# Patient Record
Sex: MALE | Race: WHITE | NOT HISPANIC OR LATINO | Employment: OTHER | URBAN - METROPOLITAN AREA
[De-identification: names, ages, dates, MRNs, and addresses within clinical notes are randomized per-mention and may not be internally consistent; named-entity substitution may affect disease eponyms.]

---

## 2017-08-31 ENCOUNTER — ALLSCRIPTS OFFICE VISIT (OUTPATIENT)
Dept: OTHER | Facility: OTHER | Age: 72
End: 2017-08-31

## 2017-08-31 DIAGNOSIS — R06.2 WHEEZING: ICD-10-CM

## 2017-09-14 ENCOUNTER — HOSPITAL ENCOUNTER (OUTPATIENT)
Dept: RADIOLOGY | Facility: HOSPITAL | Age: 72
Discharge: HOME | End: 2017-09-14
Attending: INTERNAL MEDICINE
Payer: MEDICARE

## 2017-09-14 DIAGNOSIS — R06.2 WHEEZING: ICD-10-CM

## 2017-09-14 PROCEDURE — 71020 HB CHEST X-RAY 2VW FRONTAL&LATL: CPT

## 2017-09-22 ENCOUNTER — ALLSCRIPTS OFFICE VISIT (OUTPATIENT)
Dept: OTHER | Facility: OTHER | Age: 72
End: 2017-09-22

## 2017-10-19 ENCOUNTER — GENERIC CONVERSION - ENCOUNTER (OUTPATIENT)
Dept: OTHER | Facility: OTHER | Age: 72
End: 2017-10-19

## 2017-11-02 ENCOUNTER — ALLSCRIPTS OFFICE VISIT (OUTPATIENT)
Dept: OTHER | Facility: OTHER | Age: 72
End: 2017-11-02

## 2017-11-16 ENCOUNTER — TRANSCRIBE ORDERS (OUTPATIENT)
Dept: ADMINISTRATIVE | Facility: HOSPITAL | Age: 72
End: 2017-11-16

## 2017-11-16 ENCOUNTER — GENERIC CONVERSION - ENCOUNTER (OUTPATIENT)
Dept: OTHER | Facility: OTHER | Age: 72
End: 2017-11-16

## 2017-11-16 ENCOUNTER — APPOINTMENT (OUTPATIENT)
Dept: LAB | Facility: CLINIC | Age: 72
End: 2017-11-16
Payer: MEDICARE

## 2017-11-16 DIAGNOSIS — G47.33 OBSTRUCTIVE SLEEP APNEA: ICD-10-CM

## 2017-11-16 DIAGNOSIS — R06.02 SHORTNESS OF BREATH: ICD-10-CM

## 2017-11-16 DIAGNOSIS — R06.02 SHORTNESS OF BREATH: Primary | ICD-10-CM

## 2017-11-16 PROCEDURE — 36415 COLL VENOUS BLD VENIPUNCTURE: CPT

## 2017-11-16 PROCEDURE — 86003 ALLG SPEC IGE CRUDE XTRC EA: CPT

## 2017-11-16 PROCEDURE — 82785 ASSAY OF IGE: CPT

## 2017-11-17 LAB
A ALTERNATA IGE QN: <0.1 KUA/I
A FUMIGATUS IGE QN: <0.1 KUA/I
ALLERGEN COMMENT: ABNORMAL
BERMUDA GRASS IGE QN: 0.42 KUA/I
BOXELDER IGE QN: <0.1 KUA/I
C HERBARUM IGE QN: <0.1 KUA/I
CAT DANDER IGE QN: <0.1 KUA/I
CMN PIGWEED IGE QN: <0.1 KUA/I
COMMON RAGWEED IGE QN: <0.1 KUA/I
COTTONWOOD IGE QN: <0.1 KUA/I
D FARINAE IGE QN: 0.19 KUA/I
D PTERONYSS IGE QN: 0.23 KUA/I
DOG DANDER IGE QN: <0.1 KUA/I
LONDON PLANE IGE QN: <0.1 KUA/I
MOUSE URINE PROT IGE QN: <0.1 KUA/I
MT JUNIPER IGE QN: <0.1 KUA/I
MUGWORT IGE QN: <0.1 KUA/I
P NOTATUM IGE QN: <0.1 KUA/I
ROACH IGE QN: <0.1 KUA/I
SHEEP SORREL IGE QN: <0.1 KUA/I
SILVER BIRCH IGE QN: 0.83 KUA/I
TIMOTHY IGE QN: 2.09 KUA/I
TOTAL IGE SMQN RAST: 10.8 KU/L (ref 0–113)
WALNUT IGE QN: <0.1 KUA/I
WHITE ASH IGE QN: 0.14 KUA/I
WHITE ELM IGE QN: <0.1 KUA/I
WHITE MULBERRY IGE QN: <0.1 KUA/I
WHITE OAK IGE QN: 0.4 KUA/I

## 2017-12-19 ENCOUNTER — TRANSCRIBE ORDERS (OUTPATIENT)
Dept: SLEEP CENTER | Facility: CLINIC | Age: 72
End: 2017-12-19

## 2017-12-19 DIAGNOSIS — G47.33 OSA (OBSTRUCTIVE SLEEP APNEA): Primary | ICD-10-CM

## 2017-12-22 ENCOUNTER — GENERIC CONVERSION - ENCOUNTER (OUTPATIENT)
Dept: OTHER | Facility: OTHER | Age: 72
End: 2017-12-22

## 2017-12-22 DIAGNOSIS — I10 ESSENTIAL (PRIMARY) HYPERTENSION: ICD-10-CM

## 2018-01-12 VITALS
DIASTOLIC BLOOD PRESSURE: 80 MMHG | HEIGHT: 63 IN | OXYGEN SATURATION: 99 % | WEIGHT: 224 LBS | BODY MASS INDEX: 39.69 KG/M2 | HEART RATE: 80 BPM | SYSTOLIC BLOOD PRESSURE: 130 MMHG

## 2018-01-13 VITALS
HEART RATE: 73 BPM | OXYGEN SATURATION: 93 % | SYSTOLIC BLOOD PRESSURE: 124 MMHG | BODY MASS INDEX: 41.86 KG/M2 | WEIGHT: 236.25 LBS | DIASTOLIC BLOOD PRESSURE: 76 MMHG | HEIGHT: 63 IN

## 2018-01-17 NOTE — CONSULTS
Chief Complaint  Patient here for pre-op clearance  He is having surgery on 11/08/2017 with Dr Liliana Mitchell for [facility] cell carcinoma on his face  History of Present Illness  Pre-Op Visit (Brief): The patient is being seen for a preoperative visit and 80-year-old male presents for preop medical evaluation for upcoming excisions of basal cell carcinoma from the left side of his face, scheduled for November 8, 2017 by Dr Monae Pappas  Surgical Risk Assessment:   Prior Anesthesia: He had prior anesthesia and no prior adverse reaction to general anesthesia  Pertinent Past Medical History: no pertinent past medical history  Exercise Capacity: able to walk four blocks without symptoms and able to walk two flights of stairs without symptoms  Lifestyle Factors: denies alcohol use, denies tobacco use and denies illegal drug use  Symptoms: no symptoms  HPI: Patient reports he has had several basal cell carcinomas removed in his past   Of note also this patient is being sent for pulmonary evaluation for suspected sleep apnea  At this time he denies chest pain, palpitations, shortness of breath, diaphoresis, dizziness, headache, or visual disturbance  Review of Systems    Constitutional: no fever, not feeling poorly, no chills and not feeling tired  Eyes: no eyesight problems  ENT: no earache, no sore throat and no nasal discharge  Cardiovascular: No complaints of slow heart rate, no fast heart rate, no chest pain, no palpitations, no leg claudication, no lower extremity  Respiratory: Patient does note that he has wheezing at night time if he lies on his back or his right side  , but No complaints of shortness of breath, no wheezing, no cough, no SOB on exertion, no orthopnea or PND  Gastrointestinal: No complaints of abdominal pain, no constipation, no nausea or vomiting, no diarrhea or bloody stools     Genitourinary: History of OAB, but No complaints of dysuria, no incontinence, no hesitancy, no nocturia, no genital lesion, no testicular pain  Musculoskeletal: No complaints of arthralgia, no myalgias, no joint swelling or stiffness, no limb pain or swelling  Integumentary: skin lesion, but as noted in HPI  Neurological: no headache, no dizziness and no fainting  Hematologic/Lymphatic: No complaints of swollen glands, no swollen glands in the neck, does not bleed easily, no easy bruising  Active Problems    1  Anxiety (300 00) (F41 9)   2  Back problem (724 9) (M53 9)   3  Basal cell carcinoma of skin (173 91) (C44 91)   4  Benign hypertension (401 1) (I10)   5  GERD (gastroesophageal reflux disease) (530 81) (K21 9)   6  Hyperlipidemia (272 4) (E78 5)   7  Morbid obesity (278 01) (E66 01)   8  OAB (overactive bladder) (596 51) (N32 81)   9  Obstructive sleep apnea (327 23) (G47 33)   10  Screening for colon cancer (V76 51) (Z12 11)   11  Screening for genitourinary condition (V81 6) (Z13 89)   12  Special screening examination for neoplasm of prostate (V76 44) (Z12 5)   13  Urinary urgency (788 63) (R39 15)    Past Medical History    · History of colon polyps (V12 72) (Z86 010)   · History of diverticulosis (V12 79) (Z87 19)   · History of wheezing (V12 69) (X77 868)   · History of Small bowel obstruction, partial (560 9) (K56 600)    The active problems and past medical history were reviewed and updated today  Surgical History    · History of Biopsy Skin   · History of Tonsillectomy With Adenoidectomy    The surgical history was reviewed and updated today  Family History    · Family history of cardiac disorder (V17 49) (Z82 49)   · Family history of hypertension (V17 49) (Z82 49)    · Family history of malignant neoplasm of breast (V16 3) (Z80 3)    · Denied: Family history of mental disorder   · Denied: Family history of substance abuse    The family history was reviewed and updated today         Social History    · Denies alcohol consumption (V49 89) (Z78 9)   · Full-time employment   · Sales   · Has no children   · Denied: History of High risk sexual behavior   ·    · Never smoked cigarettes (V49 89) (W97 1)   · No illicit drug use  The social history was reviewed and updated today  The social history was reviewed and is unchanged  Current Meds   1  ALPRAZolam 0 5 MG Oral Tablet; Take 1 tablet twice daily  Requested for: [id number];   Last Rx:26Oct2017 Ordered   2  Aspirin EC Lo-Dose 81 MG TBEC; Therapy: (Recorded:05May2016) to Recorded   3  B Complex Plus TABS; Therapy: (Recorded:05May2016) to Recorded   4  Chondroitin Complex CAPS; Therapy: (Recorded:05May2016) to Recorded   5  CoQ-10 100 MG Oral Capsule; Therapy: (Recorded:05May2016) to Recorded   6  Fenofibrate 54 MG Oral Tablet; take 1 tablet by mouth daily; Therapy: 75Osp6628 to (Evaluate:10Mar2018)  Requested for: 80Biy4709; Last   Rx:11Sep2017 Ordered   7  Glucosamine Complex Oral Tablet; Therapy: (Recorded:05May2016) to Recorded   8  Metoprolol Tartrate 50 MG Oral Tablet; Therapy: (Recorded:05May2016) to Recorded   9  Multi-Vitamin Oral Tablet; Therapy: (Recorded:05May2016) to Recorded   10  Omeprazole 40 MG CPCR; Therapy: (Recorded:05May2016) to Recorded   11  Probiotic Product TABS; Therapy: (Recorded:05May2016) to Recorded   12  Vitamin D3 1000 UNIT Oral Tablet; Therapy: (Recorded:05May2016) to Recorded    The medication list was reviewed and updated today  Allergies    1  No Known Drug Allergies    Vitals  Signs    Temperature: 97 9 F  Heart Rate: 78  Systolic: 580  Diastolic: 80  Height: 5 ft 2 5 in  Weight: 225 lb   BMI Calculated: 40 5  BSA Calculated: 2 02  O2 Saturation: 93    Physical Exam    Constitutional   General appearance: No acute distress, well appearing and well nourished  morbidly obese  Head and Face   Head and face: Normal     Palpation of the face and sinuses: No sinus tenderness  Eyes   Conjunctiva and lids: No erythema, swelling or discharge      Pupils and irises: Equal, round, reactive to light  Ears, Nose, Mouth, and Throat   External inspection of ears and nose: Normal     Otoscopic examination: Tympanic membranes translucent with normal light reflex  Canals patent without erythema  Hearing: Normal     Nasal mucosa, septum, and turbinates: Abnormal   Nasal septal deviation to the right with blockage of inhalation  Lips, teeth, and gums: Normal, good dentition  Oropharynx: Normal with no erythema, edema, exudate or lesions  Neck   Neck: Supple, symmetric, trachea midline, no masses  Thyroid: Normal, no thyromegaly  Pulmonary   Respiratory effort: No increased work of breathing or signs of respiratory distress  Auscultation of lungs: Clear to auscultation  No forced expiratory wheezing at this time, all fields are clear  Cardiovascular   Auscultation of heart: Normal rate and rhythm, normal S1 and S2, no murmurs  Carotid pulses: 2+ bilaterally  Abdominal aorta: Normal     Femoral pulses: 2+ bilaterally  Pedal pulses: 2+ bilaterally  Examination of extremities for edema and/or varicosities: Normal     Chest   Chest: Normal     Abdomen   Abdomen: Non-tender, no masses  The abdomen was obese  Liver and spleen: No hepatomegaly or splenomegaly  Lymphatic   Palpation of lymph nodes in neck: No lymphadenopathy  Palpation of lymph nodes in axillae: No lymphadenopathy  Palpation of lymph nodes in groin: No lymphadenopathy  Palpation of lymph nodes in other areas: No lymphadenopathy  Musculoskeletal   Gait and station: Normal     Inspection/palpation of digits and nails: Normal without clubbing or cyanosis  Inspection/palpation of joints, bones, and muscles: Normal     Range of motion: Normal     Stability: Normal     Muscle strength/tone: Normal     Skin   Skin and subcutaneous tissue: Normal without rashes or lesions  Examination for skin lesions: Abnormal   2 basal cell lesions present left side of face  Palpation of skin and subcutaneous tissue: Normal turgor  Neurologic   Cranial nerves: Cranial nerves 2-12 intact  Reflexes: 2+ and symmetric  Sensation: No sensory loss  Coordination: Normal finger to nose and heel to shin  Psychiatric   Judgment and insight: Normal     Orientation to person, place and time: Normal     Recent and remote memory: Intact  Mood and affect: Normal        Assessment    1  Preop exam for internal medicine (V72 83) (Z01 818)   2  Basal cell carcinoma of skin (173 91) (C44 91)    Plan  Need for influenza vaccination    · Stop: Fluzone High-Dose 0 5 ML Intramuscular Suspension Prefilled  Syringe  Need for pneumococcal vaccine    · Stop: Prevnar 13 Intramuscular Suspension    Discussion/Summary  Surgical Clearance: He is at a LOW TO MODERATE risk from a cardiovascular standpoint at this time without any additional cardiac testing  Reevaluation needed, if he should present with symptoms prior to surgery/procedure  Surgical clearance faxed to Dr Shiloh Tony  medical exam is acceptable with the exception of obesity and noted deviated nasal septum to the right  EKG reviewed and has been interpreted as normal  CBC and BMP are acceptable  Slight elevation in patient's WBC is not explainable by physical exam  We'll not keep him from having the procedure  Patient has already stopped his aspirin  He was also advised to stop a lot of his supplements at this time prior to surgery  He will take his metoprolol the morning of surgery with a sip of water  PATIENT IS MEDICALLY CLEARED FOR PROPOSED PROCEDURE  The patient was counseled regarding diagnostic results, instructions for management, risk factor reductions, impressions, risks and benefits of treatment options, importance of compliance with treatment  Possible side effects of new medications were reviewed with the patient/guardian today  The treatment plan was reviewed with the patient/guardian   The patient/guardian understands and agrees with the treatment plan     Self Referrals: No      End of Encounter Meds    1  ALPRAZolam 0 5 MG Oral Tablet; Take 1 tablet twice daily  Requested for: [id number];   Last Rx:26Oct2017 Ordered    2  Fenofibrate 54 MG Oral Tablet Ervin Milton); take 1 tablet by mouth daily; Therapy: 60Oss9291 to (Evaluate:10Mar2018)  Requested for: 67Pnh5422; Last   Rx:[id number] Ordered    3  Aspirin EC Lo-Dose 81 MG TBEC; Therapy: (Recorded:05May2016) to Recorded   4  B Complex Plus TABS; Therapy: (Recorded:05May2016) to Recorded   5  Chondroitin Complex CAPS; Therapy: (Recorded:05May2016) to Recorded   6  CoQ-10 100 MG Oral Capsule; Therapy: (Recorded:05May2016) to Recorded   7  Glucosamine Complex Oral Tablet; Therapy: (Recorded:05May2016) to Recorded   8  Metoprolol Tartrate 50 MG Oral Tablet; Therapy: (Recorded:05May2016) to Recorded   9  Multi-Vitamin Oral Tablet; Therapy: (Recorded:05May2016) to Recorded   10  Omeprazole 40 MG CPCR; Therapy: (Recorded:05May2016) to Recorded   11  Probiotic Product TABS; Therapy: (Recorded:05May2016) to Recorded   12  Vitamin D3 1000 UNIT Oral Tablet;     Therapy: (Recorded:05May2016) to Recorded    Signatures   Electronically signed by : [facility]; Nov 2 2017 11:08AM EST                       (Author)    Electronically signed by : Melani Morris MD; Nov 2 2017 11:22AM EST

## 2018-01-22 VITALS
BODY MASS INDEX: 39.87 KG/M2 | HEART RATE: 78 BPM | HEIGHT: 63 IN | TEMPERATURE: 97.9 F | SYSTOLIC BLOOD PRESSURE: 120 MMHG | DIASTOLIC BLOOD PRESSURE: 80 MMHG | OXYGEN SATURATION: 93 % | WEIGHT: 225 LBS

## 2018-01-22 VITALS
WEIGHT: 228.13 LBS | SYSTOLIC BLOOD PRESSURE: 114 MMHG | HEIGHT: 63 IN | DIASTOLIC BLOOD PRESSURE: 80 MMHG | HEART RATE: 77 BPM | OXYGEN SATURATION: 92 % | BODY MASS INDEX: 40.42 KG/M2

## 2018-01-24 VITALS
DIASTOLIC BLOOD PRESSURE: 80 MMHG | HEART RATE: 82 BPM | BODY MASS INDEX: 40.93 KG/M2 | WEIGHT: 231 LBS | SYSTOLIC BLOOD PRESSURE: 122 MMHG | HEIGHT: 63 IN | OXYGEN SATURATION: 92 % | TEMPERATURE: 97.3 F

## 2018-01-31 DIAGNOSIS — F41.9 ANXIETY: Primary | ICD-10-CM

## 2018-01-31 RX ORDER — ALPRAZOLAM 0.5 MG/1
TABLET ORAL
Qty: 60 TABLET | Refills: 1 | OUTPATIENT
Start: 2018-01-31 | End: 2018-03-06 | Stop reason: SDUPTHER

## 2018-02-01 ENCOUNTER — HOSPITAL ENCOUNTER (OUTPATIENT)
Dept: SLEEP CENTER | Facility: CLINIC | Age: 73
Discharge: HOME | End: 2018-02-01
Payer: MEDICARE

## 2018-02-01 DIAGNOSIS — G47.33 OSA (OBSTRUCTIVE SLEEP APNEA): ICD-10-CM

## 2018-02-01 PROCEDURE — 95810 POLYSOM 6/> YRS 4/> PARAM: CPT | Performed by: INTERNAL MEDICINE

## 2018-02-01 PROCEDURE — 95810 POLYSOM 6/> YRS 4/> PARAM: CPT

## 2018-02-02 NOTE — PROGRESS NOTES
Sleep Study Documentation    Pre-Sleep Study       Sleep testing procedure explained to patient:YES    Patient napped prior to study:NO    [address] [place] worker after 12PM   Caffeine use:NO    Alcohol:Dayshift workers after 5PM: Alcohol use:NO    Typical day for patient:NO       Study Documentation    Study type: Diagnostic   Snore: Moderate  Supplemental O2: no    O2 flow rate (L/min) range N/A  O2 flow rate (L/min) final N/A  Minimum SaO2 71%  Baseline SaO2 93%                  Study Terminated: N/A study was completed      Post-Sleep Study    Medication used at bedtime or during sleep study:YES other prescription medications    Patient reports time it took to fall asleep:20 to 30 minutes    Patient reports waking up during study:3 or more times  Patient reports returning to sleep without difficulty  Patient reports sleeping 6 to 8 hours with dreaming      Patient reports sleep during study:better than usual    Patient rated sleepiness: Not sleepy or tired    Post PAP treatment patient reports feeling N/A

## 2018-02-08 ENCOUNTER — TELEPHONE (OUTPATIENT)
Dept: SLEEP CENTER | Facility: CLINIC | Age: 73
End: 2018-02-08

## 2018-02-08 NOTE — TELEPHONE ENCOUNTER
Left voicemail reminder to make appointment for follow up with Dr Tracie Cline for sleep study results

## 2018-02-12 ENCOUNTER — TELEPHONE (OUTPATIENT)
Dept: PULMONOLOGY | Facility: CLINIC | Age: 73
End: 2018-02-12

## 2018-02-15 ENCOUNTER — OFFICE VISIT (OUTPATIENT)
Dept: PULMONOLOGY | Facility: CLINIC | Age: 73
End: 2018-02-15
Payer: MEDICARE

## 2018-02-15 VITALS
WEIGHT: 227 LBS | HEIGHT: 64 IN | OXYGEN SATURATION: 92 % | SYSTOLIC BLOOD PRESSURE: 138 MMHG | DIASTOLIC BLOOD PRESSURE: 80 MMHG | BODY MASS INDEX: 38.76 KG/M2 | HEART RATE: 88 BPM

## 2018-02-15 DIAGNOSIS — G47.33 OBSTRUCTIVE SLEEP APNEA: Primary | ICD-10-CM

## 2018-02-15 DIAGNOSIS — R06.02 SHORTNESS OF BREATH ON EXERTION: ICD-10-CM

## 2018-02-15 PROCEDURE — 99214 OFFICE O/P EST MOD 30 MIN: CPT | Performed by: PHYSICIAN ASSISTANT

## 2018-02-15 RX ORDER — METOPROLOL TARTRATE 50 MG/1
TABLET, FILM COATED ORAL
COMMUNITY
End: 2018-03-06 | Stop reason: SDUPTHER

## 2018-02-15 RX ORDER — PRAVASTATIN SODIUM 80 MG/1
TABLET ORAL
Refills: 3 | COMMUNITY
Start: 2018-01-03 | End: 2019-03-08 | Stop reason: SDUPTHER

## 2018-02-15 RX ORDER — SOLIFENACIN SUCCINATE 5 MG/1
1 TABLET, FILM COATED ORAL DAILY
COMMUNITY
Start: 2016-05-05 | End: 2018-03-06 | Stop reason: SDUPTHER

## 2018-02-15 RX ORDER — ASPIRIN 81 MG/1
TABLET ORAL
COMMUNITY

## 2018-02-15 RX ORDER — OMEPRAZOLE 40 MG/1
CAPSULE, DELAYED RELEASE ORAL
COMMUNITY
Start: 2017-05-03 | End: 2018-03-06 | Stop reason: SDUPTHER

## 2018-02-15 RX ORDER — FENOFIBRATE 54 MG/1
1 TABLET ORAL DAILY
COMMUNITY
Start: 2017-09-11 | End: 2018-03-06 | Stop reason: SDUPTHER

## 2018-02-15 NOTE — PROGRESS NOTES
Assessment:    1  Obstructive sleep apnea     2  Shortness of breath on exertion  CT chest without contrast    Pulmonary function test     PSG: significant for moderate sleep apnea with AHI of 17 4 increasing during REM sleep  Allergy panel - Elevated IgE for dust mites, bermuda grass, nolvia grass, birch, oak, white lito tree       Plan:     Discussed results of sleep study in detail with patient  Explained the pathophysiology of sleep apnea, risks of untreated sleep apnea including arrhythmia, MI, stroke  Spoke about different treatment options including CPAP, mandibular device  He agreed to try CPAP, spoke about the different masks, he can try different ones if he does not like a specific mask  He would like to try the nasal pillows  I told him that there may be some adjustment time in using the CPAP, can increase the time he uses it if he is unable to tolerate it the whole night to begin  Can also use it while resting while awake to get adjusted to using it  He will need to follow up with us within the 1st 90 days, should call us if any issues with the CPAP and not wait until his next visit  Spoke about allergy testing  Allergy to dust mites - told him to put allergy covers on his pillows and mattress, vacuum carpeting frequently and wash sheets frequently  I also reordered the CT chest and PFT that was ordered at prior visit for his SOB/wheezing  He will follow up with us in 3 months or sooner if necessary  25 minute visit was spent counseling patient on results of sleep study and blood work as well as treatment options and follow up  All of patient's questions answered  Subjective:     Patient ID: Jacquelyn Jarvis is a 81 y o  male  Chief Complaint:  Patient is a 82 yo male nonsmoker with history of KADEN not currently on CPAP  He had significant weight gain and was more symptomatic with excessive daytime sleepiness, witnessed apneic episodes, impaired concentration   He was also have cough and wheezing at night  He is here today for follow up  He had the sleep study and allergy testing done, did not have the CT scan or PFT done  He is here to discuss results  Review of Systems   Constitutional: Negative  Tired   HENT: Negative  Respiratory: Positive for cough and shortness of breath  Cardiovascular: Negative  Gastrointestinal: Negative  Genitourinary: Negative  Musculoskeletal: Negative  Skin: Negative  Allergic/Immunologic: Negative  Neurological: Negative  Psychiatric/Behavioral: Negative          Physical Exam

## 2018-02-22 ENCOUNTER — HOSPITAL ENCOUNTER (OUTPATIENT)
Dept: CT IMAGING | Facility: HOSPITAL | Age: 73
Discharge: HOME | End: 2018-02-22
Payer: MEDICARE

## 2018-02-22 ENCOUNTER — HOSPITAL ENCOUNTER (OUTPATIENT)
Dept: PULMONOLOGY | Facility: HOSPITAL | Age: 73
Discharge: HOME | End: 2018-02-22
Payer: MEDICARE

## 2018-02-22 DIAGNOSIS — R06.02 SHORTNESS OF BREATH ON EXERTION: ICD-10-CM

## 2018-02-22 PROCEDURE — 94060 EVALUATION OF WHEEZING: CPT | Performed by: INTERNAL MEDICINE

## 2018-02-22 PROCEDURE — 94729 DIFFUSING CAPACITY: CPT | Performed by: INTERNAL MEDICINE

## 2018-02-22 PROCEDURE — 94726 PLETHYSMOGRAPHY LUNG VOLUMES: CPT | Performed by: INTERNAL MEDICINE

## 2018-02-22 PROCEDURE — 94729 DIFFUSING CAPACITY: CPT

## 2018-02-22 PROCEDURE — 94060 EVALUATION OF WHEEZING: CPT

## 2018-02-22 PROCEDURE — 94760 N-INVAS EAR/PLS OXIMETRY 1: CPT

## 2018-02-22 PROCEDURE — 94727 GAS DIL/WSHOT DETER LNG VOL: CPT

## 2018-02-22 PROCEDURE — 71250 CT THORAX DX C-: CPT

## 2018-02-22 RX ORDER — ALBUTEROL SULFATE 2.5 MG/3ML
2.5 SOLUTION RESPIRATORY (INHALATION) ONCE
Status: COMPLETED | OUTPATIENT
Start: 2018-02-22 | End: 2018-02-22

## 2018-02-22 RX ADMIN — ALBUTEROL SULFATE 2.5 MG: 2.5 SOLUTION RESPIRATORY (INHALATION) at 13:31

## 2018-03-06 ENCOUNTER — TELEPHONE (OUTPATIENT)
Dept: INTERNAL MEDICINE CLINIC | Facility: CLINIC | Age: 73
End: 2018-03-06

## 2018-03-06 DIAGNOSIS — K21.9 GASTROESOPHAGEAL REFLUX DISEASE WITHOUT ESOPHAGITIS: ICD-10-CM

## 2018-03-06 DIAGNOSIS — F41.9 ANXIETY: Primary | ICD-10-CM

## 2018-03-06 DIAGNOSIS — I10 BENIGN HYPERTENSION: ICD-10-CM

## 2018-03-06 DIAGNOSIS — E78.2 MIXED HYPERLIPIDEMIA: ICD-10-CM

## 2018-03-06 DIAGNOSIS — N32.81 OAB (OVERACTIVE BLADDER): ICD-10-CM

## 2018-03-06 PROBLEM — J34.2 DEVIATED NASAL SEPTUM: Status: ACTIVE | Noted: 2017-11-02

## 2018-03-06 PROBLEM — E66.01 MORBID OBESITY (HCC): Status: ACTIVE | Noted: 2017-09-22

## 2018-03-06 PROBLEM — E78.5 HYPERLIPIDEMIA: Status: ACTIVE | Noted: 2017-08-31

## 2018-03-06 PROBLEM — C44.319 BASAL CELL CARCINOMA OF CHEEK: Status: ACTIVE | Noted: 2017-11-16

## 2018-03-06 RX ORDER — FENOFIBRATE 54 MG/1
54 TABLET ORAL DAILY
Qty: 90 TABLET | Refills: 1 | Status: SHIPPED | OUTPATIENT
Start: 2018-03-06 | End: 2018-07-27 | Stop reason: SDUPTHER

## 2018-03-06 RX ORDER — METOPROLOL TARTRATE 50 MG/1
50 TABLET, FILM COATED ORAL EVERY 12 HOURS SCHEDULED
Qty: 180 TABLET | Refills: 1 | Status: SHIPPED | OUTPATIENT
Start: 2018-03-06 | End: 2018-03-09 | Stop reason: SDUPTHER

## 2018-03-06 RX ORDER — SOLIFENACIN SUCCINATE 5 MG/1
5 TABLET, FILM COATED ORAL DAILY
Qty: 90 TABLET | Refills: 1 | Status: SHIPPED | OUTPATIENT
Start: 2018-03-06 | End: 2018-08-15 | Stop reason: SDUPTHER

## 2018-03-06 RX ORDER — OMEPRAZOLE 40 MG/1
40 CAPSULE, DELAYED RELEASE ORAL DAILY
Qty: 90 CAPSULE | Refills: 1 | Status: SHIPPED | OUTPATIENT
Start: 2018-03-06 | End: 2018-11-18 | Stop reason: SDUPTHER

## 2018-03-06 RX ORDER — ALPRAZOLAM 0.5 MG/1
0.5 TABLET ORAL 2 TIMES DAILY
Qty: 60 TABLET | Refills: 2 | OUTPATIENT
Start: 2018-03-06 | End: 2018-04-06 | Stop reason: SDUPTHER

## 2018-03-06 NOTE — TELEPHONE ENCOUNTER
Pt called asking for refills to be sent to [facility], he just signed up for it       Refills for:    Alprazolam 0 5mg  Fenofibrate 54mg  Vesicare 5mg   Lopressor 50mg  Omeprazole 40mg        He also provided his plan and ID in case its needed    Plan [id number]  ID [id number]

## 2018-03-08 NOTE — TELEPHONE ENCOUNTER
He would like it sent to his [address] on file  (2nd one)      He also had a question about one of his medications  He received one for metoprolol tartate 2x daily and he states he normally get the metorporpal succinate ER 1x daily  He wanted to know if this was an error or a specific change

## 2018-03-08 NOTE — TELEPHONE ENCOUNTER
Left message for patient to call me back so I can ask which pharmacy he would like the meds called into

## 2018-03-09 RX ORDER — METOPROLOL SUCCINATE 50 MG/1
50 TABLET, EXTENDED RELEASE ORAL DAILY
Qty: 90 TABLET | Refills: 1 | Status: SHIPPED | OUTPATIENT
Start: 2018-03-09 | End: 2018-06-14 | Stop reason: SDUPTHER

## 2018-03-09 RX ORDER — METOPROLOL SUCCINATE 50 MG/1
TABLET, EXTENDED RELEASE ORAL
Refills: 1 | COMMUNITY
Start: 2017-12-04 | End: 2018-03-09 | Stop reason: SDUPTHER

## 2018-03-09 NOTE — TELEPHONE ENCOUNTER
Pt and his wife wanted additional clarification  Xander's wife stated that she doesn't understand why on the succinate its 50mg but with the tartrate it is 2x 50mg = 100mg  I also informed her that the tartrate is what we had in Xander's chart which is why that was refilled but it is the same medication as the succinate just not Er  When asked which he preferred he stated he cannot make that decision that is up to you  Please advise

## 2018-03-09 NOTE — TELEPHONE ENCOUNTER
Called patient and gave him the advised information per Rh  Patient is in agreement with this  Will call back with any questions or concerns

## 2018-04-06 DIAGNOSIS — F41.9 ANXIETY: ICD-10-CM

## 2018-04-06 NOTE — TELEPHONE ENCOUNTER
Patient called and said that he needs a refill on His Alprazolam 60 day supply to the pharmacy in chart

## 2018-04-09 RX ORDER — ALPRAZOLAM 0.5 MG/1
0.5 TABLET ORAL 2 TIMES DAILY
Qty: 60 TABLET | Refills: 1 | OUTPATIENT
Start: 2018-04-09 | End: 2018-05-09 | Stop reason: SDUPTHER

## 2018-04-10 NOTE — TELEPHONE ENCOUNTER
Phoned into [facility] pharmacy because Ralph Kail needed it soon  Normally he does want the [facility] and he stated in the future he will call a week ahead of time

## 2018-04-12 ENCOUNTER — APPOINTMENT (OUTPATIENT)
Dept: LAB | Facility: CLINIC | Age: 73
End: 2018-04-12
Payer: MEDICARE

## 2018-04-12 ENCOUNTER — TRANSCRIBE ORDERS (OUTPATIENT)
Dept: LAB | Facility: CLINIC | Age: 73
End: 2018-04-12

## 2018-04-12 DIAGNOSIS — I10 ESSENTIAL (PRIMARY) HYPERTENSION: ICD-10-CM

## 2018-04-12 LAB
ALBUMIN SERPL BCP-MCNC: 4 G/DL (ref 3.5–5)
ALP SERPL-CCNC: 51 U/L (ref 46–116)
ALT SERPL W P-5'-P-CCNC: 38 U/L (ref 12–78)
ANION GAP SERPL CALCULATED.3IONS-SCNC: 7 MMOL/L (ref 4–13)
AST SERPL W P-5'-P-CCNC: 24 U/L (ref 5–45)
BASOPHILS # BLD AUTO: 0.04 THOUSANDS/ΜL (ref 0–0.1)
BASOPHILS NFR BLD AUTO: 1 % (ref 0–1)
BILIRUB SERPL-MCNC: 0.73 MG/DL (ref 0.2–1)
BUN SERPL-MCNC: 17 MG/DL (ref 5–25)
CALCIUM SERPL-MCNC: 8.9 MG/DL
CHLORIDE SERPL-SCNC: 105 MMOL/L (ref 100–108)
CHOLEST SERPL-MCNC: 145 MG/DL (ref 50–200)
CO2 SERPL-SCNC: 27 MMOL/L (ref 21–32)
CREAT SERPL-MCNC: 1.21 MG/DL (ref 0.6–1.3)
EOSINOPHIL # BLD AUTO: 0.15 THOUSAND/ΜL (ref 0–0.61)
EOSINOPHIL NFR BLD AUTO: 2 % (ref 0–6)
ERYTHROCYTE [DISTWIDTH] IN BLOOD BY AUTOMATED COUNT: 13.9 % (ref 11.6–15.1)
GFR SERPL CREATININE-BSD FRML MDRD: 60 ML/MIN/1.73SQ M
GLUCOSE P FAST SERPL-MCNC: 121 MG/DL (ref 65–99)
HCT VFR BLD AUTO: 48.3 % (ref 36.5–49.3)
HDLC SERPL-MCNC: 42 MG/DL (ref 40–60)
HGB BLD-MCNC: 16.9 G/DL (ref 12–17)
LDLC SERPL CALC-MCNC: 77 MG/DL (ref 0–100)
LYMPHOCYTES # BLD AUTO: 2 THOUSANDS/ΜL (ref 0.6–4.47)
LYMPHOCYTES NFR BLD AUTO: 25 % (ref 14–44)
MCH RBC QN AUTO: 30.9 PG (ref 26.8–34.3)
MCHC RBC AUTO-ENTMCNC: 35 G/DL (ref 31.4–37.4)
MCV RBC AUTO: 88 FL (ref 82–98)
MONOCYTES # BLD AUTO: 0.7 THOUSAND/ΜL (ref 0.17–1.22)
MONOCYTES NFR BLD AUTO: 9 % (ref 4–12)
NEUTROPHILS # BLD AUTO: 5.15 THOUSANDS/ΜL (ref 1.85–7.62)
NEUTS SEG NFR BLD AUTO: 63 % (ref 43–75)
NONHDLC SERPL-MCNC: 103 MG/DL
NRBC BLD AUTO-RTO: 0 /100 WBCS
PLATELET # BLD AUTO: 236 THOUSANDS/UL (ref 149–390)
PMV BLD AUTO: 10.6 FL (ref 8.9–12.7)
POTASSIUM SERPL-SCNC: 3.8 MMOL/L (ref 3.5–5.3)
PROT SERPL-MCNC: 7.6 G/DL (ref 6.4–8.2)
RBC # BLD AUTO: 5.47 MILLION/UL (ref 3.88–5.62)
SODIUM SERPL-SCNC: 139 MMOL/L (ref 136–145)
TRIGL SERPL-MCNC: 130 MG/DL
WBC # BLD AUTO: 8.06 THOUSAND/UL (ref 4.31–10.16)

## 2018-04-12 PROCEDURE — 85025 COMPLETE CBC W/AUTO DIFF WBC: CPT

## 2018-04-12 PROCEDURE — 80053 COMPREHEN METABOLIC PANEL: CPT

## 2018-04-12 PROCEDURE — 80061 LIPID PANEL: CPT

## 2018-04-27 ENCOUNTER — OFFICE VISIT (OUTPATIENT)
Dept: INTERNAL MEDICINE CLINIC | Facility: CLINIC | Age: 73
End: 2018-04-27
Payer: MEDICARE

## 2018-04-27 VITALS
SYSTOLIC BLOOD PRESSURE: 128 MMHG | OXYGEN SATURATION: 91 % | BODY MASS INDEX: 39.44 KG/M2 | DIASTOLIC BLOOD PRESSURE: 72 MMHG | HEART RATE: 84 BPM | WEIGHT: 231 LBS | HEIGHT: 64 IN

## 2018-04-27 DIAGNOSIS — E78.2 MIXED HYPERLIPIDEMIA: ICD-10-CM

## 2018-04-27 DIAGNOSIS — M54.50 ACUTE BILATERAL LOW BACK PAIN WITHOUT SCIATICA: ICD-10-CM

## 2018-04-27 DIAGNOSIS — I10 BENIGN HYPERTENSION: Primary | ICD-10-CM

## 2018-04-27 DIAGNOSIS — R73.01 IMPAIRED FASTING GLUCOSE: ICD-10-CM

## 2018-04-27 DIAGNOSIS — G47.33 OBSTRUCTIVE SLEEP APNEA: ICD-10-CM

## 2018-04-27 PROCEDURE — 99214 OFFICE O/P EST MOD 30 MIN: CPT | Performed by: INTERNAL MEDICINE

## 2018-04-27 RX ORDER — BIOTIN 1 MG
TABLET ORAL
COMMUNITY

## 2018-04-27 RX ORDER — MELOXICAM 15 MG/1
15 TABLET ORAL DAILY
Qty: 30 TABLET | Refills: 5 | Status: SHIPPED | OUTPATIENT
Start: 2018-04-27 | End: 2018-04-27 | Stop reason: SDUPTHER

## 2018-04-27 RX ORDER — MELOXICAM 15 MG/1
15 TABLET ORAL DAILY
Qty: 30 TABLET | Refills: 3 | Status: SHIPPED | OUTPATIENT
Start: 2018-04-27 | End: 2018-04-27 | Stop reason: SDUPTHER

## 2018-04-27 RX ORDER — MELOXICAM 15 MG/1
TABLET ORAL
Qty: 90 TABLET | Refills: 1 | Status: SHIPPED | OUTPATIENT
Start: 2018-04-27 | End: 2019-03-01 | Stop reason: ALTCHOICE

## 2018-04-27 NOTE — PROGRESS NOTES
Assessment/Plan:     Chronic problems are stable  Continue current medications  Continue working on diet and exercise  We will try and anti-inflammatory for his back  But if persists, suggested a chiropractor  Also suggested stretching exercises  Followup scheduled per orders  No problem-specific Assessment & Plan notes found for this encounter  Diagnoses and all orders for this visit:    Benign hypertension  -     CBC and differential; Future  -     Comprehensive metabolic panel; Future  -     Lipid panel; Future    Mixed hyperlipidemia    Impaired fasting glucose  -     HEMOGLOBIN A1C W/ EAG ESTIMATION; Future    Acute bilateral low back pain without sciatica  -     Discontinue: meloxicam (MOBIC) 15 mg tablet; Take 1 tablet (15 mg total) by mouth daily  -     meloxicam (MOBIC) 15 mg tablet; Take 1 tablet (15 mg total) by mouth daily    Obstructive sleep apnea    Other orders  -     Coenzyme Q10 (COQ-10) 100 MG CAPS; Take by mouth  -     Multiple Vitamin (MULTI-VITAMIN DAILY PO); Take by mouth  -     Cholecalciferol (VITAMIN D3) 1000 units CAPS; Take by mouth          Subjective:      Patient ID: Evangelina Lopez is a 81 y o  male  Patient comes in today for routine follow-up  His blood pressures is controlled  Cholesterol is controlled  His sugar was a little high in this blood work  Everyone has been less active with the winter we have had with multiple snow storms  He is quite happy with the sleep apnea with the CPAP machine  He states he has more energy and on his days off is doing a lot of yd work  However, this has caused problems with his lower back  He used to be episodic but is more constant now with the increased activity  Before, Aleve used to help  But that no longer helps  No pain down the legs          ALLERGIES:  No Known Allergies    CURRENT MEDICATIONS:    Current Outpatient Prescriptions:     ALPRAZolam (XANAX) 0 5 mg tablet, Take 1 tablet (0 5 mg total) by mouth 2 (two) times a day, Disp: 60 tablet, Rfl: 1    aspirin (ASPIRIN LOW DOSE) 81 mg EC tablet, Take by mouth, Disp: , Rfl:     B Complex-Folic Acid (B COMPLEX PLUS) TABS, Take by mouth, Disp: , Rfl:     Cholecalciferol (VITAMIN D3) 1000 units CAPS, Take by mouth, Disp: , Rfl:     Chondroitin Sulfate-Vit C-Mn (CHONDROITIN SULFATE COMPLEX) 400-60-2 5 MG CAPS, Take by mouth, Disp: , Rfl:     Coenzyme Q10 (COQ-10) 100 MG CAPS, Take by mouth, Disp: , Rfl:     fenofibrate (TRICOR) 54 MG tablet, Take 1 tablet (54 mg total) by mouth daily, Disp: 90 tablet, Rfl: 1    metoprolol succinate (TOPROL-XL) 50 mg 24 hr tablet, Take 1 tablet (50 mg total) by mouth daily, Disp: 90 tablet, Rfl: 1    Multiple Vitamin (MULTI-VITAMIN DAILY PO), Take by mouth, Disp: , Rfl:     omeprazole (PriLOSEC) 40 MG capsule, Take 1 capsule (40 mg total) by mouth daily for 180 days, Disp: 90 capsule, Rfl: 1    pravastatin (PRAVACHOL) 80 mg tablet, TK 1 T PO QD, Disp: , Rfl: 3    solifenacin (VESICARE) 5 mg tablet, Take 1 tablet (5 mg total) by mouth daily, Disp: 90 tablet, Rfl: 1    meloxicam (MOBIC) 15 mg tablet, Take 1 tablet (15 mg total) by mouth daily, Disp: 30 tablet, Rfl: 5    ACTIVE PROBLEM LIST:  Patient Active Problem List   Diagnosis    Shortness of breath on exertion    Obstructive sleep apnea    Anxiety    Basal cell carcinoma of cheek    Basal cell carcinoma of skin    Benign hypertension    Deviated nasal septum    GERD (gastroesophageal reflux disease)    Hyperlipidemia    Morbid obesity (HCC)    OAB (overactive bladder)    Impaired fasting glucose       PAST MEDICAL HISTORY:  Past Medical History:   Diagnosis Date    Colon polyps     DR Sherly Christianson    Diverticulosis     Small bowel obstruction, partial (HCC)        PAST SURGICAL HISTORY:  Past Surgical History:   Procedure Laterality Date    SKIN BIOPSY      TONSILLECTOMY AND ADENOIDECTOMY         FAMILY HISTORY:  Family History   Problem Relation Age of Onset    Heart disease Mother     Hypertension Mother     Breast cancer Sister        SOCIAL HISTORY:  Social History     Social History    Marital status: Single     Spouse name: N/A    Number of children: 0    Years of education: N/A     Occupational History    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      Social History Main Topics    Smoking status: Never Smoker    Smokeless tobacco: Not on file    Alcohol use No    Drug use: No    Sexual activity: Not on file      Comment: DENIED: HISTORY OF HIGH RISK SEXUAL BEHAVIOR      Other Topics Concern    Not on file     Social History Narrative     as per all scripts        Review of Systems   Respiratory: Negative for shortness of breath  Cardiovascular: Negative for chest pain  Gastrointestinal: Negative for abdominal pain  Objective:  Vitals:    04/27/18 0812   BP: 128/72   BP Location: Left arm   Patient Position: Sitting   Pulse: 84   SpO2: 91%   Weight: 105 kg (231 lb)   Height: 5' 4" (1 626 m)        Physical Exam   Constitutional: He is oriented to person, place, and time  He appears well-developed and well-nourished  Cardiovascular: Normal rate, regular rhythm and normal heart sounds  Pulmonary/Chest: Effort normal and breath sounds normal    Abdominal: Soft  Bowel sounds are normal    Musculoskeletal: He exhibits no edema  Neurological: He is alert and oriented to person, place, and time  Nursing note and vitals reviewed          RESULTS:    Recent Results (from the past 1008 hour(s))   CBC and differential    Collection Time: 04/12/18  9:45 AM   Result Value Ref Range    WBC 8 06 4 31 - 10 16 Thousand/uL    RBC 5 47 3 88 - 5 62 Million/uL    Hemoglobin 16 9 12 0 - 17 0 g/dL    Hematocrit 48 3 36 5 - 49 3 %    MCV 88 82 - 98 fL    MCH 30 9 26 8 - 34 3 pg    MCHC 35 0 31 4 - 37 4 g/dL    RDW 13 9 11 6 - 15 1 %    MPV 10 6 8 9 - 12 7 fL    Platelets 230 280 - 845 Thousands/uL    nRBC 0 /100 WBCs    Neutrophils Relative 63 43 - 75 %    Lymphocytes Relative 25 14 - 44 %    Monocytes Relative 9 4 - 12 %    Eosinophils Relative 2 0 - 6 %    Basophils Relative 1 0 - 1 %    Neutrophils Absolute 5 15 1 85 - 7 62 Thousands/µL    Lymphocytes Absolute 2 00 0 60 - 4 47 Thousands/µL    Monocytes Absolute 0 70 0 17 - 1 22 Thousand/µL    Eosinophils Absolute 0 15 0 00 - 0 61 Thousand/µL    Basophils Absolute 0 04 0 00 - 0 10 Thousands/µL   Comprehensive metabolic panel    Collection Time: 04/12/18  9:45 AM   Result Value Ref Range    Sodium 139 136 - 145 mmol/L    Potassium 3 8 3 5 - 5 3 mmol/L    Chloride 105 100 - 108 mmol/L    CO2 27 21 - 32 mmol/L    Anion Gap 7 4 - 13 mmol/L    BUN 17 5 - 25 mg/dL    Creatinine 1 21 0 60 - 1 30 mg/dL    Glucose, Fasting 121 (H) 65 - 99 mg/dL    Calcium 8 9 mg/dL    AST 24 5 - 45 U/L    ALT 38 12 - 78 U/L    Alkaline Phosphatase 51 46 - 116 U/L    Total Protein 7 6 6 4 - 8 2 g/dL    Albumin 4 0 3 5 - 5 0 g/dL    Total Bilirubin 0 73 0 20 - 1 00 mg/dL    eGFR 60 ml/min/1 73sq m   Lipid panel    Collection Time: 04/12/18  9:45 AM   Result Value Ref Range    Cholesterol 145 50 - 200 mg/dL    Triglycerides 130 <=150 mg/dL    HDL, Direct 42 40 - 60 mg/dL    LDL Calculated 77 0 - 100 mg/dL    Non-HDL-Chol (CHOL-HDL) 103 mg/dl       This note was created with voice recognition software  Phonic, grammatical and spelling errors may be present within the note as a result

## 2018-05-09 DIAGNOSIS — F41.9 ANXIETY: ICD-10-CM

## 2018-05-09 RX ORDER — ALPRAZOLAM 0.5 MG/1
0.5 TABLET ORAL 2 TIMES DAILY
Qty: 60 TABLET | Refills: 2 | Status: SHIPPED | OUTPATIENT
Start: 2018-05-09 | End: 2018-08-02 | Stop reason: SDUPTHER

## 2018-05-09 NOTE — TELEPHONE ENCOUNTER
Patient needs a refill on his alprazolam 0 5mg  He needs 60 tablets  Patient is almost out of med  [facility]

## 2018-05-17 ENCOUNTER — OFFICE VISIT (OUTPATIENT)
Dept: PULMONOLOGY | Facility: CLINIC | Age: 73
End: 2018-05-17
Payer: MEDICARE

## 2018-05-17 VITALS
BODY MASS INDEX: 39.61 KG/M2 | OXYGEN SATURATION: 94 % | HEIGHT: 64 IN | SYSTOLIC BLOOD PRESSURE: 130 MMHG | DIASTOLIC BLOOD PRESSURE: 70 MMHG | WEIGHT: 232 LBS | HEART RATE: 64 BPM

## 2018-05-17 DIAGNOSIS — G47.33 OSA ON CPAP: Primary | ICD-10-CM

## 2018-05-17 DIAGNOSIS — R93.89 ABNORMAL CT OF THE CHEST: ICD-10-CM

## 2018-05-17 DIAGNOSIS — R05.3 CHRONIC COUGH: ICD-10-CM

## 2018-05-17 DIAGNOSIS — Z99.89 OSA ON CPAP: Primary | ICD-10-CM

## 2018-05-17 PROCEDURE — 99214 OFFICE O/P EST MOD 30 MIN: CPT | Performed by: PHYSICIAN ASSISTANT

## 2018-05-17 NOTE — PROGRESS NOTES
Assessment:    1  KADEN on CPAP     2  Chronic cough     3  Abnormal CT of the chest  CT chest without contrast       Plan: 81 y o  male never smoker with significant PMH of KADEN, GERD, HTN, obesity who presents for follow-up of KADEN on CPAP  1   KADEN on CPAP - patient has been doing very well with the CPAP machine  Compliance report with 93 3% compliance  AHI of 2 1  On auto CPAP  No adjustments needed  Continue to use CPAP nightly  2   Cough, chronic -patient has been complaining of cough for the last year, worse at night  CT of chest was positive for very mild tree-in-bud opacity  Patient denies any mucus production/shortness of breath  He denies any fever, chills, chest pain  Will plan on repeat CT of chest in 6 months from original CT  Discussed etiologies of cough including postnasal drip, allergies, GERD  Patient will try to adhere to a better diet to prevent his acid reflux  He will continue to use omeprazole  He will consider addition of nasal sprays if this does not work  Subjective:     Patient ID: Natasha Landeros is a 81 y o  male  Chief Complaint: cough    HPI  81 y o  male never smoker with significant PMH of KADEN, GERD, HTN, obesity who presents for follow-up of KADEN  Patient was started on the CPAP machine over the last couple months  He states he has been doing very well  He feels more refreshed when he uses the machine  He also states that is acid reflux has gotten better at night  He does continue to complain of cough  Patient states this is been going on for the last year  Denies any significant mucus production  States it is worse at night  Does admit to having acid reflux throughout the day for which he takes omeprazole  He does not adhere to a diet to prevent GERD  Denies any significant allergies  However, 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, cows, chickens, pigs, etc   Patient had CT of chest and PFTs completed in the last couple months        Review of Systems  Review of Systems   Constitution: Negative for chills, decreased appetite, fever, malaise/fatigue and weight gain  HENT: Negative for congestion  Eyes: Negative for visual disturbance  Cardiovascular: Negative for dyspnea on exertion, leg swelling and orthopnea  Respiratory: Positive for cough and wheezing  Negative for shortness of breath and sleep disturbances due to breathing  Hematologic/Lymphatic: Negative for adenopathy  Skin: Negative for rash  Musculoskeletal: Negative for muscle weakness  Gastrointestinal: Negative for abdominal pain, diarrhea, nausea and vomiting  Neurological: Negative for excessive daytime sleepiness  Psychiatric/Behavioral: Negative for altered mental status and hallucinations  Allergic/Immunologic: Negative for environmental allergies  Objective:  /70   Pulse 64   Ht 5' 4" (1 626 m)   Wt 105 kg (232 lb)   SpO2 94%   BMI 39 82 kg/m²     Physical Exam   Constitutional: He is oriented to person, place, and time  He appears well-developed  No distress  Obese   HENT:   Head: Normocephalic and atraumatic  Neck: Normal range of motion  Cardiovascular: Normal rate and regular rhythm  Pulmonary/Chest: Effort normal and breath sounds normal    Abdominal: Soft  There is no tenderness  Musculoskeletal: Normal range of motion  He exhibits no edema or tenderness  Lymphadenopathy:     He has no cervical adenopathy  Neurological: He is alert and oriented to person, place, and time  Skin: Skin is warm and dry  He is not diaphoretic  Psychiatric: He has a normal mood and affect  His behavior is normal    Nursing note and vitals reviewed  Lab/Image Review: Reviewed all pertinent labs/radiology results       Past Medical History:   Diagnosis Date    Colon polyps     DR Sandi Germain    Diverticulosis     Obesity     KADEN (obstructive sleep apnea)     Small bowel obstruction, partial (HCC)     SOB (shortness of breath)        Social History   Substance Use Topics    Smoking status: Never Smoker    Smokeless tobacco: Never Used    Alcohol use No       Family History   Problem Relation Age of Onset    Heart disease Mother     Hypertension Mother     Breast cancer Sister        Patient Active Problem List   Diagnosis    Shortness of breath on exertion    KADEN on CPAP    Anxiety    Basal cell carcinoma of cheek    Basal cell carcinoma of skin    Benign hypertension    Deviated nasal septum    GERD (gastroesophageal reflux disease)    Hyperlipidemia    Morbid obesity (HCC)    OAB (overactive bladder)    Impaired fasting glucose

## 2018-06-14 DIAGNOSIS — I10 BENIGN HYPERTENSION: ICD-10-CM

## 2018-06-14 RX ORDER — METOPROLOL SUCCINATE 50 MG/1
50 TABLET, EXTENDED RELEASE ORAL DAILY
Qty: 90 TABLET | Refills: 0 | Status: SHIPPED | OUTPATIENT
Start: 2018-06-14 | End: 2018-08-02 | Stop reason: SDUPTHER

## 2018-07-27 DIAGNOSIS — E78.2 MIXED HYPERLIPIDEMIA: ICD-10-CM

## 2018-07-27 RX ORDER — FENOFIBRATE 54 MG/1
TABLET ORAL
Qty: 90 TABLET | Refills: 1 | Status: SHIPPED | OUTPATIENT
Start: 2018-07-27 | End: 2018-12-07 | Stop reason: SDUPTHER

## 2018-08-02 DIAGNOSIS — I10 BENIGN HYPERTENSION: ICD-10-CM

## 2018-08-02 DIAGNOSIS — F41.9 ANXIETY: ICD-10-CM

## 2018-08-02 RX ORDER — ALPRAZOLAM 0.5 MG/1
0.5 TABLET ORAL 2 TIMES DAILY
Qty: 60 TABLET | Refills: 2 | Status: SHIPPED | OUTPATIENT
Start: 2018-08-02 | End: 2018-11-07 | Stop reason: SDUPTHER

## 2018-08-02 RX ORDER — METOPROLOL SUCCINATE 50 MG/1
TABLET, EXTENDED RELEASE ORAL
Qty: 90 TABLET | Refills: 1 | Status: SHIPPED | OUTPATIENT
Start: 2018-08-02 | End: 2018-12-07 | Stop reason: SDUPTHER

## 2018-08-09 DIAGNOSIS — F41.9 ANXIETY: ICD-10-CM

## 2018-08-09 RX ORDER — ALPRAZOLAM 0.5 MG/1
TABLET ORAL
Qty: 60 TABLET | Refills: 0 | OUTPATIENT
Start: 2018-08-09

## 2018-08-09 NOTE — TELEPHONE ENCOUNTER
[place] was queried today  Patient had filled prescription on 08/03/2018, too soon to refill prescription

## 2018-08-15 DIAGNOSIS — N32.81 OAB (OVERACTIVE BLADDER): ICD-10-CM

## 2018-08-15 RX ORDER — SOLIFENACIN SUCCINATE 5 MG/1
TABLET, FILM COATED ORAL
Qty: 90 TABLET | Refills: 1 | Status: SHIPPED | OUTPATIENT
Start: 2018-08-15 | End: 2019-03-08 | Stop reason: SDUPTHER

## 2018-08-17 ENCOUNTER — HOSPITAL ENCOUNTER (OUTPATIENT)
Dept: CT IMAGING | Facility: HOSPITAL | Age: 73
Discharge: HOME | End: 2018-08-17
Payer: MEDICARE

## 2018-08-17 DIAGNOSIS — R93.89 ABNORMAL CT OF THE CHEST: ICD-10-CM

## 2018-08-17 PROCEDURE — 71250 CT THORAX DX C-: CPT

## 2018-08-24 ENCOUNTER — TRANSCRIBE ORDERS (OUTPATIENT)
Dept: LAB | Facility: CLINIC | Age: 73
End: 2018-08-24

## 2018-08-24 ENCOUNTER — APPOINTMENT (OUTPATIENT)
Dept: LAB | Facility: CLINIC | Age: 73
End: 2018-08-24
Payer: MEDICARE

## 2018-08-24 DIAGNOSIS — R73.01 IMPAIRED FASTING GLUCOSE: ICD-10-CM

## 2018-08-24 DIAGNOSIS — I10 BENIGN HYPERTENSION: ICD-10-CM

## 2018-08-24 LAB
ALBUMIN SERPL BCP-MCNC: 4.1 G/DL (ref 3.5–5)
ALP SERPL-CCNC: 51 U/L (ref 46–116)
ALT SERPL W P-5'-P-CCNC: 37 U/L (ref 12–78)
ANION GAP SERPL CALCULATED.3IONS-SCNC: 9 MMOL/L (ref 4–13)
AST SERPL W P-5'-P-CCNC: 24 U/L (ref 5–45)
BASOPHILS # BLD AUTO: 0.05 THOUSANDS/ΜL (ref 0–0.1)
BASOPHILS NFR BLD AUTO: 1 % (ref 0–1)
BILIRUB SERPL-MCNC: 0.42 MG/DL (ref 0.2–1)
BUN SERPL-MCNC: 24 MG/DL (ref 5–25)
CALCIUM SERPL-MCNC: 9.2 MG/DL (ref 8.3–10.1)
CHLORIDE SERPL-SCNC: 107 MMOL/L (ref 100–108)
CHOLEST SERPL-MCNC: 145 MG/DL (ref 50–200)
CO2 SERPL-SCNC: 24 MMOL/L (ref 21–32)
CREAT SERPL-MCNC: 1.34 MG/DL (ref 0.6–1.3)
EOSINOPHIL # BLD AUTO: 0.14 THOUSAND/ΜL (ref 0–0.61)
EOSINOPHIL NFR BLD AUTO: 2 % (ref 0–6)
ERYTHROCYTE [DISTWIDTH] IN BLOOD BY AUTOMATED COUNT: 13.7 % (ref 11.6–15.1)
GFR SERPL CREATININE-BSD FRML MDRD: 53 ML/MIN/1.73SQ M
GLUCOSE P FAST SERPL-MCNC: 106 MG/DL (ref 65–99)
HCT VFR BLD AUTO: 47.5 % (ref 36.5–49.3)
HDLC SERPL-MCNC: 37 MG/DL (ref 40–60)
HGB BLD-MCNC: 16.4 G/DL (ref 12–17)
IMM GRANULOCYTES # BLD AUTO: 0.01 THOUSAND/UL (ref 0–0.2)
IMM GRANULOCYTES NFR BLD AUTO: 0 % (ref 0–2)
LDLC SERPL CALC-MCNC: 70 MG/DL (ref 0–100)
LYMPHOCYTES # BLD AUTO: 1.73 THOUSANDS/ΜL (ref 0.6–4.47)
LYMPHOCYTES NFR BLD AUTO: 23 % (ref 14–44)
MCH RBC QN AUTO: 31.2 PG (ref 26.8–34.3)
MCHC RBC AUTO-ENTMCNC: 34.5 G/DL (ref 31.4–37.4)
MCV RBC AUTO: 90 FL (ref 82–98)
MONOCYTES # BLD AUTO: 0.68 THOUSAND/ΜL (ref 0.17–1.22)
MONOCYTES NFR BLD AUTO: 9 % (ref 4–12)
NEUTROPHILS # BLD AUTO: 5.06 THOUSANDS/ΜL (ref 1.85–7.62)
NEUTS SEG NFR BLD AUTO: 65 % (ref 43–75)
NONHDLC SERPL-MCNC: 108 MG/DL
NRBC BLD AUTO-RTO: 0 /100 WBCS
PLATELET # BLD AUTO: 224 THOUSANDS/UL (ref 149–390)
PMV BLD AUTO: 11 FL (ref 8.9–12.7)
POTASSIUM SERPL-SCNC: 4.2 MMOL/L (ref 3.5–5.3)
PROT SERPL-MCNC: 7.5 G/DL (ref 6.4–8.2)
RBC # BLD AUTO: 5.26 MILLION/UL (ref 3.88–5.62)
SODIUM SERPL-SCNC: 140 MMOL/L (ref 136–145)
TRIGL SERPL-MCNC: 189 MG/DL
WBC # BLD AUTO: 7.67 THOUSAND/UL (ref 4.31–10.16)

## 2018-08-24 PROCEDURE — 80061 LIPID PANEL: CPT

## 2018-08-24 PROCEDURE — 83036 HEMOGLOBIN GLYCOSYLATED A1C: CPT

## 2018-08-24 PROCEDURE — 80053 COMPREHEN METABOLIC PANEL: CPT

## 2018-08-24 PROCEDURE — 36415 COLL VENOUS BLD VENIPUNCTURE: CPT

## 2018-08-24 PROCEDURE — 85025 COMPLETE CBC W/AUTO DIFF WBC: CPT

## 2018-08-25 LAB
EST. AVERAGE GLUCOSE BLD GHB EST-MCNC: 131 MG/DL
HBA1C MFR BLD: 6.2 % (ref 4.2–6.3)

## 2018-08-31 ENCOUNTER — OFFICE VISIT (OUTPATIENT)
Dept: PULMONOLOGY | Facility: CLINIC | Age: 73
End: 2018-08-31
Payer: MEDICARE

## 2018-08-31 VITALS
HEIGHT: 64 IN | WEIGHT: 250 LBS | SYSTOLIC BLOOD PRESSURE: 110 MMHG | HEART RATE: 63 BPM | OXYGEN SATURATION: 92 % | DIASTOLIC BLOOD PRESSURE: 64 MMHG | BODY MASS INDEX: 42.68 KG/M2

## 2018-08-31 DIAGNOSIS — G47.33 OSA ON CPAP: Primary | ICD-10-CM

## 2018-08-31 DIAGNOSIS — E66.01 MORBID OBESITY (HCC): ICD-10-CM

## 2018-08-31 DIAGNOSIS — Z99.89 OSA ON CPAP: Primary | ICD-10-CM

## 2018-08-31 DIAGNOSIS — R93.89 ABNORMAL CT OF THE CHEST: ICD-10-CM

## 2018-08-31 PROCEDURE — 99214 OFFICE O/P EST MOD 30 MIN: CPT | Performed by: INTERNAL MEDICINE

## 2018-08-31 NOTE — PROGRESS NOTES
Assessment/Plan:   Diagnoses and all orders for this visit:    KADEN on CPAP    Abnormal CT of the chest    Morbid obesity ([address] )      Also a CPAP currently doing well continue with the current settings of auto CPAP  Will try to get the CPAP download compliance from [facility]   Cleaning of the CPAP supplies and change of CPAP supplies every 6 months discussed  Recommend weight loss  Reviewed the CT of the chest results with the patient with evidence of resolution of the tree in bud appearance as well as the groundglass opacity  Discussed with the patient to take an antiallergy pill over-the-counter loratadine when necessary as needed for the allergies  Vaccinations up-to-date  Follow-up in 6 months or when necessary earlier as needed  Return in about 6 months (around 2/28/2019)  All questions are answered to the patient's satisfaction and understanding  He verbalizes understanding  He is encouraged to call with any further questions or concerns  Portions of the record may have been created with voice recognition software  Occasional wrong word or "sound a like" substitutions may have occurred due to the inherent limitations of voice recognition software  Read the chart carefully and recognize, using context, where substitutions have occurred  Electronically Signed by Bigg Valdivia MD    ______________________________________________________________________    Chief Complaint:   Chief Complaint   Patient presents with    Shortness of Breath     ct results       Patient ID: Jilda Nageotte is a 72 y o  y o  male has a past medical history of Colon polyps; Diverticulosis; Obesity; KADEN (obstructive sleep apnea); Small bowel obstruction, partial ([address] ); and SOB (shortness of breath)  8/31/2018  72 y o  male never smoker with significant PMH of KADEN, GERD, HTN, obesity who presents for follow-up of KADEN  Patient was started on the CPAP machine over the last couple months  He states he has been doing very well  He feels more refreshed when he uses the machine  Also he had cough for which she had a CT of the chest done with tree in bud appearance as well as groundglass opacity for which he had a repeat CT of the chest done and is here for follow-up  He states the cough has completely subsided currently  Review of Systems   Constitutional: Negative for appetite change, chills, diaphoresis, fatigue, fever and unexpected weight change  HENT: Negative for congestion, ear discharge, ear pain, nosebleeds, postnasal drip, rhinorrhea, sinus pain, sore throat and voice change  Eyes: Negative for pain, discharge and visual disturbance  Respiratory: Negative for apnea, cough, choking, chest tightness, shortness of breath, wheezing and stridor  Cardiovascular: Negative for chest pain, palpitations and leg swelling  Gastrointestinal: Negative for abdominal pain, blood in stool, constipation, diarrhea and vomiting  Endocrine: Negative for cold intolerance, heat intolerance, polydipsia, polyphagia and polyuria  Genitourinary: Negative for difficulty urinating and dysuria  Musculoskeletal: Negative for arthralgias and neck pain  Skin: Negative for pallor and rash  Allergic/Immunologic: Negative for environmental allergies and food allergies  Neurological: Negative for dizziness, speech difficulty, weakness and light-headedness  Hematological: Negative for adenopathy  Does not bruise/bleed easily  Psychiatric/Behavioral: Negative for agitation, confusion and sleep disturbance  The patient is not nervous/anxious  Smoking history: He reports that he has never smoked   He has never used smokeless tobacco     The following portions of the patient's history were reviewed and updated as appropriate: allergies, current medications, past family history, past medical history, past social history, past surgical history and problem list     There is no immunization history for the selected administration types on file for this patient  Current Outpatient Prescriptions   Medication Sig Dispense Refill    ALPRAZolam (XANAX) 0 5 mg tablet Take 1 tablet (0 5 mg total) by mouth 2 (two) times a day 60 tablet 2    aspirin (ASPIRIN LOW DOSE) 81 mg EC tablet Take by mouth      B Complex-Folic Acid (B COMPLEX PLUS) TABS Take by mouth      Cholecalciferol (VITAMIN D3) 1000 units CAPS Take by mouth      Chondroitin Sulfate-Vit C-Mn (CHONDROITIN SULFATE COMPLEX) 400-60-2 5 MG CAPS Take by mouth      Coenzyme Q10 (COQ-10) 100 MG CAPS Take by mouth      fenofibrate (TRICOR) 54 MG tablet TAKE 1 TABLET BY MOUTH  DAILY 90 tablet 1    metoprolol succinate (TOPROL-XL) 50 mg 24 hr tablet TAKE 1 TABLET BY MOUTH  DAILY 90 tablet 1    Multiple Vitamin (MULTI-VITAMIN DAILY PO) Take by mouth      omeprazole (PriLOSEC) 40 MG capsule Take 1 capsule (40 mg total) by mouth daily for 180 days 90 capsule 1    pravastatin (PRAVACHOL) 80 mg tablet TK 1 T PO QD  3    VESICARE 5 MG tablet TAKE 1 TABLET BY MOUTH  DAILY 90 tablet 1    meloxicam (MOBIC) 15 mg tablet TAKE 1 TABLET(15 MG) BY MOUTH DAILY (Patient not taking: Reported on 8/31/2018) 90 tablet 1     No current facility-administered medications for this visit  Allergies: Patient has no known allergies  Objective:  Vitals:    08/31/18 0810   BP: 110/64   Pulse: 63   SpO2: 92%   Weight: 113 kg (250 lb)   Height: 5' 4" (1 626 m)   Oxygen Therapy  SpO2: 92 %    Wt Readings from Last 3 Encounters:   08/31/18 113 kg (250 lb)   05/17/18 105 kg (232 lb)   04/27/18 105 kg (231 lb)     Body mass index is 42 91 kg/m²  Physical Exam   Constitutional: He is oriented to person, place, and time  He appears well-developed and well-nourished  HENT:   Head: Normocephalic and atraumatic  Crowded oropharyngeal airways, Mallampati score 3   Eyes: EOM are normal  Pupils are equal, round, and reactive to light  Neck: Normal range of motion  Neck supple     Short and wide neck Cardiovascular: Normal rate, regular rhythm and normal heart sounds  Pulmonary/Chest: Effort normal and breath sounds normal    Abdominal: Soft  Bowel sounds are normal    Musculoskeletal: Normal range of motion  Neurological: He is alert and oriented to person, place, and time  Skin: Skin is warm and dry  Psychiatric: He has a normal mood and affect  His behavior is normal        Lab Review:   not applicable    Ct Chest Without Contrast    Result Date: 8/19/2018  Narrative: CT CHEST WITHOUT IV CONTRAST INDICATION:   R93 8: Abnormal findings on diagnostic imaging of other specified body structures  COMPARISON:  CT chest dated February 22, 2018  TECHNIQUE: CT examination of the chest was performed without intravenous contrast   Axial, sagittal, and coronal 2D reformatted images were created from the source data and submitted for interpretation  Radiation dose length product (DLP) for this visit:  521 mGy-cm   This examination, like all CT scans performed in the [place], was performed utilizing techniques to minimize radiation dose exposure, including the use of iterative reconstruction and automated exposure control  FINDINGS: LUNGS:  There has been interval resolution of a previous described tiny focus of tree-in-bud] also opacity at the superior segment of the left lower lobe  The lungs are clear with no infiltrate or pleural effusion  Mild diffuse emphysematous lung changes are stable  There is no tracheal or endobronchial lesion  PLEURA:  Unremarkable  HEART/GREAT VESSELS:  Unremarkable for patient's age  MEDIASTINUM AND JAME:  Unremarkable  CHEST WALL AND LOWER NECK:   Unremarkable  VISUALIZED STRUCTURES IN THE UPPER ABDOMEN:  There is a stable 19 mm left adrenal adenoma  There is diffuse fatty infiltration of the visualized liver  OSSEOUS STRUCTURES:  No acute fracture or destructive osseous lesion  Impression: Clear lungs with no infiltrate or pleural effusion   Interval resolution of a previously described tiny focus of tree-in-bud and groundglass opacity involving the left lower lobe superior segment when compared to a CT chest dated February 22, 2018  Mild diffuse emphysematous lung changes   Workstation performed: [id number]

## 2018-09-06 ENCOUNTER — OFFICE VISIT (OUTPATIENT)
Dept: INTERNAL MEDICINE CLINIC | Facility: CLINIC | Age: 73
End: 2018-09-06
Payer: MEDICARE

## 2018-09-06 VITALS
SYSTOLIC BLOOD PRESSURE: 120 MMHG | HEIGHT: 64 IN | RESPIRATION RATE: 18 BRPM | BODY MASS INDEX: 38 KG/M2 | DIASTOLIC BLOOD PRESSURE: 60 MMHG | WEIGHT: 222.6 LBS | HEART RATE: 84 BPM | OXYGEN SATURATION: 92 %

## 2018-09-06 DIAGNOSIS — E78.2 MIXED HYPERLIPIDEMIA: ICD-10-CM

## 2018-09-06 DIAGNOSIS — R73.01 IMPAIRED FASTING GLUCOSE: ICD-10-CM

## 2018-09-06 DIAGNOSIS — I10 BENIGN HYPERTENSION: Primary | ICD-10-CM

## 2018-09-06 DIAGNOSIS — Z99.89 OSA ON CPAP: ICD-10-CM

## 2018-09-06 DIAGNOSIS — G47.33 OSA ON CPAP: ICD-10-CM

## 2018-09-06 DIAGNOSIS — K21.9 GASTROESOPHAGEAL REFLUX DISEASE WITHOUT ESOPHAGITIS: ICD-10-CM

## 2018-09-06 DIAGNOSIS — Z12.5 SCREENING FOR PROSTATE CANCER: ICD-10-CM

## 2018-09-06 PROCEDURE — 99214 OFFICE O/P EST MOD 30 MIN: CPT | Performed by: INTERNAL MEDICINE

## 2018-09-06 NOTE — PROGRESS NOTES
Assessment/Plan:      Chronic problems are stable  Continue present medications  Continue diet and exercise  Reinforce that increased exercise would probably lower his sugars even further  Ordered labs for next visit  Return in about 4 months (around 1/6/2019)  No problem-specific Assessment & Plan notes found for this encounter  Diagnoses and all orders for this visit:    Benign hypertension  -     CBC and differential; Future  -     Comprehensive metabolic panel; Future    Impaired fasting glucose  -     Hemoglobin A1C; Future    Gastroesophageal reflux disease without esophagitis    KADEN on CPAP    Mixed hyperlipidemia  -     Lipid panel; Future    Screening for prostate cancer  -     PSA, Total Screen; Future          Subjective:      Patient ID: Dano Ronquillo is a 72 y o  male  Patient comes in today for routine follow-up  He states he is doing well with no new complaints  Blood pressure is controlled  Sugar remains borderline  Cholesterol is controlled  No recent trouble with reflux  Doing very well with his CPAP  He reports his episodes of apnea have been significantly reduced  He reports no further additions to his history          ALLERGIES:  No Known Allergies    CURRENT MEDICATIONS:    Current Outpatient Prescriptions:     ALPRAZolam (XANAX) 0 5 mg tablet, Take 1 tablet (0 5 mg total) by mouth 2 (two) times a day, Disp: 60 tablet, Rfl: 2    aspirin (ASPIRIN LOW DOSE) 81 mg EC tablet, Take by mouth, Disp: , Rfl:     B Complex-Folic Acid (B COMPLEX PLUS) TABS, Take by mouth, Disp: , Rfl:     Cholecalciferol (VITAMIN D3) 1000 units CAPS, Take by mouth, Disp: , Rfl:     Chondroitin Sulfate-Vit C-Mn (CHONDROITIN SULFATE COMPLEX) 400-60-2 5 MG CAPS, Take by mouth, Disp: , Rfl:     Coenzyme Q10 (COQ-10) 100 MG CAPS, Take by mouth, Disp: , Rfl:     fenofibrate (TRICOR) 54 MG tablet, TAKE 1 TABLET BY MOUTH  DAILY, Disp: 90 tablet, Rfl: 1    metoprolol succinate (TOPROL-XL) 50 mg 24 hr tablet, TAKE 1 TABLET BY MOUTH  DAILY, Disp: 90 tablet, Rfl: 1    Multiple Vitamin (MULTI-VITAMIN DAILY PO), Take by mouth, Disp: , Rfl:     pravastatin (PRAVACHOL) 80 mg tablet, TK 1 T PO QD, Disp: , Rfl: 3    VESICARE 5 MG tablet, TAKE 1 TABLET BY MOUTH  DAILY, Disp: 90 tablet, Rfl: 1    meloxicam (MOBIC) 15 mg tablet, TAKE 1 TABLET(15 MG) BY MOUTH DAILY, Disp: 90 tablet, Rfl: 1    omeprazole (PriLOSEC) 40 MG capsule, Take 1 capsule (40 mg total) by mouth daily for 180 days, Disp: 90 capsule, Rfl: 1    ACTIVE PROBLEM LIST:  Patient Active Problem List   Diagnosis    Shortness of breath on exertion    KADEN on CPAP    Anxiety    Basal cell carcinoma of cheek    Basal cell carcinoma of skin    Benign hypertension    Deviated nasal septum    GERD (gastroesophageal reflux disease)    Hyperlipidemia    Morbid obesity (HCC)    OAB (overactive bladder)    Impaired fasting glucose       PAST MEDICAL HISTORY:  Past Medical History:   Diagnosis Date    Colon polyps     DR Helen Mora    Diverticulosis     Obesity     KADEN (obstructive sleep apnea)     Small bowel obstruction, partial (HCC)     SOB (shortness of breath)        PAST SURGICAL HISTORY:  Past Surgical History:   Procedure Laterality Date    SKIN BIOPSY      TONSILLECTOMY AND ADENOIDECTOMY         FAMILY HISTORY:  Family History   Problem Relation Age of Onset    Heart disease Mother     Hypertension Mother     Breast cancer Sister        SOCIAL HISTORY:  Social History     Social History    Marital status: Single     Spouse name: N/A    Number of children: 0    Years of education: N/A     Occupational History    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      Social History Main Topics    Smoking status: Never Smoker    Smokeless tobacco: Never Used    Alcohol use No    Drug use: No    Sexual activity: Not on file      Comment: DENIED: HISTORY OF HIGH RISK SEXUAL BEHAVIOR      Other Topics Concern    Not on file     Social History Narrative  as per all scripts        Review of Systems   Respiratory: Negative for shortness of breath  Cardiovascular: Negative for chest pain  Gastrointestinal: Negative for abdominal pain  Objective:  Vitals:    09/06/18 1415 09/06/18 1428   BP: 168/72 120/60   BP Location: Left arm    Patient Position: Sitting    Cuff Size: Adult    Pulse: 84    Resp: 18    SpO2: 92%    Weight: 101 kg (222 lb 9 6 oz)    Height: 5' 4" (1 626 m)         Physical Exam   Constitutional: He is oriented to person, place, and time  He appears well-developed and well-nourished  Cardiovascular: Normal rate, regular rhythm and normal heart sounds  Pulmonary/Chest: Effort normal and breath sounds normal    Abdominal: Soft  Bowel sounds are normal    Musculoskeletal: He exhibits no edema  Neurological: He is alert and oriented to person, place, and time  Nursing note and vitals reviewed          RESULTS:    Recent Results (from the past 1008 hour(s))   CBC and differential    Collection Time: 08/24/18  9:28 AM   Result Value Ref Range    WBC 7 67 4 31 - 10 16 Thousand/uL    RBC 5 26 3 88 - 5 62 Million/uL    Hemoglobin 16 4 12 0 - 17 0 g/dL    Hematocrit 47 5 36 5 - 49 3 %    MCV 90 82 - 98 fL    MCH 31 2 26 8 - 34 3 pg    MCHC 34 5 31 4 - 37 4 g/dL    RDW 13 7 11 6 - 15 1 %    MPV 11 0 8 9 - 12 7 fL    Platelets 155 922 - 582 Thousands/uL    nRBC 0 /100 WBCs    Neutrophils Relative 65 43 - 75 %    Immat GRANS % 0 0 - 2 %    Lymphocytes Relative 23 14 - 44 %    Monocytes Relative 9 4 - 12 %    Eosinophils Relative 2 0 - 6 %    Basophils Relative 1 0 - 1 %    Neutrophils Absolute 5 06 1 85 - 7 62 Thousands/µL    Immature Grans Absolute 0 01 0 00 - 0 20 Thousand/uL    Lymphocytes Absolute 1 73 0 60 - 4 47 Thousands/µL    Monocytes Absolute 0 68 0 17 - 1 22 Thousand/µL    Eosinophils Absolute 0 14 0 00 - 0 61 Thousand/µL    Basophils Absolute 0 05 0 00 - 0 10 Thousands/µL   Comprehensive metabolic panel    Collection Time: 08/24/18  9:28 AM   Result Value Ref Range    Sodium 140 136 - 145 mmol/L    Potassium 4 2 3 5 - 5 3 mmol/L    Chloride 107 100 - 108 mmol/L    CO2 24 21 - 32 mmol/L    ANION GAP 9 4 - 13 mmol/L    BUN 24 5 - 25 mg/dL    Creatinine 1 34 (H) 0 60 - 1 30 mg/dL    Glucose, Fasting 106 (H) 65 - 99 mg/dL    Calcium 9 2 8 3 - 10 1 mg/dL    AST 24 5 - 45 U/L    ALT 37 12 - 78 U/L    Alkaline Phosphatase 51 46 - 116 U/L    Total Protein 7 5 6 4 - 8 2 g/dL    Albumin 4 1 3 5 - 5 0 g/dL    Total Bilirubin 0 42 0 20 - 1 00 mg/dL    eGFR 53 ml/min/1 73sq m   Lipid panel    Collection Time: 08/24/18  9:28 AM   Result Value Ref Range    Cholesterol 145 50 - 200 mg/dL    Triglycerides 189 (H) <=150 mg/dL    HDL, Direct 37 (L) 40 - 60 mg/dL    LDL Calculated 70 0 - 100 mg/dL    Non-HDL-Chol (CHOL-HDL) 108 mg/dl   HEMOGLOBIN A1C W/ EAG ESTIMATION    Collection Time: 08/24/18  9:28 AM   Result Value Ref Range    Hemoglobin A1C 6 2 4 2 - 6 3 %     mg/dl       This note was created with voice recognition software  Phonic, grammatical and spelling errors may be present within the note as a result

## 2018-09-27 ENCOUNTER — TELEPHONE (OUTPATIENT)
Dept: INTERNAL MEDICINE CLINIC | Facility: CLINIC | Age: 73
End: 2018-09-27

## 2018-09-27 NOTE — TELEPHONE ENCOUNTER
Ralph Moore stopped in to let you know that Dr Annette Jorge, ENT specialist, you referred him to for his ear is the most disgusting office he has ever seen  There is garbage on the floor, the floors look like they have not been cleaned in a long time, chairs are disgusting  He just doesn't want it to look bad on your part for referring people there

## 2018-11-07 DIAGNOSIS — F41.9 ANXIETY: ICD-10-CM

## 2018-11-07 RX ORDER — ALPRAZOLAM 0.5 MG/1
TABLET ORAL
Qty: 60 TABLET | Refills: 0 | Status: SHIPPED | OUTPATIENT
Start: 2018-11-07 | End: 2018-12-04 | Stop reason: SDUPTHER

## 2018-11-18 DIAGNOSIS — K21.9 GASTROESOPHAGEAL REFLUX DISEASE WITHOUT ESOPHAGITIS: ICD-10-CM

## 2018-11-18 RX ORDER — OMEPRAZOLE 40 MG/1
CAPSULE, DELAYED RELEASE ORAL
Qty: 90 CAPSULE | Refills: 1 | Status: SHIPPED | OUTPATIENT
Start: 2018-11-18 | End: 2020-01-21

## 2018-12-04 DIAGNOSIS — F41.9 ANXIETY: ICD-10-CM

## 2018-12-04 RX ORDER — ALPRAZOLAM 0.5 MG/1
TABLET ORAL
Qty: 60 TABLET | Refills: 0 | Status: SHIPPED | OUTPATIENT
Start: 2018-12-04 | End: 2019-01-10 | Stop reason: SDUPTHER

## 2018-12-07 DIAGNOSIS — E78.2 MIXED HYPERLIPIDEMIA: ICD-10-CM

## 2018-12-07 DIAGNOSIS — I10 BENIGN HYPERTENSION: ICD-10-CM

## 2018-12-07 RX ORDER — METOPROLOL SUCCINATE 50 MG/1
TABLET, EXTENDED RELEASE ORAL
Qty: 90 TABLET | Refills: 1 | Status: SHIPPED | OUTPATIENT
Start: 2018-12-07 | End: 2019-09-12 | Stop reason: SDUPTHER

## 2018-12-07 RX ORDER — FENOFIBRATE 54 MG/1
TABLET ORAL
Qty: 90 TABLET | Refills: 1 | Status: SHIPPED | OUTPATIENT
Start: 2018-12-07 | End: 2019-10-03 | Stop reason: SDUPTHER

## 2019-01-03 ENCOUNTER — APPOINTMENT (OUTPATIENT)
Dept: LAB | Facility: CLINIC | Age: 74
End: 2019-01-03
Payer: MEDICARE

## 2019-01-03 ENCOUNTER — TRANSCRIBE ORDERS (OUTPATIENT)
Dept: LAB | Facility: CLINIC | Age: 74
End: 2019-01-03

## 2019-01-03 DIAGNOSIS — R73.01 IMPAIRED FASTING GLUCOSE: ICD-10-CM

## 2019-01-03 DIAGNOSIS — Z12.5 SCREENING FOR PROSTATE CANCER: ICD-10-CM

## 2019-01-03 DIAGNOSIS — I10 BENIGN HYPERTENSION: ICD-10-CM

## 2019-01-03 DIAGNOSIS — E78.2 MIXED HYPERLIPIDEMIA: ICD-10-CM

## 2019-01-03 LAB
ALBUMIN SERPL BCP-MCNC: 3.9 G/DL (ref 3.5–5)
ALP SERPL-CCNC: 52 U/L (ref 46–116)
ALT SERPL W P-5'-P-CCNC: 32 U/L (ref 12–78)
ANION GAP SERPL CALCULATED.3IONS-SCNC: 5 MMOL/L (ref 4–13)
AST SERPL W P-5'-P-CCNC: 22 U/L (ref 5–45)
BASOPHILS # BLD AUTO: 0.07 THOUSANDS/ΜL (ref 0–0.1)
BASOPHILS NFR BLD AUTO: 1 % (ref 0–1)
BILIRUB SERPL-MCNC: 0.49 MG/DL (ref 0.2–1)
BUN SERPL-MCNC: 12 MG/DL (ref 5–25)
CALCIUM SERPL-MCNC: 8.6 MG/DL (ref 8.3–10.1)
CHLORIDE SERPL-SCNC: 106 MMOL/L (ref 100–108)
CHOLEST SERPL-MCNC: 154 MG/DL (ref 50–200)
CO2 SERPL-SCNC: 25 MMOL/L (ref 21–32)
CREAT SERPL-MCNC: 1.1 MG/DL (ref 0.6–1.3)
EOSINOPHIL # BLD AUTO: 0.13 THOUSAND/ΜL (ref 0–0.61)
EOSINOPHIL NFR BLD AUTO: 2 % (ref 0–6)
ERYTHROCYTE [DISTWIDTH] IN BLOOD BY AUTOMATED COUNT: 13.8 % (ref 11.6–15.1)
EST. AVERAGE GLUCOSE BLD GHB EST-MCNC: 146 MG/DL
GFR SERPL CREATININE-BSD FRML MDRD: 67 ML/MIN/1.73SQ M
GLUCOSE P FAST SERPL-MCNC: 132 MG/DL (ref 65–99)
HBA1C MFR BLD: 6.7 % (ref 4.2–6.3)
HCT VFR BLD AUTO: 47.3 % (ref 36.5–49.3)
HDLC SERPL-MCNC: 37 MG/DL (ref 40–60)
HGB BLD-MCNC: 15.6 G/DL (ref 12–17)
IMM GRANULOCYTES # BLD AUTO: 0.02 THOUSAND/UL (ref 0–0.2)
IMM GRANULOCYTES NFR BLD AUTO: 0 % (ref 0–2)
LDLC SERPL CALC-MCNC: 87 MG/DL (ref 0–100)
LYMPHOCYTES # BLD AUTO: 1.92 THOUSANDS/ΜL (ref 0.6–4.47)
LYMPHOCYTES NFR BLD AUTO: 31 % (ref 14–44)
MCH RBC QN AUTO: 30 PG (ref 26.8–34.3)
MCHC RBC AUTO-ENTMCNC: 33 G/DL (ref 31.4–37.4)
MCV RBC AUTO: 91 FL (ref 82–98)
MONOCYTES # BLD AUTO: 0.69 THOUSAND/ΜL (ref 0.17–1.22)
MONOCYTES NFR BLD AUTO: 11 % (ref 4–12)
NEUTROPHILS # BLD AUTO: 3.46 THOUSANDS/ΜL (ref 1.85–7.62)
NEUTS SEG NFR BLD AUTO: 55 % (ref 43–75)
NONHDLC SERPL-MCNC: 117 MG/DL
NRBC BLD AUTO-RTO: 0 /100 WBCS
PLATELET # BLD AUTO: 239 THOUSANDS/UL (ref 149–390)
PMV BLD AUTO: 9.9 FL (ref 8.9–12.7)
POTASSIUM SERPL-SCNC: 3.7 MMOL/L (ref 3.5–5.3)
PROT SERPL-MCNC: 7.5 G/DL (ref 6.4–8.2)
PSA SERPL-MCNC: 1.1 NG/ML (ref 0–4)
RBC # BLD AUTO: 5.2 MILLION/UL (ref 3.88–5.62)
SODIUM SERPL-SCNC: 136 MMOL/L (ref 136–145)
TRIGL SERPL-MCNC: 152 MG/DL
WBC # BLD AUTO: 6.29 THOUSAND/UL (ref 4.31–10.16)

## 2019-01-03 PROCEDURE — 80053 COMPREHEN METABOLIC PANEL: CPT

## 2019-01-03 PROCEDURE — 80061 LIPID PANEL: CPT

## 2019-01-03 PROCEDURE — 36415 COLL VENOUS BLD VENIPUNCTURE: CPT

## 2019-01-03 PROCEDURE — 83036 HEMOGLOBIN GLYCOSYLATED A1C: CPT

## 2019-01-03 PROCEDURE — 85025 COMPLETE CBC W/AUTO DIFF WBC: CPT

## 2019-01-03 PROCEDURE — G0103 PSA SCREENING: HCPCS

## 2019-01-10 ENCOUNTER — OFFICE VISIT (OUTPATIENT)
Dept: INTERNAL MEDICINE CLINIC | Facility: CLINIC | Age: 74
End: 2019-01-10
Payer: MEDICARE

## 2019-01-10 VITALS
WEIGHT: 226 LBS | DIASTOLIC BLOOD PRESSURE: 68 MMHG | HEART RATE: 72 BPM | SYSTOLIC BLOOD PRESSURE: 140 MMHG | BODY MASS INDEX: 38.58 KG/M2 | OXYGEN SATURATION: 94 % | RESPIRATION RATE: 20 BRPM | HEIGHT: 64 IN

## 2019-01-10 DIAGNOSIS — R73.01 IMPAIRED FASTING GLUCOSE: ICD-10-CM

## 2019-01-10 DIAGNOSIS — E78.2 MIXED HYPERLIPIDEMIA: ICD-10-CM

## 2019-01-10 DIAGNOSIS — F41.9 ANXIETY: ICD-10-CM

## 2019-01-10 DIAGNOSIS — K21.9 GASTROESOPHAGEAL REFLUX DISEASE WITHOUT ESOPHAGITIS: ICD-10-CM

## 2019-01-10 DIAGNOSIS — I10 BENIGN HYPERTENSION: Primary | ICD-10-CM

## 2019-01-10 DIAGNOSIS — R41.3 MEMORY LOSS: ICD-10-CM

## 2019-01-10 PROCEDURE — 99214 OFFICE O/P EST MOD 30 MIN: CPT | Performed by: INTERNAL MEDICINE

## 2019-01-10 RX ORDER — ALPRAZOLAM 0.5 MG/1
TABLET ORAL
Qty: 60 TABLET | Refills: 0 | Status: SHIPPED | OUTPATIENT
Start: 2019-01-10 | End: 2019-02-05 | Stop reason: SDUPTHER

## 2019-01-10 NOTE — PROGRESS NOTES
Assessment/Plan:     He has already made adjustments in his diet for the sugar  Will recheck in 3 months to make sure everything is returning to where it was before  No other changes in medications  Encouraged diet and exercise  Will add B12 to next blood work because of the memory loss question  Also discussed referral to a neurologist but he wanted to wait on that referral      BMI Counseling: Body mass index is 38 79 kg/m²  Discussed the patient's BMI with him  The BMI is above average  BMI counseling and education was provided to the patient  Nutrition recommendations include moderation in carbohydrate intake  Return in about 3 months (around 4/10/2019)  No problem-specific Assessment & Plan notes found for this encounter  Diagnoses and all orders for this visit:    Benign hypertension  -     CBC and differential; Future  -     Comprehensive metabolic panel; Future  -     Lipid panel; Future    Mixed hyperlipidemia    Impaired fasting glucose  -     Hemoglobin A1C; Future    Gastroesophageal reflux disease without esophagitis    Memory loss  -     Vitamin B12; Future          Subjective:      Patient ID: Ese Saleh is a 72 y o  male  Patient comes in today for routine follow-up  He states he is doing okay for the most part  His blood pressure is controlled  Cholesterol is controlled  Sugar went up  He admits that he probably did not follow his diet during the holidays but he also realized that he was eating candies at work with chocolate  He has already stopped that practice  Taking his medicine as directed  He notes that he is becoming more forgetful  He tries to write things down but notices short term memory loss at times  Can't remember customers name who he just was introduced to 5 minutes prior          ALLERGIES:  No Known Allergies    CURRENT MEDICATIONS:    Current Outpatient Prescriptions:     ALPRAZolam (XANAX) 0 5 mg tablet, TAKE 1 TABLET(0 5 MG) BY MOUTH TWICE DAILY, Disp: 60 tablet, Rfl: 0    aspirin (ASPIRIN LOW DOSE) 81 mg EC tablet, Take by mouth, Disp: , Rfl:     B Complex-Folic Acid (B COMPLEX PLUS) TABS, Take by mouth, Disp: , Rfl:     Cholecalciferol (VITAMIN D3) 1000 units CAPS, Take by mouth, Disp: , Rfl:     Chondroitin Sulfate-Vit C-Mn (CHONDROITIN SULFATE COMPLEX) 400-60-2 5 MG CAPS, Take by mouth, Disp: , Rfl:     Coenzyme Q10 (COQ-10) 100 MG CAPS, Take by mouth, Disp: , Rfl:     fenofibrate (TRICOR) 54 MG tablet, TAKE 1 TABLET BY MOUTH  DAILY, Disp: 90 tablet, Rfl: 1    meloxicam (MOBIC) 15 mg tablet, TAKE 1 TABLET(15 MG) BY MOUTH DAILY, Disp: 90 tablet, Rfl: 1    metoprolol succinate (TOPROL-XL) 50 mg 24 hr tablet, TAKE 1 TABLET BY MOUTH  DAILY, Disp: 90 tablet, Rfl: 1    Multiple Vitamin (MULTI-VITAMIN DAILY PO), Take by mouth, Disp: , Rfl:     omeprazole (PriLOSEC) 40 MG capsule, TAKE 1 CAPSULE BY MOUTH  DAILY, Disp: 90 capsule, Rfl: 1    pravastatin (PRAVACHOL) 80 mg tablet, TK 1 T PO QD, Disp: , Rfl: 3    VESICARE 5 MG tablet, TAKE 1 TABLET BY MOUTH  DAILY, Disp: 90 tablet, Rfl: 1    ACTIVE PROBLEM LIST:  Patient Active Problem List   Diagnosis    Shortness of breath on exertion    KADEN on CPAP    Anxiety    Basal cell carcinoma of cheek    Basal cell carcinoma of skin    Benign hypertension    Deviated nasal septum    GERD (gastroesophageal reflux disease)    Hyperlipidemia    Morbid obesity (HCC)    OAB (overactive bladder)    Impaired fasting glucose       PAST MEDICAL HISTORY:  Past Medical History:   Diagnosis Date    Colon polyps     DR Fidelina Adam    Diverticulosis     Obesity     KADEN (obstructive sleep apnea)     Small bowel obstruction, partial (HCC)     SOB (shortness of breath)        PAST SURGICAL HISTORY:  Past Surgical History:   Procedure Laterality Date    SKIN BIOPSY      TONSILLECTOMY AND ADENOIDECTOMY         FAMILY HISTORY:  Family History   Problem Relation Age of Onset    Heart disease Mother     Hypertension Mother     Breast cancer Sister        SOCIAL HISTORY:  Social History     Social History    Marital status: Single     Spouse name: N/A    Number of children: 0    Years of education: N/A     Occupational History    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      Social History Main Topics    Smoking status: Never Smoker    Smokeless tobacco: Never Used    Alcohol use No    Drug use: No    Sexual activity: Not on file      Comment: DENIED: HISTORY OF HIGH RISK SEXUAL BEHAVIOR      Other Topics Concern    Not on file     Social History Narrative     as per all scripts        Review of Systems   Respiratory: Negative for shortness of breath  Cardiovascular: Negative for chest pain  Gastrointestinal: Negative for abdominal pain  Objective:  Vitals:    01/10/19 0851   BP: 140/68   BP Location: Left arm   Patient Position: Sitting   Cuff Size: Large   Pulse: 72   Resp: 20   SpO2: 94%   Weight: 103 kg (226 lb)   Height: 5' 4" (1 626 m)     Body mass index is 38 79 kg/m²  Physical Exam   Constitutional: He is oriented to person, place, and time  He appears well-developed and well-nourished  Cardiovascular: Normal rate, regular rhythm and normal heart sounds  Pulmonary/Chest: Effort normal and breath sounds normal    Abdominal: Soft  Bowel sounds are normal    Musculoskeletal: He exhibits no edema  Neurological: He is alert and oriented to person, place, and time  Nursing note and vitals reviewed          RESULTS:    Recent Results (from the past 1008 hour(s))   CBC and differential    Collection Time: 01/03/19  9:09 AM   Result Value Ref Range    WBC 6 29 4 31 - 10 16 Thousand/uL    RBC 5 20 3 88 - 5 62 Million/uL    Hemoglobin 15 6 12 0 - 17 0 g/dL    Hematocrit 47 3 36 5 - 49 3 %    MCV 91 82 - 98 fL    MCH 30 0 26 8 - 34 3 pg    MCHC 33 0 31 4 - 37 4 g/dL    RDW 13 8 11 6 - 15 1 %    MPV 9 9 8 9 - 12 7 fL    Platelets 771 146 - 001 Thousands/uL    nRBC 0 /100 WBCs    Neutrophils Relative 55 43 - 75 %    Immat GRANS % 0 0 - 2 %    Lymphocytes Relative 31 14 - 44 %    Monocytes Relative 11 4 - 12 %    Eosinophils Relative 2 0 - 6 %    Basophils Relative 1 0 - 1 %    Neutrophils Absolute 3 46 1 85 - 7 62 Thousands/µL    Immature Grans Absolute 0 02 0 00 - 0 20 Thousand/uL    Lymphocytes Absolute 1 92 0 60 - 4 47 Thousands/µL    Monocytes Absolute 0 69 0 17 - 1 22 Thousand/µL    Eosinophils Absolute 0 13 0 00 - 0 61 Thousand/µL    Basophils Absolute 0 07 0 00 - 0 10 Thousands/µL   Comprehensive metabolic panel    Collection Time: 01/03/19  9:09 AM   Result Value Ref Range    Sodium 136 136 - 145 mmol/L    Potassium 3 7 3 5 - 5 3 mmol/L    Chloride 106 100 - 108 mmol/L    CO2 25 21 - 32 mmol/L    ANION GAP 5 4 - 13 mmol/L    BUN 12 5 - 25 mg/dL    Creatinine 1 10 0 60 - 1 30 mg/dL    Glucose, Fasting 132 (H) 65 - 99 mg/dL    Calcium 8 6 8 3 - 10 1 mg/dL    AST 22 5 - 45 U/L    ALT 32 12 - 78 U/L    Alkaline Phosphatase 52 46 - 116 U/L    Total Protein 7 5 6 4 - 8 2 g/dL    Albumin 3 9 3 5 - 5 0 g/dL    Total Bilirubin 0 49 0 20 - 1 00 mg/dL    eGFR 67 ml/min/1 73sq m   Hemoglobin A1C    Collection Time: 01/03/19  9:09 AM   Result Value Ref Range    Hemoglobin A1C 6 7 (H) 4 2 - 6 3 %     mg/dl   Lipid panel    Collection Time: 01/03/19  9:09 AM   Result Value Ref Range    Cholesterol 154 50 - 200 mg/dL    Triglycerides 152 (H) <=150 mg/dL    HDL, Direct 37 (L) 40 - 60 mg/dL    LDL Calculated 87 0 - 100 mg/dL    Non-HDL-Chol (CHOL-HDL) 117 mg/dl   PSA, Total Screen    Collection Time: 01/03/19  9:09 AM   Result Value Ref Range    PSA 1 1 0 0 - 4 0 ng/mL       This note was created with voice recognition software  Phonic, grammatical and spelling errors may be present within the note as a result

## 2019-02-05 DIAGNOSIS — F41.9 ANXIETY: ICD-10-CM

## 2019-02-05 RX ORDER — ALPRAZOLAM 0.5 MG/1
TABLET ORAL
Qty: 60 TABLET | Refills: 1 | Status: SHIPPED | OUTPATIENT
Start: 2019-02-05 | End: 2019-04-08 | Stop reason: SDUPTHER

## 2019-02-10 ENCOUNTER — HOSPITAL ENCOUNTER (EMERGENCY)
Facility: HOSPITAL | Age: 74
Discharge: HOME | End: 2019-02-10
Attending: EMERGENCY MEDICINE | Admitting: EMERGENCY MEDICINE
Payer: MEDICARE

## 2019-02-10 VITALS
TEMPERATURE: 97.6 F | BODY MASS INDEX: 39.48 KG/M2 | SYSTOLIC BLOOD PRESSURE: 139 MMHG | WEIGHT: 231.26 LBS | DIASTOLIC BLOOD PRESSURE: 69 MMHG | HEIGHT: 64 IN | HEART RATE: 57 BPM | RESPIRATION RATE: 12 BRPM | OXYGEN SATURATION: 94 %

## 2019-02-10 DIAGNOSIS — S60.352A FOREIGN BODY OF LEFT THUMB, INITIAL ENCOUNTER: Primary | ICD-10-CM

## 2019-02-10 PROCEDURE — 99283 EMERGENCY DEPT VISIT LOW MDM: CPT

## 2019-02-10 PROCEDURE — 90715 TDAP VACCINE 7 YRS/> IM: CPT | Performed by: EMERGENCY MEDICINE

## 2019-02-10 PROCEDURE — 90471 IMMUNIZATION ADMIN: CPT

## 2019-02-10 RX ORDER — CEPHALEXIN 250 MG/1
500 CAPSULE ORAL ONCE
Status: COMPLETED | OUTPATIENT
Start: 2019-02-10 | End: 2019-02-10

## 2019-02-10 RX ORDER — CEPHALEXIN 500 MG/1
500 CAPSULE ORAL 3 TIMES DAILY
Qty: 20 CAPSULE | Refills: 0 | Status: SHIPPED | OUTPATIENT
Start: 2019-02-10 | End: 2019-02-17

## 2019-02-10 RX ORDER — LIDOCAINE HYDROCHLORIDE 10 MG/ML
2 INJECTION, SOLUTION EPIDURAL; INFILTRATION; INTRACAUDAL; PERINEURAL ONCE
Status: COMPLETED | OUTPATIENT
Start: 2019-02-10 | End: 2019-02-10

## 2019-02-10 RX ORDER — GINSENG 100 MG
1 CAPSULE ORAL ONCE
Status: COMPLETED | OUTPATIENT
Start: 2019-02-10 | End: 2019-02-10

## 2019-02-10 RX ADMIN — BACITRACIN 1 SMALL APPLICATION: 500 OINTMENT TOPICAL at 19:53

## 2019-02-10 RX ADMIN — TETANUS TOXOID, REDUCED DIPHTHERIA TOXOID AND ACELLULAR PERTUSSIS VACCINE, ADSORBED 0.5 ML: 5; 2.5; 8; 8; 2.5 SUSPENSION INTRAMUSCULAR at 19:26

## 2019-02-10 RX ADMIN — LIDOCAINE HYDROCHLORIDE 2 ML: 10 INJECTION, SOLUTION EPIDURAL; INFILTRATION; INTRACAUDAL; PERINEURAL at 19:26

## 2019-02-10 RX ADMIN — CEPHALEXIN 500 MG: 250 CAPSULE ORAL at 19:53

## 2019-02-11 NOTE — DISCHARGE INSTRUCTIONS
Soak your hand in warm, soapy water several times a day  Cover it with topical antibiotic ointment and a bandage when you are doing anything where it might get dirty  Take antibiotic as prescribed  Follow up with her primary care doctor as needed, to make sure this is doing better  Return if he develops significant redness, swelling, drainage of pus, fevers, or for any other concerns

## 2019-02-11 NOTE — ED PROVIDER NOTES
History  Chief Complaint   Patient presents with    Finger Injury     Pt states he picked up a piece of lunber and got a splinter in thumb of left hand  Pt reports painful swelling and irritation to site since that time  HPI    Prior to Admission Medications   Prescriptions Last Dose Informant Patient Reported? Taking?    ALPRAZolam (XANAX) 0 5 mg tablet   No No   Sig: TAKE 1 TABLET(0 5 MG) BY MOUTH TWICE DAILY   B Complex-Folic Acid (B COMPLEX PLUS) TABS  Self Yes No   Sig: Take by mouth   Cholecalciferol (VITAMIN D3) 1000 units CAPS  Self Yes No   Sig: Take by mouth   Chondroitin Sulfate-Vit C-Mn (CHONDROITIN SULFATE COMPLEX) 400-60-2 5 MG CAPS  Self Yes No   Sig: Take by mouth   Coenzyme Q10 (COQ-10) 100 MG CAPS  Self Yes No   Sig: Take by mouth   Multiple Vitamin (MULTI-VITAMIN DAILY PO)  Self Yes No   Sig: Take by mouth   VESICARE 5 MG tablet   No No   Sig: TAKE 1 TABLET BY MOUTH  DAILY   aspirin (ASPIRIN LOW DOSE) 81 mg EC tablet  Self Yes No   Sig: Take by mouth   fenofibrate (TRICOR) 54 MG tablet   No No   Sig: TAKE 1 TABLET BY MOUTH  DAILY   meloxicam (MOBIC) 15 mg tablet  Self No No   Sig: TAKE 1 TABLET(15 MG) BY MOUTH DAILY   metoprolol succinate (TOPROL-XL) 50 mg 24 hr tablet   No No   Sig: TAKE 1 TABLET BY MOUTH  DAILY   omeprazole (PriLOSEC) 40 MG capsule   No No   Sig: TAKE 1 CAPSULE BY MOUTH  DAILY   pravastatin (PRAVACHOL) 80 mg tablet  Self Yes No   Sig: TK 1 T PO QD      Facility-Administered Medications: None       Past Medical History:   Diagnosis Date    Colon polyps     DR Elvis Up    Diverticulosis     Obesity     KADEN (obstructive sleep apnea)     Small bowel obstruction, partial (HCC)     SOB (shortness of breath)        Past Surgical History:   Procedure Laterality Date    SKIN BIOPSY      TONSILLECTOMY AND ADENOIDECTOMY         Family History   Problem Relation Age of Onset    Heart disease Mother     Hypertension Mother     Breast cancer Sister      I have reviewed and agree with the history as documented  Social History     Tobacco Use    Smoking status: Never Smoker    Smokeless tobacco: Never Used   Substance Use Topics    Alcohol use: No    Drug use: No        Review of Systems    Physical Exam  Physical Exam   Constitutional: He is oriented to person, place, and time  He appears well-developed and well-nourished  No distress  obese   HENT:   Head: Normocephalic and atraumatic  Eyes: Pupils are equal, round, and reactive to light  Conjunctivae are normal    Neck: Normal range of motion  No tracheal deviation present  Cardiovascular: Normal rate, regular rhythm and intact distal pulses  Pulses:       Radial pulses are 2+ on the left side  Pulmonary/Chest: Effort normal  No respiratory distress  Musculoskeletal:        Hands:  Neurological: He is alert and oriented to person, place, and time  GCS eye subscore is 4  GCS verbal subscore is 5  GCS motor subscore is 6  Skin: Skin is warm and dry  Psychiatric: He has a normal mood and affect  His behavior is normal    Nursing note and vitals reviewed        Vital Signs  ED Triage Vitals   Temperature Pulse Respirations Blood Pressure SpO2   02/10/19 1901 02/10/19 1858 02/10/19 1858 02/10/19 1858 02/10/19 1858   97 6 °F (36 4 °C) 62 18 139/74 94 %      Temp Source Heart Rate Source Patient Position - Orthostatic VS BP Location FiO2 (%)   02/10/19 1901 02/10/19 1858 02/10/19 1858 02/10/19 1858 --   Oral Monitor Lying Right arm       Pain Score       02/10/19 1858       6           Vitals:    02/10/19 1858 02/10/19 1946   BP: 139/74 139/69   Pulse: 62 57   Patient Position - Orthostatic VS: Lying Sitting       Visual Acuity      ED Medications  Medications   cephalexin (KEFLEX) capsule 500 mg (has no administration in time range)   bacitracin topical ointment 1 small application (has no administration in time range)   lidocaine (PF) (XYLOCAINE-MPF) 1 % injection 2 mL (2 mL Infiltration Given 2/10/19 1926) tetanus-diphtheria-acellular pertussis (BOOSTRIX) IM injection 0 5 mL (0 5 mL Intramuscular Given 2/10/19 1926)       Diagnostic Studies  Results Reviewed     None                 No orders to display              Procedures  Foreign Body - Embedded  Date/Time: 2/10/2019 7:28 AM  Performed by: Donya Spence MD  Authorized by: Donya Spence MD     Patient location:  ED  Other Assisting Provider: No    Consent:     Consent obtained:  Verbal    Consent given by:  Patient    Risks discussed:  Bleeding, incomplete removal, infection, nerve damage, pain, worsening of condition and poor cosmetic result    Alternatives discussed:  No treatment  Universal protocol:     Patient identity confirmed:  Verbally with patient  Location:     Location:  Finger    Finger location:  L thumb    Depth: Intradermal    Tendon involvement:  None  Pre-procedure details:     Imaging:  Ultrasound    Neurovascular status: intact      Preparation: Patient was prepped and draped in usual sterile fashion    Anesthesia (see MAR for exact dosages): Anesthesia method:  Local infiltration    Local anesthetic:  Lidocaine 1% w/o epi  Procedure details:     Scalpel size:  11    Incision length:  Stab    Localization method:  Visualized    Dissection of underlying tissues: no      Bloodless field: yes      Removal mechanism: Forceps    Removal Method:  Open    Guidance comment:  Vizualization    Procedure complexity:  Simple    Foreign bodies recovered:  1    Description:  Wooden splinter, 1 5 cm    Intact foreign body removal: yes    Post-procedure details:     Neurovascular status: intact      Confirmation:  No additional foreign bodies on visualization    Skin closure:  None    Dressing:  Antibiotic ointment    Patient tolerance of procedure:   Tolerated well, no immediate complications           Phone Contacts  ED Phone Contact    ED Course                               MDM  Number of Diagnoses or Management Options  Foreign body of left thumb, initial encounter: new and requires workup  Diagnosis management comments: This is a 68-year-old male who presents here today with concern for wood splinter in his left thumb  He is right-hand dominant  He states early this morning he was moving old lumbar and felt like he got a piece wood in his finger  He did not remove it  He did notice some bruising underneath the nail  He states it has been progressively more red, swollen, and irritated since this morning  He has been applying bacitracin which has not been helping  He denies any other injuries  He is uncertain of his last tetanus shot, but states it has probably been 20 years  He denies any blood thinning medications at the baby aspirin  He has no problems wound healing  ROS: Otherwise negative, unless stated as above  He is well-appearing, in no acute distress  He does have erythema and mild swelling to the radial side of his right thumb with a visible dark area beneath the concerning for foreign body  Bedside ultrasound does show shadowing in this area consistent with foreign body  This was removed as above without complications  I discussed with the patient findings, treatment at home, follow-up, indications for return, and he expresses understanding with this plan  Amount and/or Complexity of Data Reviewed  Independent visualization of images, tracings, or specimens: yes        Disposition  Final diagnoses:   Foreign body of left thumb, initial encounter     Time reflects when diagnosis was documented in both MDM as applicable and the Disposition within this note     Time User Action Codes Description Comment    2/10/2019  7:45 PM Esthela Sifuentes Add [[id number]] Foreign body of left thumb, initial encounter       ED Disposition     ED Disposition Condition Date/Time Comment    Discharge Good Sun Feb 10, 2019  7:44 PM Conrado Chacon discharge to home/self care              Follow-up Information     Follow up With Specialties Details Why Contact Info    Sheree Martinez MD Internal Medicine Schedule an appointment as soon as possible for a visit in 3 days As needed [address]  [address]  [phone]            Patient's Medications   Discharge Prescriptions    CEPHALEXIN (KEFLEX) 500 MG CAPSULE    Take 1 capsule (500 mg total) by mouth 3 (three) times a day for 7 days       Start Date: 2/10/2019 End Date: 2/17/2019       Order Dose: 500 mg       Quantity: 20 capsule    Refills: 0     No discharge procedures on file      ED Provider  Electronically Signed by           Crys Lanier MD  02/10/19 2020

## 2019-03-01 ENCOUNTER — OFFICE VISIT (OUTPATIENT)
Dept: PULMONOLOGY | Facility: CLINIC | Age: 74
End: 2019-03-01
Payer: MEDICARE

## 2019-03-01 ENCOUNTER — TELEPHONE (OUTPATIENT)
Dept: PULMONOLOGY | Facility: CLINIC | Age: 74
End: 2019-03-01

## 2019-03-01 VITALS
OXYGEN SATURATION: 91 % | SYSTOLIC BLOOD PRESSURE: 128 MMHG | BODY MASS INDEX: 39.09 KG/M2 | DIASTOLIC BLOOD PRESSURE: 68 MMHG | WEIGHT: 229 LBS | HEIGHT: 64 IN | HEART RATE: 64 BPM

## 2019-03-01 DIAGNOSIS — G47.33 OSA ON CPAP: Primary | ICD-10-CM

## 2019-03-01 DIAGNOSIS — Z99.89 OSA ON CPAP: Primary | ICD-10-CM

## 2019-03-01 DIAGNOSIS — E66.01 MORBID OBESITY (HCC): ICD-10-CM

## 2019-03-01 PROCEDURE — 99214 OFFICE O/P EST MOD 30 MIN: CPT | Performed by: INTERNAL MEDICINE

## 2019-03-01 NOTE — TELEPHONE ENCOUNTER
----- Message from Fabián Barker MD sent at 3/1/2019  9:44 AM EST -----  Pl get download compliance from [place] for the past 6 months

## 2019-03-04 NOTE — PROGRESS NOTES
Assessment/Plan:   Diagnoses and all orders for this visit:    KADEN on CPAP    Morbid obesity (HCC)        KADEN on CPAP, Currently doing well continue with current settings currently on auto CPAP  Cleaning of the supplies and change of CPAP surprisingly 6 months discussed  He did have abnormal CT of the chest with tree in bud appearance follow-up CT chest did demonstrate resolution of those opacities  Vaccinations up-to-date  Recommend weight loss  Follow-up in 6 months or when necessary earlier as needed  Return in about 6 months (around 9/1/2019)  All questions are answered to the patient's satisfaction and understanding  He verbalizes understanding  He is encouraged to call with any further questions or concerns  Portions of the record may have been created with voice recognition software  Occasional wrong word or "sound a like" substitutions may have occurred due to the inherent limitations of voice recognition software  Read the chart carefully and recognize, using context, where substitutions have occurred  Electronically Signed by Scout Bryant MD    ______________________________________________________________________    Chief Complaint: No chief complaint on file  Patient ID: Vince Harrell is a 72 y o  y o  male has a past medical history of Colon polyps, Diverticulosis, Obesity, KADEN (obstructive sleep apnea), Small bowel obstruction, partial ([address] ), and SOB (shortness of breath)  3/1/2019  Patient presents today for follow-up visit  72 y o  male never smoker with significant PMH of KADEN, GERD, HTN, obesity who presents for follow-up of KADEN  Patient was started on the CPAP machine over the last couple months  He states he has been doing very well  He feels more refreshed when he uses the machine  Also he had cough for which she had a CT of the chest done with tree in bud appearance as well as groundglass opacity for which he had a repeat CT of the chest done and is here for follow-up    He states the cough has completely subsided currently  Review of Systems   Constitutional: Positive for fatigue and unexpected weight change  Negative for appetite change, chills, diaphoresis and fever  HENT: Negative for congestion, ear discharge, ear pain, nosebleeds, postnasal drip, rhinorrhea, sinus pain, sore throat and voice change  Eyes: Negative for pain, discharge and visual disturbance  Respiratory: Positive for shortness of breath  Negative for apnea, cough, choking, chest tightness, wheezing and stridor  Cardiovascular: Negative for chest pain, palpitations and leg swelling  Gastrointestinal: Negative for abdominal pain, blood in stool, constipation, diarrhea and vomiting  Endocrine: Negative for cold intolerance, heat intolerance, polydipsia, polyphagia and polyuria  Genitourinary: Negative for difficulty urinating and dysuria  Musculoskeletal: Negative for arthralgias and neck pain  Skin: Negative for pallor and rash  Allergic/Immunologic: Negative for environmental allergies and food allergies  Neurological: Negative for dizziness, speech difficulty, weakness and light-headedness  Hematological: Negative for adenopathy  Does not bruise/bleed easily  Psychiatric/Behavioral: Negative for agitation, confusion and sleep disturbance  The patient is not nervous/anxious  Smoking history: He reports that he has never smoked   He has never used smokeless tobacco     The following portions of the patient's history were reviewed and updated as appropriate: allergies, current medications, past family history, past medical history, past social history, past surgical history and problem list     Immunization History   Administered Date(s) Administered    Tdap 02/10/2019     Current Outpatient Medications   Medication Sig Dispense Refill    ALPRAZolam (XANAX) 0 5 mg tablet TAKE 1 TABLET(0 5 MG) BY MOUTH TWICE DAILY 60 tablet 1    aspirin (ASPIRIN LOW DOSE) 81 mg EC tablet Take by mouth      B Complex-Folic Acid (B COMPLEX PLUS) TABS Take by mouth      Cholecalciferol (VITAMIN D3) 1000 units CAPS Take by mouth      Chondroitin Sulfate-Vit C-Mn (CHONDROITIN SULFATE COMPLEX) 400-60-2 5 MG CAPS Take by mouth      Coenzyme Q10 (COQ-10) 100 MG CAPS Take by mouth      fenofibrate (TRICOR) 54 MG tablet TAKE 1 TABLET BY MOUTH  DAILY 90 tablet 1    metoprolol succinate (TOPROL-XL) 50 mg 24 hr tablet TAKE 1 TABLET BY MOUTH  DAILY 90 tablet 1    Multiple Vitamin (MULTI-VITAMIN DAILY PO) Take by mouth      omeprazole (PriLOSEC) 40 MG capsule TAKE 1 CAPSULE BY MOUTH  DAILY 90 capsule 1    pravastatin (PRAVACHOL) 80 mg tablet TK 1 T PO QD  3    VESICARE 5 MG tablet TAKE 1 TABLET BY MOUTH  DAILY 90 tablet 1     No current facility-administered medications for this visit  Allergies: Patient has no known allergies  Objective:  Vitals:    03/01/19 0929   BP: 128/68   Pulse: 64   SpO2: 91%   Weight: 104 kg (229 lb)   Height: 5' 4" (1 626 m)   Oxygen Therapy  SpO2: 91 %    Wt Readings from Last 3 Encounters:   03/01/19 104 kg (229 lb)   02/10/19 105 kg (231 lb 4 2 oz)   01/10/19 103 kg (226 lb)     Body mass index is 39 31 kg/m²  Physical Exam   Constitutional: He is oriented to person, place, and time  He appears well-developed and well-nourished  HENT:   Head: Normocephalic and atraumatic  Crowded oropharyngeal airway s, Mallampati score 3   Eyes: Pupils are equal, round, and reactive to light  EOM are normal    Neck: Normal range of motion  Neck supple  Short and wide neck   Cardiovascular: Normal rate, regular rhythm and normal heart sounds  Pulmonary/Chest: Effort normal and breath sounds normal    Abdominal: Soft  Bowel sounds are normal    Musculoskeletal: Normal range of motion  Neurological: He is alert and oriented to person, place, and time  Skin: Skin is warm and dry  Psychiatric: He has a normal mood and affect   His behavior is normal

## 2019-03-08 DIAGNOSIS — N32.81 OAB (OVERACTIVE BLADDER): ICD-10-CM

## 2019-03-08 DIAGNOSIS — E78.00 PURE HYPERCHOLESTEROLEMIA: Primary | ICD-10-CM

## 2019-03-11 RX ORDER — SOLIFENACIN SUCCINATE 5 MG/1
5 TABLET, FILM COATED ORAL DAILY
Qty: 90 TABLET | Refills: 1 | Status: SHIPPED | OUTPATIENT
Start: 2019-03-11 | End: 2021-01-11 | Stop reason: SDUPTHER

## 2019-03-11 RX ORDER — PRAVASTATIN SODIUM 80 MG/1
80 TABLET ORAL DAILY
Qty: 30 TABLET | Refills: 3 | Status: SHIPPED | OUTPATIENT
Start: 2019-03-11 | End: 2019-07-10 | Stop reason: SDUPTHER

## 2019-04-08 DIAGNOSIS — F41.9 ANXIETY: ICD-10-CM

## 2019-04-08 RX ORDER — ALPRAZOLAM 0.5 MG/1
TABLET ORAL
Qty: 60 TABLET | Refills: 0 | Status: SHIPPED | OUTPATIENT
Start: 2019-04-08 | End: 2019-05-10 | Stop reason: SDUPTHER

## 2019-04-12 ENCOUNTER — TRANSCRIBE ORDERS (OUTPATIENT)
Dept: LAB | Facility: CLINIC | Age: 74
End: 2019-04-12

## 2019-04-12 ENCOUNTER — APPOINTMENT (OUTPATIENT)
Dept: LAB | Facility: CLINIC | Age: 74
End: 2019-04-12
Payer: MEDICARE

## 2019-04-12 DIAGNOSIS — R73.01 IMPAIRED FASTING GLUCOSE: ICD-10-CM

## 2019-04-12 DIAGNOSIS — I10 BENIGN HYPERTENSION: ICD-10-CM

## 2019-04-12 DIAGNOSIS — R41.3 MEMORY LOSS: ICD-10-CM

## 2019-04-12 LAB
ALBUMIN SERPL BCP-MCNC: 3.9 G/DL (ref 3.5–5)
ALP SERPL-CCNC: 48 U/L (ref 46–116)
ALT SERPL W P-5'-P-CCNC: 40 U/L (ref 12–78)
ANION GAP SERPL CALCULATED.3IONS-SCNC: 3 MMOL/L (ref 4–13)
AST SERPL W P-5'-P-CCNC: 22 U/L (ref 5–45)
BASOPHILS # BLD AUTO: 0.04 THOUSANDS/ΜL (ref 0–0.1)
BASOPHILS NFR BLD AUTO: 0 % (ref 0–1)
BILIRUB SERPL-MCNC: 0.52 MG/DL (ref 0.2–1)
BUN SERPL-MCNC: 21 MG/DL (ref 5–25)
CALCIUM SERPL-MCNC: 8.2 MG/DL (ref 8.3–10.1)
CHLORIDE SERPL-SCNC: 107 MMOL/L (ref 100–108)
CHOLEST SERPL-MCNC: 121 MG/DL (ref 50–200)
CO2 SERPL-SCNC: 26 MMOL/L (ref 21–32)
CREAT SERPL-MCNC: 1.22 MG/DL (ref 0.6–1.3)
EOSINOPHIL # BLD AUTO: 0.12 THOUSAND/ΜL (ref 0–0.61)
EOSINOPHIL NFR BLD AUTO: 1 % (ref 0–6)
ERYTHROCYTE [DISTWIDTH] IN BLOOD BY AUTOMATED COUNT: 13.6 % (ref 11.6–15.1)
EST. AVERAGE GLUCOSE BLD GHB EST-MCNC: 126 MG/DL
GFR SERPL CREATININE-BSD FRML MDRD: 59 ML/MIN/1.73SQ M
GLUCOSE P FAST SERPL-MCNC: 116 MG/DL (ref 65–99)
HBA1C MFR BLD: 6 % (ref 4.2–6.3)
HCT VFR BLD AUTO: 46.6 % (ref 36.5–49.3)
HDLC SERPL-MCNC: 34 MG/DL (ref 40–60)
HGB BLD-MCNC: 15.6 G/DL (ref 12–17)
IMM GRANULOCYTES # BLD AUTO: 0.04 THOUSAND/UL (ref 0–0.2)
IMM GRANULOCYTES NFR BLD AUTO: 0 % (ref 0–2)
LDLC SERPL CALC-MCNC: 60 MG/DL (ref 0–100)
LYMPHOCYTES # BLD AUTO: 1.81 THOUSANDS/ΜL (ref 0.6–4.47)
LYMPHOCYTES NFR BLD AUTO: 18 % (ref 14–44)
MCH RBC QN AUTO: 30.5 PG (ref 26.8–34.3)
MCHC RBC AUTO-ENTMCNC: 33.5 G/DL (ref 31.4–37.4)
MCV RBC AUTO: 91 FL (ref 82–98)
MONOCYTES # BLD AUTO: 0.8 THOUSAND/ΜL (ref 0.17–1.22)
MONOCYTES NFR BLD AUTO: 8 % (ref 4–12)
NEUTROPHILS # BLD AUTO: 7.03 THOUSANDS/ΜL (ref 1.85–7.62)
NEUTS SEG NFR BLD AUTO: 73 % (ref 43–75)
NONHDLC SERPL-MCNC: 87 MG/DL
NRBC BLD AUTO-RTO: 0 /100 WBCS
PLATELET # BLD AUTO: 225 THOUSANDS/UL (ref 149–390)
PMV BLD AUTO: 10.8 FL (ref 8.9–12.7)
POTASSIUM SERPL-SCNC: 4.1 MMOL/L (ref 3.5–5.3)
PROT SERPL-MCNC: 7.1 G/DL (ref 6.4–8.2)
RBC # BLD AUTO: 5.11 MILLION/UL (ref 3.88–5.62)
SODIUM SERPL-SCNC: 136 MMOL/L (ref 136–145)
TRIGL SERPL-MCNC: 134 MG/DL
VIT B12 SERPL-MCNC: 550 PG/ML (ref 100–900)
WBC # BLD AUTO: 9.84 THOUSAND/UL (ref 4.31–10.16)

## 2019-04-12 PROCEDURE — 82607 VITAMIN B-12: CPT

## 2019-04-12 PROCEDURE — 80053 COMPREHEN METABOLIC PANEL: CPT

## 2019-04-12 PROCEDURE — 80061 LIPID PANEL: CPT

## 2019-04-12 PROCEDURE — 36415 COLL VENOUS BLD VENIPUNCTURE: CPT

## 2019-04-12 PROCEDURE — 85025 COMPLETE CBC W/AUTO DIFF WBC: CPT

## 2019-04-12 PROCEDURE — 83036 HEMOGLOBIN GLYCOSYLATED A1C: CPT

## 2019-04-19 ENCOUNTER — OFFICE VISIT (OUTPATIENT)
Dept: INTERNAL MEDICINE CLINIC | Facility: CLINIC | Age: 74
End: 2019-04-19
Payer: MEDICARE

## 2019-04-19 VITALS
HEART RATE: 64 BPM | HEIGHT: 64 IN | SYSTOLIC BLOOD PRESSURE: 110 MMHG | RESPIRATION RATE: 18 BRPM | DIASTOLIC BLOOD PRESSURE: 76 MMHG | OXYGEN SATURATION: 94 % | BODY MASS INDEX: 38.76 KG/M2 | WEIGHT: 227 LBS

## 2019-04-19 DIAGNOSIS — K21.9 GASTROESOPHAGEAL REFLUX DISEASE WITHOUT ESOPHAGITIS: ICD-10-CM

## 2019-04-19 DIAGNOSIS — I10 BENIGN HYPERTENSION: Primary | ICD-10-CM

## 2019-04-19 DIAGNOSIS — E78.2 MIXED HYPERLIPIDEMIA: ICD-10-CM

## 2019-04-19 DIAGNOSIS — R73.01 IMPAIRED FASTING GLUCOSE: ICD-10-CM

## 2019-04-19 PROBLEM — R06.02 SHORTNESS OF BREATH ON EXERTION: Status: RESOLVED | Noted: 2017-11-16 | Resolved: 2019-04-19

## 2019-04-19 PROCEDURE — 99214 OFFICE O/P EST MOD 30 MIN: CPT | Performed by: INTERNAL MEDICINE

## 2019-05-02 ENCOUNTER — HOSPITAL ENCOUNTER (OUTPATIENT)
Dept: RADIOLOGY | Facility: HOSPITAL | Age: 74
Discharge: HOME | End: 2019-05-02
Payer: MEDICARE

## 2019-05-02 ENCOUNTER — TRANSCRIBE ORDERS (OUTPATIENT)
Dept: ADMINISTRATIVE | Facility: HOSPITAL | Age: 74
End: 2019-05-02

## 2019-05-02 DIAGNOSIS — R52 PAIN: Primary | ICD-10-CM

## 2019-05-02 DIAGNOSIS — R52 PAIN: ICD-10-CM

## 2019-05-02 PROCEDURE — 73630 X-RAY EXAM OF FOOT: CPT

## 2019-05-10 DIAGNOSIS — F41.9 ANXIETY: ICD-10-CM

## 2019-05-13 RX ORDER — ALPRAZOLAM 0.5 MG/1
TABLET ORAL
Qty: 60 TABLET | Refills: 0 | Status: SHIPPED | OUTPATIENT
Start: 2019-05-13 | End: 2019-06-10 | Stop reason: SDUPTHER

## 2019-06-10 DIAGNOSIS — F41.9 ANXIETY: ICD-10-CM

## 2019-06-11 RX ORDER — ALPRAZOLAM 0.5 MG/1
TABLET ORAL
Qty: 60 TABLET | Refills: 1 | Status: SHIPPED | OUTPATIENT
Start: 2019-06-11 | End: 2019-08-10 | Stop reason: SDUPTHER

## 2019-07-10 DIAGNOSIS — E78.00 PURE HYPERCHOLESTEROLEMIA: ICD-10-CM

## 2019-07-10 RX ORDER — PRAVASTATIN SODIUM 80 MG/1
80 TABLET ORAL DAILY
Qty: 90 TABLET | Refills: 1 | Status: SHIPPED | OUTPATIENT
Start: 2019-07-10 | End: 2019-11-21 | Stop reason: SDUPTHER

## 2019-08-10 DIAGNOSIS — F41.9 ANXIETY: ICD-10-CM

## 2019-08-12 RX ORDER — ALPRAZOLAM 0.5 MG/1
TABLET ORAL
Qty: 60 TABLET | Refills: 0 | Status: SHIPPED | OUTPATIENT
Start: 2019-08-12 | End: 2019-09-09 | Stop reason: SDUPTHER

## 2019-08-23 ENCOUNTER — TRANSCRIBE ORDERS (OUTPATIENT)
Dept: LAB | Facility: CLINIC | Age: 74
End: 2019-08-23

## 2019-08-23 ENCOUNTER — APPOINTMENT (OUTPATIENT)
Dept: LAB | Facility: CLINIC | Age: 74
End: 2019-08-23
Payer: MEDICARE

## 2019-08-23 DIAGNOSIS — I10 BENIGN HYPERTENSION: ICD-10-CM

## 2019-08-23 DIAGNOSIS — R73.01 IMPAIRED FASTING GLUCOSE: ICD-10-CM

## 2019-08-23 LAB
ALBUMIN SERPL BCP-MCNC: 4 G/DL (ref 3.5–5)
ALP SERPL-CCNC: 54 U/L (ref 46–116)
ALT SERPL W P-5'-P-CCNC: 43 U/L (ref 12–78)
ANION GAP SERPL CALCULATED.3IONS-SCNC: 4 MMOL/L (ref 4–13)
AST SERPL W P-5'-P-CCNC: 23 U/L (ref 5–45)
BASOPHILS # BLD AUTO: 0.07 THOUSANDS/ΜL (ref 0–0.1)
BASOPHILS NFR BLD AUTO: 1 % (ref 0–1)
BILIRUB SERPL-MCNC: 0.53 MG/DL (ref 0.2–1)
BUN SERPL-MCNC: 14 MG/DL (ref 5–25)
CALCIUM SERPL-MCNC: 8.8 MG/DL (ref 8.3–10.1)
CHLORIDE SERPL-SCNC: 109 MMOL/L (ref 100–108)
CHOLEST SERPL-MCNC: 144 MG/DL (ref 50–200)
CO2 SERPL-SCNC: 26 MMOL/L (ref 21–32)
CREAT SERPL-MCNC: 1.24 MG/DL (ref 0.6–1.3)
EOSINOPHIL # BLD AUTO: 0.24 THOUSAND/ΜL (ref 0–0.61)
EOSINOPHIL NFR BLD AUTO: 3 % (ref 0–6)
ERYTHROCYTE [DISTWIDTH] IN BLOOD BY AUTOMATED COUNT: 14.1 % (ref 11.6–15.1)
EST. AVERAGE GLUCOSE BLD GHB EST-MCNC: 134 MG/DL
GFR SERPL CREATININE-BSD FRML MDRD: 58 ML/MIN/1.73SQ M
GLUCOSE P FAST SERPL-MCNC: 121 MG/DL (ref 65–99)
HBA1C MFR BLD: 6.3 % (ref 4.2–6.3)
HCT VFR BLD AUTO: 48.2 % (ref 36.5–49.3)
HDLC SERPL-MCNC: 39 MG/DL (ref 40–60)
HGB BLD-MCNC: 16.4 G/DL (ref 12–17)
IMM GRANULOCYTES # BLD AUTO: 0.02 THOUSAND/UL (ref 0–0.2)
IMM GRANULOCYTES NFR BLD AUTO: 0 % (ref 0–2)
LDLC SERPL CALC-MCNC: 75 MG/DL (ref 0–100)
LYMPHOCYTES # BLD AUTO: 2.25 THOUSANDS/ΜL (ref 0.6–4.47)
LYMPHOCYTES NFR BLD AUTO: 25 % (ref 14–44)
MCH RBC QN AUTO: 30.8 PG (ref 26.8–34.3)
MCHC RBC AUTO-ENTMCNC: 34 G/DL (ref 31.4–37.4)
MCV RBC AUTO: 91 FL (ref 82–98)
MONOCYTES # BLD AUTO: 0.76 THOUSAND/ΜL (ref 0.17–1.22)
MONOCYTES NFR BLD AUTO: 8 % (ref 4–12)
NEUTROPHILS # BLD AUTO: 5.66 THOUSANDS/ΜL (ref 1.85–7.62)
NEUTS SEG NFR BLD AUTO: 63 % (ref 43–75)
NONHDLC SERPL-MCNC: 105 MG/DL
NRBC BLD AUTO-RTO: 0 /100 WBCS
PLATELET # BLD AUTO: 235 THOUSANDS/UL (ref 149–390)
PMV BLD AUTO: 10.8 FL (ref 8.9–12.7)
POTASSIUM SERPL-SCNC: 4 MMOL/L (ref 3.5–5.3)
PROT SERPL-MCNC: 7.5 G/DL (ref 6.4–8.2)
RBC # BLD AUTO: 5.32 MILLION/UL (ref 3.88–5.62)
SODIUM SERPL-SCNC: 139 MMOL/L (ref 136–145)
TRIGL SERPL-MCNC: 152 MG/DL
WBC # BLD AUTO: 9 THOUSAND/UL (ref 4.31–10.16)

## 2019-08-23 PROCEDURE — 80053 COMPREHEN METABOLIC PANEL: CPT

## 2019-08-23 PROCEDURE — 36415 COLL VENOUS BLD VENIPUNCTURE: CPT

## 2019-08-23 PROCEDURE — 83036 HEMOGLOBIN GLYCOSYLATED A1C: CPT

## 2019-08-23 PROCEDURE — 80061 LIPID PANEL: CPT

## 2019-08-23 PROCEDURE — 85025 COMPLETE CBC W/AUTO DIFF WBC: CPT

## 2019-08-29 ENCOUNTER — OFFICE VISIT (OUTPATIENT)
Dept: INTERNAL MEDICINE CLINIC | Facility: CLINIC | Age: 74
End: 2019-08-29
Payer: MEDICARE

## 2019-08-29 VITALS
HEART RATE: 70 BPM | OXYGEN SATURATION: 98 % | SYSTOLIC BLOOD PRESSURE: 120 MMHG | BODY MASS INDEX: 38.58 KG/M2 | RESPIRATION RATE: 16 BRPM | DIASTOLIC BLOOD PRESSURE: 70 MMHG | WEIGHT: 226 LBS | HEIGHT: 64 IN

## 2019-08-29 DIAGNOSIS — Z00.00 MEDICARE ANNUAL WELLNESS VISIT, SUBSEQUENT: Primary | ICD-10-CM

## 2019-08-29 DIAGNOSIS — I10 BENIGN HYPERTENSION: ICD-10-CM

## 2019-08-29 DIAGNOSIS — E78.2 MIXED HYPERLIPIDEMIA: ICD-10-CM

## 2019-08-29 DIAGNOSIS — Z12.11 SCREENING FOR MALIGNANT NEOPLASM OF COLON: ICD-10-CM

## 2019-08-29 DIAGNOSIS — R73.01 IMPAIRED FASTING GLUCOSE: ICD-10-CM

## 2019-08-29 PROCEDURE — 99214 OFFICE O/P EST MOD 30 MIN: CPT | Performed by: INTERNAL MEDICINE

## 2019-08-29 PROCEDURE — G0439 PPPS, SUBSEQ VISIT: HCPCS | Performed by: INTERNAL MEDICINE

## 2019-08-29 NOTE — PATIENT INSTRUCTIONS
Obesity   AMBULATORY CARE:   Obesity  is when your body mass index (BMI) is greater than 30  Your healthcare provider will use your height and weight to measure your BMI  The risks of obesity include  many health problems, such as injuries or physical disability  You may need tests to check for the following:  · Diabetes     · High blood pressure or high cholesterol     · Heart disease     · Gallbladder or liver disease     · Cancer of the colon, breast, prostate, liver, or kidney     · Sleep apnea     · Arthritis or gout  Seek care immediately if:   · You have a severe headache, confusion, or difficulty speaking  · You have weakness on one side of your body  · You have chest pain, sweating, or shortness of breath  Contact your healthcare provider if:   · You have symptoms of gallbladder or liver disease, such as pain in your upper abdomen  · You have knee or hip pain and discomfort while walking  · You have symptoms of diabetes, such as intense hunger and thirst, and frequent urination  · You have symptoms of sleep apnea, such as snoring or daytime sleepiness  · You have questions or concerns about your condition or care  Treatment for obesity  focuses on helping you lose weight to improve your health  Even a small decrease in BMI can reduce the risk for many health problems  Your healthcare provider will help you set a weight-loss goal   · Lifestyle changes  are the first step in treating obesity  These include making healthy food choices and getting regular physical activity  Your healthcare provider may suggest a weight-loss program that involves coaching, education, and therapy  · Medicine  may help you lose weight when it is used with a healthy diet and physical activity  · Surgery  can help you lose weight if you are very obese and have other health problems  There are several types of weight-loss surgery  Ask your healthcare provider for more information    Be successful losing weight:   · Set small, realistic goals  An example of a small goal is to walk for 20 minutes 5 days a week  Anther goal is to lose 5% of your body weight  · Tell friends, family members, and coworkers about your goals  and ask for their support  Ask a friend to lose weight with you, or join a weight-loss support group  · Identify foods or triggers that may cause you to overeat , and find ways to avoid them  Remove tempting high-calorie foods from your home and workplace  Place a bowl of fresh fruit on your kitchen counter  If stress causes you to eat, then find other ways to cope with stress  · Keep a diary to track what you eat and drink  Also write down how many minutes of physical activity you do each day  Weigh yourself once a week and record it in your diary  Eating changes: You will need to eat 500 to 1,000 fewer calories each day than you currently eat to lose 1 to 2 pounds a week  The following changes will help you cut calories:  · Eat smaller portions  Use small plates, no larger than 9 inches in diameter  Fill your plate half full of fruits and vegetables  Measure your food using measuring cups until you know what a serving size looks like  · Eat 3 meals and 1 or 2 snacks each day  Plan your meals in advance  Campbell Comber and eat at home most of the time  Eat slowly  · Eat fruits and vegetables at every meal   They are low in calories and high in fiber, which makes you feel full  Do not add butter, margarine, or cream sauce to vegetables  Use herbs to season steamed vegetables  · Eat less fat and fewer fried foods  Eat more baked or grilled chicken and fish  These protein sources are lower in calories and fat than red meat  Limit fast food  Dress your salads with olive oil and vinegar instead of bottled dressing  · Limit the amount of sugar you eat  Do not drink sugary beverages  Limit alcohol  Activity changes:  Physical activity is good for your body in many ways   It helps you burn calories and build strong muscles  It decreases stress and depression, and improves your mood  It can also help you sleep better  Talk to your healthcare provider before you begin an exercise program   · Exercise for at least 30 minutes 5 days a week  Start slowly  Set aside time each day for physical activity that you enjoy and that is convenient for you  It is best to do both weight training and an activity that increases your heart rate, such as walking, bicycling, or swimming  · Find ways to be more active  Do yard work and housecleaning  Walk up the stairs instead of using elevators  Spend your leisure time going to events that require walking, such as outdoor festivals or fairs  This extra physical activity can help you lose weight and keep it off  Follow up with your healthcare provider as directed: You may need to meet with a dietitian  Write down your questions so you remember to ask them during your visits  © 2017 [address] [employer] Information is for End User's use only and may not be sold, redistributed or otherwise used for commercial purposes  All illustrations and images included in CareNotes® are the copyrighted property of [employer]  or Duncan Solomon  The above information is an  only  It is not intended as medical advice for individual conditions or treatments  Talk to your doctor, nurse or pharmacist before following any medical regimen to see if it is safe and effective for you  Urinary Incontinence   WHAT YOU NEED TO KNOW:   What is urinary incontinence? Urinary incontinence (UI) is when you lose control of your bladder  What causes UI? UI occurs because your bladder cannot store or empty urine properly  The following are the most common types of UI:  · Stress incontinence  is when you leak urine due to increased bladder pressure  This may happen when you cough, sneeze, or exercise       · Urge incontinence  is when you feel the need to urinate right away and leak urine accidentally  · Mixed incontinence  is when you have both stress and urge UI  What are the signs and symptoms of UI?   · You feel like your bladder does not empty completely when you urinate  · You urinate often and need to urinate immediately  · You leak urine when you sleep, or you wake up with the urge to urinate  · You leak urine when you cough, sneeze, exercise, or laugh  How is UI diagnosed? Your healthcare provider will ask how often you leak urine and whether you have stress or urge symptoms  Tell him which medicines you take, how often you urinate, and how much liquid you drink each day  You may need any of the following tests:  · Urine tests  may show infection or kidney function  · A pelvic exam  may be done to check for blockages  A pelvic exam will also show if your bladder, uterus, or other organs have moved out of place  · An x-ray, ultrasound, or CT  may show problems with parts of your urinary system  You may be given contrast liquid to help your organs show up better in the pictures  Tell the healthcare provider if you have ever had an allergic reaction to contrast liquid  Do not enter the MRI room with anything metal  Metal can cause serious injury  Tell the healthcare provider if you have any metal in or on your body  · A bladder scan  will show how much urine is left in your bladder after you urinate  You will be asked to urinate and then healthcare providers will use a small ultrasound machine to check the urine left in your bladder  · Cystometry  is used to check the function of your urinary system  Your healthcare provider checks the pressure in your bladder while filling it with fluid  Your bladder pressure may also be tested when your bladder is full and while you urinate  How is UI treated? · Medicines  can help strengthen your bladder control      · Electrical stimulation  is used to send a small amount of electrical energy to your pelvic floor muscles  This helps control your bladder function  Electrodes may be placed outside your body or in your rectum  For women, the electrodes may be placed in the vagina  · A bulking agent  may be injected into the wall of your urethra to make it thicker  This helps keep your urethra closed and decreases urine leakage  · Devices  such as a clamp, pessary, or tampon may help stop urine leaks  Ask your healthcare provider for more information about these and other devices  · Surgery  may be needed if other treatments do not work  Several types of surgery can help improve your bladder control  Ask your healthcare provider for more information about the surgery you may need  How can I manage my symptoms? · Do pelvic muscle exercises often  Your pelvic muscles help you stop urinating  Squeeze these muscles tight for 5 seconds, then relax for 5 seconds  Gradually work up to squeezing for 10 seconds  Do 3 sets of 15 repetitions a day, or as directed  This will help strengthen your pelvic muscles and improve bladder control  · A catheter  may be used to help empty your bladder  A catheter is a tiny, plastic tube that is put into your bladder to drain your urine  Your healthcare provider may tell you to use a catheter to prevent your bladder from getting too full and leaking urine  · Keep a UI record  Write down how often you leak urine and how much you leak  Make a note of what you were doing when you leaked urine  · Train your bladder  Go to the bathroom at set times, such as every 2 hours, even if you do not feel the urge to go  You can also try to hold your urine when you feel the urge to go  For example, hold your urine for 5 minutes when you feel the urge to go  As that becomes easier, hold your urine for 10 minutes  · Drink liquids as directed  Ask your healthcare provider how much liquid to drink each day and which liquids are best for you   You may need to limit the amount of liquid you drink to help control your urine leakage  Limit or do not have drinks that contain caffeine or alcohol  Do not drink any liquid right before you go to bed  · Prevent constipation  Eat a variety of high-fiber foods  Good examples are high-fiber cereals, beans, vegetables, and whole-grain breads  Prune juice may help make your bowel movement softer  Walking is the best way to trigger your intestines to have a bowel movement  · Exercise regularly and maintain a healthy weight  Ask your healthcare provider how much you should weigh and about the best exercise plan for you  Weight loss and exercise will decrease pressure on your bladder and help you control your leakage  Ask him to help you create a weight loss plan if you are overweight  When should I seek immediate care? · You have severe pain  · You are confused or cannot think clearly  When should I contact my healthcare provider? · You have a fever  · You see blood in your urine  · You have pain when you urinate  · You have new or worse pain, even after treatment  · Your mouth feels dry or you have vision changes  · Your urine is cloudy or smells bad  · You have questions or concerns about your condition or care  CARE AGREEMENT:   You have the right to help plan your care  Learn about your health condition and how it may be treated  Discuss treatment options with your caregivers to decide what care you want to receive  You always have the right to refuse treatment  The above information is an  only  It is not intended as medical advice for individual conditions or treatments  Talk to your doctor, nurse or pharmacist before following any medical regimen to see if it is safe and effective for you  © 2017 [address] [employer] Information is for End User's use only and may not be sold, redistributed or otherwise used for commercial purposes   All illustrations and images included in CareNotes® are the copyrighted property of [employer] M [employer] Inc  or Duncan Solomon  Cigarette Smoking and Your Health   AMBULATORY CARE:   Risks to your health if you smoke:  Nicotine and other chemicals found in tobacco damage every cell in your body  Even if you are a light smoker, you have an increased risk for cancer, heart disease, and lung disease  If you are pregnant or have diabetes, smoking increases your risk for complications  Benefits to your health if you stop smoking:   · You decrease respiratory symptoms such as coughing, wheezing, and shortness of breath  · You reduce your risk for cancers of the lung, mouth, throat, kidney, bladder, pancreas, stomach, and cervix  If you already have cancer, you increase the benefits of chemotherapy  You also reduce your risk for cancer returning or a second cancer from developing  · You reduce your risk for heart disease, blood clots, heart attack, and stroke  · You reduce your risk for lung infections, and diseases such as pneumonia, asthma, chronic bronchitis, and emphysema  · Your circulation improves  More oxygen can be delivered to your body  If you have diabetes, you lower your risk for complications, such as kidney, artery, and eye diseases  You also lower your risk for nerve damage  Nerve damage can lead to amputations, poor vision, and blindness  · You improve your body's ability to heal and to fight infections  Benefits to the health of others if you stop smoking:  Tobacco is harmful to nonsmokers who breathe in your secondhand smoke  The following are ways the health of others around you may improve when you stop smoking:  · You lower the risks for lung cancer and heart disease in nonsmoking adults  · If you are pregnant, you lower the risk for miscarriage, early delivery, low birth weight, and stillbirth  You also lower your baby's risk for SIDS, obesity, developmental delay, and neurobehavioral problems, such as ADHD  · If you have children, you lower their risk for ear infections, colds, pneumonia, bronchitis, and asthma  For more information and support to stop smoking:   · Smokefree  gov  Phone: 4- 714 - 832-0367  Web Address: www smokefrMain[facility]  Follow up with your healthcare provider as directed:  Write down your questions so you remember to ask them during your visits  © 2017 [address] [employer] Information is for End User's use only and may not be sold, redistributed or otherwise used for commercial purposes  All illustrations and images included in CareNotes® are the copyrighted property of [employer]  or Duncan Solomon  The above information is an  only  It is not intended as medical advice for individual conditions or treatments  Talk to your doctor, nurse or pharmacist before following any medical regimen to see if it is safe and effective for you  Fall Prevention   WHAT YOU NEED TO KNOW:   What is fall prevention? Fall prevention includes ways to make your home and other areas safer  It also includes ways you can move more carefully to prevent a fall  What increases my risk for falls? · Lack of vitamin D    · Not getting enough sleep each night    · Trouble walking or keeping your balance, or foot problems    · Health conditions that cause changes in your blood pressure, vision, or muscle strength and coordination    · Medicines that make you dizzy, weak, or sleepy    · Problems seeing clearly    · Shoes that have high heels or are not supportive    · Tripping hazards, such as items left on the floor, no handrails on the stairs, or broken steps  How can I help protect myself from falls? · Stand or sit up slowly  This may help you keep your balance and prevent falls  If you need to get up during the night, sit up first  Be sure you are fully awake before you stand  Turn on the light before you start walking  Go slowly in case you are still sleepy   Make sure you will not trip over any pets sleeping in the bedroom  · Use assistive devices as directed  Your healthcare provider may suggest that you use a cane or walker to help you keep your balance  You may need to have grab bars put in your bathroom near the toilet or in the shower  · Wear shoes that fit well and have soles that   Wear shoes both inside and outside  Use slippers with good   Do not wear shoes with high heels  · Wear a personal alarm  This is a device that allows you to call 911 if you fall and need help  Ask your healthcare provider for more information  · Stay active  Exercise can help strengthen your muscles and improve your balance  Your healthcare provider may recommend water aerobics or walking  He or she may also recommend physical therapy to improve your coordination  Never start an exercise program without talking to your healthcare provider first      · Manage medical conditions  Keep all appointments with your healthcare providers  Visit your eye doctor as directed  How can I make my home safer? · Add items to prevent falls in the bathroom  Put nonslip strips on your bath or shower floor to prevent you from slipping  Use a bath mat if you do not have carpet in the bathroom  This will prevent you from falling when you step out of the bath or shower  Use a shower seat so you do not need to stand while you shower  Sit on the toilet or a chair in your bathroom to dry yourself and put on clothing  This will prevent you from losing your balance from drying or dressing yourself while you are standing  · Keep paths clear  Remove books, shoes, and other objects from walkways and stairs  Place cords for telephones and lamps out of the way so that you do not need to walk over them  Tape them down if you cannot move them  Remove small rugs  If you cannot remove a rug, secure it with double-sided tape  This will prevent you from tripping  · Install bright lights in your home  Use night lights to help light paths to the bathroom or kitchen  Always turn on the light before you start walking  · Keep items you use often on shelves within reach  Do not use a step stool to help you reach an item  · Paint or place reflective tape on the edges of your stairs  This will help you see the stairs better  Call 911 or have someone else call if:   · You have fallen and are unconscious  · You have fallen and cannot move part of your body  Contact your healthcare provider if:   · You have fallen and have pain or a headache  · You have questions or concerns about your condition or care  CARE AGREEMENT:   You have the right to help plan your care  Learn about your health condition and how it may be treated  Discuss treatment options with your caregivers to decide what care you want to receive  You always have the right to refuse treatment  The above information is an  only  It is not intended as medical advice for individual conditions or treatments  Talk to your doctor, nurse or pharmacist before following any medical regimen to see if it is safe and effective for you  © 2017 [address] [facility] Information is for End User's use only and may not be sold, redistributed or otherwise used for commercial purposes  All illustrations and images included in CareNotes® are the copyrighted property of [employer]  or Duncan Solomon  Advance Directives   WHAT YOU NEED TO KNOW:   What are advance directives? Advance directives are legal documents that state your wishes and plans for medical care  These plans are made ahead of time in case you lose your ability to make decisions for yourself  Advance directives can apply to any medical decision, such as the treatments you want, and if you want to donate organs  What are the types of advance directives? There are many types of advance directives, and each state has rules about how to use them   You may choose a combination of any of the following:  · Living will: This is a written record of the treatment you want  You can also choose which treatments you do not want, which to limit, and which to stop at a certain time  This includes surgery, medicine, IV fluid, and tube feedings  · Durable power of  for healthcare [facility]): This is a written record that states who you want to make healthcare choices for you when you are unable to make them for yourself  This person, called a proxy, is usually a family member or a friend  You may choose more than 1 proxy  · Do not resuscitate (DNR) order:  A DNR order is used in case your heart stops beating or you stop breathing  It is a request not to have certain forms of treatment, such as CPR  A DNR order may be included in other types of advance directives  · Medical directive: This covers the care that you want if you are in a coma, near death, or unable to make decisions for yourself  You can list the treatments you want for each condition  Treatment may include pain medicine, surgery, blood transfusions, dialysis, IV or tube feedings, and a ventilator (breathing machine)  · Values history: This document has questions about your views, beliefs, and how you feel and think about life  This information can help others choose the care that you would choose  Why are advance directives important? An advance directive helps you control your care  Although spoken wishes may be used, it is better to have your wishes written down  Spoken wishes can be misunderstood, or not followed  Treatments may be given even if you do not want them  An advance directive may make it easier for your family to make difficult choices about your care  How do I decide what to put in my advance directives? · Make informed decisions:  Make sure you fully understand treatments or care you may receive   Think about the benefits and problems your decisions could cause for you or your family  Talk to healthcare providers if you have concerns or questions before you write down your wishes  You may also want to talk with your [facility] or , or a   Check your state laws to make sure that what you put in your advance directive is legal      · Sign all forms:  Sign and date your advance directive when you have finished  You may also need 2 witnesses to sign the forms  Witnesses cannot be your doctor or his staff, your spouse, heirs or beneficiaries, people you owe money to, or your chosen proxy  Talk to your family, proxy, and healthcare providers about your advance directive  Give each person a copy, and keep one for yourself in a place you can get to easily  Do not keep it hidden or locked away  · Review and revise your plans: You can revise your advance directive at any time, as long as you are able to make decisions  Review your plan every year, and when there are changes in your life, or your health  When you make changes, let your family, proxy, and healthcare providers know  Give each a new copy  Where can I find more information? · [employer]  [address] [place] , [address]   Phone: 0- [phone] - [phone]  Phone: 2- 287 - [phone]  Web Address: http://www  aafp org  · [address] [facility])  [address] [facility] [place], [address]  Phone: [phone]  Phone: [phone]  Web Address: Chandan summers  [facility] AGREEMENT:   You have the right to help plan your care  To help with this plan, you must learn about your health condition and treatment options  You must also learn about advance directives and how they are used  Work with your healthcare providers to decide what care will be used to treat you  You always have the right to refuse treatment  The above information is an  only   It is not intended as medical advice for individual conditions or treatments  Talk to your doctor, nurse or pharmacist before following any medical regimen to see if it is safe and effective for you  © 2017 [address] [employer] Information is for End User's use only and may not be sold, redistributed or otherwise used for commercial purposes  All illustrations and images included in CareNotes® are the copyrighted property of [employer]  or Duncan Solomon  Obesity   AMBULATORY CARE:   Obesity  is when your body mass index (BMI) is greater than 30  Your healthcare provider will use your height and weight to measure your BMI  The risks of obesity include  many health problems, such as injuries or physical disability  You may need tests to check for the following:  · Diabetes     · High blood pressure or high cholesterol     · Heart disease     · Gallbladder or liver disease     · Cancer of the colon, breast, prostate, liver, or kidney     · Sleep apnea     · Arthritis or gout  Seek care immediately if:   · You have a severe headache, confusion, or difficulty speaking  · You have weakness on one side of your body  · You have chest pain, sweating, or shortness of breath  Contact your healthcare provider if:   · You have symptoms of gallbladder or liver disease, such as pain in your upper abdomen  · You have knee or hip pain and discomfort while walking  · You have symptoms of diabetes, such as intense hunger and thirst, and frequent urination  · You have symptoms of sleep apnea, such as snoring or daytime sleepiness  · You have questions or concerns about your condition or care  Treatment for obesity  focuses on helping you lose weight to improve your health  Even a small decrease in BMI can reduce the risk for many health problems  Your healthcare provider will help you set a weight-loss goal   · Lifestyle changes  are the first step in treating obesity  These include making healthy food choices and getting regular physical activity  Your healthcare provider may suggest a weight-loss program that involves coaching, education, and therapy  · Medicine  may help you lose weight when it is used with a healthy diet and physical activity  · Surgery  can help you lose weight if you are very obese and have other health problems  There are several types of weight-loss surgery  Ask your healthcare provider for more information  Be successful losing weight:   · Set small, realistic goals  An example of a small goal is to walk for 20 minutes 5 days a week  Anther goal is to lose 5% of your body weight  · Tell friends, family members, and coworkers about your goals  and ask for their support  Ask a friend to lose weight with you, or join a weight-loss support group  · Identify foods or triggers that may cause you to overeat , and find ways to avoid them  Remove tempting high-calorie foods from your home and workplace  Place a bowl of fresh fruit on your kitchen counter  If stress causes you to eat, then find other ways to cope with stress  · Keep a diary to track what you eat and drink  Also write down how many minutes of physical activity you do each day  Weigh yourself once a week and record it in your diary  Eating changes: You will need to eat 500 to 1,000 fewer calories each day than you currently eat to lose 1 to 2 pounds a week  The following changes will help you cut calories:  · Eat smaller portions  Use small plates, no larger than 9 inches in diameter  Fill your plate half full of fruits and vegetables  Measure your food using measuring cups until you know what a serving size looks like  · Eat 3 meals and 1 or 2 snacks each day  Plan your meals in advance  [facility] and eat at home most of the time  Eat slowly  · Eat fruits and vegetables at every meal   They are low in calories and high in fiber, which makes you feel full  Do not add butter, margarine, or cream sauce to vegetables   Use herbs to season steamed vegetables  · Eat less fat and fewer fried foods  Eat more baked or grilled chicken and fish  These protein sources are lower in calories and fat than red meat  Limit fast food  Dress your salads with olive oil and vinegar instead of bottled dressing  · Limit the amount of sugar you eat  Do not drink sugary beverages  Limit alcohol  Activity changes:  Physical activity is good for your body in many ways  It helps you burn calories and build strong muscles  It decreases stress and depression, and improves your mood  It can also help you sleep better  Talk to your healthcare provider before you begin an exercise program   · Exercise for at least 30 minutes 5 days a week  Start slowly  Set aside time each day for physical activity that you enjoy and that is convenient for you  It is best to do both weight training and an activity that increases your heart rate, such as walking, bicycling, or swimming  · Find ways to be more active  Do yard work and housecleaning  Walk up the stairs instead of using elevators  Spend your leisure time going to events that require walking, such as outdoor festivals or fairs  This extra physical activity can help you lose weight and keep it off  Follow up with your healthcare provider as directed: You may need to meet with a dietitian  Write down your questions so you remember to ask them during your visits  © 2017 [employer] Information is for End User's use only and may not be sold, redistributed or otherwise used for commercial purposes  All illustrations and images included in CareNotes® are the copyrighted property of A [employer] A M [employer] Inc  or Duncan Solomon  The above information is an  only  It is not intended as medical advice for individual conditions or treatments  Talk to your doctor, nurse or pharmacist before following any medical regimen to see if it is safe and effective for you    Urinary Incontinence   WHAT YOU NEED TO KNOW:   What is urinary incontinence? Urinary incontinence (UI) is when you lose control of your bladder  What causes UI? UI occurs because your bladder cannot store or empty urine properly  The following are the most common types of UI:  · Stress incontinence  is when you leak urine due to increased bladder pressure  This may happen when you cough, sneeze, or exercise  · Urge incontinence  is when you feel the need to urinate right away and leak urine accidentally  · Mixed incontinence  is when you have both stress and urge UI  What are the signs and symptoms of UI?   · You feel like your bladder does not empty completely when you urinate  · You urinate often and need to urinate immediately  · You leak urine when you sleep, or you wake up with the urge to urinate  · You leak urine when you cough, sneeze, exercise, or laugh  How is UI diagnosed? Your healthcare provider will ask how often you leak urine and whether you have stress or urge symptoms  Tell him which medicines you take, how often you urinate, and how much liquid you drink each day  You may need any of the following tests:  · Urine tests  may show infection or kidney function  · A pelvic exam  may be done to check for blockages  A pelvic exam will also show if your bladder, uterus, or other organs have moved out of place  · An x-ray, ultrasound, or CT  may show problems with parts of your urinary system  You may be given contrast liquid to help your organs show up better in the pictures  Tell the healthcare provider if you have ever had an allergic reaction to contrast liquid  Do not enter the MRI room with anything metal  Metal can cause serious injury  Tell the healthcare provider if you have any metal in or on your body  · A bladder scan  will show how much urine is left in your bladder after you urinate   You will be asked to urinate and then healthcare providers will use a small ultrasound machine to check the urine left in your bladder  · Cystometry  is used to check the function of your urinary system  Your healthcare provider checks the pressure in your bladder while filling it with fluid  Your bladder pressure may also be tested when your bladder is full and while you urinate  How is UI treated? · Medicines  can help strengthen your bladder control  · Electrical stimulation  is used to send a small amount of electrical energy to your pelvic floor muscles  This helps control your bladder function  Electrodes may be placed outside your body or in your rectum  For women, the electrodes may be placed in the vagina  · A bulking agent  may be injected into the wall of your urethra to make it thicker  This helps keep your urethra closed and decreases urine leakage  · Devices  such as a clamp, pessary, or tampon may help stop urine leaks  Ask your healthcare provider for more information about these and other devices  · Surgery  may be needed if other treatments do not work  Several types of surgery can help improve your bladder control  Ask your healthcare provider for more information about the surgery you may need  How can I manage my symptoms? · Do pelvic muscle exercises often  Your pelvic muscles help you stop urinating  Squeeze these muscles tight for 5 seconds, then relax for 5 seconds  Gradually work up to squeezing for 10 seconds  Do 3 sets of 15 repetitions a day, or as directed  This will help strengthen your pelvic muscles and improve bladder control  · A catheter  may be used to help empty your bladder  A catheter is a tiny, plastic tube that is put into your bladder to drain your urine  Your healthcare provider may tell you to use a catheter to prevent your bladder from getting too full and leaking urine  · Keep a UI record  Write down how often you leak urine and how much you leak  Make a note of what you were doing when you leaked urine  · Train your bladder    Go to the bathroom at set times, such as every 2 hours, even if you do not feel the urge to go  You can also try to hold your urine when you feel the urge to go  For example, hold your urine for 5 minutes when you feel the urge to go  As that becomes easier, hold your urine for 10 minutes  · Drink liquids as directed  Ask your healthcare provider how much liquid to drink each day and which liquids are best for you  You may need to limit the amount of liquid you drink to help control your urine leakage  Limit or do not have drinks that contain caffeine or alcohol  Do not drink any liquid right before you go to bed  · Prevent constipation  Eat a variety of high-fiber foods  Good examples are high-fiber cereals, beans, vegetables, and whole-grain breads  Prune juice may help make your bowel movement softer  Walking is the best way to trigger your intestines to have a bowel movement  · Exercise regularly and maintain a healthy weight  Ask your healthcare provider how much you should weigh and about the best exercise plan for you  Weight loss and exercise will decrease pressure on your bladder and help you control your leakage  Ask him to help you create a weight loss plan if you are overweight  When should I seek immediate care? · You have severe pain  · You are confused or cannot think clearly  When should I contact my healthcare provider? · You have a fever  · You see blood in your urine  · You have pain when you urinate  · You have new or worse pain, even after treatment  · Your mouth feels dry or you have vision changes  · Your urine is cloudy or smells bad  · You have questions or concerns about your condition or care  CARE AGREEMENT:   You have the right to help plan your care  Learn about your health condition and how it may be treated  Discuss treatment options with your caregivers to decide what care you want to receive  You always have the right to refuse treatment   The above information is an  only  It is not intended as medical advice for individual conditions or treatments  Talk to your doctor, nurse or pharmacist before following any medical regimen to see if it is safe and effective for you  © 2017 [employer] Information is for End User's use only and may not be sold, redistributed or otherwise used for commercial purposes  All illustrations and images included in CareNotes® are the copyrighted property of [employer]  or Duncan Juanito  Cigarette Smoking and Your Health   AMBULATORY CARE:   Risks to your health if you smoke:  Nicotine and other chemicals found in tobacco damage every cell in your body  Even if you are a light smoker, you have an increased risk for cancer, heart disease, and lung disease  If you are pregnant or have diabetes, smoking increases your risk for complications  Benefits to your health if you stop smoking:   · You decrease respiratory symptoms such as coughing, wheezing, and shortness of breath  · You reduce your risk for cancers of the lung, mouth, throat, kidney, bladder, pancreas, stomach, and cervix  If you already have cancer, you increase the benefits of chemotherapy  You also reduce your risk for cancer returning or a second cancer from developing  · You reduce your risk for heart disease, blood clots, heart attack, and stroke  · You reduce your risk for lung infections, and diseases such as pneumonia, asthma, chronic bronchitis, and emphysema  · Your circulation improves  More oxygen can be delivered to your body  If you have diabetes, you lower your risk for complications, such as kidney, artery, and eye diseases  You also lower your risk for nerve damage  Nerve damage can lead to amputations, poor vision, and blindness  · You improve your body's ability to heal and to fight infections    Benefits to the health of others if you stop smoking:  Tobacco is harmful to nonsmokers who breathe in your secondhand smoke  The following are ways the health of others around you may improve when you stop smoking:  · You lower the risks for lung cancer and heart disease in nonsmoking adults  · If you are pregnant, you lower the risk for miscarriage, early delivery, low birth weight, and stillbirth  You also lower your baby's risk for SIDS, obesity, developmental delay, and neurobehavioral problems, such as ADHD  · If you have children, you lower their risk for ear infections, colds, pneumonia, bronchitis, and asthma  For more information and support to stop smoking:   · [employer]  [place]  Phone: 6- 642 - [phone]  Web Address: www Float: [place]  Follow up with your healthcare provider as directed:  Write down your questions so you remember to ask them during your visits  © 2017 [employer] Information is for End User's use only and may not be sold, redistributed or otherwise used for commercial purposes  All illustrations and images included in CareNotes® are the copyrighted property of [employer]  or Duncan Solomon  The above information is an  only  It is not intended as medical advice for individual conditions or treatments  Talk to your doctor, nurse or pharmacist before following any medical regimen to see if it is safe and effective for you  Fall Prevention   WHAT YOU NEED TO KNOW:   What is fall prevention? Fall prevention includes ways to make your home and other areas safer  It also includes ways you can move more carefully to prevent a fall  What increases my risk for falls?    · Lack of vitamin D    · Not getting enough sleep each night    · Trouble walking or keeping your balance, or foot problems    · Health conditions that cause changes in your blood pressure, vision, or muscle strength and coordination    · Medicines that make you dizzy, weak, or sleepy    · Problems seeing clearly    · Shoes that have high heels or are not supportive    · Tripping hazards, such as items left on the floor, no handrails on the stairs, or broken steps  How can I help protect myself from falls? · Stand or sit up slowly  This may help you keep your balance and prevent falls  If you need to get up during the night, sit up first  Be sure you are fully awake before you stand  Turn on the light before you start walking  Go slowly in case you are still sleepy  Make sure you will not trip over any pets sleeping in the bedroom  · Use assistive devices as directed  Your healthcare provider may suggest that you use a cane or walker to help you keep your balance  You may need to have grab bars put in your bathroom near the toilet or in the shower  · Wear shoes that fit well and have soles that   Wear shoes both inside and outside  Use slippers with good   Do not wear shoes with high heels  · Wear a personal alarm  This is a device that allows you to call 911 if you fall and need help  Ask your healthcare provider for more information  · Stay active  Exercise can help strengthen your muscles and improve your balance  Your healthcare provider may recommend water aerobics or walking  He or she may also recommend physical therapy to improve your coordination  Never start an exercise program without talking to your healthcare provider first      · Manage medical conditions  Keep all appointments with your healthcare providers  Visit your eye doctor as directed  How can I make my home safer? · Add items to prevent falls in the bathroom  Put nonslip strips on your bath or shower floor to prevent you from slipping  Use a bath mat if you do not have carpet in the bathroom  This will prevent you from falling when you step out of the bath or shower  Use a shower seat so you do not need to stand while you shower  Sit on the toilet or a chair in your bathroom to dry yourself and put on clothing   This will prevent you from losing your balance from drying or dressing yourself while you are standing  · Keep paths clear  Remove books, shoes, and other objects from walkways and stairs  Place cords for telephones and lamps out of the way so that you do not need to walk over them  Tape them down if you cannot move them  Remove small rugs  If you cannot remove a rug, secure it with double-sided tape  This will prevent you from tripping  · Install bright lights in your home  Use night lights to help light paths to the bathroom or kitchen  Always turn on the light before you start walking  · Keep items you use often on shelves within reach  Do not use a step stool to help you reach an item  · Paint or place reflective tape on the edges of your stairs  This will help you see the stairs better  Call 911 or have someone else call if:   · You have fallen and are unconscious  · You have fallen and cannot move part of your body  Contact your healthcare provider if:   · You have fallen and have pain or a headache  · You have questions or concerns about your condition or care  CARE AGREEMENT:   You have the right to help plan your care  Learn about your health condition and how it may be treated  Discuss treatment options with your caregivers to decide what care you want to receive  You always have the right to refuse treatment  The above information is an  only  It is not intended as medical advice for individual conditions or treatments  Talk to your doctor, nurse or pharmacist before following any medical regimen to see if it is safe and effective for you  © 2017 [facility] Information is for End User's use only and may not be sold, redistributed or otherwise used for commercial purposes  All illustrations and images included in CareNotes® are the copyrighted property of A [employer] A M , Inc  or Duncan Solomon  Advance Directives   WHAT YOU NEED TO KNOW:   What are advance directives?   Advance directives are legal documents that state your wishes and plans for medical care  These plans are made ahead of time in case you lose your ability to make decisions for yourself  Advance directives can apply to any medical decision, such as the treatments you want, and if you want to donate organs  What are the types of advance directives? There are many types of advance directives, and each state has rules about how to use them  You may choose a combination of any of the following:  · Living will: This is a written record of the treatment you want  You can also choose which treatments you do not want, which to limit, and which to stop at a certain time  This includes surgery, medicine, IV fluid, and tube feedings  · Durable power of  for healthcare [facility]): This is a written record that states who you want to make healthcare choices for you when you are unable to make them for yourself  This person, called a proxy, is usually a family member or a friend  You may choose more than 1 proxy  · Do not resuscitate (DNR) order:  A DNR order is used in case your heart stops beating or you stop breathing  It is a request not to have certain forms of treatment, such as CPR  A DNR order may be included in other types of advance directives  · Medical directive: This covers the care that you want if you are in a coma, near death, or unable to make decisions for yourself  You can list the treatments you want for each condition  Treatment may include pain medicine, surgery, blood transfusions, dialysis, IV or tube feedings, and a ventilator (breathing machine)  · Values history: This document has questions about your views, beliefs, and how you feel and think about life  This information can help others choose the care that you would choose  Why are advance directives important? An advance directive helps you control your care  Although spoken wishes may be used, it is better to have your wishes written down   Spoken wishes can be misunderstood, or not followed  Treatments may be given even if you do not want them  An advance directive may make it easier for your family to make difficult choices about your care  How do I decide what to put in my advance directives? · Make informed decisions:  Make sure you fully understand treatments or care you may receive  Think about the benefits and problems your decisions could cause for you or your family  Talk to healthcare providers if you have concerns or questions before you write down your wishes  You may also want to talk with your Sabianist or , or a   Check your state laws to make sure that what you put in your advance directive is legal      · Sign all forms:  Sign and date your advance directive when you have finished  You may also need 2 witnesses to sign the forms  Witnesses cannot be your doctor or his staff, your spouse, heirs or beneficiaries, people you owe money to, or your chosen proxy  Talk to your family, proxy, and healthcare providers about your advance directive  Give each person a copy, and keep one for yourself in a place you can get to easily  Do not keep it hidden or locked away  · Review and revise your plans: You can revise your advance directive at any time, as long as you are able to make decisions  Review your plan every year, and when there are changes in your life, or your health  When you make changes, let your family, proxy, and healthcare providers know  Give each a new copy  Where can I find more information? · [employer]  [address] , [address]  Phone: 1- [phone] - [phone]  Phone: [phone]  Web Address: http://www  aafp org  · [address] [facility])  [address] [facility], [address]  Phone: [phone]  Phone: [phone]  Web Address: [place] CEJA AGREEMENT:   You have the right to help plan your care  To help with this plan, you must learn about your health condition and treatment options  You must also learn about advance directives and how they are used  Work with your healthcare providers to decide what care will be used to treat you  You always have the right to refuse treatment  The above information is an  only  It is not intended as medical advice for individual conditions or treatments  Talk to your doctor, nurse or pharmacist before following any medical regimen to see if it is safe and effective for you  © 2017 [address] [employer] Information is for End User's use only and may not be sold, redistributed or otherwise used for commercial purposes  All illustrations and images included in CareNotes® are the copyrighted property of [employer]  or Duncan oSlomon

## 2019-08-29 NOTE — PROGRESS NOTES
Assessment/Plan:     Chronic problems are controlled but up slightly  Reminded him to watch, especially as we approach the holiday season in the next few months     Continue present medications  Continue diet and exercise  Ordered labs for next visit  BMI Counseling: Body mass index is 38 79 kg/m²  Discussed the patient's BMI with him  The BMI is above average  BMI counseling and education was provided to the patient  Nutrition recommendations include moderation in carbohydrate intake  Return in about 4 months (around 12/29/2019)  No problem-specific Assessment & Plan notes found for this encounter  Diagnoses and all orders for this visit:    Medicare annual wellness visit, subsequent    Benign hypertension  -     Comprehensive metabolic panel; Future  -     CBC and differential; Future  -     Lipid panel; Future    Mixed hyperlipidemia    Impaired fasting glucose  -     Hemoglobin A1C; Future    Screening for malignant neoplasm of colon  -     Ambulatory referral to Gastroenterology; Future          Subjective:      Patient ID: Vinicius Power is a 69 y o  male  Patient comes in today for routine follow-up and Medicare wellness  He states he is doing okay  His blood pressure is controlled  His blood work shows sugar and cholesterol are controlled but they are up slightly from before  He is watching his diet and taking his medicine  Probably could do better with the diet  He admits he is due for repeat colonoscopy  He did have colon polyps before  Denies any new complaints today  No further additions to his history        ALLERGIES:  No Known Allergies    CURRENT MEDICATIONS:    Current Outpatient Medications:     ALPRAZolam (XANAX) 0 5 mg tablet, TAKE 1 TABLET(0 5 MG) BY MOUTH TWICE DAILY, Disp: 60 tablet, Rfl: 0    aspirin (ASPIRIN LOW DOSE) 81 mg EC tablet, Take by mouth, Disp: , Rfl:     B Complex-Folic Acid (B COMPLEX PLUS) TABS, Take by mouth, Disp: , Rfl:     Cholecalciferol (VITAMIN D3) 1000 units CAPS, Take by mouth, Disp: , Rfl:     Chondroitin Sulfate-Vit C-Mn (CHONDROITIN SULFATE COMPLEX) 400-60-2 5 MG CAPS, Take by mouth, Disp: , Rfl:     Coenzyme Q10 (COQ-10) 100 MG CAPS, Take by mouth, Disp: , Rfl:     fenofibrate (TRICOR) 54 MG tablet, TAKE 1 TABLET BY MOUTH  DAILY, Disp: 90 tablet, Rfl: 1    metoprolol succinate (TOPROL-XL) 50 mg 24 hr tablet, TAKE 1 TABLET BY MOUTH  DAILY, Disp: 90 tablet, Rfl: 1    Multiple Vitamin (MULTI-VITAMIN DAILY PO), Take by mouth, Disp: , Rfl:     omeprazole (PriLOSEC) 40 MG capsule, TAKE 1 CAPSULE BY MOUTH  DAILY, Disp: 90 capsule, Rfl: 1    pravastatin (PRAVACHOL) 80 mg tablet, Take 1 tablet (80 mg total) by mouth daily, Disp: 90 tablet, Rfl: 1    solifenacin (VESICARE) 5 mg tablet, Take 1 tablet (5 mg total) by mouth daily, Disp: 90 tablet, Rfl: 1    ACTIVE PROBLEM LIST:  Patient Active Problem List   Diagnosis    KADEN on CPAP    Anxiety    Basal cell carcinoma of cheek    Basal cell carcinoma of skin    Benign hypertension    Deviated nasal septum    GERD (gastroesophageal reflux disease)    Hyperlipidemia    Morbid obesity (HCC)    OAB (overactive bladder)    Impaired fasting glucose       PAST MEDICAL HISTORY:  Past Medical History:   Diagnosis Date    Colon polyps     DR Holly Ojeda    Diverticulosis     Obesity     KADEN (obstructive sleep apnea)     Small bowel obstruction, partial (HCC)     SOB (shortness of breath)        PAST SURGICAL HISTORY:  Past Surgical History:   Procedure Laterality Date    SKIN BIOPSY      TONSILLECTOMY AND ADENOIDECTOMY         FAMILY HISTORY:  Family History   Problem Relation Age of Onset    Heart disease Mother     Hypertension Mother     Breast cancer Sister        SOCIAL HISTORY:  Social History     Socioeconomic History    Marital status: Significant Other     Spouse name: Not on file    Number of children: 0    Years of education: Not on file    Highest education level: Not on file   Occupational History    Occupation: SALES     Comment: FULL TIME    Social Needs    Financial resource strain: Not on file    Food insecurity:     Worry: Not on file     Inability: Not on file    Transportation needs:     Medical: Not on file     Non-medical: Not on file   Tobacco Use    Smoking status: Never Smoker    Smokeless tobacco: Never Used   Substance and Sexual Activity    Alcohol use: No    Drug use: No    Sexual activity: Not on file     Comment: DENIED: HISTORY OF HIGH RISK SEXUAL BEHAVIOR    Lifestyle    Physical activity:     Days per week: Not on file     Minutes per session: Not on file    Stress: Not on file   Relationships    Social connections:     Talks on phone: Not on file     Gets together: Not on file     Attends Gnosticist service: Not on file     Active member of club or organization: Not on file     Attends meetings of clubs or organizations: Not on file     Relationship status: Not on file    Intimate partner violence:     Fear of current or ex partner: Not on file     Emotionally abused: Not on file     Physically abused: Not on file     Forced sexual activity: Not on file   Other Topics Concern    Not on file   Social History Narrative     as per all scripts        Review of Systems   Respiratory: Negative for shortness of breath  Cardiovascular: Negative for chest pain  Gastrointestinal: Negative for abdominal pain  Objective:  Vitals:    08/29/19 0941   BP: 120/70   BP Location: Left arm   Patient Position: Sitting   Cuff Size: Standard   Pulse: 70   Resp: 16   SpO2: 98%   Weight: 103 kg (226 lb)   Height: 5' 4" (1 626 m)     Body mass index is 38 79 kg/m²  Physical Exam   Constitutional: He is oriented to person, place, and time  He appears well-developed and well-nourished  Cardiovascular: Normal rate, regular rhythm and normal heart sounds  Pulmonary/Chest: Effort normal and breath sounds normal    Abdominal: Soft   There is no tenderness  Musculoskeletal: He exhibits no edema  Neurological: He is alert and oriented to person, place, and time  Nursing note and vitals reviewed          RESULTS:    Recent Results (from the past 1008 hour(s))   CBC and differential    Collection Time: 08/23/19 10:41 AM   Result Value Ref Range    WBC 9 00 4 31 - 10 16 Thousand/uL    RBC 5 32 3 88 - 5 62 Million/uL    Hemoglobin 16 4 12 0 - 17 0 g/dL    Hematocrit 48 2 36 5 - 49 3 %    MCV 91 82 - 98 fL    MCH 30 8 26 8 - 34 3 pg    MCHC 34 0 31 4 - 37 4 g/dL    RDW 14 1 11 6 - 15 1 %    MPV 10 8 8 9 - 12 7 fL    Platelets 177 641 - 041 Thousands/uL    nRBC 0 /100 WBCs    Neutrophils Relative 63 43 - 75 %    Immat GRANS % 0 0 - 2 %    Lymphocytes Relative 25 14 - 44 %    Monocytes Relative 8 4 - 12 %    Eosinophils Relative 3 0 - 6 %    Basophils Relative 1 0 - 1 %    Neutrophils Absolute 5 66 1 85 - 7 62 Thousands/µL    Immature Grans Absolute 0 02 0 00 - 0 20 Thousand/uL    Lymphocytes Absolute 2 25 0 60 - 4 47 Thousands/µL    Monocytes Absolute 0 76 0 17 - 1 22 Thousand/µL    Eosinophils Absolute 0 24 0 00 - 0 61 Thousand/µL    Basophils Absolute 0 07 0 00 - 0 10 Thousands/µL   Comprehensive metabolic panel    Collection Time: 08/23/19 10:41 AM   Result Value Ref Range    Sodium 139 136 - 145 mmol/L    Potassium 4 0 3 5 - 5 3 mmol/L    Chloride 109 (H) 100 - 108 mmol/L    CO2 26 21 - 32 mmol/L    ANION GAP 4 4 - 13 mmol/L    BUN 14 5 - 25 mg/dL    Creatinine 1 24 0 60 - 1 30 mg/dL    Glucose, Fasting 121 (H) 65 - 99 mg/dL    Calcium 8 8 8 3 - 10 1 mg/dL    AST 23 5 - 45 U/L    ALT 43 12 - 78 U/L    Alkaline Phosphatase 54 46 - 116 U/L    Total Protein 7 5 6 4 - 8 2 g/dL    Albumin 4 0 3 5 - 5 0 g/dL    Total Bilirubin 0 53 0 20 - 1 00 mg/dL    eGFR 58 ml/min/1 73sq m   Lipid panel    Collection Time: 08/23/19 10:41 AM   Result Value Ref Range    Cholesterol 144 50 - 200 mg/dL    Triglycerides 152 (H) <=150 mg/dL    HDL, Direct 39 (L) 40 - 60 mg/dL LDL Calculated 75 0 - 100 mg/dL    Non-HDL-Chol (CHOL-HDL) 105 mg/dl   Hemoglobin A1C    Collection Time: 08/23/19 10:41 AM   Result Value Ref Range    Hemoglobin A1C 6 3 4 2 - 6 3 %     mg/dl       This note was created with voice recognition software  Phonic, grammatical and spelling errors may be present within the note as a result

## 2019-08-29 NOTE — PROGRESS NOTES
Assessment and Plan:     Problem List Items Addressed This Visit     None         History of Present Illness:     Patient presents for Welcome to Medicare visit  Patient Care Team:  Kwan Lopez MD as PCP - General  CHERISE Lange MD     Review of Systems:     Review of Systems   Respiratory: Negative for shortness of breath  Cardiovascular: Negative for chest pain  Gastrointestinal: Negative for abdominal pain and bowel incontinence  Psychiatric/Behavioral: The patient is not nervous/anxious           Problem List:     Patient Active Problem List   Diagnosis    KADEN on CPAP    Anxiety    Basal cell carcinoma of cheek    Basal cell carcinoma of skin    Benign hypertension    Deviated nasal septum    GERD (gastroesophageal reflux disease)    Hyperlipidemia    Morbid obesity (HCC)    OAB (overactive bladder)    Impaired fasting glucose      Past Medical and Surgical History:     Past Medical History:   Diagnosis Date    Colon polyps     DR Tono Thomas    Diverticulosis     Obesity     KADEN (obstructive sleep apnea)     Small bowel obstruction, partial (HCC)     SOB (shortness of breath)      Past Surgical History:   Procedure Laterality Date    SKIN BIOPSY      TONSILLECTOMY AND ADENOIDECTOMY        Family History:     Family History   Problem Relation Age of Onset    Heart disease Mother     Hypertension Mother     Breast cancer Sister       Social History:     Social History     Tobacco Use   Smoking Status Never Smoker   Smokeless Tobacco Never Used     Social History     Substance and Sexual Activity   Alcohol Use No     Social History     Substance and Sexual Activity   Drug Use No      Medications and Allergies:     Current Outpatient Medications   Medication Sig Dispense Refill    ALPRAZolam (XANAX) 0 5 mg tablet TAKE 1 TABLET(0 5 MG) BY MOUTH TWICE DAILY 60 tablet 0    aspirin (ASPIRIN LOW DOSE) 81 mg EC tablet Take by mouth      B Complex-Folic Acid (B COMPLEX PLUS) TABS Take by mouth      Cholecalciferol (VITAMIN D3) 1000 units CAPS Take by mouth      Chondroitin Sulfate-Vit C-Mn (CHONDROITIN SULFATE COMPLEX) 400-60-2 5 MG CAPS Take by mouth      Coenzyme Q10 (COQ-10) 100 MG CAPS Take by mouth      fenofibrate (TRICOR) 54 MG tablet TAKE 1 TABLET BY MOUTH  DAILY 90 tablet 1    metoprolol succinate (TOPROL-XL) 50 mg 24 hr tablet TAKE 1 TABLET BY MOUTH  DAILY 90 tablet 1    Multiple Vitamin (MULTI-VITAMIN DAILY PO) Take by mouth      omeprazole (PriLOSEC) 40 MG capsule TAKE 1 CAPSULE BY MOUTH  DAILY 90 capsule 1    pravastatin (PRAVACHOL) 80 mg tablet Take 1 tablet (80 mg total) by mouth daily 90 tablet 1    solifenacin (VESICARE) 5 mg tablet Take 1 tablet (5 mg total) by mouth daily 90 tablet 1     No current facility-administered medications for this visit  No Known Allergies   Immunizations:     Immunization History   Administered Date(s) Administered    Tdap 02/10/2019      Medicare Screening Tests and Risk Assessments:     Ervin Danielle is here for his Initial Wellness visit  Health Risk Assessment:  Patient rates overall health as good  Patient feels that their physical health rating is Same  Eyesight was rated as Same  Hearing was rated as Slightly worse  Patient feels that their emotional and mental health rating is Same  Pain experienced by patient in the last 7 days has been Some  Patient's pain rating has been 4/10  Patient states that he has experienced no weight loss or gain in last 6 months  Emotional/Mental Health:  Patient has not been feeling nervous/anxious  PHQ-9 Depression Screening:    Frequency of the following problems over the past two weeks:      1  Little interest or pleasure in doing things: 0 - not at all      2  Feeling down, depressed, or hopeless: 0 - not at all  PHQ-2 Score: 0          Broken Bones/Falls:     Fall Risk Assessment:    In the past year, patient has experienced: No history of falling in past year          Bladder/Bowel:  Patient has not leaked urine accidently in the last six months  Patient reports no loss of bowel control  Immunizations:  Patient has not had a flu vaccination within the last year  Patient has not received a pneumonia shot  Patient has not received a shingles shot  Patient has not received tetanus/diphtheria shot  Home Safety:  Patient does not have trouble with stairs inside or outside of their home  Patient currently reports that there are no safety hazards present in home, working smoke alarms, working carbon monoxide detectors  Preventative Screenings:   no prostate cancer screen performed, no colon cancer screen completed, cholesterol screen completed, glaucoma eye exam completed,     Nutrition:  Current diet: Diabetic with servings of the following:    Medications:  Patient is not currently taking any over-the-counter supplements  Patient is able to manage medications  Lifestyle Choices:  Patient reports no tobacco use  Patient has not smoked or used tobacco in the past   Patient reports no alcohol use  Patient drives a vehicle  Patient wears seat belt  Current level of exercise of physical activity described by patient as: 2x a week  Activities of Daily Living:  Can get out of bed by his or her self, able to dress self, able to make own meals, able to do own shopping, able to bathe self, can do own laundry/housekeeping, can manage own money, pay bills and track expenses    Previous Hospitalizations:  No hospitalization or ED visit in past 12 months        Advanced Directives:  Patient has decided on a power of   Patient has spoken to designated power of   Patient has completed advanced directive          Preventative Screening/Counseling:      Cardiovascular:      General: Screening Current          Diabetes:      General: Screening Current          Colorectal Cancer:      Due for studies: Colonoscopy - High Risk Prostate Cancer:      General: Screening Current          Osteoporosis:      General: Screening Not Indicated          Glaucoma:      General: Screening Current          HIV:      General: Screening Not Indicated          Hepatitis C:      General: Risks and Benefits Discussed        Advanced Directives: Information on ACP and/or AD provided  Immunizations:      Influenza: Influenza UTD This Year      Pneumococcal: Patient Declines          No exam data present     Physical Exam:     There were no vitals taken for this visit  Physical Exam   Psychiatric: He has a normal mood and affect   His behavior is normal  Judgment and thought content normal

## 2019-09-09 DIAGNOSIS — F41.9 ANXIETY: ICD-10-CM

## 2019-09-10 RX ORDER — ALPRAZOLAM 0.5 MG/1
TABLET ORAL
Qty: 60 TABLET | Refills: 0 | Status: SHIPPED | OUTPATIENT
Start: 2019-09-10 | End: 2019-10-08 | Stop reason: SDUPTHER

## 2019-09-12 DIAGNOSIS — I10 BENIGN HYPERTENSION: ICD-10-CM

## 2019-09-13 RX ORDER — METOPROLOL SUCCINATE 50 MG/1
TABLET, EXTENDED RELEASE ORAL
Qty: 90 TABLET | Refills: 1 | Status: SHIPPED | OUTPATIENT
Start: 2019-09-13 | End: 2020-01-21

## 2019-10-01 ENCOUNTER — TELEPHONE (OUTPATIENT)
Dept: GASTROENTEROLOGY | Facility: CLINIC | Age: 74
End: 2019-10-01

## 2019-10-01 NOTE — TELEPHONE ENCOUNTER
10/01/19  Screened by: Adams Boland    Referring Provider     Pre- Screening: There is no height or weight on file to calculate BMI  Has patient been referred for a routine screening Colonoscopy? yes  Is the patient between 39-70 years old? yes    SCHEDULING STAFF   If the patient is between 45yrs-49yrs, please advise patient to confirm benefits/coverage with their insurance company for a routine screening colonoscopy, some insurance carriers will only cover at [address] or older   If the patient is over 66years old, please schedule an office visit  Do you have any of the following symptoms?  no    Have you had a coronary or vascular stent within the last year? no    Have you had a heart attack or stroke in the last 6 months? no    Have you had intestinal surgery in the last 3 months? no    Do you have problems with: no    Do you use: no    Have you been hospitalized in the last Month? no    Have you been diagnosed with a bleeding disorder or anemia? no    Have you had chest pain (angina) or breathing problems  (COPD) in the last 3 months? no    Do you have any difficulty walking up a flight of stairs? no    Have you had Kidney failure or insufficiency? no    Have you had heart valve surgery? no    Are you confined to a wheelchair? no    Do you take no    Have you been diagnosed with Diabetes or are you taking any   Diabetic medications? no    : If patient answers NO to medical questions, then schedule procedure  If patient answers YES to medical questions, then schedule office appointment  Previous Colonoscopy yes  Date and Facility of last colonoscopy?  5 Years     Comments:

## 2019-10-03 ENCOUNTER — PREP FOR PROCEDURE (OUTPATIENT)
Dept: GASTROENTEROLOGY | Facility: CLINIC | Age: 74
End: 2019-10-03

## 2019-10-03 DIAGNOSIS — E78.2 MIXED HYPERLIPIDEMIA: ICD-10-CM

## 2019-10-03 DIAGNOSIS — Z12.11 SPECIAL SCREENING FOR MALIGNANT NEOPLASMS, COLON: Primary | ICD-10-CM

## 2019-10-03 RX ORDER — FENOFIBRATE 54 MG/1
TABLET ORAL
Qty: 90 TABLET | Refills: 1 | Status: SHIPPED | OUTPATIENT
Start: 2019-10-03 | End: 2020-01-21

## 2019-10-07 DIAGNOSIS — F41.9 ANXIETY: ICD-10-CM

## 2019-10-08 DIAGNOSIS — F41.9 ANXIETY: ICD-10-CM

## 2019-10-08 RX ORDER — ALPRAZOLAM 0.5 MG/1
TABLET ORAL
Qty: 60 TABLET | Refills: 0 | OUTPATIENT
Start: 2019-10-08

## 2019-10-08 RX ORDER — ALPRAZOLAM 0.5 MG/1
0.5 TABLET ORAL 2 TIMES DAILY
Qty: 60 TABLET | Refills: 0 | Status: SHIPPED | OUTPATIENT
Start: 2019-10-08 | End: 2019-11-03 | Stop reason: SDUPTHER

## 2019-10-25 ENCOUNTER — TELEPHONE (OUTPATIENT)
Dept: INTERNAL MEDICINE CLINIC | Facility: CLINIC | Age: 74
End: 2019-10-25

## 2019-10-25 NOTE — TELEPHONE ENCOUNTER
Patient is having a colonoscopy on 11/1/19 and they told him to call his PCP to see if he needed to stop any of his medications? Alprazolam, Metoprolol, Omeprazole, Pravastatin or Fenofibrate?     Please call patient and a message can be left if no answer @ [phone]

## 2019-10-25 NOTE — TELEPHONE ENCOUNTER
No need to stop any of those medicines  The doctor doing the colonoscopy will probably have you stop aspirin and most over-the-counter supplements for about a week prior to the procedure in case they have to do biopsies  But there is no problem with year prescription medicines

## 2019-11-01 ENCOUNTER — ANESTHESIA (OUTPATIENT)
Dept: GASTROENTEROLOGY | Facility: HOSPITAL | Age: 74
End: 2019-11-01

## 2019-11-01 ENCOUNTER — HOSPITAL ENCOUNTER (OUTPATIENT)
Dept: GASTROENTEROLOGY | Facility: HOSPITAL | Age: 74
Setting detail: OUTPATIENT SURGERY
Discharge: HOME | End: 2019-11-01
Attending: INTERNAL MEDICINE | Admitting: INTERNAL MEDICINE
Payer: MEDICARE

## 2019-11-01 ENCOUNTER — ANESTHESIA EVENT (OUTPATIENT)
Dept: GASTROENTEROLOGY | Facility: HOSPITAL | Age: 74
End: 2019-11-01

## 2019-11-01 VITALS
RESPIRATION RATE: 18 BRPM | HEIGHT: 64 IN | SYSTOLIC BLOOD PRESSURE: 144 MMHG | HEART RATE: 66 BPM | OXYGEN SATURATION: 93 % | BODY MASS INDEX: 38.16 KG/M2 | DIASTOLIC BLOOD PRESSURE: 73 MMHG | WEIGHT: 223.55 LBS | TEMPERATURE: 98.1 F

## 2019-11-01 DIAGNOSIS — Z12.11 SPECIAL SCREENING FOR MALIGNANT NEOPLASMS, COLON: ICD-10-CM

## 2019-11-01 PROCEDURE — 45385 COLONOSCOPY W/LESION REMOVAL: CPT | Performed by: INTERNAL MEDICINE

## 2019-11-01 PROCEDURE — 45380 COLONOSCOPY AND BIOPSY: CPT | Performed by: INTERNAL MEDICINE

## 2019-11-01 PROCEDURE — 88305 TISSUE EXAM BY PATHOLOGIST: CPT | Performed by: PATHOLOGY

## 2019-11-01 RX ORDER — PROPOFOL 10 MG/ML
INJECTION, EMULSION INTRAVENOUS AS NEEDED
Status: DISCONTINUED | OUTPATIENT
Start: 2019-11-01 | End: 2019-11-01 | Stop reason: SURG

## 2019-11-01 RX ORDER — SODIUM CHLORIDE, SODIUM LACTATE, POTASSIUM CHLORIDE, CALCIUM CHLORIDE 600; 310; 30; 20 MG/100ML; MG/100ML; MG/100ML; MG/100ML
125 INJECTION, SOLUTION INTRAVENOUS CONTINUOUS
Status: DISCONTINUED | OUTPATIENT
Start: 2019-11-01 | End: 2019-11-05 | Stop reason: HOSPADM

## 2019-11-01 RX ORDER — METOCLOPRAMIDE HYDROCHLORIDE 5 MG/ML
10 INJECTION INTRAMUSCULAR; INTRAVENOUS ONCE
Status: COMPLETED | OUTPATIENT
Start: 2019-11-01 | End: 2019-11-01

## 2019-11-01 RX ORDER — LIDOCAINE HYDROCHLORIDE 10 MG/ML
INJECTION, SOLUTION INFILTRATION; PERINEURAL AS NEEDED
Status: DISCONTINUED | OUTPATIENT
Start: 2019-11-01 | End: 2019-11-01 | Stop reason: SURG

## 2019-11-01 RX ORDER — SODIUM CHLORIDE, SODIUM LACTATE, POTASSIUM CHLORIDE, CALCIUM CHLORIDE 600; 310; 30; 20 MG/100ML; MG/100ML; MG/100ML; MG/100ML
INJECTION, SOLUTION INTRAVENOUS CONTINUOUS PRN
Status: DISCONTINUED | OUTPATIENT
Start: 2019-11-01 | End: 2019-11-01 | Stop reason: SURG

## 2019-11-01 RX ADMIN — PROPOFOL 20 MG: 10 INJECTION, EMULSION INTRAVENOUS at 09:54

## 2019-11-01 RX ADMIN — LIDOCAINE HYDROCHLORIDE 50 MG: 10 INJECTION, SOLUTION INFILTRATION; PERINEURAL at 09:38

## 2019-11-01 RX ADMIN — PROPOFOL 200 MG: 10 INJECTION, EMULSION INTRAVENOUS at 09:39

## 2019-11-01 RX ADMIN — SODIUM CHLORIDE, SODIUM LACTATE, POTASSIUM CHLORIDE, AND CALCIUM CHLORIDE: .6; .31; .03; .02 INJECTION, SOLUTION INTRAVENOUS at 09:27

## 2019-11-01 RX ADMIN — PROPOFOL 20 MG: 10 INJECTION, EMULSION INTRAVENOUS at 09:50

## 2019-11-01 RX ADMIN — METOCLOPRAMIDE 10 MG: 5 INJECTION, SOLUTION INTRAMUSCULAR; INTRAVENOUS at 10:33

## 2019-11-01 RX ADMIN — SODIUM CHLORIDE, SODIUM LACTATE, POTASSIUM CHLORIDE, AND CALCIUM CHLORIDE 125 ML/HR: .6; .31; .03; .02 INJECTION, SOLUTION INTRAVENOUS at 09:26

## 2019-11-01 RX ADMIN — PROPOFOL 50 MG: 10 INJECTION, EMULSION INTRAVENOUS at 09:44

## 2019-11-01 NOTE — DISCHARGE INSTRUCTIONS
Colonoscopy   WHAT YOU NEED TO KNOW:   A colonoscopy is a procedure to examine the inside of your colon (intestine) with a scope  Polyps or tissue growths may have been removed during your colonoscopy  It is normal to feel bloated and to have some abdominal discomfort  You should be passing gas  If you have hemorrhoids or you had polyps removed, you may have a small amount of bleeding  DISCHARGE INSTRUCTIONS:   Seek care immediately if:   · You have a large amount of bright red blood in your bowel movements  · Your abdomen is hard and firm and you have severe pain  · You have sudden trouble breathing  Contact your healthcare provider if:   · You develop a rash or hives  · You have a fever within 24 hours of your procedure       · You have not had a bowel movement for 3 days after your procedure  · You have questions or concerns about your condition or care  Activity:   · Do not lift, strain, or run  for 3 days after your procedure  · Rest after your procedure  You have been given medicine to relax you  Do not  drive or make important decisions until the day after your procedure  Return to your normal activity as directed  · Relieve gas and discomfort from bloating  by lying on your right side with a heating pad on your abdomen  You may need to take short walks to help the gas move out  Eat small meals until bloating is relieved  If you had polyps removed: For 7 days after your procedure:  · Do not  take aspirin  · Do not  go on long car rides  Follow up with your healthcare provider as directed:  Write down your questions so you remember to ask them during your visits  © 2017 [employer] is for End User's use only and may not be sold, redistributed or otherwise used for commercial purposes  All illustrations and images included in CareNotes® are the copyrighted property of [employer]  or Duncan Solomon    The above information is an  only  It is not intended as medical advice for individual conditions or treatments  Talk to your doctor, nurse or pharmacist before following any medical regimen to see if it is safe and effective for you

## 2019-11-01 NOTE — ANESTHESIA PREPROCEDURE EVALUATION
Review of Systems/Medical History  Patient summary reviewed  Chart reviewed  No history of anesthetic complications     Cardiovascular  Exercise tolerance (METS): >4,  Hyperlipidemia, Hypertension on > 1 medication,    Pulmonary  No pneumonia, Shortness of breath, Sleep apnea CPAP,        GI/Hepatic    GERD well controlled,             Endo/Other    Obesity  morbid obesity   GYN       Hematology   Musculoskeletal       Neurology   Psychology           Physical Exam    Airway    Mallampati score: III  TM Distance: >3 FB  Neck ROM: full     Dental   No notable dental hx     Cardiovascular      Pulmonary      Other Findings        Anesthesia Plan  ASA Score- 3     Anesthesia Type- IV sedation with anesthesia with ASA Monitors  Additional Monitors:   Airway Plan:         Plan Factors-    Induction- intravenous  Postoperative Plan-     Informed Consent- Anesthetic plan and risks discussed with patient  I personally reviewed this patient with the CRNA  Discussed and agreed on the Anesthesia Plan with the CRNA  Sanat Amador

## 2019-11-01 NOTE — H&P
History and Physical - [facility]  Frank Kumar 69 y o  male MRN: [id number]      HPI: Frank Kumar is a 69y o  year old male who presents for screening colonoscopy      REVIEW OF SYSTEMS: Per the HPI, and otherwise unremarkable  Historical Information   Past Medical History:   Diagnosis Date    Colon polyps     DR Inés Licona    Diverticulosis     Obesity     KADEN (obstructive sleep apnea)     Small bowel obstruction, partial (HCC)     SOB (shortness of breath)      Past Surgical History:   Procedure Laterality Date    SKIN BIOPSY      TONSILLECTOMY AND ADENOIDECTOMY       Social History   Social History     Substance and Sexual Activity   Alcohol Use No     Social History     Substance and Sexual Activity   Drug Use No     Social History     Tobacco Use   Smoking Status Never Smoker   Smokeless Tobacco Never Used     Family History   Problem Relation Age of Onset    Heart disease Mother     Hypertension Mother     Breast cancer Sister        Meds/Allergies       (Not in a hospital admission)    No Known Allergies    Objective     There were no vitals taken for this visit  PHYSICAL EXAM    Gen: NAD  CV: RRR  CHEST: Clear  ABD: soft, NT/ND  EXT: no edema      ASSESSMENT/PLAN:  This is a 69y o  year old male here for screening colonoscopy, and he is stable and optimized for his procedure

## 2019-11-01 NOTE — ANESTHESIA POSTPROCEDURE EVALUATION
Post-Op Assessment Note    CV Status:  Stable  Pain Score: 0    Pain management: adequate     Mental Status:  Sleepy   Hydration Status:  Euvolemic   PONV Controlled:  Controlled   Airway Patency:  Patent   Post Op Vitals Reviewed: Yes      Staff: CRNA   Comments: o2 on 4 l/nasal cannula          BP   134/73   Temp      Pulse  67   Resp   20   SpO2   92

## 2019-11-03 DIAGNOSIS — F41.9 ANXIETY: ICD-10-CM

## 2019-11-04 RX ORDER — ALPRAZOLAM 0.5 MG/1
TABLET ORAL
Qty: 60 TABLET | Refills: 0 | Status: SHIPPED | OUTPATIENT
Start: 2019-11-04 | End: 2019-12-02 | Stop reason: SDUPTHER

## 2019-11-06 ENCOUNTER — TELEPHONE (OUTPATIENT)
Dept: GASTROENTEROLOGY | Facility: CLINIC | Age: 74
End: 2019-11-06

## 2019-11-06 NOTE — TELEPHONE ENCOUNTER
Heidi pt - Pt called to inquire about the pathology results of his colon, please call [phone]   Ty

## 2019-11-07 ENCOUNTER — TELEPHONE (OUTPATIENT)
Dept: GASTROENTEROLOGY | Facility: CLINIC | Age: 74
End: 2019-11-07

## 2019-11-07 NOTE — TELEPHONE ENCOUNTER
Dr Wilton Kaplan - Patient's wife, called  Did the results of biopsy come in   Please call Shannan Goldberg at [phone] ty

## 2019-11-08 NOTE — TELEPHONE ENCOUNTER
Vianca Cavazos, Patient's significant other, called  She is "in state of panic" it has been over a week  Still waiting on results of biopsy   Please call the home number [phone] if not answer please call the cell at [phone] ty

## 2019-11-11 ENCOUNTER — TELEPHONE (OUTPATIENT)
Dept: GASTROENTEROLOGY | Facility: CLINIC | Age: 74
End: 2019-11-11

## 2019-11-11 NOTE — TELEPHONE ENCOUNTER
Dr Sylwia Shrestha - Patient called lmom - colonoscopy took 5 polyps  Patient bowel Movement 11/10/19 showed Substantial amount of blood in toilet  This morning 11/11/19 not so much in toilet,  but like dry blood in stool  Patient is concerned   Please call Bhargavi Jasmine at [phone] ty

## 2019-11-11 NOTE — TELEPHONE ENCOUNTER
Dr Rebeca Cruz patient 11/1/19 colonoscopy/ polypectomy with clips  Patient called to report 2 episodes of rectal bleeding  1st episode last night -large amount dark red blood with BM   2 nd episode this morning -dark blood mixed in BM and on toilet tissue  Denies n/v, dizziness, palpitations or sob  Abdomen soft  2/10 lower left abdominal discomfort  Stopped aspirin 10/25  Patient will continue to monitor and will go to ER for evaluation if symptoms continue/ symptomatic

## 2019-11-12 ENCOUNTER — HOSPITAL ENCOUNTER (INPATIENT)
Facility: HOSPITAL | Age: 74
LOS: 1 days | Discharge: HOME | DRG: 920 | End: 2019-11-14
Attending: EMERGENCY MEDICINE | Admitting: FAMILY MEDICINE
Payer: MEDICARE

## 2019-11-12 DIAGNOSIS — K92.1 HEMATOCHEZIA: Primary | ICD-10-CM

## 2019-11-12 DIAGNOSIS — K62.5 BRIGHT RED BLOOD PER RECTUM: ICD-10-CM

## 2019-11-12 LAB
ABO GROUP BLD: NORMAL
ALBUMIN SERPL BCP-MCNC: 3.9 G/DL (ref 3.5–5)
ALP SERPL-CCNC: 47 U/L (ref 46–116)
ALT SERPL W P-5'-P-CCNC: 34 U/L (ref 12–78)
ANION GAP SERPL CALCULATED.3IONS-SCNC: 8 MMOL/L (ref 4–13)
APTT PPP: 35 SECONDS (ref 23–37)
AST SERPL W P-5'-P-CCNC: 25 U/L (ref 5–45)
BASOPHILS # BLD AUTO: 0.06 THOUSANDS/ΜL (ref 0–0.1)
BASOPHILS NFR BLD AUTO: 1 % (ref 0–1)
BILIRUB SERPL-MCNC: 0.6 MG/DL (ref 0.2–1)
BLD GP AB SCN SERPL QL: NEGATIVE
BUN SERPL-MCNC: 16 MG/DL (ref 5–25)
CALCIUM SERPL-MCNC: 9.2 MG/DL (ref 8.3–10.1)
CHLORIDE SERPL-SCNC: 104 MMOL/L (ref 100–108)
CO2 SERPL-SCNC: 29 MMOL/L (ref 21–32)
CREAT SERPL-MCNC: 1.18 MG/DL (ref 0.6–1.3)
EOSINOPHIL # BLD AUTO: 0.09 THOUSAND/ΜL (ref 0–0.61)
EOSINOPHIL NFR BLD AUTO: 1 % (ref 0–6)
ERYTHROCYTE [DISTWIDTH] IN BLOOD BY AUTOMATED COUNT: 13.3 % (ref 11.6–15.1)
GFR SERPL CREATININE-BSD FRML MDRD: 61 ML/MIN/1.73SQ M
GLUCOSE SERPL-MCNC: 101 MG/DL (ref 65–140)
HCT VFR BLD AUTO: 44.8 % (ref 36.5–49.3)
HGB BLD-MCNC: 15.2 G/DL (ref 12–17)
IMM GRANULOCYTES # BLD AUTO: 0.02 THOUSAND/UL (ref 0–0.2)
IMM GRANULOCYTES NFR BLD AUTO: 0 % (ref 0–2)
INR PPP: 1.13 (ref 0.84–1.19)
LYMPHOCYTES # BLD AUTO: 3.31 THOUSANDS/ΜL (ref 0.6–4.47)
LYMPHOCYTES NFR BLD AUTO: 34 % (ref 14–44)
MCH RBC QN AUTO: 30.9 PG (ref 26.8–34.3)
MCHC RBC AUTO-ENTMCNC: 33.9 G/DL (ref 31.4–37.4)
MCV RBC AUTO: 91 FL (ref 82–98)
MONOCYTES # BLD AUTO: 0.75 THOUSAND/ΜL (ref 0.17–1.22)
MONOCYTES NFR BLD AUTO: 8 % (ref 4–12)
NEUTROPHILS # BLD AUTO: 5.6 THOUSANDS/ΜL (ref 1.85–7.62)
NEUTS SEG NFR BLD AUTO: 56 % (ref 43–75)
NRBC BLD AUTO-RTO: 0 /100 WBCS
PLATELET # BLD AUTO: 254 THOUSANDS/UL (ref 149–390)
PMV BLD AUTO: 9.9 FL (ref 8.9–12.7)
POTASSIUM SERPL-SCNC: 4.4 MMOL/L (ref 3.5–5.3)
PROT SERPL-MCNC: 7.3 G/DL (ref 6.4–8.2)
PROTHROMBIN TIME: 14.5 SECONDS (ref 11.6–14.5)
RBC # BLD AUTO: 4.92 MILLION/UL (ref 3.88–5.62)
RH BLD: POSITIVE
SODIUM SERPL-SCNC: 141 MMOL/L (ref 136–145)
SPECIMEN EXPIRATION DATE: NORMAL
WBC # BLD AUTO: 9.83 THOUSAND/UL (ref 4.31–10.16)

## 2019-11-12 PROCEDURE — 99285 EMERGENCY DEPT VISIT HI MDM: CPT

## 2019-11-12 PROCEDURE — 80053 COMPREHEN METABOLIC PANEL: CPT | Performed by: EMERGENCY MEDICINE

## 2019-11-12 PROCEDURE — 96365 THER/PROPH/DIAG IV INF INIT: CPT

## 2019-11-12 PROCEDURE — 86850 RBC ANTIBODY SCREEN: CPT | Performed by: EMERGENCY MEDICINE

## 2019-11-12 PROCEDURE — 99285 EMERGENCY DEPT VISIT HI MDM: CPT | Performed by: EMERGENCY MEDICINE

## 2019-11-12 PROCEDURE — 85730 THROMBOPLASTIN TIME PARTIAL: CPT | Performed by: EMERGENCY MEDICINE

## 2019-11-12 PROCEDURE — 86900 BLOOD TYPING SEROLOGIC ABO: CPT | Performed by: EMERGENCY MEDICINE

## 2019-11-12 PROCEDURE — C9113 INJ PANTOPRAZOLE SODIUM, VIA: HCPCS | Performed by: EMERGENCY MEDICINE

## 2019-11-12 PROCEDURE — 85025 COMPLETE CBC W/AUTO DIFF WBC: CPT | Performed by: EMERGENCY MEDICINE

## 2019-11-12 PROCEDURE — 1123F ACP DISCUSS/DSCN MKR DOCD: CPT | Performed by: PHYSICIAN ASSISTANT

## 2019-11-12 PROCEDURE — 36415 COLL VENOUS BLD VENIPUNCTURE: CPT | Performed by: EMERGENCY MEDICINE

## 2019-11-12 PROCEDURE — 86901 BLOOD TYPING SEROLOGIC RH(D): CPT | Performed by: EMERGENCY MEDICINE

## 2019-11-12 PROCEDURE — 85610 PROTHROMBIN TIME: CPT | Performed by: EMERGENCY MEDICINE

## 2019-11-12 RX ADMIN — SODIUM CHLORIDE 80 MG: 9 INJECTION, SOLUTION INTRAVENOUS at 22:52

## 2019-11-12 RX ADMIN — SODIUM CHLORIDE 8 MG/HR: 9 INJECTION, SOLUTION INTRAVENOUS at 23:15

## 2019-11-12 NOTE — TELEPHONE ENCOUNTER
OK great  If patient still bleeding and would like to be seen he can make an appt or go to the ER  Thanks

## 2019-11-12 NOTE — TELEPHONE ENCOUNTER
FYI: Spoke with patient  He states he has a large amount of bleeding  Patient will go to the ED for evaluation

## 2019-11-12 NOTE — TELEPHONE ENCOUNTER
FYI: Spoke with patient  He is doing much better  Bleeding has decreased and understand to go to the ED for evaluation for alarm symtpoms large amounts of blood, severe abdominal pain, fever ETC  Relayed patient biopsy results  He will follow-up with colon in 3 years

## 2019-11-12 NOTE — TELEPHONE ENCOUNTER
Pt called again and states he is having a lot of bleeding and diarrhea  Please call [phone]   Ty

## 2019-11-13 LAB
ANION GAP SERPL CALCULATED.3IONS-SCNC: 8 MMOL/L (ref 4–13)
BASOPHILS # BLD AUTO: 0.06 THOUSANDS/ΜL (ref 0–0.1)
BASOPHILS NFR BLD AUTO: 1 % (ref 0–1)
BUN SERPL-MCNC: 15 MG/DL (ref 5–25)
CALCIUM SERPL-MCNC: 8.6 MG/DL (ref 8.3–10.1)
CHLORIDE SERPL-SCNC: 104 MMOL/L (ref 100–108)
CO2 SERPL-SCNC: 26 MMOL/L (ref 21–32)
CREAT SERPL-MCNC: 1.02 MG/DL (ref 0.6–1.3)
EOSINOPHIL # BLD AUTO: 0.15 THOUSAND/ΜL (ref 0–0.61)
EOSINOPHIL NFR BLD AUTO: 2 % (ref 0–6)
ERYTHROCYTE [DISTWIDTH] IN BLOOD BY AUTOMATED COUNT: 13.2 % (ref 11.6–15.1)
GFR SERPL CREATININE-BSD FRML MDRD: 73 ML/MIN/1.73SQ M
GLUCOSE SERPL-MCNC: 104 MG/DL (ref 65–140)
HCT VFR BLD AUTO: 42.6 % (ref 36.5–49.3)
HGB BLD-MCNC: 14.6 G/DL (ref 12–17)
IMM GRANULOCYTES # BLD AUTO: 0.02 THOUSAND/UL (ref 0–0.2)
IMM GRANULOCYTES NFR BLD AUTO: 0 % (ref 0–2)
LYMPHOCYTES # BLD AUTO: 2.51 THOUSANDS/ΜL (ref 0.6–4.47)
LYMPHOCYTES NFR BLD AUTO: 31 % (ref 14–44)
MCH RBC QN AUTO: 31.1 PG (ref 26.8–34.3)
MCHC RBC AUTO-ENTMCNC: 34.3 G/DL (ref 31.4–37.4)
MCV RBC AUTO: 91 FL (ref 82–98)
MONOCYTES # BLD AUTO: 0.79 THOUSAND/ΜL (ref 0.17–1.22)
MONOCYTES NFR BLD AUTO: 10 % (ref 4–12)
NEUTROPHILS # BLD AUTO: 4.58 THOUSANDS/ΜL (ref 1.85–7.62)
NEUTS SEG NFR BLD AUTO: 56 % (ref 43–75)
NRBC BLD AUTO-RTO: 0 /100 WBCS
PLATELET # BLD AUTO: 249 THOUSANDS/UL (ref 149–390)
PMV BLD AUTO: 10 FL (ref 8.9–12.7)
POTASSIUM SERPL-SCNC: 4 MMOL/L (ref 3.5–5.3)
RBC # BLD AUTO: 4.7 MILLION/UL (ref 3.88–5.62)
SODIUM SERPL-SCNC: 138 MMOL/L (ref 136–145)
WBC # BLD AUTO: 8.11 THOUSAND/UL (ref 4.31–10.16)

## 2019-11-13 PROCEDURE — 36415 COLL VENOUS BLD VENIPUNCTURE: CPT | Performed by: INTERNAL MEDICINE

## 2019-11-13 PROCEDURE — 85025 COMPLETE CBC W/AUTO DIFF WBC: CPT | Performed by: INTERNAL MEDICINE

## 2019-11-13 PROCEDURE — C9113 INJ PANTOPRAZOLE SODIUM, VIA: HCPCS | Performed by: FAMILY MEDICINE

## 2019-11-13 PROCEDURE — 80048 BASIC METABOLIC PNL TOTAL CA: CPT | Performed by: INTERNAL MEDICINE

## 2019-11-13 PROCEDURE — 99220 PR INITIAL OBSERVATION CARE/DAY 70 MINUTES: CPT | Performed by: INTERNAL MEDICINE

## 2019-11-13 PROCEDURE — 99222 1ST HOSP IP/OBS MODERATE 55: CPT | Performed by: PHYSICIAN ASSISTANT

## 2019-11-13 RX ORDER — FENOFIBRATE 48 MG/1
48 TABLET, COATED ORAL DAILY
Status: DISCONTINUED | OUTPATIENT
Start: 2019-11-13 | End: 2019-11-14 | Stop reason: HOSPADM

## 2019-11-13 RX ORDER — OXYBUTYNIN CHLORIDE 5 MG/1
5 TABLET, EXTENDED RELEASE ORAL DAILY
Status: DISCONTINUED | OUTPATIENT
Start: 2019-11-13 | End: 2019-11-14 | Stop reason: HOSPADM

## 2019-11-13 RX ORDER — PRAVASTATIN SODIUM 80 MG/1
80 TABLET ORAL DAILY
Status: DISCONTINUED | OUTPATIENT
Start: 2019-11-13 | End: 2019-11-14 | Stop reason: HOSPADM

## 2019-11-13 RX ORDER — PANTOPRAZOLE SODIUM 40 MG/1
40 INJECTION, POWDER, FOR SOLUTION INTRAVENOUS EVERY 12 HOURS SCHEDULED
Status: DISCONTINUED | OUTPATIENT
Start: 2019-11-13 | End: 2019-11-13

## 2019-11-13 RX ORDER — METOPROLOL SUCCINATE 50 MG/1
50 TABLET, EXTENDED RELEASE ORAL DAILY
Status: DISCONTINUED | OUTPATIENT
Start: 2019-11-13 | End: 2019-11-14 | Stop reason: HOSPADM

## 2019-11-13 RX ORDER — FENOFIBRATE 54 MG/1
54 TABLET ORAL DAILY
Status: DISCONTINUED | OUTPATIENT
Start: 2019-11-13 | End: 2019-11-13

## 2019-11-13 RX ORDER — ALPRAZOLAM 0.5 MG/1
0.5 TABLET ORAL 2 TIMES DAILY
Status: DISCONTINUED | OUTPATIENT
Start: 2019-11-13 | End: 2019-11-14 | Stop reason: HOSPADM

## 2019-11-13 RX ORDER — POLYETHYLENE GLYCOL 3350 17 G/17G
238 POWDER, FOR SOLUTION ORAL ONCE
Status: COMPLETED | OUTPATIENT
Start: 2019-11-13 | End: 2019-11-13

## 2019-11-13 RX ADMIN — PRAVASTATIN SODIUM 80 MG: 80 TABLET ORAL at 18:15

## 2019-11-13 RX ADMIN — PANTOPRAZOLE SODIUM 40 MG: 40 INJECTION, POWDER, FOR SOLUTION INTRAVENOUS at 08:52

## 2019-11-13 RX ADMIN — METOPROLOL SUCCINATE 50 MG: 50 TABLET, EXTENDED RELEASE ORAL at 08:52

## 2019-11-13 RX ADMIN — ALPRAZOLAM 0.5 MG: 0.5 TABLET ORAL at 08:52

## 2019-11-13 RX ADMIN — ALPRAZOLAM 0.5 MG: 0.5 TABLET ORAL at 18:16

## 2019-11-13 RX ADMIN — OXYBUTYNIN 5 MG: 5 TABLET, FILM COATED, EXTENDED RELEASE ORAL at 08:52

## 2019-11-13 RX ADMIN — BISACODYL 10 MG: 5 TABLET, COATED ORAL at 18:15

## 2019-11-13 RX ADMIN — FENOFIBRATE 48 MG: 48 TABLET, COATED ORAL at 09:15

## 2019-11-13 RX ADMIN — POLYETHYLENE GLYCOL 3350 238 G: 17 POWDER, FOR SOLUTION ORAL at 18:16

## 2019-11-13 NOTE — UTILIZATION REVIEW
Initial Clinical Review  Observation 11/12/19 @8984 CONVERTED TO INPATIENT 11/13/19 @1428 FOR EVALUATION OF BLOOD STOOLS  11/13/19 1430  Inpatient Admission Once     Transfer Service: Hospitalist       Question Answer Comment   Admitting Physician Eufemia Lopez of [facility] Med Surg    Estimated length of stay Not Applicable        61/55/14 1429       ED Arrival Information     Expected Arrival Acuity Means of Arrival Escorted By Service Admission Type    - 11/12/2019 21:06 Urgent Walk-In Self General Medicine Urgent    Arrival Complaint    rectal bleeding        Chief Complaint   Patient presents with    Rectal Bleeding     pt reports having colonoscpy on Nov 1st, and bleeding starting the 6th  pt reports stool is dark in color  healthy appearing adult in no apparent distress  Assessment/Plan: 69year old male to the ED from home with rectal bleeding, dark stool since November 6th  Admitted under observation then converted to inpatient for bright red blood per rectum  S oP colonscopy with clips placed on 11/1  His abdomen is soft, nontender, +bs with heme + stool      Bright red blood per rectum  Assessment & Plan  NPO  IV PPI  Hemoglobin monitoring  Gastroenterology consultation  Hold chemoprophylaxis for DVT as well as aspirin due to presentation  Hemodynamic monitoring  Transfuse as needed        Morbid obesity ([address] )  Assessment & Plan  Dietary and lifestyle modifications recommended     Hyperlipidemia  Assessment & Plan  Continue Tricor and Pravachol at home dosing     Benign hypertension  Assessment & Plan  Continue Toprol-XL 50 mg daily  Monitor blood pressure with routine vitals     KADEN on CPAP  Assessment & Plan  Continue CPAP HS  ED Triage Vitals   Temperature Pulse Respirations Blood Pressure SpO2   11/12/19 2110 11/12/19 2110 11/12/19 2110 11/12/19 2110 11/12/19 2110   (!) 97 4 °F (36 3 °C) 69 18 149/67 96 %      Temp Source Heart Rate Source Patient Position - Orthostatic VS BP Location FiO2 (%)   11/12/19 2110 11/13/19 0336 11/12/19 2110 11/12/19 2110 --   Oral Monitor Sitting Left arm       Pain Score       11/13/19 0700       No Pain        Wt Readings from Last 1 Encounters:   11/12/19 99 8 kg (220 lb)     Additional Vital Signs:   Date/Time  Temp  Pulse  Resp  BP  SpO2  O2 Device  Patient Position - Orthostatic VS   11/13/19 0852  --  70  --  141/65  --  --  --   11/13/19 0700  97 5 °F (36 4 °C)  73  16  133/75  94 %  None (Room air)  Lying   11/13/19 0336  --  74  21  119/58  93 %  None (Room air)  Lying   11/12/19 2315  --  64  23Abnormal   136/63  97 %  --  --   11/12/19 2110  97 4 °F (36 3 °C)Abnormal   69  18  149/67  96 %           Pertinent Labs/Diagnostic Test Results:     Results from last 7 days   Lab Units 11/13/19  0508 11/12/19  2226   WBC Thousand/uL 8 11 9 83   HEMOGLOBIN g/dL 14 6 15 2   HEMATOCRIT % 42 6 44 8   PLATELETS Thousands/uL 249 254   NEUTROS ABS Thousands/µL 4 58 5 60         Results from last 7 days   Lab Units 11/13/19  0508 11/12/19  2226   SODIUM mmol/L 138 141   POTASSIUM mmol/L 4 0 4 4   CHLORIDE mmol/L 104 104   CO2 mmol/L 26 29   ANION GAP mmol/L 8 8   BUN mg/dL 15 16   CREATININE mg/dL 1 02 1 18   EGFR ml/min/1 73sq m 73 61   CALCIUM mg/dL 8 6 9 2     Results from last 7 days   Lab Units 11/12/19  2226   AST U/L 25   ALT U/L 34   ALK PHOS U/L 47   TOTAL PROTEIN g/dL 7 3   ALBUMIN g/dL 3 9   TOTAL BILIRUBIN mg/dL 0 60         Results from last 7 days   Lab Units 11/13/19  0508 11/12/19  2226   GLUCOSE RANDOM mg/dL 104 101         Results from last 7 days   Lab Units 11/12/19  2226   PROTIME seconds 14 5   INR  1 13   PTT seconds 35     ED Treatment:   Medication Administration from 11/12/2019 2106 to 11/13/2019 1003       Date/Time Order Dose Route Action     11/12/2019 2252 pantoprazole (PROTONIX) 80 mg in sodium chloride 0 9 % 100 mL IVPB 80 mg Intravenous New Bag     11/12/2019 8878 pantoprazole (PROTONIX) 80 mg in sodium chloride 0 9 % 100 mL infusion 8 mg/hr Intravenous New Bag     11/13/2019 0852 oxybutynin (DITROPAN-XL) 24 hr tablet 5 mg 5 mg Oral Given     11/13/2019 0852 metoprolol succinate (TOPROL-XL) 24 hr tablet 50 mg 50 mg Oral Given     11/13/2019 0852 ALPRAZolam (XANAX) tablet 0 5 mg 0 5 mg Oral Given     11/13/2019 0915 fenofibrate (TRICOR) tablet 48 mg 48 mg Oral Given     11/13/2019 0852 pantoprazole (PROTONIX) injection 40 mg 40 mg Intravenous Given        Past Medical History:   Diagnosis Date    Colon polyps     DR Noelle Pena    Diverticulosis     Hyperlipidemia     Hypertension     Obesity     KADEN (obstructive sleep apnea)     cpap    Pneumonia     Small bowel obstruction, partial (HCC)     SOB (shortness of breath)      Admitting Diagnosis: Rectal bleeding [K62 5]  Age/Sex: 69 y o  male  Admission Orders:  NPO  Scheduled Medications:    Medications:  ALPRAZolam 0 5 mg Oral BID   fenofibrate 48 mg Oral Daily   metoprolol succinate 50 mg Oral Daily   oxybutynin 5 mg Oral Daily   pantoprazole 40 mg Intravenous Q12H [address]   pravastatin 80 mg Oral Daily     PRN Meds:       IP CONSULT TO GASTROENTEROLOGY    Network Utilization Review Department  [email or web address]  ATTENTION: Please call with any questions or concerns to [phone] and carefully listen to the prompts so that you are directed to the right person  All voicemails are confidential   [employer] all requests for admission clinical reviews, approved or denied determinations and any other requests to dedicated fax number below belonging to the campus where the patient is receiving treatment    FACILITY NAME UR FAX NUMBER   ADMISSION DENIALS (Administrative/Medical Necessity) [phone]   PARENT CHILD HEALTH (Maternity/NICU/Pediatrics) [phone]   [facility] [phone]   [address] [phone]   [facility] [phone]   Saeed Nieto [facility] [phone]   Saturnino Gonzalez [address] [facility] [address] [facility] [phone]

## 2019-11-13 NOTE — ED NOTES
1  CC - pt reports to ED for rectal bleeding ongoing starting 11/6 after a colonoscopy completed on 11/6  Pt reports hx of colon polyps and diverticulosis  2  Admission related to injury? - no     3  Orientation status - A&Ox4    4  Abnormal labs/abnormal focused assessment/vitals - normal hemoglobin/hematocrit  Next lab due at 1500      5  Medications/drips - none infusing    6  Last time narcotics given - n/a    7  IV lines/drains/etc - 18 R arm    8  Isolation status - n/a    9  Skin - intact     10  Ambulation - independent     11   ED nurse's name/# -  Edelmira Schmitt RN  11/13/19 6750

## 2019-11-13 NOTE — ED PROVIDER NOTES
History  Chief Complaint   Patient presents with    Rectal Bleeding     pt reports having colonoscpy on Nov 1st, and bleeding starting the 6th  pt reports stool is dark in color  healthy appearing adult in no apparent distress  19-year-old male presents with one week of hematochezia following a colonoscopy on November 1st by Dr Jason Saleh  Patient denies hematemesis  Patient denies melena at present though he does note at the onset, he had darker stools that has subsequently resolved and currently only notes bright red blood  Patient notes limited stool since that time, noting it is mainly blood when having a bowel movement  Patient states bowel movements aggravates the symptoms and nothing alleviates it  Patient denies any abdominal pain  ROS: Patient denies abdominal pain, syncope, weakness, confusion, weight loss, change in bowel movements, fever/chills, nausea/vomiting, diarrhea, dyspnea, anorexia, constipation, diaphoresis, chest pain, groin pain, dysuria, hematuria, or back/neck pain  All other systems reviewed and negative  Patient denies any use of iron or bismuth  Patient denies any significant consumption of beets or red food coloring  Patient notes history of colonoscopy but no additional abdominal surgeries, including no history of AAA repair  Patient denies a history of prior EGD  Patient denies history of prior PUD  Patient deneis any history of hepatic pathology  Patient denies any history of alcoholism  Patient deneis any history of coagulopathy  Patient denies use of anticoagulation and affirms any use of anti-platelet medications, taking ASA  Patient denies use of NSAIDs  Patient denies use of corticosteroids  Patient amenable to transfusion of blood products, if necessary  Objective:  Vital signs reviewed  Constitutional:  no acute distress  Eyes: No scleral icterus  HENT: Head normocephalic  No pharnyngeal or nasal bleeding noted    CV:  Regular rate and rhythm  Respiratory:  Lungs clear to auscultation bilaterally without adventitious sounds  Abdomen:  Inspection of an obese abdomen without previous abdominal surgical incisions noted without erythema, rashes or ecchymosis noted  No abdominal distention  No abdominal pulsations noted  Normal bowel sounds with no bruit auscultated  Soft abdomen  Palpitation noted no specific area of tendernessNo masses or pulsatile aorta noted on examination  No rebound or guarding noted on examination, non-peritoneal exam     Rectal: Normal external exam with no fissures, external hemorrhoids, or lesions noted  Normal spincter tone  No masses or internal hemorrhoids noted on digital examination  No significant tenderness with examination  Stool noted to be bright red blood mixed with stool  Skin:  Warm and dry  No purpora or petechiae  Extremities: No edema  No palmar erythema  Medical Decision Making  Possible gastrointestinal bleeding with a broad differential   Considering symptoms occurred status post colonoscopy, possibly bleeding from one of the sites that was ligated  Per the colonoscopy report, 5 sites were intervened including one area with bleeding at the time of the procedure  Will establish large bore IV access and make patient NPO considering possibility of surgical intervention required  Patient placed on continuous cardiopulmonary monitoring  Will defer fluids at this point in the assessment  Will obtain CBC to evaluate for anemia and platelet count  Will check patient's PT to evaluate for potential coagulopathy  Will obtain type and screen considering patient may need transfusion  Will obtain CMP to evaluate electrolytes, renal and hepatic function  Will monitor and reassess  Considering persistent bleeding, likely admission for continued monitoring and evaluation by Gastroenterology        History provided by:  Patient  Rectal Bleeding - Minor   Quality:  Bright red  Amount:  Copious  Timing:  Constant  Relieved by:  Nothing  Worsened by:  Nothing  Ineffective treatments:  None tried  Associated symptoms: no abdominal pain, no dizziness, no epistaxis, no fever, no hematemesis, no light-headedness, no loss of consciousness, no recent illness and no vomiting        Prior to Admission Medications   Prescriptions Last Dose Informant Patient Reported? Taking?    ALPRAZolam (XANAX) 0 5 mg tablet   No No   Sig: TAKE 1 TABLET BY MOUTH TWO  TIMES DAILY   B Complex-Folic Acid (B COMPLEX PLUS) TABS  Self Yes No   Sig: Take by mouth   Cholecalciferol (VITAMIN D3) 1000 units CAPS  Self Yes No   Sig: Take by mouth   Chondroitin Sulfate-Vit C-Mn (CHONDROITIN SULFATE COMPLEX) 400-60-2 5 MG CAPS  Self Yes No   Sig: Take by mouth   Coenzyme Q10 (COQ-10) 100 MG CAPS  Self Yes No   Sig: Take by mouth   Multiple Vitamin (MULTI-VITAMIN DAILY PO)  Self Yes No   Sig: Take by mouth   aspirin (ASPIRIN LOW DOSE) 81 mg EC tablet  Self Yes No   Sig: Take by mouth   fenofibrate (TRICOR) 54 MG tablet   No No   Sig: TAKE 1 TABLET BY MOUTH  DAILY   metoprolol succinate (TOPROL-XL) 50 mg 24 hr tablet   No No   Sig: TAKE 1 TABLET BY MOUTH  DAILY   omeprazole (PriLOSEC) 40 MG capsule   No No   Sig: TAKE 1 CAPSULE BY MOUTH  DAILY   pravastatin (PRAVACHOL) 80 mg tablet   No No   Sig: Take 1 tablet (80 mg total) by mouth daily   solifenacin (VESICARE) 5 mg tablet   No No   Sig: Take 1 tablet (5 mg total) by mouth daily      Facility-Administered Medications: None       Past Medical History:   Diagnosis Date    Colon polyps     DR Sirena Cornejo    Diverticulosis     Hyperlipidemia     Hypertension     Obesity     KADEN (obstructive sleep apnea)     cpap    Pneumonia     Small bowel obstruction, partial (HCC)     SOB (shortness of breath)        Past Surgical History:   Procedure Laterality Date    SKIN BIOPSY      TONSILLECTOMY AND ADENOIDECTOMY         Family History   Problem Relation Age of Onset    Heart disease Mother     Hypertension Mother     Breast cancer Sister      I have reviewed and agree with the history as documented  Social History     Tobacco Use    Smoking status: Never Smoker    Smokeless tobacco: Never Used   Substance Use Topics    Alcohol use: No     Comment: occassional    Drug use: No        Review of Systems   Constitutional: Negative for fever  HENT: Negative for nosebleeds  Gastrointestinal: Positive for blood in stool and hematochezia  Negative for abdominal pain, diarrhea, hematemesis, nausea, rectal pain and vomiting  Neurological: Negative for dizziness, loss of consciousness and light-headedness  All other systems reviewed and are negative  Physical Exam  Physical Exam   Constitutional: He appears well-developed and well-nourished  No distress  HENT:   Head: Normocephalic and atraumatic  Eyes: Pupils are equal, round, and reactive to light  No scleral icterus  Neck: Normal range of motion  Neck supple  Cardiovascular: Normal rate  Pulmonary/Chest: Effort normal    Abdominal: Soft  He exhibits no distension  There is no tenderness  Genitourinary: Rectal exam shows guaiac positive stool  Musculoskeletal: He exhibits no deformity  Neurological: He is alert  Skin: Skin is dry  He is not diaphoretic  Psychiatric: He has a normal mood and affect  Vitals reviewed        Vital Signs  ED Triage Vitals [11/12/19 2110]   Temperature Pulse Respirations Blood Pressure SpO2   (!) 97 4 °F (36 3 °C) 69 18 149/67 96 %      Temp Source Heart Rate Source Patient Position - Orthostatic VS BP Location FiO2 (%)   Oral -- Sitting Left arm --      Pain Score       --           Vitals:    11/12/19 2110 11/12/19 2315   BP: 149/67 136/63   Pulse: 69 64   Patient Position - Orthostatic VS: Sitting          Visual Acuity      ED Medications  Medications   oxybutynin (DITROPAN-XL) 24 hr tablet 5 mg (has no administration in time range)   pravastatin (PRAVACHOL) tablet 80 mg (has no administration in time range)   metoprolol succinate (TOPROL-XL) 24 hr tablet 50 mg (has no administration in time range)   fenofibrate (TRICOR) tablet 54 mg (has no administration in time range)   ALPRAZolam (XANAX) tablet 0 5 mg (has no administration in time range)   pantoprazole (PROTONIX) injection 40 mg (has no administration in time range)   pantoprazole (PROTONIX) 80 mg in sodium chloride 0 9 % 100 mL IVPB (0 mg Intravenous Stopped 11/12/19 2313)       Diagnostic Studies  Results Reviewed     Procedure Component Value Units Date/Time    Comprehensive metabolic panel [[id number]] Collected:  11/12/19 2226    Lab Status:  Final result Specimen:  Blood from Arm, Right Updated:  11/12/19 2252     Sodium 141 mmol/L      Potassium 4 4 mmol/L      Chloride 104 mmol/L      CO2 29 mmol/L      ANION GAP 8 mmol/L      BUN 16 mg/dL      Creatinine 1 18 mg/dL      Glucose 101 mg/dL      Calcium 9 2 mg/dL      AST 25 U/L      ALT 34 U/L      Alkaline Phosphatase 47 U/L      Total Protein 7 3 g/dL      Albumin 3 9 g/dL      Total Bilirubin 0 60 mg/dL      eGFR 61 ml/min/1 73sq m     Narrative:       [place] guidelines for Chronic Kidney Disease (CKD):     Stage 1 with normal or high GFR (GFR > 90 mL/min/1 73 square meters)    Stage 2 Mild CKD (GFR = 60-89 mL/min/1 73 square meters)    Stage 3A Moderate CKD (GFR = 45-59 mL/min/1 73 square meters)    Stage 3B Moderate CKD (GFR = 30-44 mL/min/1 73 square meters)    Stage 4 Severe CKD (GFR = 15-29 mL/min/1 73 square meters)    Stage 5 End Stage CKD (GFR <15 mL/min/1 73 square meters)  Note: GFR calculation is accurate only with a steady state creatinine    Protime-INR [[id number]]  (Normal) Collected:  11/12/19 2226    Lab Status:  Final result Specimen:  Blood from Arm, Right Updated:  11/12/19 2249     Protime 14 5 seconds      INR 1 13    APTT [[id number]]  (Normal) Collected:  11/12/19 2226    Lab Status:  Final result Specimen:  Blood from Arm, Right Updated:  11/12/19 2249     PTT 35 seconds     CBC and differential [[id number]] Collected:  11/12/19 2226    Lab Status:  Final result Specimen:  Blood from Arm, Right Updated:  11/12/19 2235     WBC 9 83 Thousand/uL      RBC 4 92 Million/uL      Hemoglobin 15 2 g/dL      Hematocrit 44 8 %      MCV 91 fL      MCH 30 9 pg      MCHC 33 9 g/dL      RDW 13 3 %      MPV 9 9 fL      Platelets 866 Thousands/uL      nRBC 0 /100 WBCs      Neutrophils Relative 56 %      Immat GRANS % 0 %      Lymphocytes Relative 34 %      Monocytes Relative 8 %      Eosinophils Relative 1 %      Basophils Relative 1 %      Neutrophils Absolute 5 60 Thousands/µL      Immature Grans Absolute 0 02 Thousand/uL      Lymphocytes Absolute 3 31 Thousands/µL      Monocytes Absolute 0 75 Thousand/µL      Eosinophils Absolute 0 09 Thousand/µL      Basophils Absolute 0 06 Thousands/µL                  No orders to display              Procedures  Procedures       ED Course                   Initial Sepsis Screening     Row Name 11/12/19 2298                Is the patient's history suggestive of a new or worsening infection? No  -CE        Suspected source of infection  --        Are two or more of the following signs & symptoms of infection both present and new to the patient? No  -CE        Indicate SIRS criteria  --        If the answer is yes to both questions, suspicion of sepsis is present  --        If severe sepsis is present AND tissue hypoperfusion perists in the hour after fluid resuscitation or lactate > 4, the patient meets criteria for SEPTIC SHOCK  --        Are any of the following organ dysfunction criteria present within 6 hours of suspected infection and SIRS criteria that are NOT considered to be chronic conditions?   --        Organ dysfunction  --        Date of presentation of severe sepsis  --        Time of presentation of severe sepsis  --        Tissue hypoperfusion persists in the hour after crystalloid fluid administration, evidenced, by either:  --        Was hypotension present within one hour of the conclusion of crystalloid fluid administration?  --        Date of presentation of septic shock  --        Time of presentation of septic shock  --          User Key  (r) = Recorded By, (t) = Taken By, (c) = Cosigned By    [address] Name Provider Valeri Charles MD Physician                  MDM    Disposition  Final diagnoses:   Hematochezia     Time reflects when diagnosis was documented in both MDM as applicable and the Disposition within this note     Time User Action Codes Description Comment    11/12/2019 11:53 PM Roxie Hernandez Add [K92 1] Hematochezia       ED Disposition     ED Disposition Condition Date/Time Comment    Admit Stable Tue Nov 12, 2019 11:53 PM Case was discussed with MARY LOU and the patient's admission status was agreed to be Admission Status: observation status to the service of Dr Kushal Rincon   Follow-up Information    None         Patient's Medications   Discharge Prescriptions    No medications on file     No discharge procedures on file      ED Provider  Electronically Signed by           Sukhjinder Tristan MD  11/13/19 1322

## 2019-11-13 NOTE — ASSESSMENT & PLAN NOTE
NPO  IV PPI  Hemoglobin monitoring  Gastroenterology consultation  Hold chemoprophylaxis for DVT as well as aspirin due to presentation  Hemodynamic monitoring  Transfuse as needed

## 2019-11-13 NOTE — H&P
H&P- Sami Juarez 1945, 69 y o  male MRN: [id number]    Unit/Bed#: ED 26 Encounter: [id number]    Primary Care Provider: Ranajna Qiu MD   Date and time admitted to hospital: 11/12/2019  9:56 PM        * Bright red blood per rectum  Assessment & Plan  NPO  IV PPI  Hemoglobin monitoring  Gastroenterology consultation  Hold chemoprophylaxis for DVT as well as aspirin due to presentation  Hemodynamic monitoring  Transfuse as needed      Morbid obesity ([address] )  Assessment & Plan  Dietary and lifestyle modifications recommended    Hyperlipidemia  Assessment & Plan  Continue Tricor and Pravachol at home dosing    Benign hypertension  Assessment & Plan  Continue Toprol-XL 50 mg daily  Monitor blood pressure with routine vitals    KADEN on CPAP  Assessment & Plan  Continue CPAP HS      VTE Prophylaxis: GI bleed  / sequential compression device   Code Status:  Full code  POLST: There is no POLST form on file for this patient (pre-hospital)  Discussion with family:      Anticipated Length of Stay:  Patient will be admitted on an Observation basis with an anticipated length of stay of  less than 2 midnights  Justification for Hospital Stay:  Bright red blood per rectum    Total Time for Visit, including Counseling / Coordination of Care: 30 minutes  Greater than 50% of this total time spent on direct patient counseling and coordination of care  Chief Complaint:   Bright red blood per rectum    History of Present Illness:    Sami Juarez is a 69 y o  male past medical history in this case significant for recent colonoscopy with multiple clips placed which was done on 11/01/2019  Patient was then his usual state of health until 11/06/2019 when he began to have bright red blood per rectum multiple times a day  Patient denies abdominal pain nausea vomiting, he is moving his bowels approximately 10 times daily with bright red blood per rectum    He denies chest pain palpitations shortness of breath lightheadedness dizziness focal numbness , fatigue or weakness  Appetite has been preserved weight has been stable  Review of Systems:    Review of Systems   Constitutional: Negative  Negative for activity change, appetite change, chills, diaphoresis, fatigue, fever and unexpected weight change  HENT: Negative for congestion, sinus pressure and sinus pain  Eyes: Negative  Negative for photophobia, pain, discharge and visual disturbance  Respiratory: Negative  Negative for cough, chest tightness, shortness of breath, wheezing and stridor  Cardiovascular: Negative  Negative for chest pain, palpitations and leg swelling  Gastrointestinal: Positive for blood in stool and diarrhea  Negative for abdominal distention, abdominal pain, constipation, nausea and vomiting  Endocrine: Negative  Negative for cold intolerance, heat intolerance and polyuria  Genitourinary: Negative  Negative for difficulty urinating, dysuria, flank pain, frequency, hematuria and urgency  Musculoskeletal: Negative  Negative for arthralgias, gait problem, joint swelling, myalgias and neck stiffness  Skin: Negative  Negative for color change, pallor, rash and wound  Allergic/Immunologic: Negative  Negative for immunocompromised state  Neurological: Negative  Negative for dizziness, seizures, syncope, weakness, light-headedness, numbness and headaches  Hematological: Negative  Negative for adenopathy  Does not bruise/bleed easily  Psychiatric/Behavioral: Negative  Negative for confusion and hallucinations  The patient is not nervous/anxious          Past Medical and Surgical History:     Past Medical History:   Diagnosis Date    Colon polyps     DR Jesus Andrew    Diverticulosis     Hyperlipidemia     Hypertension     Obesity     KADEN (obstructive sleep apnea)     cpap    Pneumonia     Small bowel obstruction, partial (HCC)     SOB (shortness of breath)        Past Surgical History:   Procedure Laterality Date    SKIN BIOPSY      TONSILLECTOMY AND ADENOIDECTOMY         Meds/Allergies:    Prior to Admission medications    Medication Sig Start Date End Date Taking? Authorizing Provider   ALPRAZolam Indu Dill) 0 5 mg tablet TAKE 1 TABLET BY MOUTH TWO  TIMES DAILY 11/4/19   Mauricio Al PA-C   aspirin (ASPIRIN LOW DOSE) 81 mg EC tablet Take by mouth    Historical Provider, MD   B Complex-Folic Acid (B COMPLEX PLUS) TABS Take by mouth    Historical Provider, MD   Cholecalciferol (VITAMIN D3) 1000 units CAPS Take by mouth    Historical Provider, MD   Chondroitin Sulfate-Vit C-Mn (CHONDROITIN SULFATE COMPLEX) 400-60-2 5 MG CAPS Take by mouth    Historical Provider, MD   Coenzyme Q10 (COQ-10) 100 MG CAPS Take by mouth    Historical Provider, MD   fenofibrate (TRICOR) 54 MG tablet TAKE 1 TABLET BY MOUTH  DAILY 10/3/19   Samson Ram MD   metoprolol succinate (TOPROL-XL) 50 mg 24 hr tablet TAKE 1 TABLET BY MOUTH  DAILY 9/13/19   Mauricio Al PA-C   Multiple Vitamin (MULTI-VITAMIN DAILY PO) Take by mouth    Historical Provider, MD   omeprazole (PriLOSEC) 40 MG capsule TAKE 1 CAPSULE BY MOUTH  DAILY 11/18/18   Mauricio Al PA-C   pravastatin (PRAVACHOL) 80 mg tablet Take 1 tablet (80 mg total) by mouth daily 7/10/19   Samson Ram MD   solifenacin (VESICARE) 5 mg tablet Take 1 tablet (5 mg total) by mouth daily 3/11/19   Mauricio Al PA-C     I have reviewed home medications using allscripts      Allergies: No Known Allergies    Social History:     Marital Status: Significant Other   Occupation:  [employer]  Patient Pre-hospital Living Situation:  Independent  Patient Pre-hospital Level of Mobility:  Ambulates without assist device  Patient Pre-hospital Diet Restrictions:  None  Substance Use History:   Social History     Substance and Sexual Activity   Alcohol Use No    Comment: occassional     Social History     Tobacco Use   Smoking Status Never Smoker   Smokeless Tobacco Never Used     Social History     Substance and Sexual Activity   Drug Use No       Family History:    non-contributory    Physical Exam:     Vitals:   Blood Pressure: 136/63 (11/12/19 2315)  Pulse: 64 (11/12/19 2315)  Temperature: (!) 97 4 °F (36 3 °C) (11/12/19 2110)  Temp Source: Oral (11/12/19 2110)  Respirations: (!) 23 (11/12/19 2315)  Height: 5' 4" (162 6 cm) (11/12/19 2111)  Weight - Scale: 99 8 kg (220 lb) (11/12/19 2111)  SpO2: 97 % (11/12/19 2315)    Physical Exam   Constitutional: He is oriented to person, place, and time  He appears well-developed and well-nourished  No distress  HENT:   Head: Normocephalic and atraumatic  Right Ear: External ear normal    Left Ear: External ear normal    Nose: Nose normal    Eyes: Conjunctivae are normal  Right eye exhibits no discharge  Left eye exhibits no discharge  No scleral icterus  Neck: Normal range of motion  No JVD present  No tracheal deviation present  No thyromegaly present  Cardiovascular: Normal rate, regular rhythm, normal heart sounds and intact distal pulses  Exam reveals no gallop and no friction rub  No murmur heard  Pulmonary/Chest: Effort normal and breath sounds normal  No stridor  No respiratory distress  He has no wheezes  He has no rales  Abdominal: Soft  Bowel sounds are normal  He exhibits no distension  There is no tenderness  There is no rebound and no guarding  Musculoskeletal: Normal range of motion  He exhibits no edema, tenderness or deformity  Neurological: He is alert and oriented to person, place, and time  He has normal reflexes  He exhibits normal muscle tone  Coordination normal    Skin: Skin is warm and dry  No rash noted  He is not diaphoretic  No erythema  No pallor  Psychiatric: He has a normal mood and affect  His behavior is normal  Judgment and thought content normal    Nursing note and vitals reviewed  Additional Data:     Lab Results: I have personally reviewed pertinent reports        Results from last 7 days   Lab Units 11/12/19 2226   WBC Thousand/uL 9 83   HEMOGLOBIN g/dL 15 2   HEMATOCRIT % 44 8   PLATELETS Thousands/uL 254   NEUTROS PCT % 56   LYMPHS PCT % 34   MONOS PCT % 8   EOS PCT % 1     Results from last 7 days   Lab Units 11/12/19  2226   SODIUM mmol/L 141   POTASSIUM mmol/L 4 4   CHLORIDE mmol/L 104   CO2 mmol/L 29   BUN mg/dL 16   CREATININE mg/dL 1 18   ANION GAP mmol/L 8   CALCIUM mg/dL 9 2   ALBUMIN g/dL 3 9   TOTAL BILIRUBIN mg/dL 0 60   ALK PHOS U/L 47   ALT U/L 34   AST U/L 25   GLUCOSE RANDOM mg/dL 101     Results from last 7 days   Lab Units 11/12/19  2226   INR  1 13                   Imaging: I have personally reviewed pertinent reports  No orders to display       EKG, Pathology, and Other Studies Reviewed on Admission:   · EKG:      Allscripts / Epic Records Reviewed: Yes     ** Please Note: This note has been constructed using a voice recognition system   **

## 2019-11-13 NOTE — PLAN OF CARE
Problem: Potential for Falls  Goal: Patient will remain free of falls  Description  INTERVENTIONS:  - Assess patient frequently for physical needs  -  Identify cognitive and physical deficits and behaviors that affect risk of falls    -  [place] fall precautions as indicated by assessment   - Educate patient/family on patient safety including physical limitations  - Instruct patient to call for assistance with activity based on assessment  - Modify environment to reduce risk of injury  - Consider OT/PT consult to assist with strengthening/mobility  Outcome: Progressing     Problem: PAIN - ADULT  Goal: Verbalizes/displays adequate comfort level or baseline comfort level  Description  Interventions:  - Encourage patient to monitor pain and request assistance  - Assess pain using appropriate pain scale  - Administer analgesics based on type and severity of pain and evaluate response  - Implement non-pharmacological measures as appropriate and evaluate response  - Consider cultural and social influences on pain and pain management  - Notify physician/advanced practitioner if interventions unsuccessful or patient reports new pain  Outcome: Progressing     Problem: INFECTION - ADULT  Goal: Absence or prevention of progression during hospitalization  Description  INTERVENTIONS:  - Assess and monitor for signs and symptoms of infection  - Monitor lab/diagnostic results  - Monitor all insertion sites, i e  indwelling lines, tubes, and drains  - Monitor endotracheal if appropriate and nasal secretions for changes in amount and color  - [place] appropriate cooling/warming therapies per order  - Administer medications as ordered  - Instruct and encourage patient and family to use good hand hygiene technique  - Identify and instruct in appropriate isolation precautions for identified infection/condition  Outcome: Progressing  Goal: Absence of fever/infection during neutropenic period  Description  INTERVENTIONS:  - Monitor WBC    Outcome: Progressing     Problem: SAFETY ADULT  Goal: Maintain or return to baseline ADL function  Description  INTERVENTIONS:  -  Assess patient's ability to carry out ADLs; assess patient's baseline for ADL function and identify physical deficits which impact ability to perform ADLs (bathing, care of mouth/teeth, toileting, grooming, dressing, etc )  - Assess/evaluate cause of self-care deficits   - Assess range of motion  - Assess patient's mobility; develop plan if impaired  - Assess patient's need for assistive devices and provide as appropriate  - Encourage maximum independence but intervene and supervise when necessary  - Involve family in performance of ADLs  - Assess for home care needs following discharge   - Consider OT consult to assist with ADL evaluation and planning for discharge  - Provide patient education as appropriate  Outcome: Progressing  Goal: Maintain or return mobility status to optimal level  Description  INTERVENTIONS:  - Assess patient's baseline mobility status (ambulation, transfers, stairs, etc )    - Identify cognitive and physical deficits and behaviors that affect mobility  - Identify mobility aids required to assist with transfers and/or ambulation (gait belt, sit-to-stand, lift, walker, cane, etc )  - [place] fall precautions as indicated by assessment  - Record patient progress and toleration of activity level on Mobility SBAR; progress patient to next Phase/Stage  - Instruct patient to call for assistance with activity based on assessment  - Consider rehabilitation consult to assist with strengthening/weightbearing, etc   Outcome: Progressing     Problem: DISCHARGE PLANNING  Goal: Discharge to home or other facility with appropriate resources  Description  INTERVENTIONS:  - Identify barriers to discharge w/patient and caregiver  - Arrange for needed discharge resources and transportation as appropriate  - Identify discharge learning needs (meds, wound care, etc )  - Arrange for interpretive services to assist at discharge as needed  - Refer to Case Management Department for coordinating discharge planning if the patient needs post-hospital services based on physician/advanced practitioner order or complex needs related to functional status, cognitive ability, or social support system  Outcome: Progressing     Problem: Knowledge Deficit  Goal: Patient/family/caregiver demonstrates understanding of disease process, treatment plan, medications, and discharge instructions  Description  Complete learning assessment and assess knowledge base    Interventions:  - Provide teaching at level of understanding  - Provide teaching via preferred learning methods  Outcome: Progressing

## 2019-11-13 NOTE — QUICK NOTE
Patient was seen and examined  He is lying comfortably in the bed  He denies any bleeding since admission  He denies any abdominal pain  Vital signs are stable  General exam:  Not in acute distress  Heart:  S1-S2 normal   Regular rhythm and rate  Lungs:  Clear to auscultation bilaterally  Abdomen:  Obese  Soft, nontender, nondistended  Extremities:  No edema  Assessment and plan:    GI bleed-currently stable  Hemoglobin is stable  Recent colonoscopy  Plan for colonoscopy tomorrow  GI follows  Colon polyp-status post colonoscopy on 11/01/2019 with the removal of a large flat polyp  Obstructive sleep apnea-continue CPAP      Hypertension-continue Toprol 50 mg daily

## 2019-11-13 NOTE — CONSULTS
Consultation - [facility]  Mo Nunez 69 y o  male MRN: [id number]  Unit/Bed#: -01 Encounter: [id number]        Consults    Reason for Consult / Principal Problem: Hematochezia    HPI: Mr Duane Rapp is a 70 yo M with a PMH of KADEN on CPAP, HTN, HLD, presenting for evaluation of hematochezia since Wednesday last week as well as more frequent BMs  He had a colonoscopy on 11/1 with Dr Kristina Pemberton for screening and he had a large flat polyp in the cecum removed with bleeding after intervention s/p 4 clips placed for hemostasis, 4 other polyps removed on the L side of the colon, multiple large diverticula noted in the sigmoid colon  He then starting having dark red blood per rectum without any abdominal pain on 11/6 and this continued every day  He did occasionally have LLQ soreness no pain, nausea, or vomiting  He did not resume his baby ASA after the colonoscopy due to the bleeding  He denies feeling lightheaded or dizzy at any time  He called our office yesterday for further direction due to persistent bleeding and he was directed to the ER for evaluation  He is feeling well today  He reports no further passage of blood since yesterday but has not eaten today      REVIEW OF SYSTEMS: Negative except for as stated above      Historical Information   Past Medical History:   Diagnosis Date    Colon polyps     DR Anjana Reed    Diverticulosis     Hyperlipidemia     Hypertension     Obesity     KADEN (obstructive sleep apnea)     cpap    Pneumonia     Small bowel obstruction, partial (HCC)     SOB (shortness of breath)      Past Surgical History:   Procedure Laterality Date    SKIN BIOPSY      TONSILLECTOMY AND ADENOIDECTOMY       Social History   Social History     Substance and Sexual Activity   Alcohol Use Yes    Frequency: Monthly or less    Drinks per session: 1 or 2    Binge frequency: Never    Comment: occassional     Social History     Substance and Sexual Activity   Drug Use No     Social History     Tobacco Use   Smoking Status Never Smoker   Smokeless Tobacco Never Used     Family History   Problem Relation Age of Onset    Heart disease Mother     Hypertension Mother     Breast cancer Sister        Meds/Allergies     Medications Prior to Admission   Medication    ALPRAZolam (XANAX) 0 5 mg tablet    aspirin (ASPIRIN LOW DOSE) 81 mg EC tablet    B Complex-Folic Acid (B COMPLEX PLUS) TABS    Cholecalciferol (VITAMIN D3) 1000 units CAPS    Chondroitin Sulfate-Vit C-Mn (CHONDROITIN SULFATE COMPLEX) 400-60-2 5 MG CAPS    Coenzyme Q10 (COQ-10) 100 MG CAPS    fenofibrate (TRICOR) 54 MG tablet    metoprolol succinate (TOPROL-XL) 50 mg 24 hr tablet    Multiple Vitamin (MULTI-VITAMIN DAILY PO)    omeprazole (PriLOSEC) 40 MG capsule    pravastatin (PRAVACHOL) 80 mg tablet    solifenacin (VESICARE) 5 mg tablet     Current Facility-Administered Medications   Medication Dose Route Frequency    ALPRAZolam (XANAX) tablet 0 5 mg  0 5 mg Oral BID    bisacodyl (DULCOLAX) EC tablet 10 mg  10 mg Oral Once    fenofibrate (TRICOR) tablet 48 mg  48 mg Oral Daily    metoprolol succinate (TOPROL-XL) 24 hr tablet 50 mg  50 mg Oral Daily    oxybutynin (DITROPAN-XL) 24 hr tablet 5 mg  5 mg Oral Daily    polyethylene glycol (GLYCOLAX) bowel prep 238 g  238 g Oral Once    pravastatin (PRAVACHOL) tablet 80 mg  80 mg Oral Daily       No Known Allergies        Objective     Blood pressure 133/81, pulse 64, temperature (!) 97 2 °F (36 2 °C), resp  rate 18, height 5' 4" (1 626 m), weight 99 8 kg (220 lb), SpO2 95 %        Intake/Output Summary (Last 24 hours) at 11/13/2019 1436  Last data filed at 11/13/2019 0706  Gross per 24 hour   Intake 100 ml   Output 500 ml   Net -400 ml         PHYSICAL EXAM:      General Appearance:   Alert, oriented x3, no acute distress   HEENT:   Normocephalic, atraumatic, anicteric      Neck:  Supple, symmetrical, trachea midline   Lungs:   Clear to auscultation bilaterally; no rales, rhonchi or wheezing; respirations unlabored    Heart[de-identified]   S1 and S2 normal; regular rate and rhythm; no murmur, rub, or gallop  Abdomen:   Soft, non-tender, non-distended; normal bowel sounds; no masses, no organomegaly    Rectal:   Deferred    Extremities:  No cyanosis, clubbing or edema    Pulses:  2+ and symmetric all extremities    Skin:  No jaundice or pallor      Lab Results:   Results from last 7 days   Lab Units 11/13/19  0508   WBC Thousand/uL 8 11   HEMOGLOBIN g/dL 14 6   HEMATOCRIT % 42 6   PLATELETS Thousands/uL 249   NEUTROS PCT % 56   LYMPHS PCT % 31   MONOS PCT % 10   EOS PCT % 2     Results from last 7 days   Lab Units 11/13/19  0508 11/12/19  2226   POTASSIUM mmol/L 4 0 4 4   CHLORIDE mmol/L 104 104   CO2 mmol/L 26 29   BUN mg/dL 15 16   CREATININE mg/dL 1 02 1 18   CALCIUM mg/dL 8 6 9 2   ALK PHOS U/L  --  47   ALT U/L  --  34   AST U/L  --  25     Results from last 7 days   Lab Units 11/12/19  2226   INR  1 13           Imaging Studies: I have personally reviewed pertinent imaging studies  No results found  ASSESSMENT and PLAN:      Hematochezia  Postpolypectomy Bleeding  - Suspect this is a postpolypectomy bleed v diverticular bleed  - Hb luckily is normal at 14 6 and he is HD stable  - Given daily episodes of bleeding for 1 week, will need to repeat colonoscopy to evaluate polypectomy sites for active bleeding with intervention  - Clear liquid diet today, NPO after midnight  - Miralax prep with dulcolax starting tonight  - Recheck CBC tomorrow AM  - He should be stable for discharge tomorrow after the colonoscopy but this will be determined pending findings    The patient will be seen by Dr Amado Espinosa

## 2019-11-14 ENCOUNTER — ANESTHESIA EVENT (INPATIENT)
Dept: GASTROENTEROLOGY | Facility: HOSPITAL | Age: 74
DRG: 920 | End: 2019-11-14
Payer: MEDICARE

## 2019-11-14 ENCOUNTER — APPOINTMENT (INPATIENT)
Dept: GASTROENTEROLOGY | Facility: HOSPITAL | Age: 74
DRG: 920 | End: 2019-11-14
Payer: MEDICARE

## 2019-11-14 ENCOUNTER — TELEPHONE (OUTPATIENT)
Dept: SURGERY | Facility: HOSPITAL | Age: 74
End: 2019-11-14

## 2019-11-14 ENCOUNTER — ANESTHESIA (INPATIENT)
Dept: GASTROENTEROLOGY | Facility: HOSPITAL | Age: 74
DRG: 920 | End: 2019-11-14
Payer: MEDICARE

## 2019-11-14 VITALS
OXYGEN SATURATION: 94 % | WEIGHT: 220 LBS | DIASTOLIC BLOOD PRESSURE: 70 MMHG | HEART RATE: 79 BPM | TEMPERATURE: 97.4 F | HEIGHT: 64 IN | BODY MASS INDEX: 37.56 KG/M2 | RESPIRATION RATE: 20 BRPM | SYSTOLIC BLOOD PRESSURE: 123 MMHG

## 2019-11-14 LAB
ERYTHROCYTE [DISTWIDTH] IN BLOOD BY AUTOMATED COUNT: 13.2 % (ref 11.6–15.1)
HCT VFR BLD AUTO: 45.6 % (ref 36.5–49.3)
HGB BLD-MCNC: 15.2 G/DL (ref 12–17)
MCH RBC QN AUTO: 30.3 PG (ref 26.8–34.3)
MCHC RBC AUTO-ENTMCNC: 33.3 G/DL (ref 31.4–37.4)
MCV RBC AUTO: 91 FL (ref 82–98)
PLATELET # BLD AUTO: 261 THOUSANDS/UL (ref 149–390)
PMV BLD AUTO: 9.7 FL (ref 8.9–12.7)
RBC # BLD AUTO: 5.02 MILLION/UL (ref 3.88–5.62)
WBC # BLD AUTO: 9.61 THOUSAND/UL (ref 4.31–10.16)

## 2019-11-14 PROCEDURE — 45378 DIAGNOSTIC COLONOSCOPY: CPT | Performed by: INTERNAL MEDICINE

## 2019-11-14 PROCEDURE — 0W3P8ZZ CONTROL BLEEDING IN GASTROINTESTINAL TRACT, VIA NATURAL OR ARTIFICIAL OPENING ENDOSCOPIC: ICD-10-PCS | Performed by: INTERNAL MEDICINE

## 2019-11-14 PROCEDURE — 99238 HOSP IP/OBS DSCHRG MGMT 30/<: CPT | Performed by: FAMILY MEDICINE

## 2019-11-14 PROCEDURE — 85027 COMPLETE CBC AUTOMATED: CPT | Performed by: PHYSICIAN ASSISTANT

## 2019-11-14 RX ORDER — SODIUM CHLORIDE, SODIUM LACTATE, POTASSIUM CHLORIDE, CALCIUM CHLORIDE 600; 310; 30; 20 MG/100ML; MG/100ML; MG/100ML; MG/100ML
INJECTION, SOLUTION INTRAVENOUS CONTINUOUS PRN
Status: DISCONTINUED | OUTPATIENT
Start: 2019-11-14 | End: 2019-11-14 | Stop reason: SURG

## 2019-11-14 RX ORDER — LIDOCAINE HYDROCHLORIDE 10 MG/ML
INJECTION, SOLUTION INFILTRATION; PERINEURAL AS NEEDED
Status: DISCONTINUED | OUTPATIENT
Start: 2019-11-14 | End: 2019-11-14 | Stop reason: SURG

## 2019-11-14 RX ORDER — PROPOFOL 10 MG/ML
INJECTION, EMULSION INTRAVENOUS AS NEEDED
Status: DISCONTINUED | OUTPATIENT
Start: 2019-11-14 | End: 2019-11-14 | Stop reason: SURG

## 2019-11-14 RX ADMIN — PROPOFOL 20 MG: 10 INJECTION, EMULSION INTRAVENOUS at 14:02

## 2019-11-14 RX ADMIN — PROPOFOL 30 MG: 10 INJECTION, EMULSION INTRAVENOUS at 13:57

## 2019-11-14 RX ADMIN — PROPOFOL 20 MG: 10 INJECTION, EMULSION INTRAVENOUS at 14:06

## 2019-11-14 RX ADMIN — ALPRAZOLAM 0.5 MG: 0.5 TABLET ORAL at 08:54

## 2019-11-14 RX ADMIN — FENOFIBRATE 48 MG: 48 TABLET, COATED ORAL at 08:54

## 2019-11-14 RX ADMIN — SODIUM CHLORIDE, SODIUM LACTATE, POTASSIUM CHLORIDE, AND CALCIUM CHLORIDE: .6; .31; .03; .02 INJECTION, SOLUTION INTRAVENOUS at 13:42

## 2019-11-14 RX ADMIN — LIDOCAINE HYDROCHLORIDE 50 MG: 10 INJECTION, SOLUTION INFILTRATION; PERINEURAL at 13:52

## 2019-11-14 RX ADMIN — PROPOFOL 150 MG: 10 INJECTION, EMULSION INTRAVENOUS at 13:52

## 2019-11-14 RX ADMIN — OXYBUTYNIN 5 MG: 5 TABLET, FILM COATED, EXTENDED RELEASE ORAL at 08:54

## 2019-11-14 NOTE — ASSESSMENT & PLAN NOTE
No more episodes of bleeding  Status post colonoscopy today    There was a large also noted and 1 extra clip has been placed  Hemoglobin stable

## 2019-11-14 NOTE — ANESTHESIA POSTPROCEDURE EVALUATION
Post-Op Assessment Note    CV Status:  Stable  Pain Score: 0    Pain management: adequate     Mental Status:  Sleepy   Hydration Status:  Stable   PONV Controlled:  None   Airway Patency:  Patent and adequate   Post Op Vitals Reviewed: Yes      Staff: CRNA           BP   124/67   Temp      Pulse  71   Resp   16   SpO2   97%

## 2019-11-14 NOTE — DISCHARGE SUMMARY
Discharge- Rexene Barthel 1945, 69 y o  male MRN: [id number]    Unit/Bed#: -01 Encounter: [id number]    Primary Care Provider: Lay Page MD   Date and time admitted to hospital: 11/12/2019  9:56 PM        Morbid obesity ([facility] [address] )  Assessment & Plan  Dietary and lifestyle modifications recommended    Hyperlipidemia  Assessment & Plan  Continue Tricor and Pravachol at home dosing    Benign hypertension  Assessment & Plan  Continue Toprol-XL 50 mg daily  Monitor blood pressure with routine vitals    KADEN on CPAP  Assessment & Plan  Continue CPAP HS    * Bright red blood per rectum  Assessment & Plan  No more episodes of bleeding  Status post colonoscopy today  There was a large also noted and 1 extra clip has been placed  Hemoglobin stable              Discharge Summary - [facility] Internal Medicine    Patient Information: Rexene Barthel 69 y o  male MRN: [id number]  Unit/Bed#: -01 Encounter: [id number]    Discharging Physician / Practitioner: Israel Cardoza MD  PCP: Lay Page MD  Admission Date: 11/12/2019  Discharge Date: 11/14/19    Reason for Admission:  GI bleed    Discharge Diagnoses:     Principal Problem:    Bright red blood per rectum  Active Problems:    KADEN on CPAP    Benign hypertension    Hyperlipidemia    Morbid obesity ([facility] )  Resolved Problems:    * No resolved hospital problems  *      Consultations During Hospital Stay:  · GI    Procedures Performed:     · Colonoscopy on 11/14/2019    Significant Findings / Test Results:     · Large ulcer in the cecum; placed 1 clip successfully (clip was MRI compatible)  For clips were found attached to the area of ulceration within the cecum  A 5th clip was added  There was no active bleeding    · Ulcer in the transverse colon  · Small ulcer with no bleeding in the rectum; placed 1 clip successfully (clip was MRI compatible)  · Multiple small, moderate scattered diverticula in the sigmoid colon with no bleeding    Incidental Findings:   · As above    Test Results Pending at Discharge (will require follow up): · None     Outpatient Tests Requested:  · None    Complications:  None    Hospital Course:     Frank Kumar is a 69 y o  male patient who originally presented to the hospital on 11/12/2019 due to an episode of rectal bleeding since last week after he had a colonoscopy done on 11/01/2019  His hemoglobin remained stable around 14-15  He did not have any episodes of rectal bleeding since admission  GI has been consulted  Today, he had a colonoscopy done which revealed a large cecal ulcer, for which 1 clip has been placed in addition to 4 clips that have been placed prior  There were no active bleeding ulcers noted  Next Colonoscopy should be done in 3 years  Condition at Discharge: stable     Discharge Day Visit / Exam:     Subjective:  Patient was seen examined today  He denies any abdominal discomfort or pain  He did not have any rectal bleeding since admission  Vitals: Blood Pressure: 123/70 (11/14/19 1425)  Pulse: 79 (11/14/19 1425)  Temperature: (!) 97 4 °F (36 3 °C) (11/14/19 1415)  Temp Source: Temporal (11/14/19 1415)  Respirations: 20 (11/14/19 1425)  Height: 5' 4" (162 6 cm) (11/12/19 2111)  Weight - Scale: 99 8 kg (220 lb) (11/12/19 2111)  SpO2: 94 % (11/14/19 1425)  Exam:   Physical Exam   Constitutional: He appears well-developed and well-nourished  Eyes: Pupils are equal, round, and reactive to light  Neck: Normal range of motion  Cardiovascular: Normal rate, regular rhythm and normal heart sounds  Pulmonary/Chest: Effort normal and breath sounds normal  No respiratory distress  Abdominal: Soft  Bowel sounds are normal  There is no tenderness  Musculoskeletal: He exhibits no edema or deformity  Neurological: He is alert  Skin: Skin is warm  Capillary refill takes less than 2 seconds  No rash noted  No erythema  Psychiatric: He has a normal mood and affect           Discussion with Family:  No    Discharge instructions/Information to patient and family:   See after visit summary for information provided to patient and family  Provisions for Follow-Up Care:  See after visit summary for information related to follow-up care and any pertinent home health orders  Disposition:     Home    For Discharges to [facility]:   · Not Applicable to this Patient - Not Applicable to this Patient    Planned Readmission: no     Discharge Statement:  I spent 30 minutes discharging the patient  This time was spent on the day of discharge  I had direct contact with the patient on the day of discharge  Greater than 50% of the total time was spent examining patient, answering all patient questions, arranging and discussing plan of care with patient as well as directly providing post-discharge instructions  Additional time then spent on discharge activities  Discharge Medications:  See after visit summary for reconciled discharge medications provided to patient and family        ** Please Note: This note has been constructed using a voice recognition system **

## 2019-11-14 NOTE — RESPIRATORY THERAPY NOTE
Spoke with patient regarding setting up a CPAP for HS use  Pt does not feel that he needs it at this time  Explained to patient that if he changes his mind he can call and we will set it up

## 2019-11-14 NOTE — PLAN OF CARE
Problem: Potential for Falls  Goal: Patient will remain free of falls  Description  INTERVENTIONS:  - Assess patient frequently for physical needs  -  Identify cognitive and physical deficits and behaviors that affect risk of falls    -  [place] fall precautions as indicated by assessment   - Educate patient/family on patient safety including physical limitations  - Instruct patient to call for assistance with activity based on assessment  - Modify environment to reduce risk of injury  - Consider OT/PT consult to assist with strengthening/mobility  Outcome: Progressing     Problem: PAIN - ADULT  Goal: Verbalizes/displays adequate comfort level or baseline comfort level  Description  Interventions:  - Encourage patient to monitor pain and request assistance  - Assess pain using appropriate pain scale  - Administer analgesics based on type and severity of pain and evaluate response  - Implement non-pharmacological measures as appropriate and evaluate response  - Consider cultural and social influences on pain and pain management  - Notify physician/advanced practitioner if interventions unsuccessful or patient reports new pain  Outcome: Progressing     Problem: INFECTION - ADULT  Goal: Absence or prevention of progression during hospitalization  Description  INTERVENTIONS:  - Assess and monitor for signs and symptoms of infection  - Monitor lab/diagnostic results  - Monitor all insertion sites, i e  indwelling lines, tubes, and drains  - Monitor endotracheal if appropriate and nasal secretions for changes in amount and color  - [place] appropriate cooling/warming therapies per order  - Administer medications as ordered  - Instruct and encourage patient and family to use good hand hygiene technique  - Identify and instruct in appropriate isolation precautions for identified infection/condition  Outcome: Progressing  Goal: Absence of fever/infection during neutropenic period  Description  INTERVENTIONS:  - Monitor WBC    Outcome: Progressing     Problem: SAFETY ADULT  Goal: Maintain or return to baseline ADL function  Description  INTERVENTIONS:  -  Assess patient's ability to carry out ADLs; assess patient's baseline for ADL function and identify physical deficits which impact ability to perform ADLs (bathing, care of mouth/teeth, toileting, grooming, dressing, etc )  - Assess/evaluate cause of self-care deficits   - Assess range of motion  - Assess patient's mobility; develop plan if impaired  - Assess patient's need for assistive devices and provide as appropriate  - Encourage maximum independence but intervene and supervise when necessary  - Involve family in performance of ADLs  - Assess for home care needs following discharge   - Consider OT consult to assist with ADL evaluation and planning for discharge  - Provide patient education as appropriate  Outcome: Progressing  Goal: Maintain or return mobility status to optimal level  Description  INTERVENTIONS:  - Assess patient's baseline mobility status (ambulation, transfers, stairs, etc )    - Identify cognitive and physical deficits and behaviors that affect mobility  - Identify mobility aids required to assist with transfers and/or ambulation (gait belt, sit-to-stand, lift, walker, cane, etc )  - [place] fall precautions as indicated by assessment  - Record patient progress and toleration of activity level on Mobility SBAR; progress patient to next Phase/Stage  - Instruct patient to call for assistance with activity based on assessment  - Consider rehabilitation consult to assist with strengthening/weightbearing, etc   Outcome: Progressing     Problem: DISCHARGE PLANNING  Goal: Discharge to home or other facility with appropriate resources  Description  INTERVENTIONS:  - Identify barriers to discharge w/patient and caregiver  - Arrange for needed discharge resources and transportation as appropriate  - Identify discharge learning needs (meds, wound care, etc )  - Arrange for interpretive services to assist at discharge as needed  - Refer to Case Management Department for coordinating discharge planning if the patient needs post-hospital services based on physician/advanced practitioner order or complex needs related to functional status, cognitive ability, or social support system  Outcome: Progressing     Problem: Knowledge Deficit  Goal: Patient/family/caregiver demonstrates understanding of disease process, treatment plan, medications, and discharge instructions  Description  Complete learning assessment and assess knowledge base    Interventions:  - Provide teaching at level of understanding  - Provide teaching via preferred learning methods  Outcome: Progressing

## 2019-11-14 NOTE — ANESTHESIA PREPROCEDURE EVALUATION
Review of Systems/Medical History  Patient summary reviewed  Chart reviewed  No history of anesthetic complications     Cardiovascular  Exercise tolerance (METS): >4,  Hyperlipidemia, Hypertension on > 1 medication,    Pulmonary  No pneumonia, Sleep apnea CPAP,        GI/Hepatic    GI bleeding , active, GERD well controlled,             Endo/Other    Obesity  morbid obesity   GYN       Hematology   Musculoskeletal       Neurology   Psychology   Anxiety,              Physical Exam    Airway    Mallampati score: III  TM Distance: >3 FB  Neck ROM: full     Dental   No notable dental hx     Cardiovascular      Pulmonary      Other Findings        Anesthesia Plan  ASA Score- 3     Anesthesia Type- IV sedation with anesthesia with ASA Monitors  Additional Monitors:   Airway Plan:         Plan Factors-    Induction- intravenous  Postoperative Plan-     Informed Consent- Anesthetic plan and risks discussed with patient  I personally reviewed this patient with the CRNA  Discussed and agreed on the Anesthesia Plan with the CRNA  Piyush Mancera

## 2019-11-15 ENCOUNTER — TRANSITIONAL CARE MANAGEMENT (OUTPATIENT)
Dept: INTERNAL MEDICINE CLINIC | Facility: CLINIC | Age: 74
End: 2019-11-15

## 2019-11-15 ENCOUNTER — TELEPHONE (OUTPATIENT)
Dept: FAMILY MEDICINE CLINIC | Facility: CLINIC | Age: 74
End: 2019-11-15

## 2019-11-15 NOTE — TELEPHONE ENCOUNTER
Spoke to patient and scheduled tcm for next week  Patient would like ot know if he can take tylenol for aches and pains  He knows he was told to hold off on aspirin due to bleeding  Please advise and let me know

## 2019-11-18 NOTE — PHYSICIAN ADVISOR
Current patient class: Inpatient  The patient is currently on Hospital Day: 3 at [address]      This patient was originally admitted to the hospital under observation status  After admission the patient was reevaluated and determined to require further hospitalization  The patient was documented to require at least a 2nd midnight in the hospital  As such the patient satisfied the 2 midnight benchmark and was therefore eligible for inpatient admission  After review of the relevant documentation, labs, vital signs and test results, the patient is appropriate for INPATIENT ADMISSION  Admission to the hospital as an inpatient is a complex decision making process which requires the practitioner to consider the patients presenting complaint, history and physical examination and all relevant testing  With this in mind, in this case, the patient was deemed appropriate for INPATIENT ADMISSION  After review of the documentation and testing available at the time of the admission I concur with this clinical determination of medical necessity  Rationale is as follows: The patient is a 44-year-old male who presented to the emergency room on 11/12/2019 at 9:56 p m  with a chief complaint of rectal bleeding for 5 days  He has a history of recent colonoscopy with polypectomy  Patient was started on intravenous fluids as well as intravenous Protonix infusion  Gastroenterology was consulted and deemed necessary that patient undergo a repeat colonoscopy  Patient was placed NPO  Colonoscopy was performed with finding of ulcer site from prior colonoscopy with 4 clips  Fifth clip was applied with no further evidence of bleeding  Patient remain hospitalized over 2 midnights  Inpatient level of care was appropriate      The patients vitals on arrival were ED Triage Vitals   Temperature Pulse Respirations Blood Pressure SpO2   11/12/19 2110 11/12/19 2110 11/12/19 2110 11/12/19 2110 11/12/19 2110   (!) 97 4 °F (36 3 °C) 69 18 149/67 96 %      Temp Source Heart Rate Source Patient Position - Orthostatic VS BP Location FiO2 (%)   11/12/19 2110 11/13/19 0336 11/12/19 2110 11/12/19 2110 --   Oral Monitor Sitting Left arm       Pain Score       11/13/19 0700       No Pain           Past Medical History:   Diagnosis Date    Colon polyps     DR Bryanna Nicole    Diverticulosis     Hyperlipidemia     Hypertension     Obesity     KADEN (obstructive sleep apnea)     cpap    Pneumonia     Small bowel obstruction, partial (HCC)     SOB (shortness of breath)      Past Surgical History:   Procedure Laterality Date    SKIN BIOPSY      TONSILLECTOMY AND ADENOIDECTOMY         The patient was admitted to the hospital at 1429 on 11/13/19 for the following diagnosis:  Hematochezia [K92 1]  Rectal bleeding [K62 5]    Consults have been placed to:   IP CONSULT TO GASTROENTEROLOGY    Vitals:    11/14/19 0736 11/14/19 1325 11/14/19 1415 11/14/19 1425   BP: 103/76 131/65 123/65 123/70   Pulse: 75 78 75 79   Resp: 17 21 21 20   Temp: 97 7 °F (36 5 °C) 97 5 °F (36 4 °C) (!) 97 4 °F (36 3 °C)    TempSrc:  Temporal Temporal    SpO2: 95% 95% 94% 94%   Weight:       Height:           Most recent labs:    No results for input(s): WBC, HGB, HCT, PLT, K, NA, CALCIUM, BUN, CREATININE, LIPASE, AMYLASE, INR, TROPONINI, CKTOTAL, AST, ALT, ALKPHOS, BILITOT in the last 72 hours  Scheduled Meds:  Continuous Infusions:  No current facility-administered medications for this encounter  PRN Meds:      Surgical procedures (if appropriate):

## 2019-11-21 ENCOUNTER — OFFICE VISIT (OUTPATIENT)
Dept: INTERNAL MEDICINE CLINIC | Facility: CLINIC | Age: 74
End: 2019-11-21
Payer: MEDICARE

## 2019-11-21 ENCOUNTER — APPOINTMENT (OUTPATIENT)
Dept: LAB | Facility: HOSPITAL | Age: 74
End: 2019-11-21
Payer: MEDICARE

## 2019-11-21 VITALS
BODY MASS INDEX: 38.24 KG/M2 | OXYGEN SATURATION: 97 % | HEIGHT: 64 IN | RESPIRATION RATE: 18 BRPM | HEART RATE: 70 BPM | DIASTOLIC BLOOD PRESSURE: 68 MMHG | WEIGHT: 224 LBS | SYSTOLIC BLOOD PRESSURE: 100 MMHG

## 2019-11-21 DIAGNOSIS — E78.00 PURE HYPERCHOLESTEROLEMIA: ICD-10-CM

## 2019-11-21 DIAGNOSIS — M79.642 PAIN IN BOTH HANDS: ICD-10-CM

## 2019-11-21 DIAGNOSIS — M79.641 PAIN IN BOTH HANDS: ICD-10-CM

## 2019-11-21 DIAGNOSIS — K92.2 LOWER GI BLEED: ICD-10-CM

## 2019-11-21 DIAGNOSIS — K92.2 LOWER GI BLEED: Primary | ICD-10-CM

## 2019-11-21 PROBLEM — K57.90 DIVERTICULOSIS: Status: ACTIVE | Noted: 2019-11-21

## 2019-11-21 LAB
BASOPHILS # BLD AUTO: 0.07 THOUSANDS/ΜL (ref 0–0.1)
BASOPHILS NFR BLD AUTO: 1 % (ref 0–1)
EOSINOPHIL # BLD AUTO: 0.11 THOUSAND/ΜL (ref 0–0.61)
EOSINOPHIL NFR BLD AUTO: 1 % (ref 0–6)
ERYTHROCYTE [DISTWIDTH] IN BLOOD BY AUTOMATED COUNT: 13.7 % (ref 11.6–15.1)
HCT VFR BLD AUTO: 42.8 % (ref 36.5–49.3)
HGB BLD-MCNC: 14.3 G/DL (ref 12–17)
IMM GRANULOCYTES # BLD AUTO: 0.02 THOUSAND/UL (ref 0–0.2)
IMM GRANULOCYTES NFR BLD AUTO: 0 % (ref 0–2)
LYMPHOCYTES # BLD AUTO: 2.63 THOUSANDS/ΜL (ref 0.6–4.47)
LYMPHOCYTES NFR BLD AUTO: 30 % (ref 14–44)
MCH RBC QN AUTO: 30.9 PG (ref 26.8–34.3)
MCHC RBC AUTO-ENTMCNC: 33.4 G/DL (ref 31.4–37.4)
MCV RBC AUTO: 92 FL (ref 82–98)
MONOCYTES # BLD AUTO: 0.81 THOUSAND/ΜL (ref 0.17–1.22)
MONOCYTES NFR BLD AUTO: 9 % (ref 4–12)
NEUTROPHILS # BLD AUTO: 5.03 THOUSANDS/ΜL (ref 1.85–7.62)
NEUTS SEG NFR BLD AUTO: 59 % (ref 43–75)
NRBC BLD AUTO-RTO: 0 /100 WBCS
PLATELET # BLD AUTO: 286 THOUSANDS/UL (ref 149–390)
PMV BLD AUTO: 9.9 FL (ref 8.9–12.7)
RBC # BLD AUTO: 4.63 MILLION/UL (ref 3.88–5.62)
WBC # BLD AUTO: 8.67 THOUSAND/UL (ref 4.31–10.16)

## 2019-11-21 PROCEDURE — 36415 COLL VENOUS BLD VENIPUNCTURE: CPT

## 2019-11-21 PROCEDURE — 85025 COMPLETE CBC W/AUTO DIFF WBC: CPT

## 2019-11-21 PROCEDURE — 99496 TRANSJ CARE MGMT HIGH F2F 7D: CPT | Performed by: PHYSICIAN ASSISTANT

## 2019-11-21 RX ORDER — PRAVASTATIN SODIUM 80 MG/1
TABLET ORAL
Qty: 90 TABLET | Refills: 1 | Status: SHIPPED | OUTPATIENT
Start: 2019-11-21 | End: 2020-02-12 | Stop reason: SINTOL

## 2019-11-21 NOTE — PROGRESS NOTES
Assessment/Plan:   Patient Instructions   Have patient check CBC  No other changes to medication  Stay off baby aspirin at this time  Give trial of flaxseed oil 1 daily for arthritic hands  Hold you're Omega 3 while on the flaxseed oil  Follow-up as scheduled  Sooner as needed  Weight loss encouraged  Will discuss results of CBC when it is available  Quality Measures:       Return for Next scheduled follow up  Diagnoses and all orders for this visit:    Lower GI bleed  -     CBC and differential; Future    Pain in both hands          Subjective:      Patient ID: Katie Phillips is a 69 y o  male  Hospital follow-up/transition of care    Patient's wife present for this visit  Patient had undergone a colonoscopy on 11/01/2019 at which time a large 10 mm flat polyp was found in cecum, and after removal was bleeding therefore intervention was carried out with 4 clips  He had other polyps removed at the same time and single clips were added at certain points  This patient re-presented to the ER on 11/12/2019 due to rectal bleeding  At that time is reported he was hemodynamically stable  He had been admitted and repeat colonoscopy was done finding a large ulcer in the cecum and 1 more clip was successfully added for hemostasis  Ulcer was found in the transverse colon and a small ulcer in the cecum where 1 clip was added  Patient had no further GI bleeding since that time  He is denying any abdominal pain  He has kept his diet light  No nausea or vomiting  No fever or chills  Hospital labs, imaging studies, and records have been reviewed  Patient also today complaining of generalized bilateral hand pain  In his past he would use an occasional ibuprofen  He knows at this time he cannot do so  We did discuss proper use of acetaminophen and he does understand  He also is question about taking his baby aspirin  I have asked him to hold this aspirin at this time      TCM Call (since 10/21/2019)     Date and time call was made  11/15/2019 11:16 AM    Hospital care reviewed  Records not available    Patient was hospitialized at  [facility]    Date of Admission  11/12/19    Date of discharge  11/14/19    Diagnosis  rectal bleeding    Disposition  Home    Were the patients medications reviewed and updated  No    Current Symptoms  None      TCM Call (since 10/21/2019)     Post hospital issues  None    Should patient be enrolled in anticoag monitoring? No    Scheduled for follow up?   Yes    Did you obtain your prescribed medications  Yes    Do you need help managing your prescriptions or medications  No    Is transportation to your appointment needed  No    Living Arrangements  Family members    Support System  Family    The type of support provided  Emotional; Financial; Physical    Do you have social support  Yes, as much as I need    Are you recieving any outpatient services  No    Are you recieving home care services  No    Are you using any community resources  No    Current waiver services  No    Have you fallen in the last 12 months  No    Interperter language line needed  No            ALLERGIES:  No Known Allergies    CURRENT MEDICATIONS:    Current Outpatient Medications:     ALPRAZolam (XANAX) 0 5 mg tablet, TAKE 1 TABLET BY MOUTH TWO  TIMES DAILY, Disp: 60 tablet, Rfl: 0    aspirin (ASPIRIN LOW DOSE) 81 mg EC tablet, Take by mouth, Disp: , Rfl:     B Complex-Folic Acid (B COMPLEX PLUS) TABS, Take by mouth, Disp: , Rfl:     Cholecalciferol (VITAMIN D3) 1000 units CAPS, Take by mouth, Disp: , Rfl:     Chondroitin Sulfate-Vit C-Mn (CHONDROITIN SULFATE COMPLEX) 400-60-2 5 MG CAPS, Take by mouth, Disp: , Rfl:     Coenzyme Q10 (COQ-10) 100 MG CAPS, Take by mouth, Disp: , Rfl:     fenofibrate (TRICOR) 54 MG tablet, TAKE 1 TABLET BY MOUTH  DAILY, Disp: 90 tablet, Rfl: 1    metoprolol succinate (TOPROL-XL) 50 mg 24 hr tablet, TAKE 1 TABLET BY MOUTH  DAILY, Disp: 90 tablet, Rfl: 1    Multiple Vitamin (MULTI-VITAMIN DAILY PO), Take by mouth, Disp: , Rfl:     omeprazole (PriLOSEC) 40 MG capsule, TAKE 1 CAPSULE BY MOUTH  DAILY, Disp: 90 capsule, Rfl: 1    pravastatin (PRAVACHOL) 80 mg tablet, TAKE 1 TABLET BY MOUTH  DAILY, Disp: 90 tablet, Rfl: 1    solifenacin (VESICARE) 5 mg tablet, Take 1 tablet (5 mg total) by mouth daily, Disp: 90 tablet, Rfl: 1    ACTIVE PROBLEM LIST:  Patient Active Problem List   Diagnosis    KADEN on CPAP    Anxiety    Basal cell carcinoma of cheek    Basal cell carcinoma of skin    Benign hypertension    Deviated nasal septum    GERD (gastroesophageal reflux disease)    Hyperlipidemia    Morbid obesity (HCC)    OAB (overactive bladder)    Impaired fasting glucose    Bright red blood per rectum       PAST MEDICAL HISTORY:  Past Medical History:   Diagnosis Date    Colon polyps     DR Aravind Brush    Diverticulosis     Hyperlipidemia     Hypertension     Obesity     KADEN (obstructive sleep apnea)     cpap    Pneumonia     Small bowel obstruction, partial (HCC)     SOB (shortness of breath)        PAST SURGICAL HISTORY:  Past Surgical History:   Procedure Laterality Date    SKIN BIOPSY      TONSILLECTOMY AND ADENOIDECTOMY         FAMILY HISTORY:  Family History   Problem Relation Age of Onset    Heart disease Mother     Hypertension Mother     Breast cancer Sister        SOCIAL HISTORY:  Social History     Socioeconomic History    Marital status: Significant Other     Spouse name: Not on file    Number of children: 0    Years of education: Not on file    Highest education level: Not on file   Occupational History    Occupation: SALES     Comment: FULL TIME    Social Needs    Financial resource strain: Not on file    Food insecurity:     Worry: Not on file     Inability: Not on file    Transportation needs:     Medical: Not on file     Non-medical: Not on file   Tobacco Use    Smoking status: Never Smoker    Smokeless tobacco: Never Used Substance and Sexual Activity    Alcohol use: Yes     Frequency: Monthly or less     Drinks per session: 1 or 2     Binge frequency: Never     Comment: occassional    Drug use: No    Sexual activity: Yes     Partners: Female     Comment: DENIED: HISTORY OF HIGH RISK SEXUAL BEHAVIOR    Lifestyle    Physical activity:     Days per week: Not on file     Minutes per session: Not on file    Stress: Not on file   Relationships    Social connections:     Talks on phone: Not on file     Gets together: Not on file     Attends Spiritism service: Not on file     Active member of club or organization: Not on file     Attends meetings of clubs or organizations: Not on file     Relationship status: Not on file    Intimate partner violence:     Fear of current or ex partner: Not on file     Emotionally abused: Not on file     Physically abused: Not on file     Forced sexual activity: Not on file   Other Topics Concern    Not on file   Social History Narrative     as per all scripts        Review of Systems   Constitutional: Negative for activity change, chills, fatigue and fever  HENT: Negative for congestion  Eyes: Negative for discharge  Respiratory: Negative for cough, chest tightness and shortness of breath  Cardiovascular: Negative for chest pain, palpitations and leg swelling  Gastrointestinal: Negative for abdominal pain, anal bleeding, blood in stool, constipation, diarrhea, nausea and vomiting  Genitourinary: Negative for difficulty urinating  Musculoskeletal: Negative for arthralgias and myalgias  Skin: Negative for rash  Allergic/Immunologic: Negative for immunocompromised state  Neurological: Negative for dizziness, syncope, weakness, light-headedness and headaches  Hematological: Negative for adenopathy  Does not bruise/bleed easily  Psychiatric/Behavioral: Negative for dysphoric mood  The patient is not nervous/anxious            Objective:  Vitals:    11/21/19 1403   BP: 100/68   BP Location: Left arm   Patient Position: Sitting   Cuff Size: Large   Pulse: 70   Resp: 18   SpO2: 97%   Weight: 102 kg (224 lb)   Height: 5' 4" (1 626 m)     Body mass index is 38 45 kg/m²  Physical Exam   Constitutional: He is oriented to person, place, and time  He appears well-developed and well-nourished  No distress  Obese   HENT:   Head: Normocephalic  Eyes: Pupils are equal, round, and reactive to light  Neck: No JVD present  Cardiovascular: Normal rate, regular rhythm and normal heart sounds  Pulmonary/Chest: Effort normal and breath sounds normal    Abdominal: Soft  Bowel sounds are normal  There is no tenderness  There is no guarding  Musculoskeletal: He exhibits no edema  Lymphadenopathy:     He has no cervical adenopathy  Neurological: He is alert and oriented to person, place, and time  Skin: Skin is warm and dry  Psychiatric: He has a normal mood and affect  His behavior is normal    Nursing note and vitals reviewed          RESULTS:    Recent Results (from the past 1008 hour(s))   Tissue Exam    Collection Time: 11/01/19  9:48 AM   Result Value Ref Range    Case Report       Surgical Pathology Report                         Case: [id number]                                   Authorizing Provider:  Eran Love DO          Collected:           11/01/2019 2319              Ordering Location:      [facility]       Received:            11/01/2019 [address] [facility]                                                             Pathologist:           Jhonny Marquez DO                                                      Specimens:   A) - Polyp, Colorectal, cecum                                                                       B) - Polyp, Colorectal, ascending                                                                   C) - Polyp, Colorectal, sigmoid D) - Polyp, Colorectal, rectum x2                                                          Final Diagnosis       A  Colon, Cecum (Polyp), Biopsy:  - Tubular adenoma  B  Colon, Ascending (Polyp), Biopsy:  - Colonic mucosa with marked thermal artifact and serrated features, suggestive of sessile serrated adenoma/polyp  C  Colon, Sigmoid (Polyp), Biopsy:  - Tubular adenoma  D  Colon, Rectum (Polyp x 2), Biopsy:  - Hyperplastic polyps  Note       The endoscopic report was reviewed  Additional Information       All controls performed with the immunohistochemical stains reported above reacted appropriately  These tests were developed and their performance characteristics determined by [facility] or [address] [place] [address]  They may not be cleared or approved by the [place]  Food and Drug Administration  The FDA has determined that such clearance or approval is not necessary  These tests are used for clinical purposes  They should not be regarded as investigational or for research  This laboratory has been approved by [place] 88, designated as a high-complexity laboratory and is qualified to perform these tests  Interpretation performed at [facility], [address] [place] [address]         (COLON/RECTUM POLYP FORM-GI - All Specimens)             : Adenoma(s)       :    Serrated Adenoma      Gross Description       A  The specimen is received in formalin, labeled with the patient's name and hospital number, and is designated "cecal polyp"  The specimen consists of an aggregate of tan-purple, rubbery, fragmented polypoid portions of tissue measuring 1 3 x 0 9 x 0 4 cm in greatest dimension  The specimen is entirely submitted in a screened cassette  B  The specimen is received in formalin, labeled with the patient's name and hospital number, and is designated "ascending colon polyp    The specimen consists of a single tan-pink soft tissue fragment measuring 0 6 cm in greatest dimension  The specimen is entirely submitted in a screened cassette  C  The specimen is received in formalin, labeled with the patient's name and hospital number, and is designated "sigmoid colon polyp  The specimen consists of a single tan-pink, rubbery polypoid portion of tissue measuring 0 6 cm in greatest dimension  The specimen is entirely submitted in a screened cassette  D  The specimen is received in formalin, labeled with the patient's name and hospital number, and is designated "rectal polyp x2  The specimen consists of 2 tan-brown, rubbery, irregularly shaped polypoid portions of tissue measuring 0 3 and 0 5 cm in greatest dimension  The specimen is entirely submitted in a screened cassette  Note: The estimated total formalin fixation time based upon information provided by the submitting clinician and the standard processing schedule is under 72 hours       Vera                                                              Clinical Information Hot snare polypectomy    CBC and differential    Collection Time: 11/12/19 10:26 PM   Result Value Ref Range    WBC 9 83 4 31 - 10 16 Thousand/uL    RBC 4 92 3 88 - 5 62 Million/uL    Hemoglobin 15 2 12 0 - 17 0 g/dL    Hematocrit 44 8 36 5 - 49 3 %    MCV 91 82 - 98 fL    MCH 30 9 26 8 - 34 3 pg    MCHC 33 9 31 4 - 37 4 g/dL    RDW 13 3 11 6 - 15 1 %    MPV 9 9 8 9 - 12 7 fL    Platelets 654 953 - 070 Thousands/uL    nRBC 0 /100 WBCs    Neutrophils Relative 56 43 - 75 %    Immat GRANS % 0 0 - 2 %    Lymphocytes Relative 34 14 - 44 %    Monocytes Relative 8 4 - 12 %    Eosinophils Relative 1 0 - 6 %    Basophils Relative 1 0 - 1 %    Neutrophils Absolute 5 60 1 85 - 7 62 Thousands/µL    Immature Grans Absolute 0 02 0 00 - 0 20 Thousand/uL    Lymphocytes Absolute 3 31 0 60 - 4 47 Thousands/µL    Monocytes Absolute 0 75 0 17 - 1 22 Thousand/µL    Eosinophils Absolute 0 09 0 00 - 0 61 Thousand/µL    Basophils Absolute 0 06 0 00 - 0 10 Thousands/µL   Comprehensive metabolic panel    Collection Time: 11/12/19 10:26 PM   Result Value Ref Range    Sodium 141 136 - 145 mmol/L    Potassium 4 4 3 5 - 5 3 mmol/L    Chloride 104 100 - 108 mmol/L    CO2 29 21 - 32 mmol/L    ANION GAP 8 4 - 13 mmol/L    BUN 16 5 - 25 mg/dL    Creatinine 1 18 0 60 - 1 30 mg/dL    Glucose 101 65 - 140 mg/dL    Calcium 9 2 8 3 - 10 1 mg/dL    AST 25 5 - 45 U/L    ALT 34 12 - 78 U/L    Alkaline Phosphatase 47 46 - 116 U/L    Total Protein 7 3 6 4 - 8 2 g/dL    Albumin 3 9 3 5 - 5 0 g/dL    Total Bilirubin 0 60 0 20 - 1 00 mg/dL    eGFR 61 ml/min/1 73sq m   Protime-INR    Collection Time: 11/12/19 10:26 PM   Result Value Ref Range    Protime 14 5 11 6 - 14 5 seconds    INR 1 13 0 84 - 1 19   Type and screen    Collection Time: 11/12/19 10:26 PM   Result Value Ref Range    ABO Grouping A     Rh Factor Positive     Antibody Screen Negative     Specimen Expiration Date [id number]    APTT    Collection Time: 11/12/19 10:26 PM   Result Value Ref Range    PTT 35 23 - 37 seconds   Basic metabolic panel    Collection Time: 11/13/19  5:08 AM   Result Value Ref Range    Sodium 138 136 - 145 mmol/L    Potassium 4 0 3 5 - 5 3 mmol/L    Chloride 104 100 - 108 mmol/L    CO2 26 21 - 32 mmol/L    ANION GAP 8 4 - 13 mmol/L    BUN 15 5 - 25 mg/dL    Creatinine 1 02 0 60 - 1 30 mg/dL    Glucose 104 65 - 140 mg/dL    Calcium 8 6 8 3 - 10 1 mg/dL    eGFR 73 ml/min/1 73sq m   CBC and differential    Collection Time: 11/13/19  5:08 AM   Result Value Ref Range    WBC 8 11 4 31 - 10 16 Thousand/uL    RBC 4 70 3 88 - 5 62 Million/uL    Hemoglobin 14 6 12 0 - 17 0 g/dL    Hematocrit 42 6 36 5 - 49 3 %    MCV 91 82 - 98 fL    MCH 31 1 26 8 - 34 3 pg    MCHC 34 3 31 4 - 37 4 g/dL    RDW 13 2 11 6 - 15 1 %    MPV 10 0 8 9 - 12 7 fL    Platelets 198 196 - 174 Thousands/uL    nRBC 0 /100 WBCs    Neutrophils Relative 56 43 - 75 %    Immat GRANS % 0 0 - 2 %    Lymphocytes Relative 31 14 - 44 %    Monocytes Relative 10 4 - 12 %    Eosinophils Relative 2 0 - 6 %    Basophils Relative 1 0 - 1 %    Neutrophils Absolute 4 58 1 85 - 7 62 Thousands/µL    Immature Grans Absolute 0 02 0 00 - 0 20 Thousand/uL    Lymphocytes Absolute 2 51 0 60 - 4 47 Thousands/µL    Monocytes Absolute 0 79 0 17 - 1 22 Thousand/µL    Eosinophils Absolute 0 15 0 00 - 0 61 Thousand/µL    Basophils Absolute 0 06 0 00 - 0 10 Thousands/µL   CBC    Collection Time: 11/14/19  5:54 AM   Result Value Ref Range    WBC 9 61 4 31 - 10 16 Thousand/uL    RBC 5 02 3 88 - 5 62 Million/uL    Hemoglobin 15 2 12 0 - 17 0 g/dL    Hematocrit 45 6 36 5 - 49 3 %    MCV 91 82 - 98 fL    MCH 30 3 26 8 - 34 3 pg    MCHC 33 3 31 4 - 37 4 g/dL    RDW 13 2 11 6 - 15 1 %    Platelets 648 784 - 766 Thousands/uL    MPV 9 7 8 9 - 12 7 fL       This note was created with voice recognition software  Phonic, grammatical and spelling errors may be present within the note as a result

## 2019-11-21 NOTE — PATIENT INSTRUCTIONS
Have patient check CBC  No other changes to medication  Stay off baby aspirin at this time  Give trial of flaxseed oil 1 daily for arthritic hands  Hold you're Omega 3 while on the flaxseed oil  Follow-up as scheduled  Sooner as needed

## 2019-12-02 DIAGNOSIS — F41.9 ANXIETY: ICD-10-CM

## 2019-12-02 RX ORDER — ALPRAZOLAM 0.5 MG/1
TABLET ORAL
Qty: 60 TABLET | Refills: 0 | Status: SHIPPED | OUTPATIENT
Start: 2019-12-02 | End: 2020-01-22 | Stop reason: SDUPTHER

## 2019-12-26 ENCOUNTER — TRANSCRIBE ORDERS (OUTPATIENT)
Dept: LAB | Facility: CLINIC | Age: 74
End: 2019-12-26

## 2019-12-26 ENCOUNTER — APPOINTMENT (OUTPATIENT)
Dept: LAB | Facility: CLINIC | Age: 74
End: 2019-12-26
Payer: MEDICARE

## 2019-12-26 DIAGNOSIS — I10 BENIGN HYPERTENSION: ICD-10-CM

## 2019-12-26 DIAGNOSIS — R73.01 IMPAIRED FASTING GLUCOSE: ICD-10-CM

## 2019-12-26 LAB
ALBUMIN SERPL BCP-MCNC: 3.7 G/DL (ref 3.5–5)
ALP SERPL-CCNC: 59 U/L (ref 46–116)
ALT SERPL W P-5'-P-CCNC: 38 U/L (ref 12–78)
ANION GAP SERPL CALCULATED.3IONS-SCNC: 6 MMOL/L (ref 4–13)
AST SERPL W P-5'-P-CCNC: 21 U/L (ref 5–45)
BASOPHILS # BLD AUTO: 0.07 THOUSANDS/ΜL (ref 0–0.1)
BASOPHILS NFR BLD AUTO: 1 % (ref 0–1)
BILIRUB SERPL-MCNC: 0.3 MG/DL (ref 0.2–1)
BUN SERPL-MCNC: 17 MG/DL (ref 5–25)
CALCIUM SERPL-MCNC: 9.4 MG/DL (ref 8.3–10.1)
CHLORIDE SERPL-SCNC: 110 MMOL/L (ref 100–108)
CHOLEST SERPL-MCNC: 151 MG/DL (ref 50–200)
CO2 SERPL-SCNC: 26 MMOL/L (ref 21–32)
CREAT SERPL-MCNC: 1.12 MG/DL (ref 0.6–1.3)
EOSINOPHIL # BLD AUTO: 0.11 THOUSAND/ΜL (ref 0–0.61)
EOSINOPHIL NFR BLD AUTO: 1 % (ref 0–6)
ERYTHROCYTE [DISTWIDTH] IN BLOOD BY AUTOMATED COUNT: 13.4 % (ref 11.6–15.1)
EST. AVERAGE GLUCOSE BLD GHB EST-MCNC: 128 MG/DL
GFR SERPL CREATININE-BSD FRML MDRD: 65 ML/MIN/1.73SQ M
GLUCOSE P FAST SERPL-MCNC: 155 MG/DL (ref 65–99)
HBA1C MFR BLD: 6.1 % (ref 4.2–6.3)
HCT VFR BLD AUTO: 48.3 % (ref 36.5–49.3)
HDLC SERPL-MCNC: 36 MG/DL
HGB BLD-MCNC: 15.9 G/DL (ref 12–17)
IMM GRANULOCYTES # BLD AUTO: 0.02 THOUSAND/UL (ref 0–0.2)
IMM GRANULOCYTES NFR BLD AUTO: 0 % (ref 0–2)
LDLC SERPL CALC-MCNC: 62 MG/DL (ref 0–100)
LYMPHOCYTES # BLD AUTO: 2.06 THOUSANDS/ΜL (ref 0.6–4.47)
LYMPHOCYTES NFR BLD AUTO: 27 % (ref 14–44)
MCH RBC QN AUTO: 30.5 PG (ref 26.8–34.3)
MCHC RBC AUTO-ENTMCNC: 32.9 G/DL (ref 31.4–37.4)
MCV RBC AUTO: 93 FL (ref 82–98)
MONOCYTES # BLD AUTO: 0.6 THOUSAND/ΜL (ref 0.17–1.22)
MONOCYTES NFR BLD AUTO: 8 % (ref 4–12)
NEUTROPHILS # BLD AUTO: 4.78 THOUSANDS/ΜL (ref 1.85–7.62)
NEUTS SEG NFR BLD AUTO: 63 % (ref 43–75)
NONHDLC SERPL-MCNC: 115 MG/DL
NRBC BLD AUTO-RTO: 0 /100 WBCS
PLATELET # BLD AUTO: 237 THOUSANDS/UL (ref 149–390)
PMV BLD AUTO: 10.4 FL (ref 8.9–12.7)
POTASSIUM SERPL-SCNC: 4 MMOL/L (ref 3.5–5.3)
PROT SERPL-MCNC: 7.4 G/DL (ref 6.4–8.2)
RBC # BLD AUTO: 5.21 MILLION/UL (ref 3.88–5.62)
SODIUM SERPL-SCNC: 142 MMOL/L (ref 136–145)
TRIGL SERPL-MCNC: 263 MG/DL
WBC # BLD AUTO: 7.64 THOUSAND/UL (ref 4.31–10.16)

## 2019-12-26 PROCEDURE — 36415 COLL VENOUS BLD VENIPUNCTURE: CPT

## 2019-12-26 PROCEDURE — 83036 HEMOGLOBIN GLYCOSYLATED A1C: CPT

## 2019-12-26 PROCEDURE — 80061 LIPID PANEL: CPT

## 2019-12-26 PROCEDURE — 85025 COMPLETE CBC W/AUTO DIFF WBC: CPT

## 2019-12-26 PROCEDURE — 80053 COMPREHEN METABOLIC PANEL: CPT

## 2020-01-02 ENCOUNTER — OFFICE VISIT (OUTPATIENT)
Dept: INTERNAL MEDICINE CLINIC | Facility: CLINIC | Age: 75
End: 2020-01-02
Payer: MEDICARE

## 2020-01-02 VITALS
DIASTOLIC BLOOD PRESSURE: 70 MMHG | SYSTOLIC BLOOD PRESSURE: 108 MMHG | HEART RATE: 78 BPM | WEIGHT: 230 LBS | OXYGEN SATURATION: 98 % | BODY MASS INDEX: 39.27 KG/M2 | HEIGHT: 64 IN

## 2020-01-02 DIAGNOSIS — R73.01 IMPAIRED FASTING GLUCOSE: ICD-10-CM

## 2020-01-02 DIAGNOSIS — I10 BENIGN HYPERTENSION: ICD-10-CM

## 2020-01-02 DIAGNOSIS — K62.5 BRIGHT RED BLOOD PER RECTUM: Primary | ICD-10-CM

## 2020-01-02 DIAGNOSIS — E78.2 MIXED HYPERLIPIDEMIA: ICD-10-CM

## 2020-01-02 PROCEDURE — 99214 OFFICE O/P EST MOD 30 MIN: CPT | Performed by: INTERNAL MEDICINE

## 2020-01-02 NOTE — PROGRESS NOTES
Assessment/Plan:     Chronic problems are stable  Continue current medications but hold his pravastatin for 2 weeks because of a muscle aches and fatigue  Also, suggested starting to take his omeprazole straight for 2 weeks  Reviewed diet with him  Highly encouraged increased fiber  Quality Measures:       Return in about 4 weeks (around 1/30/2020)  No problem-specific Assessment & Plan notes found for this encounter  Diagnoses and all orders for this visit:    Bright red blood per rectum    Impaired fasting glucose    Benign hypertension    Mixed hyperlipidemia          Subjective:      Patient ID: Norris Harrell is a 69 y o  male  Patient comes in today for follow-up with his significant other  He has had no further bleeding but still getting episodic abdominal pain on the left side  Sugars are still controlled  Cholesterol is controlled  Blood pressure is controlled  He did gain some weight back  No shortness of breath  They do report an ongoing issue with fatigue and overall achiness  His significant other is usually not with him so she was filling in the blanks        ALLERGIES:  No Known Allergies    CURRENT MEDICATIONS:    Current Outpatient Medications:     ALPRAZolam (XANAX) 0 5 mg tablet, TAKE 1 TABLET BY MOUTH TWO  TIMES DAILY, Disp: 60 tablet, Rfl: 0    aspirin (ASPIRIN LOW DOSE) 81 mg EC tablet, Take by mouth, Disp: , Rfl:     B Complex-Folic Acid (B COMPLEX PLUS) TABS, Take by mouth, Disp: , Rfl:     Cholecalciferol (VITAMIN D3) 1000 units CAPS, Take by mouth, Disp: , Rfl:     Chondroitin Sulfate-Vit C-Mn (CHONDROITIN SULFATE COMPLEX) 400-60-2 5 MG CAPS, Take by mouth, Disp: , Rfl:     Coenzyme Q10 (COQ-10) 100 MG CAPS, Take by mouth, Disp: , Rfl:     fenofibrate (TRICOR) 54 MG tablet, TAKE 1 TABLET BY MOUTH  DAILY, Disp: 90 tablet, Rfl: 1    metoprolol succinate (TOPROL-XL) 50 mg 24 hr tablet, TAKE 1 TABLET BY MOUTH  DAILY, Disp: 90 tablet, Rfl: 1    Multiple Vitamin (MULTI-VITAMIN DAILY PO), Take by mouth, Disp: , Rfl:     omeprazole (PriLOSEC) 40 MG capsule, TAKE 1 CAPSULE BY MOUTH  DAILY, Disp: 90 capsule, Rfl: 1    pravastatin (PRAVACHOL) 80 mg tablet, TAKE 1 TABLET BY MOUTH  DAILY, Disp: 90 tablet, Rfl: 1    solifenacin (VESICARE) 5 mg tablet, Take 1 tablet (5 mg total) by mouth daily, Disp: 90 tablet, Rfl: 1    ACTIVE PROBLEM LIST:  Patient Active Problem List   Diagnosis    KADEN on CPAP    Anxiety    Basal cell carcinoma of cheek    Basal cell carcinoma of skin    Benign hypertension    Deviated nasal septum    GERD (gastroesophageal reflux disease)    Hyperlipidemia    Morbid obesity (HCC)    OAB (overactive bladder)    Impaired fasting glucose    Bright red blood per rectum    Diverticulosis       PAST MEDICAL HISTORY:  Past Medical History:   Diagnosis Date    Colon polyps     DR Nayeli Campa    Diverticulosis     Hyperlipidemia     Hypertension     Obesity     KADEN (obstructive sleep apnea)     cpap    Pneumonia     Small bowel obstruction, partial (HCC)     SOB (shortness of breath)        PAST SURGICAL HISTORY:  Past Surgical History:   Procedure Laterality Date    SKIN BIOPSY      TONSILLECTOMY AND ADENOIDECTOMY         FAMILY HISTORY:  Family History   Problem Relation Age of Onset    Heart disease Mother     Hypertension Mother     Breast cancer Sister        SOCIAL HISTORY:  Social History     Socioeconomic History    Marital status: Significant Other     Spouse name: Not on file    Number of children: 0    Years of education: Not on file    Highest education level: Not on file   Occupational History    Occupation: SALES     Comment: FULL TIME    Social Needs    Financial resource strain: Not on file    Food insecurity:     Worry: Not on file     Inability: Not on file    Transportation needs:     Medical: Not on file     Non-medical: Not on file   Tobacco Use    Smoking status: Never Smoker    Smokeless tobacco: Never Used Substance and Sexual Activity    Alcohol use: Yes     Frequency: Monthly or less     Drinks per session: 1 or 2     Binge frequency: Never     Comment: occassional    Drug use: No    Sexual activity: Yes     Partners: Female     Comment: DENIED: HISTORY OF HIGH RISK SEXUAL BEHAVIOR    Lifestyle    Physical activity:     Days per week: Not on file     Minutes per session: Not on file    Stress: Not on file   Relationships    Social connections:     Talks on phone: Not on file     Gets together: Not on file     Attends Mormonism service: Not on file     Active member of club or organization: Not on file     Attends meetings of clubs or organizations: Not on file     Relationship status: Not on file    Intimate partner violence:     Fear of current or ex partner: Not on file     Emotionally abused: Not on file     Physically abused: Not on file     Forced sexual activity: Not on file   Other Topics Concern    Not on file   Social History Narrative     as per all scripts        Review of Systems   Respiratory: Negative for shortness of breath  Cardiovascular: Negative for chest pain  Gastrointestinal: Positive for abdominal pain  Objective:  Vitals:    01/02/20 0924   BP: 108/70   BP Location: Left arm   Patient Position: Sitting   Cuff Size: Large   Pulse: 78   SpO2: 98%   Weight: 104 kg (230 lb)   Height: 5' 4" (1 626 m)     Body mass index is 39 48 kg/m²  Physical Exam   Constitutional: He is oriented to person, place, and time  He appears well-developed and well-nourished  Cardiovascular: Normal rate, regular rhythm and normal heart sounds  Pulmonary/Chest: Effort normal and breath sounds normal    Abdominal: Soft  He exhibits no distension and no mass  There is no tenderness  Musculoskeletal: He exhibits no edema  Neurological: He is alert and oriented to person, place, and time  Nursing note and vitals reviewed          RESULTS:    Recent Results (from the past 1008 hour(s))   CBC and differential    Collection Time: 11/21/19  3:00 PM   Result Value Ref Range    WBC 8 67 4 31 - 10 16 Thousand/uL    RBC 4 63 3 88 - 5 62 Million/uL    Hemoglobin 14 3 12 0 - 17 0 g/dL    Hematocrit 42 8 36 5 - 49 3 %    MCV 92 82 - 98 fL    MCH 30 9 26 8 - 34 3 pg    MCHC 33 4 31 4 - 37 4 g/dL    RDW 13 7 11 6 - 15 1 %    MPV 9 9 8 9 - 12 7 fL    Platelets [phone] - 616 Thousands/uL    nRBC 0 /100 WBCs    Neutrophils Relative 59 43 - 75 %    Immat GRANS % 0 0 - 2 %    Lymphocytes Relative 30 14 - 44 %    Monocytes Relative 9 4 - 12 %    Eosinophils Relative 1 0 - 6 %    Basophils Relative 1 0 - 1 %    Neutrophils Absolute 5 03 1 85 - 7 62 Thousands/µL    Immature Grans Absolute 0 02 0 00 - 0 20 Thousand/uL    Lymphocytes Absolute 2 63 0 60 - 4 47 Thousands/µL    Monocytes Absolute 0 81 0 17 - 1 22 Thousand/µL    Eosinophils Absolute 0 11 0 00 - 0 61 Thousand/µL    Basophils Absolute 0 07 0 00 - 0 10 Thousands/µL   Comprehensive metabolic panel    Collection Time: 12/26/19 11:09 AM   Result Value Ref Range    Sodium 142 136 - 145 mmol/L    Potassium 4 0 3 5 - 5 3 mmol/L    Chloride 110 (H) 100 - 108 mmol/L    CO2 26 21 - 32 mmol/L    ANION GAP 6 4 - 13 mmol/L    BUN 17 5 - 25 mg/dL    Creatinine 1 12 0 60 - 1 30 mg/dL    Glucose, Fasting 155 (H) 65 - 99 mg/dL    Calcium 9 4 8 3 - 10 1 mg/dL    AST 21 5 - 45 U/L    ALT 38 12 - 78 U/L    Alkaline Phosphatase 59 46 - 116 U/L    Total Protein 7 4 6 4 - 8 2 g/dL    Albumin 3 7 3 5 - 5 0 g/dL    Total Bilirubin 0 30 0 20 - 1 00 mg/dL    eGFR 65 ml/min/1 73sq m   CBC and differential    Collection Time: 12/26/19 11:09 AM   Result Value Ref Range    WBC 7 64 4 31 - 10 16 Thousand/uL    RBC 5 21 3 88 - 5 62 Million/uL    Hemoglobin 15 9 12 0 - 17 0 g/dL    Hematocrit 48 3 36 5 - 49 3 %    MCV 93 82 - 98 fL    MCH 30 5 26 8 - 34 3 pg    MCHC 32 9 31 4 - 37 4 g/dL    RDW 13 4 11 6 - 15 1 %    MPV 10 4 8 9 - 12 7 fL    Platelets 405 718 - 527 Thousands/uL nRBC 0 /100 WBCs    Neutrophils Relative 63 43 - 75 %    Immat GRANS % 0 0 - 2 %    Lymphocytes Relative 27 14 - 44 %    Monocytes Relative 8 4 - 12 %    Eosinophils Relative 1 0 - 6 %    Basophils Relative 1 0 - 1 %    Neutrophils Absolute 4 78 1 85 - 7 62 Thousands/µL    Immature Grans Absolute 0 02 0 00 - 0 20 Thousand/uL    Lymphocytes Absolute 2 06 0 60 - 4 47 Thousands/µL    Monocytes Absolute 0 60 0 17 - 1 22 Thousand/µL    Eosinophils Absolute 0 11 0 00 - 0 61 Thousand/µL    Basophils Absolute 0 07 0 00 - 0 10 Thousands/µL   Hemoglobin A1C    Collection Time: 12/26/19 11:09 AM   Result Value Ref Range    Hemoglobin A1C 6 1 4 2 - 6 3 %     mg/dl   Lipid panel    Collection Time: 12/26/19 11:09 AM   Result Value Ref Range    Cholesterol 151 50 - 200 mg/dL    Triglycerides 263 (H) <=150 mg/dL    HDL, Direct 36 (L) >=40 mg/dL    LDL Calculated 62 0 - 100 mg/dL    Non-HDL-Chol (CHOL-HDL) 115 mg/dl       This note was created with voice recognition software  Phonic, grammatical and spelling errors may be present within the note as a result

## 2020-01-21 DIAGNOSIS — E78.2 MIXED HYPERLIPIDEMIA: ICD-10-CM

## 2020-01-21 DIAGNOSIS — K21.9 GASTROESOPHAGEAL REFLUX DISEASE WITHOUT ESOPHAGITIS: ICD-10-CM

## 2020-01-21 DIAGNOSIS — I10 BENIGN HYPERTENSION: ICD-10-CM

## 2020-01-21 RX ORDER — FENOFIBRATE 54 MG/1
TABLET ORAL
Qty: 90 TABLET | Refills: 3 | Status: SHIPPED | OUTPATIENT
Start: 2020-01-21 | End: 2021-02-09

## 2020-01-21 RX ORDER — OMEPRAZOLE 40 MG/1
CAPSULE, DELAYED RELEASE ORAL
Qty: 90 CAPSULE | Refills: 1 | Status: SHIPPED | OUTPATIENT
Start: 2020-01-21 | End: 2020-10-31

## 2020-01-21 RX ORDER — METOPROLOL SUCCINATE 50 MG/1
TABLET, EXTENDED RELEASE ORAL
Qty: 90 TABLET | Refills: 1 | Status: SHIPPED | OUTPATIENT
Start: 2020-01-21 | End: 2020-08-03

## 2020-01-22 DIAGNOSIS — F41.9 ANXIETY: ICD-10-CM

## 2020-01-22 RX ORDER — ALPRAZOLAM 0.5 MG/1
0.5 TABLET ORAL 2 TIMES DAILY
Qty: 60 TABLET | Refills: 0 | Status: SHIPPED | OUTPATIENT
Start: 2020-01-22 | End: 2020-02-20 | Stop reason: SDUPTHER

## 2020-01-22 NOTE — TELEPHONE ENCOUNTER
Patient is requesting a refill on alprazolam 0 5 mg tablet, take 1 tablet by mouth two times daily      [facility]/[place]    Call back #[phone]

## 2020-01-31 ENCOUNTER — OFFICE VISIT (OUTPATIENT)
Dept: INTERNAL MEDICINE CLINIC | Facility: CLINIC | Age: 75
End: 2020-01-31
Payer: MEDICARE

## 2020-01-31 VITALS
WEIGHT: 229 LBS | OXYGEN SATURATION: 95 % | BODY MASS INDEX: 39.09 KG/M2 | HEIGHT: 64 IN | SYSTOLIC BLOOD PRESSURE: 128 MMHG | DIASTOLIC BLOOD PRESSURE: 70 MMHG | HEART RATE: 64 BPM

## 2020-01-31 DIAGNOSIS — M25.50 PAIN IN JOINT, MULTIPLE SITES: ICD-10-CM

## 2020-01-31 DIAGNOSIS — K62.5 BRIGHT RED BLOOD PER RECTUM: Primary | ICD-10-CM

## 2020-01-31 PROCEDURE — 99213 OFFICE O/P EST LOW 20 MIN: CPT | Performed by: INTERNAL MEDICINE

## 2020-01-31 NOTE — PROGRESS NOTES
Assessment/Plan:     GI issues appear resolved  Observe for now  For his joint pains, again told him and his significant other to hold his Pravachol for 2 weeks  If that does nothing, he will then get the blood work for other possible causes  If the Pravachol was the problem, he will call and we can switch his medicine  Quality Measures:       Return in about 3 months (around 4/30/2020)  No problem-specific Assessment & Plan notes found for this encounter  Diagnoses and all orders for this visit:    Bright red blood per rectum    Pain in joint, multiple sites  -     CBC and differential; Future  -     Comprehensive metabolic panel; Future  -     MIRELLA Screen w/ Reflex to Titer/Pattern; Future  -     RF Screen w/ Reflex to Titer; Future  -     Cyclic citrul peptide antibody, IgG; Future  -     Lyme Antibody Profile with reflex to WB; Future  -     Uric acid; Future  -     CK (with reflex to MB); Future          Subjective:      Patient ID: Lindsey Domínguez is a 69 y o  male  Patient comes in today for follow-up with his significant other  GI system is still doing well  No further bleeding  The omeprazole helped the sensation he was getting in the left side of his abdomen  That is all gone  He is still getting the joint and muscle aches  However, he forgot to stop the Pravachol and see if that was the problem  No other complaints today        ALLERGIES:  No Known Allergies    CURRENT MEDICATIONS:    Current Outpatient Medications:     ALPRAZolam (XANAX) 0 5 mg tablet, Take 1 tablet (0 5 mg total) by mouth 2 (two) times a day, Disp: 60 tablet, Rfl: 0    B Complex-Folic Acid (B COMPLEX PLUS) TABS, Take by mouth, Disp: , Rfl:     Cholecalciferol (VITAMIN D3) 1000 units CAPS, Take by mouth, Disp: , Rfl:     Chondroitin Sulfate-Vit C-Mn (CHONDROITIN SULFATE COMPLEX) 400-60-2 5 MG CAPS, Take by mouth, Disp: , Rfl:     Coenzyme Q10 (COQ-10) 100 MG CAPS, Take by mouth, Disp: , Rfl:     fenofibrate (TRICOR) 54 MG tablet, TAKE 1 TABLET BY MOUTH  DAILY, Disp: 90 tablet, Rfl: 3    metoprolol succinate (TOPROL-XL) 50 mg 24 hr tablet, TAKE 1 TABLET BY MOUTH  DAILY, Disp: 90 tablet, Rfl: 1    Multiple Vitamin (MULTI-VITAMIN DAILY PO), Take by mouth, Disp: , Rfl:     omeprazole (PriLOSEC) 40 MG capsule, TAKE 1 CAPSULE BY MOUTH  DAILY, Disp: 90 capsule, Rfl: 1    pravastatin (PRAVACHOL) 80 mg tablet, TAKE 1 TABLET BY MOUTH  DAILY, Disp: 90 tablet, Rfl: 1    solifenacin (VESICARE) 5 mg tablet, Take 1 tablet (5 mg total) by mouth daily, Disp: 90 tablet, Rfl: 1    aspirin (ASPIRIN LOW DOSE) 81 mg EC tablet, Take by mouth, Disp: , Rfl:     ACTIVE PROBLEM LIST:  Patient Active Problem List   Diagnosis    KADEN on CPAP    Anxiety    Basal cell carcinoma of cheek    Basal cell carcinoma of skin    Benign hypertension    Deviated nasal septum    GERD (gastroesophageal reflux disease)    Hyperlipidemia    Morbid obesity (HCC)    OAB (overactive bladder)    Impaired fasting glucose    Bright red blood per rectum    Diverticulosis       PAST MEDICAL HISTORY:  Past Medical History:   Diagnosis Date    Colon polyps     DR Carrillo Ghleroy    Diverticulosis     Hyperlipidemia     Hypertension     Obesity     KADEN (obstructive sleep apnea)     cpap    Pneumonia     Small bowel obstruction, partial (HCC)     SOB (shortness of breath)        PAST SURGICAL HISTORY:  Past Surgical History:   Procedure Laterality Date    SKIN BIOPSY      TONSILLECTOMY AND ADENOIDECTOMY         FAMILY HISTORY:  Family History   Problem Relation Age of Onset    Heart disease Mother     Hypertension Mother     Breast cancer Sister        SOCIAL HISTORY:  Social History     Socioeconomic History    Marital status: Significant Other     Spouse name: Not on file    Number of children: 0    Years of education: Not on file    Highest education level: Not on file   Occupational History    Occupation: SALES     Comment: FULL TIME    Social Needs    Financial resource strain: Not on file    Food insecurity:     Worry: Not on file     Inability: Not on file    Transportation needs:     Medical: Not on file     Non-medical: Not on file   Tobacco Use    Smoking status: Never Smoker    Smokeless tobacco: Never Used   Substance and Sexual Activity    Alcohol use: Yes     Frequency: Monthly or less     Drinks per session: 1 or 2     Binge frequency: Never     Comment: occassional    Drug use: No    Sexual activity: Yes     Partners: Female     Comment: DENIED: HISTORY OF HIGH RISK SEXUAL BEHAVIOR    Lifestyle    Physical activity:     Days per week: Not on file     Minutes per session: Not on file    Stress: Not on file   Relationships    Social connections:     Talks on phone: Not on file     Gets together: Not on file     Attends Holiness service: Not on file     Active member of club or organization: Not on file     Attends meetings of clubs or organizations: Not on file     Relationship status: Not on file    Intimate partner violence:     Fear of current or ex partner: Not on file     Emotionally abused: Not on file     Physically abused: Not on file     Forced sexual activity: Not on file   Other Topics Concern    Not on file   Social History Narrative     as per all scripts        Review of Systems   Respiratory: Negative for shortness of breath  Cardiovascular: Negative for chest pain  Gastrointestinal: Negative for abdominal pain  Objective:  Vitals:    01/31/20 0917   BP: 128/70   BP Location: Left arm   Patient Position: Sitting   Cuff Size: Adult   Pulse: 64   SpO2: 95%   Weight: 104 kg (229 lb)   Height: 5' 4" (1 626 m)     Body mass index is 39 31 kg/m²  Physical Exam   Constitutional: He is oriented to person, place, and time  He appears well-developed and well-nourished  Musculoskeletal: He exhibits no edema  Neurological: He is alert and oriented to person, place, and time     Nursing note and vitals reviewed          RESULTS:    Recent Results (from the past 1008 hour(s))   Comprehensive metabolic panel    Collection Time: 12/26/19 11:09 AM   Result Value Ref Range    Sodium 142 136 - 145 mmol/L    Potassium 4 0 3 5 - 5 3 mmol/L    Chloride 110 (H) 100 - 108 mmol/L    CO2 26 21 - 32 mmol/L    ANION GAP 6 4 - 13 mmol/L    BUN 17 5 - 25 mg/dL    Creatinine 1 12 0 60 - 1 30 mg/dL    Glucose, Fasting 155 (H) 65 - 99 mg/dL    Calcium 9 4 8 3 - 10 1 mg/dL    AST 21 5 - 45 U/L    ALT 38 12 - 78 U/L    Alkaline Phosphatase 59 46 - 116 U/L    Total Protein 7 4 6 4 - 8 2 g/dL    Albumin 3 7 3 5 - 5 0 g/dL    Total Bilirubin 0 30 0 20 - 1 00 mg/dL    eGFR 65 ml/min/1 73sq m   CBC and differential    Collection Time: 12/26/19 11:09 AM   Result Value Ref Range    WBC 7 64 4 31 - 10 16 Thousand/uL    RBC 5 21 3 88 - 5 62 Million/uL    Hemoglobin 15 9 12 0 - 17 0 g/dL    Hematocrit 48 3 36 5 - 49 3 %    MCV 93 82 - 98 fL    MCH 30 5 26 8 - 34 3 pg    MCHC 32 9 31 4 - 37 4 g/dL    RDW 13 4 11 6 - 15 1 %    MPV 10 4 8 9 - 12 7 fL    Platelets 936 376 - 195 Thousands/uL    nRBC 0 /100 WBCs    Neutrophils Relative 63 43 - 75 %    Immat GRANS % 0 0 - 2 %    Lymphocytes Relative 27 14 - 44 %    Monocytes Relative 8 4 - 12 %    Eosinophils Relative 1 0 - 6 %    Basophils Relative 1 0 - 1 %    Neutrophils Absolute 4 78 1 85 - 7 62 Thousands/µL    Immature Grans Absolute 0 02 0 00 - 0 20 Thousand/uL    Lymphocytes Absolute 2 06 0 60 - 4 47 Thousands/µL    Monocytes Absolute 0 60 0 17 - 1 22 Thousand/µL    Eosinophils Absolute 0 11 0 00 - 0 61 Thousand/µL    Basophils Absolute 0 07 0 00 - 0 10 Thousands/µL   Hemoglobin A1C    Collection Time: 12/26/19 11:09 AM   Result Value Ref Range    Hemoglobin A1C 6 1 4 2 - 6 3 %     mg/dl   Lipid panel    Collection Time: 12/26/19 11:09 AM   Result Value Ref Range    Cholesterol 151 50 - 200 mg/dL    Triglycerides 263 (H) <=150 mg/dL    HDL, Direct 36 (L) >=40 mg/dL    LDL Calculated 62 0 - 100 mg/dL    Non-HDL-Chol (CHOL-HDL) 115 mg/dl       This note was created with voice recognition software  Phonic, grammatical and spelling errors may be present within the note as a result

## 2020-02-12 ENCOUNTER — TELEPHONE (OUTPATIENT)
Dept: INTERNAL MEDICINE CLINIC | Facility: CLINIC | Age: 75
End: 2020-02-12

## 2020-02-12 DIAGNOSIS — E78.2 MIXED HYPERLIPIDEMIA: Primary | ICD-10-CM

## 2020-02-12 DIAGNOSIS — E78.2 MIXED HYPERLIPIDEMIA: ICD-10-CM

## 2020-02-12 RX ORDER — ROSUVASTATIN CALCIUM 5 MG/1
5 TABLET, COATED ORAL DAILY
Qty: 30 TABLET | Refills: 5 | Status: SHIPPED | OUTPATIENT
Start: 2020-02-12 | End: 2020-02-12

## 2020-02-12 RX ORDER — ROSUVASTATIN CALCIUM 5 MG/1
TABLET, COATED ORAL
Qty: 90 TABLET | Refills: 1 | Status: SHIPPED | OUTPATIENT
Start: 2020-02-12 | End: 2020-08-06

## 2020-02-12 NOTE — TELEPHONE ENCOUNTER
Patient's pain in his joints has diminished significantly since he is off of the pravastatin for 2 weeks  Will need something else sent to pharmacy in place of this  Please advise        [facility]/[place]  Call back #[phone]

## 2020-02-20 ENCOUNTER — TELEPHONE (OUTPATIENT)
Dept: INTERNAL MEDICINE CLINIC | Facility: CLINIC | Age: 75
End: 2020-02-20

## 2020-02-20 DIAGNOSIS — F41.9 ANXIETY: ICD-10-CM

## 2020-02-20 RX ORDER — ALPRAZOLAM 0.5 MG/1
0.5 TABLET ORAL 2 TIMES DAILY
Qty: 60 TABLET | Refills: 1 | Status: SHIPPED | OUTPATIENT
Start: 2020-02-20 | End: 2020-03-19 | Stop reason: SDUPTHER

## 2020-02-20 NOTE — TELEPHONE ENCOUNTER
Patient needs a refill on alprazolam 0 5 mg tablet, take 1 tablet (0 5 mg total) by mouth 2 times a day      [facility]/[place]    Call back #[phone]

## 2020-03-19 DIAGNOSIS — F41.9 ANXIETY: ICD-10-CM

## 2020-03-20 RX ORDER — ALPRAZOLAM 0.5 MG/1
0.5 TABLET ORAL 2 TIMES DAILY
Qty: 60 TABLET | Refills: 1 | Status: SHIPPED | OUTPATIENT
Start: 2020-03-20 | End: 2020-05-12 | Stop reason: SDUPTHER

## 2020-04-21 ENCOUNTER — TELEPHONE (OUTPATIENT)
Dept: INTERNAL MEDICINE CLINIC | Facility: CLINIC | Age: 75
End: 2020-04-21
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
## 2020-04-21 RX ORDER — DOXYCYCLINE HYCLATE 100 MG/1
100 CAPSULE ORAL EVERY 12 HOURS SCHEDULED
Qty: 28 CAPSULE | Refills: 0 | Status: SHIPPED | OUTPATIENT
Start: 2020-04-21 | End: 2020-05-05

## 2020-05-06 ENCOUNTER — TELEPHONE (OUTPATIENT)
Dept: INTERNAL MEDICINE CLINIC | Facility: CLINIC | Age: 75
End: 2020-05-06

## 2020-05-08 ENCOUNTER — TELEMEDICINE (OUTPATIENT)
Dept: INTERNAL MEDICINE CLINIC | Facility: CLINIC | Age: 75
End: 2020-05-08
Payer: MEDICARE

## 2020-05-08 DIAGNOSIS — E78.2 MIXED HYPERLIPIDEMIA: ICD-10-CM

## 2020-05-08 DIAGNOSIS — I10 BENIGN HYPERTENSION: Primary | ICD-10-CM

## 2020-05-08 DIAGNOSIS — M25.50 PAIN IN JOINT, MULTIPLE SITES: ICD-10-CM

## 2020-05-08 DIAGNOSIS — K21.9 GASTROESOPHAGEAL REFLUX DISEASE WITHOUT ESOPHAGITIS: ICD-10-CM

## 2020-05-08 PROCEDURE — 99214 OFFICE O/P EST MOD 30 MIN: CPT | Performed by: INTERNAL MEDICINE

## 2020-05-12 DIAGNOSIS — F41.9 ANXIETY: ICD-10-CM

## 2020-05-12 RX ORDER — ALPRAZOLAM 0.5 MG/1
0.5 TABLET ORAL 2 TIMES DAILY
Qty: 60 TABLET | Refills: 1 | Status: SHIPPED | OUTPATIENT
Start: 2020-05-12 | End: 2020-07-13

## 2020-07-13 DIAGNOSIS — F41.9 ANXIETY: ICD-10-CM

## 2020-07-13 RX ORDER — ALPRAZOLAM 0.5 MG/1
TABLET ORAL
Qty: 60 TABLET | Refills: 3 | Status: SHIPPED | OUTPATIENT
Start: 2020-07-13 | End: 2020-11-16

## 2020-07-13 NOTE — TELEPHONE ENCOUNTER
Controlled Substance Review    [place] PDMP or [place]  reviewed: No red flags were identified; safe to proceed with prescription  Reena Cuevas

## 2020-08-03 DIAGNOSIS — I10 BENIGN HYPERTENSION: ICD-10-CM

## 2020-08-03 RX ORDER — METOPROLOL SUCCINATE 50 MG/1
TABLET, EXTENDED RELEASE ORAL
Qty: 90 TABLET | Refills: 3 | Status: SHIPPED | OUTPATIENT
Start: 2020-08-03 | End: 2021-08-27

## 2020-08-06 DIAGNOSIS — E78.2 MIXED HYPERLIPIDEMIA: ICD-10-CM

## 2020-08-06 RX ORDER — ROSUVASTATIN CALCIUM 5 MG/1
TABLET, COATED ORAL
Qty: 90 TABLET | Refills: 1 | Status: SHIPPED | OUTPATIENT
Start: 2020-08-06 | End: 2020-10-15 | Stop reason: SDUPTHER

## 2020-08-21 DIAGNOSIS — E78.2 MIXED HYPERLIPIDEMIA: Primary | ICD-10-CM

## 2020-08-21 RX ORDER — PRAVASTATIN SODIUM 80 MG/1
TABLET ORAL
Qty: 90 TABLET | Refills: 3 | OUTPATIENT
Start: 2020-08-21

## 2020-09-11 ENCOUNTER — OFFICE VISIT (OUTPATIENT)
Dept: INTERNAL MEDICINE CLINIC | Facility: CLINIC | Age: 75
End: 2020-09-11
Payer: MEDICARE

## 2020-09-11 ENCOUNTER — HOSPITAL ENCOUNTER (OUTPATIENT)
Dept: RADIOLOGY | Facility: HOSPITAL | Age: 75
Discharge: HOME | End: 2020-09-11
Attending: INTERNAL MEDICINE
Payer: MEDICARE

## 2020-09-11 VITALS
DIASTOLIC BLOOD PRESSURE: 78 MMHG | HEIGHT: 64 IN | RESPIRATION RATE: 20 BRPM | SYSTOLIC BLOOD PRESSURE: 120 MMHG | HEART RATE: 62 BPM | TEMPERATURE: 98.3 F | BODY MASS INDEX: 39.27 KG/M2 | OXYGEN SATURATION: 94 % | WEIGHT: 230 LBS

## 2020-09-11 DIAGNOSIS — R06.02 SHORTNESS OF BREATH: ICD-10-CM

## 2020-09-11 DIAGNOSIS — R73.01 IMPAIRED FASTING GLUCOSE: ICD-10-CM

## 2020-09-11 DIAGNOSIS — I10 BENIGN HYPERTENSION: Primary | ICD-10-CM

## 2020-09-11 DIAGNOSIS — E78.2 MIXED HYPERLIPIDEMIA: ICD-10-CM

## 2020-09-11 PROCEDURE — 71046 X-RAY EXAM CHEST 2 VIEWS: CPT

## 2020-09-11 PROCEDURE — 99214 OFFICE O/P EST MOD 30 MIN: CPT | Performed by: INTERNAL MEDICINE

## 2020-09-11 NOTE — PROGRESS NOTES
Assessment/Plan:     Chronic problems appear stable but he is due for blood work  For his episodic shortness of breath, for suggested he start taking his omeprazole daily without fail  Also ordered labs and chest x-ray  If nothing noted after this, consider stress testing next  Quality Measures: BMI Counseling: Body mass index is 39 48 kg/m²  The BMI is above normal  Nutrition recommendations include encouraging healthy choices of fruits and vegetables  Return in about 3 months (around 12/11/2020)  No problem-specific Assessment & Plan notes found for this encounter  Diagnoses and all orders for this visit:    Benign hypertension  -     Comprehensive metabolic panel; Future  -     CBC and differential; Future  -     Lipid panel; Future    Mixed hyperlipidemia    Impaired fasting glucose  -     Hemoglobin A1C; Future    Shortness of breath  -     XR chest pa & lateral; Future          Subjective:      Patient ID: Cesar Painting is a 70 y o  male  Patient comes in today for routine follow-up  He has been back to work for a while now  Doing okay in terms of the COVID period overdue for blood work however  His blood pressure is controlled  He feels his reflux is controlled and takes the medicine just as needed at this point  Watching his diet for the sugar  Her still much more comfortable on the Crestor as compared to the prior cholesterol medicine  No more muscle aches  He has noticed some episodic shortness of breath  Not related to activity  He states he can just be sitting there and gets it  Goes away on its own  Not related to wearing his mask  No associated chest pain or arm pain        ALLERGIES:  No Known Allergies    CURRENT MEDICATIONS:    Current Outpatient Medications:     ALPRAZolam (XANAX) 0 5 mg tablet, TAKE 1 TABLET(0 5 MG) BY MOUTH TWICE DAILY, Disp: 60 tablet, Rfl: 3    aspirin (ASPIRIN LOW DOSE) 81 mg EC tablet, Take by mouth, Disp: , Rfl:     B Complex-Folic Acid (B COMPLEX PLUS) TABS, Take by mouth, Disp: , Rfl:     Cholecalciferol (VITAMIN D3) 1000 units CAPS, Take by mouth, Disp: , Rfl:     Chondroitin Sulfate-Vit C-Mn (CHONDROITIN SULFATE COMPLEX) 400-60-2 5 MG CAPS, Take by mouth, Disp: , Rfl:     Coenzyme Q10 (COQ-10) 100 MG CAPS, Take by mouth, Disp: , Rfl:     fenofibrate (TRICOR) 54 MG tablet, TAKE 1 TABLET BY MOUTH  DAILY, Disp: 90 tablet, Rfl: 3    metoprolol succinate (TOPROL-XL) 50 mg 24 hr tablet, TAKE 1 TABLET BY MOUTH  DAILY, Disp: 90 tablet, Rfl: 3    Multiple Vitamin (MULTI-VITAMIN DAILY PO), Take by mouth, Disp: , Rfl:     omeprazole (PriLOSEC) 40 MG capsule, TAKE 1 CAPSULE BY MOUTH  DAILY, Disp: 90 capsule, Rfl: 1    rosuvastatin (CRESTOR) 5 mg tablet, TAKE 1 TABLET(5 MG) BY MOUTH DAILY, Disp: 90 tablet, Rfl: 1    solifenacin (VESICARE) 5 mg tablet, Take 1 tablet (5 mg total) by mouth daily (Patient not taking: Reported on 9/11/2020), Disp: 90 tablet, Rfl: 1    ACTIVE PROBLEM LIST:  Patient Active Problem List   Diagnosis    KADEN on CPAP    Anxiety    Basal cell carcinoma of cheek    Basal cell carcinoma of skin    Benign hypertension    Deviated nasal septum    GERD (gastroesophageal reflux disease)    Hyperlipidemia    Morbid obesity (HCC)    OAB (overactive bladder)    Impaired fasting glucose    Bright red blood per rectum    Diverticulosis       PAST MEDICAL HISTORY:  Past Medical History:   Diagnosis Date    Colon polyps     DR Enmanuel Coles    Diverticulosis     Hyperlipidemia     Hypertension     Obesity     KADEN (obstructive sleep apnea)     cpap    Pneumonia     Small bowel obstruction, partial (HCC)     SOB (shortness of breath)        PAST SURGICAL HISTORY:  Past Surgical History:   Procedure Laterality Date    SKIN BIOPSY      TONSILLECTOMY AND ADENOIDECTOMY         FAMILY HISTORY:  Family History   Problem Relation Age of Onset    Heart disease Mother     Hypertension Mother     Breast cancer Sister SOCIAL HISTORY:  Social History     Socioeconomic History    Marital status: Significant Other     Spouse name: Not on file    Number of children: 0    Years of education: Not on file    Highest education level: Not on file   Occupational History    Occupation: SALES     Comment: FULL TIME    Social Needs    Financial resource strain: Not on file    Food insecurity     Worry: Not on file     Inability: Not on file   [employer] needs     Medical: Not on file     Non-medical: Not on file   Tobacco Use    Smoking status: Never Smoker    Smokeless tobacco: Never Used   Substance and Sexual Activity    Alcohol use: Yes     Frequency: Monthly or less     Drinks per session: 1 or 2     Binge frequency: Never     Comment: occassional    Drug use: No    Sexual activity: Yes     Partners: Female     Comment: DENIED: HISTORY OF HIGH RISK SEXUAL BEHAVIOR    Lifestyle    Physical activity     Days per week: Not on file     Minutes per session: Not on file    Stress: Not on file   Relationships    Social connections     Talks on phone: Not on file     Gets together: Not on file     Attends [employer] service: Not on file     Active member of club or organization: Not on file     Attends meetings of clubs or organizations: Not on file     Relationship status: Not on file    Intimate partner violence     Fear of current or ex partner: Not on file     Emotionally abused: Not on file     Physically abused: Not on file     Forced sexual activity: Not on file   Other Topics Concern    Not on file   Social History Narrative     as per all scripts        Review of Systems   Respiratory: Positive for shortness of breath  Cardiovascular: Negative for chest pain  Gastrointestinal: Negative for abdominal pain           Objective:  Vitals:    09/11/20 1350   BP: 120/78   BP Location: Left arm   Patient Position: Sitting   Cuff Size: Large   Pulse: 62   Resp: 20   Temp: 98 3 °F (36 8 °C)   TempSrc: Tympanic   SpO2: 94%   Weight: 104 kg (230 lb)   Height: 5' 4" (1 626 m)     Body mass index is 39 48 kg/m²  Physical Exam  Vitals signs and nursing note reviewed  Constitutional:       Appearance: He is well-developed  Cardiovascular:      Rate and Rhythm: Normal rate and regular rhythm  Heart sounds: Normal heart sounds  Pulmonary:      Effort: Pulmonary effort is normal       Breath sounds: Normal breath sounds  Abdominal:      Palpations: Abdomen is soft  Tenderness: There is no abdominal tenderness  Neurological:      Mental Status: He is alert and oriented to person, place, and time  RESULTS:    No results found for this or any previous visit (from the past 1008 hour(s))  This note was created with voice recognition software  Phonic, grammatical and spelling errors may be present within the note as a result

## 2020-09-14 ENCOUNTER — TELEPHONE (OUTPATIENT)
Dept: INTERNAL MEDICINE CLINIC | Facility: CLINIC | Age: 75
End: 2020-09-14

## 2020-09-14 DIAGNOSIS — R93.89 ABNORMAL CHEST X-RAY: Primary | ICD-10-CM

## 2020-09-17 ENCOUNTER — HOSPITAL ENCOUNTER (OUTPATIENT)
Dept: RADIOLOGY | Facility: HOSPITAL | Age: 75
Discharge: HOME | End: 2020-09-17
Attending: INTERNAL MEDICINE
Payer: MEDICARE

## 2020-09-17 DIAGNOSIS — R93.89 ABNORMAL CHEST X-RAY: ICD-10-CM

## 2020-09-17 PROCEDURE — 71046 X-RAY EXAM CHEST 2 VIEWS: CPT

## 2020-10-15 DIAGNOSIS — E78.2 MIXED HYPERLIPIDEMIA: Primary | ICD-10-CM

## 2020-10-15 RX ORDER — ROSUVASTATIN CALCIUM 5 MG/1
5 TABLET, COATED ORAL DAILY
Qty: 90 TABLET | Refills: 1 | Status: SHIPPED | OUTPATIENT
Start: 2020-10-15 | End: 2021-01-11 | Stop reason: SDUPTHER

## 2020-10-15 RX ORDER — PRAVASTATIN SODIUM 80 MG/1
TABLET ORAL
Qty: 90 TABLET | Refills: 3 | OUTPATIENT
Start: 2020-10-15

## 2020-10-31 DIAGNOSIS — K21.9 GASTROESOPHAGEAL REFLUX DISEASE WITHOUT ESOPHAGITIS: ICD-10-CM

## 2020-10-31 RX ORDER — OMEPRAZOLE 40 MG/1
CAPSULE, DELAYED RELEASE ORAL
Qty: 90 CAPSULE | Refills: 3 | Status: SHIPPED | OUTPATIENT
Start: 2020-10-31 | End: 2021-09-29

## 2020-11-14 DIAGNOSIS — F41.9 ANXIETY: ICD-10-CM

## 2020-11-16 RX ORDER — ALPRAZOLAM 0.5 MG/1
TABLET ORAL
Qty: 60 TABLET | Refills: 3 | Status: SHIPPED | OUTPATIENT
Start: 2020-11-16 | End: 2021-03-17

## 2020-12-04 ENCOUNTER — LAB (OUTPATIENT)
Dept: LAB | Facility: HOSPITAL | Age: 75
End: 2020-12-04
Attending: INTERNAL MEDICINE
Payer: MEDICARE

## 2020-12-04 DIAGNOSIS — M25.50 PAIN IN JOINT, MULTIPLE SITES: ICD-10-CM

## 2020-12-04 DIAGNOSIS — R73.01 IMPAIRED FASTING GLUCOSE: ICD-10-CM
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
## 2020-12-04 DIAGNOSIS — I10 BENIGN HYPERTENSION: ICD-10-CM

## 2020-12-04 LAB
ALBUMIN SERPL BCP-MCNC: 3.8 G/DL (ref 3.5–5)
ALP SERPL-CCNC: 54 U/L (ref 46–116)
ALT SERPL W P-5'-P-CCNC: 58 U/L (ref 12–78)
ANION GAP SERPL CALCULATED.3IONS-SCNC: 8 MMOL/L (ref 4–13)
AST SERPL W P-5'-P-CCNC: 33 U/L (ref 5–45)
BASOPHILS # BLD AUTO: 0.05 THOUSANDS/ΜL (ref 0–0.1)
BASOPHILS NFR BLD AUTO: 1 % (ref 0–1)
BILIRUB SERPL-MCNC: 0.3 MG/DL (ref 0.2–1)
BUN SERPL-MCNC: 15 MG/DL (ref 5–25)
CALCIUM SERPL-MCNC: 8.8 MG/DL (ref 8.3–10.1)
CHLORIDE SERPL-SCNC: 106 MMOL/L (ref 100–108)
CHOLEST SERPL-MCNC: 143 MG/DL (ref 50–200)
CK SERPL-CCNC: 141 U/L (ref 39–308)
CO2 SERPL-SCNC: 28 MMOL/L (ref 21–32)
CREAT SERPL-MCNC: 1.13 MG/DL (ref 0.6–1.3)
EOSINOPHIL # BLD AUTO: 0.21 THOUSAND/ΜL (ref 0–0.61)
EOSINOPHIL NFR BLD AUTO: 3 % (ref 0–6)
ERYTHROCYTE [DISTWIDTH] IN BLOOD BY AUTOMATED COUNT: 13.5 % (ref 11.6–15.1)
EST. AVERAGE GLUCOSE BLD GHB EST-MCNC: 143 MG/DL
GFR SERPL CREATININE-BSD FRML MDRD: 64 ML/MIN/1.73SQ M
GLUCOSE P FAST SERPL-MCNC: 141 MG/DL (ref 65–99)
HBA1C MFR BLD: 6.6 %
HCT VFR BLD AUTO: 49.9 % (ref 36.5–49.3)
HDLC SERPL-MCNC: 31 MG/DL
HGB BLD-MCNC: 16.7 G/DL (ref 12–17)
IMM GRANULOCYTES # BLD AUTO: 0.03 THOUSAND/UL (ref 0–0.2)
IMM GRANULOCYTES NFR BLD AUTO: 0 % (ref 0–2)
LDLC SERPL CALC-MCNC: 47 MG/DL (ref 0–100)
LYMPHOCYTES # BLD AUTO: 2.08 THOUSANDS/ΜL (ref 0.6–4.47)
LYMPHOCYTES NFR BLD AUTO: 28 % (ref 14–44)
MCH RBC QN AUTO: 30.1 PG (ref 26.8–34.3)
MCHC RBC AUTO-ENTMCNC: 33.5 G/DL (ref 31.4–37.4)
MCV RBC AUTO: 90 FL (ref 82–98)
MONOCYTES # BLD AUTO: 0.76 THOUSAND/ΜL (ref 0.17–1.22)
MONOCYTES NFR BLD AUTO: 10 % (ref 4–12)
NEUTROPHILS # BLD AUTO: 4.26 THOUSANDS/ΜL (ref 1.85–7.62)
NEUTS SEG NFR BLD AUTO: 58 % (ref 43–75)
NONHDLC SERPL-MCNC: 112 MG/DL
NRBC BLD AUTO-RTO: 0 /100 WBCS
PLATELET # BLD AUTO: 228 THOUSANDS/UL (ref 149–390)
PMV BLD AUTO: 9.8 FL (ref 8.9–12.7)
POTASSIUM SERPL-SCNC: 4.1 MMOL/L (ref 3.5–5.3)
PROT SERPL-MCNC: 7.3 G/DL (ref 6.4–8.2)
RBC # BLD AUTO: 5.54 MILLION/UL (ref 3.88–5.62)
SODIUM SERPL-SCNC: 142 MMOL/L (ref 136–145)
TRIGL SERPL-MCNC: 325 MG/DL
URATE SERPL-MCNC: 3.1 MG/DL (ref 4.2–8)
WBC # BLD AUTO: 7.39 THOUSAND/UL (ref 4.31–10.16)

## 2020-12-04 PROCEDURE — 86618 LYME DISEASE ANTIBODY: CPT

## 2020-12-04 PROCEDURE — 83036 HEMOGLOBIN GLYCOSYLATED A1C: CPT

## 2020-12-04 PROCEDURE — 86430 RHEUMATOID FACTOR TEST QUAL: CPT

## 2020-12-04 PROCEDURE — 84550 ASSAY OF BLOOD/URIC ACID: CPT

## 2020-12-04 PROCEDURE — 86200 CCP ANTIBODY: CPT

## 2020-12-04 PROCEDURE — 80053 COMPREHEN METABOLIC PANEL: CPT

## 2020-12-04 PROCEDURE — 36415 COLL VENOUS BLD VENIPUNCTURE: CPT

## 2020-12-04 PROCEDURE — 86038 ANTINUCLEAR ANTIBODIES: CPT

## 2020-12-04 PROCEDURE — 82550 ASSAY OF CK (CPK): CPT

## 2020-12-04 PROCEDURE — 80061 LIPID PANEL: CPT

## 2020-12-04 PROCEDURE — 85025 COMPLETE CBC W/AUTO DIFF WBC: CPT

## 2020-12-05 LAB — B BURGDOR IGG+IGM SER-ACNC: 18

## 2020-12-06 LAB — CCP IGA+IGG SERPL IA-ACNC: 5 UNITS (ref 0–19)

## 2020-12-07 LAB
RHEUMATOID FACT SER QL LA: NEGATIVE
RYE IGE QN: NEGATIVE

## 2020-12-11 ENCOUNTER — OFFICE VISIT (OUTPATIENT)
Dept: INTERNAL MEDICINE CLINIC | Facility: CLINIC | Age: 75
End: 2020-12-11
Payer: MEDICARE

## 2020-12-11 VITALS
SYSTOLIC BLOOD PRESSURE: 118 MMHG | RESPIRATION RATE: 16 BRPM | BODY MASS INDEX: 39.44 KG/M2 | TEMPERATURE: 97.5 F | DIASTOLIC BLOOD PRESSURE: 74 MMHG | HEIGHT: 64 IN | OXYGEN SATURATION: 92 % | HEART RATE: 62 BPM | WEIGHT: 231 LBS

## 2020-12-11 DIAGNOSIS — I10 BENIGN HYPERTENSION: ICD-10-CM

## 2020-12-11 DIAGNOSIS — E78.2 MIXED HYPERLIPIDEMIA: ICD-10-CM

## 2020-12-11 DIAGNOSIS — Z12.5 SCREENING FOR PROSTATE CANCER: ICD-10-CM

## 2020-12-11 DIAGNOSIS — R73.01 IMPAIRED FASTING GLUCOSE: ICD-10-CM

## 2020-12-11 DIAGNOSIS — E66.01 MORBID OBESITY (HCC): ICD-10-CM

## 2020-12-11 DIAGNOSIS — Z00.00 MEDICARE ANNUAL WELLNESS VISIT, SUBSEQUENT: Primary | ICD-10-CM

## 2020-12-11 PROCEDURE — G0439 PPPS, SUBSEQ VISIT: HCPCS | Performed by: INTERNAL MEDICINE

## 2020-12-11 PROCEDURE — 99214 OFFICE O/P EST MOD 30 MIN: CPT | Performed by: INTERNAL MEDICINE

## 2021-01-11 DIAGNOSIS — E78.2 MIXED HYPERLIPIDEMIA: ICD-10-CM

## 2021-01-11 DIAGNOSIS — N32.81 OAB (OVERACTIVE BLADDER): ICD-10-CM

## 2021-01-11 RX ORDER — ROSUVASTATIN CALCIUM 5 MG/1
5 TABLET, COATED ORAL DAILY
Qty: 90 TABLET | Refills: 1 | Status: SHIPPED | OUTPATIENT
Start: 2021-01-11 | End: 2021-05-26

## 2021-01-11 RX ORDER — SOLIFENACIN SUCCINATE 5 MG/1
5 TABLET, FILM COATED ORAL DAILY
Qty: 90 TABLET | Refills: 1 | Status: SHIPPED | OUTPATIENT
Start: 2021-01-11 | End: 2021-09-15

## 2021-02-09 ENCOUNTER — OFFICE VISIT (OUTPATIENT)
Dept: URGENT CARE | Facility: CLINIC | Age: 76
End: 2021-02-09
Payer: MEDICARE

## 2021-02-09 VITALS
WEIGHT: 225 LBS | HEIGHT: 64 IN | OXYGEN SATURATION: 93 % | DIASTOLIC BLOOD PRESSURE: 73 MMHG | SYSTOLIC BLOOD PRESSURE: 152 MMHG | BODY MASS INDEX: 38.41 KG/M2 | HEART RATE: 65 BPM | TEMPERATURE: 97.9 F | RESPIRATION RATE: 18 BRPM

## 2021-02-09 DIAGNOSIS — R22.0 JAW SWELLING: Primary | ICD-10-CM

## 2021-02-09 DIAGNOSIS — E78.2 MIXED HYPERLIPIDEMIA: ICD-10-CM

## 2021-02-09 PROCEDURE — G0463 HOSPITAL OUTPT CLINIC VISIT: HCPCS | Performed by: PHYSICIAN ASSISTANT

## 2021-02-09 PROCEDURE — 99203 OFFICE O/P NEW LOW 30 MIN: CPT | Performed by: PHYSICIAN ASSISTANT

## 2021-02-09 RX ORDER — FENOFIBRATE 54 MG/1
TABLET ORAL
Qty: 90 TABLET | Refills: 3 | Status: SHIPPED | OUTPATIENT
Start: 2021-02-09 | End: 2022-02-10

## 2021-02-09 RX ORDER — CLINDAMYCIN HYDROCHLORIDE 300 MG/1
300 CAPSULE ORAL 3 TIMES DAILY
Qty: 21 CAPSULE | Refills: 0 | Status: SHIPPED | OUTPATIENT
Start: 2021-02-09 | End: 2021-02-16

## 2021-02-09 RX ORDER — IBUPROFEN 800 MG/1
800 TABLET ORAL EVERY 6 HOURS PRN
Qty: 30 TABLET | Refills: 0 | Status: SHIPPED | OUTPATIENT
Start: 2021-02-09 | End: 2021-03-19 | Stop reason: ALTCHOICE

## 2021-02-09 NOTE — PROGRESS NOTES
[facility]        NAME: Katie Phillips is a 70 y o  male  : 1945    MRN: [id number]  DATE: 2021  TIME: 9:49 AM    Assessment and Plan   Jaw swelling [R22 0]  1  Jaw swelling  ibuprofen (MOTRIN) 800 mg tablet    clindamycin (CLEOCIN) 300 MG capsule     alternate with tylenol and motrin for pain and swelling   abx as directed  F/u pcp in 3-5 days    Patient Instructions     Follow up with PCP in 3-5 days  Proceed to  ER if symptoms worsen  Chief Complaint     Chief Complaint   Patient presents with    Jaw Pain     pt states that he has pain in his upper and lower jaw on the right side, visible swelling noted           History of Present Illness       45-year-old male presents for evaluation of right-sided jaw swelling  Patient states over the last 2-3 days he has had worsening upper and lower jaw pain and swelling  He states it seems to be radiating from his right ear  States this has happened in the past   Does have a history of basal cell carcinoma  He has been taking motrin with some relief of symptoms  Denies fever or chills  Review of Systems   Review of Systems   Constitutional: Negative for chills, fatigue and fever  HENT: Negative for congestion, ear pain, sinus pain, sore throat and trouble swallowing  Jaw pain and swelling   Eyes: Negative for pain, discharge and redness  Respiratory: Negative for cough, chest tightness, shortness of breath and wheezing  Cardiovascular: Negative for chest pain, palpitations and leg swelling  Gastrointestinal: Negative for abdominal pain, diarrhea, nausea and vomiting  Musculoskeletal: Negative for arthralgias, joint swelling and myalgias  Skin: Negative for rash  Neurological: Negative for dizziness, weakness, numbness and headaches           Current Medications       Current Outpatient Medications:     ALPRAZolam (XANAX) 0 5 mg tablet, TAKE 1 TABLET(0 5 MG) BY MOUTH TWICE DAILY, Disp: 60 tablet, Rfl: 3   aspirin (ASPIRIN LOW DOSE) 81 mg EC tablet, Take by mouth, Disp: , Rfl:     B Complex-Folic Acid (B COMPLEX PLUS) TABS, Take by mouth, Disp: , Rfl:     Cholecalciferol (VITAMIN D3) 1000 units CAPS, Take by mouth, Disp: , Rfl:     Coenzyme Q10 (COQ-10) 100 MG CAPS, Take by mouth, Disp: , Rfl:     metoprolol succinate (TOPROL-XL) 50 mg 24 hr tablet, TAKE 1 TABLET BY MOUTH  DAILY, Disp: 90 tablet, Rfl: 3    Multiple Vitamin (MULTI-VITAMIN DAILY PO), Take by mouth, Disp: , Rfl:     omeprazole (PriLOSEC) 40 MG capsule, TAKE 1 CAPSULE BY MOUTH  DAILY, Disp: 90 capsule, Rfl: 3    rosuvastatin (CRESTOR) 5 mg tablet, Take 1 tablet (5 mg total) by mouth daily, Disp: 90 tablet, Rfl: 1    solifenacin (VESIcare) 5 mg tablet, Take 1 tablet (5 mg total) by mouth daily, Disp: 90 tablet, Rfl: 1    Chondroitin Sulfate-Vit C-Mn (CHONDROITIN SULFATE COMPLEX) 400-60-2 5 MG CAPS, Take by mouth, Disp: , Rfl:     clindamycin (CLEOCIN) 300 MG capsule, Take 1 capsule (300 mg total) by mouth 3 (three) times a day for 7 days, Disp: 21 capsule, Rfl: 0    fenofibrate (TRICOR) 54 MG tablet, TAKE 1 TABLET BY MOUTH  DAILY, Disp: 90 tablet, Rfl: 3    ibuprofen (MOTRIN) 800 mg tablet, Take 1 tablet (800 mg total) by mouth every 6 (six) hours as needed for moderate pain, Disp: 30 tablet, Rfl: 0    Current Allergies     Allergies as of 02/09/2021    (No Known Allergies)            The following portions of the patient's history were reviewed and updated as appropriate: allergies, current medications, past family history, past medical history, past social history, past surgical history and problem list      Past Medical History:   Diagnosis Date    Cancer ([facility] [address] )     basal call carcinoma    Colon polyps     DR Magaly Andrew    Diverticulosis     Hyperlipidemia     Hypertension     Obesity     KADEN (obstructive sleep apnea)     cpap    Pneumonia     Small bowel obstruction, partial (HCC)     SOB (shortness of breath)        Past Surgical History:   Procedure Laterality Date    SKIN BIOPSY      TONSILLECTOMY AND ADENOIDECTOMY         Family History   Problem Relation Age of Onset    Heart disease Mother     Hypertension Mother     Breast cancer Sister          Medications have been verified  Objective   /73   Pulse 65   Temp 97 9 °F (36 6 °C) (Temporal)   Resp 18   Ht 5' 4" (1 626 m)   Wt 102 kg (225 lb)   SpO2 93%   BMI 38 62 kg/m²        Physical Exam     Physical Exam  Vitals signs and nursing note reviewed  Constitutional:       General: He is awake  HENT:      Head:      Jaw: There is normal jaw occlusion  Tenderness and swelling present  No trismus, pain on movement or malocclusion  Salivary Glands: Right salivary gland is not diffusely enlarged or tender  Left salivary gland is not diffusely enlarged or tender  Right Ear: Tympanic membrane and ear canal normal       Left Ear: Tympanic membrane and ear canal normal       Ears:     Neurological:      Mental Status: He is alert

## 2021-02-18 ENCOUNTER — OFFICE VISIT (OUTPATIENT)
Dept: PULMONOLOGY | Facility: CLINIC | Age: 76
End: 2021-02-18
Payer: MEDICARE

## 2021-02-18 VITALS — BODY MASS INDEX: 38.41 KG/M2 | HEIGHT: 64 IN | WEIGHT: 225 LBS

## 2021-02-18 DIAGNOSIS — E66.01 MORBID OBESITY (HCC): ICD-10-CM

## 2021-02-18 DIAGNOSIS — G47.33 OSA ON CPAP: Primary | ICD-10-CM

## 2021-02-18 DIAGNOSIS — Z99.89 OSA ON CPAP: Primary | ICD-10-CM

## 2021-02-18 PROCEDURE — 99441 PR PHYS/QHP TELEPHONE EVALUATION 5-10 MIN: CPT | Performed by: PHYSICIAN ASSISTANT

## 2021-02-18 NOTE — PROGRESS NOTES
Virtual Brief Visit    Assessment/Plan:    Problem List Items Addressed This Visit        Respiratory    KADEN on CPAP - Primary    Relevant Orders    PAP DME Resupply/Reorder    Mask fitting only    PAP DME Pressure Change       Other    Morbid obesity ([address] )          Telephone visit was done today for follow-up  Patient continues with his CPAP on a nightly basis  Reviewed compliance report which shows he is using a just over 5 hours per night with a residual AHI of 2 6  He does feel that his mask leaks unless he makes it very tight, he would like to try a different mask possibly the nasal pillows  Will order a mask that appointment as well as new supplies for him to try the nasal pillows  He does also feel that the pressure gets too high after a few hours and usually takes it off after the 5 hour chaitanya and sleeps the rest of the night without the CPAP  Will lower the range on the auto pressure to see if this makes it more comfortable for him throughout the night  Will check with Kash in about 2-3 weeks to see that the AHI is still acceptable  He will follow-up with us in 1 year or sooner if necessary  Reason for visit is   Chief Complaint   Patient presents with    Follow-up    Sleep Apnea    Virtual Brief Visit        Encounter provider Marleni Mcconnell PA-C    Provider located at [address] [place] [phone]    Recent Visits  No visits were found meeting these conditions  Showing recent visits within past 7 days and meeting all other requirements     Today's Visits  Date Type Provider Dept   02/18/21 Office Visit Marleni Mcconnell PA-C [facility]   Showing today's visits and meeting all other requirements     Future Appointments  No visits were found meeting these conditions     Showing future appointments within next 150 days and meeting all other requirements        After connecting through telephone, the patient was identified by name and date of birth  Nubia La was informed that this is a telemedicine visit and that the visit is being conducted through telephone  My office door was closed  No one else was in the room  He acknowledged consent and understanding of privacy and security of the platform  The patient has agreed to participate and understands he can discontinue the visit at any time  Patient is aware this is a billable service  Subjective    Nubia La is a 70 y o  male  Patient is a 62-year-old male nonsmoker with past medical history of KADEN on CPAP, GERD, hypertension, obesity  He was last seen in 2019, telephone visit was done today for follow-up  He has overall been doing well with his CPAP, continues it on a nightly basis  He has felt that his mask leaks unless he makes it tight which then feels more uncomfortable  He uses it for about 5 hours per night, after that he feels the pressure is too high in usually takes it off for the remainder of the night  He does overall feel that he is sleeping better with the use of the CPAP  primary symptoms  Associated symptoms include headaches and myalgias  Pertinent negatives include no chest pain, fever or sore throat          Past Medical History:   Diagnosis Date    Cancer [facility])     basal call carcinoma    Colon polyps     DR Aamir Zaapta    Diverticulosis     Hyperlipidemia     Hypertension     Obesity     KADEN (obstructive sleep apnea)     cpap    Pneumonia     Small bowel obstruction, partial (HCC)     SOB (shortness of breath)        Past Surgical History:   Procedure Laterality Date    SKIN BIOPSY      TONSILLECTOMY AND ADENOIDECTOMY         Current Outpatient Medications   Medication Sig Dispense Refill    ALPRAZolam (XANAX) 0 5 mg tablet TAKE 1 TABLET(0 5 MG) BY MOUTH TWICE DAILY 60 tablet 3    aspirin (ASPIRIN LOW DOSE) 81 mg EC tablet Take by mouth      B Complex-Folic Acid (B COMPLEX PLUS) TABS Take by mouth      Cholecalciferol (VITAMIN D3) 1000 units CAPS Take by mouth      Chondroitin Sulfate-Vit C-Mn (CHONDROITIN SULFATE COMPLEX) 400-60-2 5 MG CAPS Take by mouth      Coenzyme Q10 (COQ-10) 100 MG CAPS Take by mouth      fenofibrate (TRICOR) 54 MG tablet TAKE 1 TABLET BY MOUTH  DAILY 90 tablet 3    ibuprofen (MOTRIN) 800 mg tablet Take 1 tablet (800 mg total) by mouth every 6 (six) hours as needed for moderate pain 30 tablet 0    metoprolol succinate (TOPROL-XL) 50 mg 24 hr tablet TAKE 1 TABLET BY MOUTH  DAILY 90 tablet 3    Multiple Vitamin (MULTI-VITAMIN DAILY PO) Take by mouth      omeprazole (PriLOSEC) 40 MG capsule TAKE 1 CAPSULE BY MOUTH  DAILY 90 capsule 3    rosuvastatin (CRESTOR) 5 mg tablet Take 1 tablet (5 mg total) by mouth daily 90 tablet 1    solifenacin (VESIcare) 5 mg tablet Take 1 tablet (5 mg total) by mouth daily 90 tablet 1     No current facility-administered medications for this visit  No Known Allergies    Review of Systems   Constitutional: Negative  Negative for appetite change and fever  HENT: Positive for postnasal drip  Negative for ear pain, rhinorrhea, sneezing, sore throat and trouble swallowing  Respiratory: Positive for shortness of breath  Cardiovascular: Negative  Negative for chest pain  Gastrointestinal: Negative  Genitourinary: Negative  Musculoskeletal: Positive for myalgias  Skin: Negative  Allergic/Immunologic: Negative  Neurological: Positive for headaches  Psychiatric/Behavioral: Negative  Vitals:    02/18/21 0935   Weight: 102 kg (225 lb)   Height: 5' 4" (1 626 m)         I spent 9 minutes directly with the patient during this visit    VIRTUAL VISIT DISCLAIMER    Sami Juarez acknowledges that he has consented to an online visit or consultation   He understands that the online visit is based solely on information provided by him, and that, in the absence of a face-to-face physical evaluation by the physician, the diagnosis he receives is both limited and provisional in terms of accuracy and completeness  This is not intended to replace a full medical face-to-face evaluation by the physician  Yamileth Qureshi understands and accepts these terms          Answers for HPI/ROS submitted by the patient on 2/17/2021   Primary symptoms  When did you first notice your symptoms?: more than 1 year ago  How often do your symptoms occur?: constantly  Since you first noticed this problem, how has it changed?: unchanged  Do you have shortness of breath that occurs with effort or exertion?: Yes  Do you have ear congestion?: No  Do you have heartburn?: Yes  Do you have fatigue?: Yes  Do you have nasal congestion?: No  Do you have shortness of breath when lying flat?: Yes  Do you have shortness of breath when you wake up?: Yes  Do you have sweats?: No  Have you experienced weight loss?: No  Which of the following makes your symptoms worse?: climbing stairs, exercise, lying down, minimal activity, strenuous activity  Which of the following makes your symptoms better?: nothing  Risk factors for lung disease: animal exposure

## 2021-02-19 ENCOUNTER — TRANSCRIBE ORDERS (OUTPATIENT)
Dept: SLEEP CENTER | Facility: CLINIC | Age: 76
End: 2021-02-19

## 2021-03-05 DIAGNOSIS — Z23 ENCOUNTER FOR IMMUNIZATION: ICD-10-CM

## 2021-03-11 ENCOUNTER — APPOINTMENT (OUTPATIENT)
Dept: LAB | Facility: HOSPITAL | Age: 76
End: 2021-03-11
Attending: INTERNAL MEDICINE
Payer: MEDICARE

## 2021-03-11 DIAGNOSIS — I10 BENIGN HYPERTENSION: ICD-10-CM

## 2021-03-11 DIAGNOSIS — Z12.5 SCREENING FOR PROSTATE CANCER: ICD-10-CM

## 2021-03-11 DIAGNOSIS — R73.01 IMPAIRED FASTING GLUCOSE: ICD-10-CM

## 2021-03-11 LAB
ALBUMIN SERPL BCP-MCNC: 3.4 G/DL (ref 3.5–5)
ALP SERPL-CCNC: 53 U/L (ref 46–116)
ALT SERPL W P-5'-P-CCNC: 41 U/L (ref 12–78)
ANION GAP SERPL CALCULATED.3IONS-SCNC: 7 MMOL/L (ref 4–13)
AST SERPL W P-5'-P-CCNC: 25 U/L (ref 5–45)
BASOPHILS # BLD AUTO: 0.07 THOUSANDS/ΜL (ref 0–0.1)
BASOPHILS NFR BLD AUTO: 1 % (ref 0–1)
BILIRUB SERPL-MCNC: 0.42 MG/DL (ref 0.2–1)
BUN SERPL-MCNC: 20 MG/DL (ref 5–25)
CALCIUM ALBUM COR SERPL-MCNC: 9.1 MG/DL (ref 8.3–10.1)
CALCIUM SERPL-MCNC: 8.6 MG/DL (ref 8.3–10.1)
CHLORIDE SERPL-SCNC: 107 MMOL/L (ref 100–108)
CHOLEST SERPL-MCNC: 112 MG/DL (ref 50–200)
CO2 SERPL-SCNC: 30 MMOL/L (ref 21–32)
CREAT SERPL-MCNC: 1.02 MG/DL (ref 0.6–1.3)
EOSINOPHIL # BLD AUTO: 0.27 THOUSAND/ΜL (ref 0–0.61)
EOSINOPHIL NFR BLD AUTO: 3 % (ref 0–6)
ERYTHROCYTE [DISTWIDTH] IN BLOOD BY AUTOMATED COUNT: 13.8 % (ref 11.6–15.1)
GFR SERPL CREATININE-BSD FRML MDRD: 73 ML/MIN/1.73SQ M
GLUCOSE P FAST SERPL-MCNC: 133 MG/DL (ref 65–99)
HCT VFR BLD AUTO: 47.2 % (ref 36.5–49.3)
HDLC SERPL-MCNC: 32 MG/DL
HGB BLD-MCNC: 15.8 G/DL (ref 12–17)
IMM GRANULOCYTES # BLD AUTO: 0.03 THOUSAND/UL (ref 0–0.2)
IMM GRANULOCYTES NFR BLD AUTO: 0 % (ref 0–2)
LDLC SERPL CALC-MCNC: 42 MG/DL (ref 0–100)
LYMPHOCYTES # BLD AUTO: 2.25 THOUSANDS/ΜL (ref 0.6–4.47)
LYMPHOCYTES NFR BLD AUTO: 24 % (ref 14–44)
MCH RBC QN AUTO: 30.1 PG (ref 26.8–34.3)
MCHC RBC AUTO-ENTMCNC: 33.5 G/DL (ref 31.4–37.4)
MCV RBC AUTO: 90 FL (ref 82–98)
MONOCYTES # BLD AUTO: 0.93 THOUSAND/ΜL (ref 0.17–1.22)
MONOCYTES NFR BLD AUTO: 10 % (ref 4–12)
NEUTROPHILS # BLD AUTO: 5.7 THOUSANDS/ΜL (ref 1.85–7.62)
NEUTS SEG NFR BLD AUTO: 62 % (ref 43–75)
NONHDLC SERPL-MCNC: 80 MG/DL
NRBC BLD AUTO-RTO: 0 /100 WBCS
PLATELET # BLD AUTO: 226 THOUSANDS/UL (ref 149–390)
PMV BLD AUTO: 10 FL (ref 8.9–12.7)
POTASSIUM SERPL-SCNC: 4.1 MMOL/L (ref 3.5–5.3)
PROT SERPL-MCNC: 7 G/DL (ref 6.4–8.2)
PSA SERPL-MCNC: 1 NG/ML (ref 0–4)
RBC # BLD AUTO: 5.25 MILLION/UL (ref 3.88–5.62)
SODIUM SERPL-SCNC: 144 MMOL/L (ref 136–145)
TRIGL SERPL-MCNC: 190 MG/DL
WBC # BLD AUTO: 9.25 THOUSAND/UL (ref 4.31–10.16)

## 2021-03-11 PROCEDURE — G0103 PSA SCREENING: HCPCS

## 2021-03-11 PROCEDURE — 80061 LIPID PANEL: CPT

## 2021-03-11 PROCEDURE — 80053 COMPREHEN METABOLIC PANEL: CPT

## 2021-03-11 PROCEDURE — 85025 COMPLETE CBC W/AUTO DIFF WBC: CPT

## 2021-03-11 PROCEDURE — 36415 COLL VENOUS BLD VENIPUNCTURE: CPT

## 2021-03-11 PROCEDURE — 83036 HEMOGLOBIN GLYCOSYLATED A1C: CPT

## 2021-03-12 LAB
EST. AVERAGE GLUCOSE BLD GHB EST-MCNC: 137 MG/DL
HBA1C MFR BLD: 6.4 %

## 2021-03-17 DIAGNOSIS — F41.9 ANXIETY: ICD-10-CM

## 2021-03-17 RX ORDER — ALPRAZOLAM 0.5 MG/1
TABLET ORAL
Qty: 60 TABLET | Refills: 2 | Status: SHIPPED | OUTPATIENT
Start: 2021-03-17 | End: 2021-06-18

## 2021-03-17 NOTE — TELEPHONE ENCOUNTER
Controlled Substance Review    [place] PDMP or [place]  reviewed: No red flags were identified; safe to proceed with prescription  Mercedes Blend decreased ability to use legs for bridging/pushing/decreased ability to use arms for pushing/pulling/impaired ability to control trunk for mobility

## 2021-03-18 ENCOUNTER — IMMUNIZATIONS (OUTPATIENT)
Dept: FAMILY MEDICINE CLINIC | Facility: HOSPITAL | Age: 76
End: 2021-03-18

## 2021-03-18 ENCOUNTER — TELEPHONE (OUTPATIENT)
Dept: PULMONOLOGY | Facility: CLINIC | Age: 76
End: 2021-03-18

## 2021-03-18 DIAGNOSIS — Z23 ENCOUNTER FOR IMMUNIZATION: Primary | ICD-10-CM

## 2021-03-18 DIAGNOSIS — G47.33 OSA (OBSTRUCTIVE SLEEP APNEA): Primary | ICD-10-CM

## 2021-03-18 PROCEDURE — 91300 SARS-COV-2 / COVID-19 MRNA VACCINE (PFIZER-BIONTECH) 30 MCG: CPT

## 2021-03-18 PROCEDURE — 0001A SARS-COV-2 / COVID-19 MRNA VACCINE (PFIZER-BIONTECH) 30 MCG: CPT

## 2021-03-18 NOTE — TELEPHONE ENCOUNTER
Patient is still complaining his cpap setting is still not right  He still feels like he is gasping for air  Can you please call him to discuss? Thank you

## 2021-03-18 NOTE — TELEPHONE ENCOUNTER
Can you obtain a new download compliance? I lowered the pressure range to make it more comfortable  Can you also find out if he was able to try a new mask? Thanks

## 2021-03-19 ENCOUNTER — OFFICE VISIT (OUTPATIENT)
Dept: INTERNAL MEDICINE CLINIC | Facility: CLINIC | Age: 76
End: 2021-03-19
Payer: MEDICARE

## 2021-03-19 VITALS
HEART RATE: 60 BPM | OXYGEN SATURATION: 95 % | BODY MASS INDEX: 38.93 KG/M2 | SYSTOLIC BLOOD PRESSURE: 146 MMHG | RESPIRATION RATE: 16 BRPM | WEIGHT: 228 LBS | DIASTOLIC BLOOD PRESSURE: 72 MMHG | HEIGHT: 64 IN | TEMPERATURE: 97.3 F

## 2021-03-19 DIAGNOSIS — I10 BENIGN HYPERTENSION: Primary | ICD-10-CM

## 2021-03-19 DIAGNOSIS — E78.2 MIXED HYPERLIPIDEMIA: ICD-10-CM

## 2021-03-19 DIAGNOSIS — R73.01 IMPAIRED FASTING GLUCOSE: ICD-10-CM

## 2021-03-19 DIAGNOSIS — E66.01 MORBID OBESITY (HCC): ICD-10-CM

## 2021-03-19 PROCEDURE — 99214 OFFICE O/P EST MOD 30 MIN: CPT | Performed by: INTERNAL MEDICINE

## 2021-03-19 RX ORDER — MELOXICAM 15 MG/1
15 TABLET ORAL DAILY
COMMUNITY
End: 2021-08-19 | Stop reason: SDUPTHER

## 2021-03-19 NOTE — PROGRESS NOTES
Assessment/Plan:       Chronic problems are stable  Continue present medications  Continue diet and exercise  Ordered labs for next visit  Cautioned him about the meloxicam, especially for his blood pressure  Quality Measures:       Return in about 4 months (around 7/19/2021)  No problem-specific Assessment & Plan notes found for this encounter  Diagnoses and all orders for this visit:    Benign hypertension  -     Comprehensive metabolic panel; Future  -     CBC and differential; Future    Mixed hyperlipidemia  -     Lipid panel; Future    Impaired fasting glucose  -     Hemoglobin A1C; Future    Morbid obesity ([address] )    Other orders  -     meloxicam (MOBIC) 15 mg tablet; Take 15 mg by mouth daily          Subjective:      Patient ID: Wiliam Nicolas is a 70 y o  male  Patient comes in today for routine follow-up  He states he is doing well with no new complaints  He has been working through the [employer]  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  He did get his 1st COVID vaccine without a problem  He is scheduled for his 2nd 1  His blood work shows his sugars coming back down  His cholesterol is very well controlled  Blood pressure is controlled  A little high here today  But he does note he started taking the meloxicam again for his back  Trying to work on his weight but the snow storms did not help that        ALLERGIES:  No Known Allergies    CURRENT MEDICATIONS:    Current Outpatient Medications:     ALPRAZolam (XANAX) 0 5 mg tablet, TAKE 1 TABLET(0 5 MG) BY MOUTH TWICE DAILY, Disp: 60 tablet, Rfl: 2    aspirin (ASPIRIN LOW DOSE) 81 mg EC tablet, Take by mouth, Disp: , Rfl:     B Complex-Folic Acid (B COMPLEX PLUS) TABS, Take by mouth, Disp: , Rfl:     Cholecalciferol (VITAMIN D3) 1000 units CAPS, Take by mouth, Disp: , Rfl:     Chondroitin Sulfate-Vit C-Mn (CHONDROITIN SULFATE COMPLEX) 400-60-2 5 MG CAPS, Take by mouth, Disp: , Rfl:     Coenzyme Q10 (COQ-10) 100 MG CAPS, Take by mouth, Disp: , Rfl:     fenofibrate (TRICOR) 54 MG tablet, TAKE 1 TABLET BY MOUTH  DAILY, Disp: 90 tablet, Rfl: 3    meloxicam (MOBIC) 15 mg tablet, Take 15 mg by mouth daily, Disp: , Rfl:     metoprolol succinate (TOPROL-XL) 50 mg 24 hr tablet, TAKE 1 TABLET BY MOUTH  DAILY, Disp: 90 tablet, Rfl: 3    Multiple Vitamin (MULTI-VITAMIN DAILY PO), Take by mouth, Disp: , Rfl:     omeprazole (PriLOSEC) 40 MG capsule, TAKE 1 CAPSULE BY MOUTH  DAILY, Disp: 90 capsule, Rfl: 3    rosuvastatin (CRESTOR) 5 mg tablet, Take 1 tablet (5 mg total) by mouth daily, Disp: 90 tablet, Rfl: 1    solifenacin (VESIcare) 5 mg tablet, Take 1 tablet (5 mg total) by mouth daily, Disp: 90 tablet, Rfl: 1    ACTIVE PROBLEM LIST:  Patient Active Problem List   Diagnosis    KADEN on CPAP    Anxiety    Basal cell carcinoma of cheek    Basal cell carcinoma of skin    Benign hypertension    Deviated nasal septum    GERD (gastroesophageal reflux disease)    Hyperlipidemia    Morbid obesity (HCC)    OAB (overactive bladder)    Impaired fasting glucose    Bright red blood per rectum    Diverticulosis       PAST MEDICAL HISTORY:  Past Medical History:   Diagnosis Date    Cancer [address] [address] )     basal call carcinoma    Colon polyps     DR Myke Beckett    Diverticulosis     Hyperlipidemia     Hypertension     Obesity     KADEN (obstructive sleep apnea)     cpap    Pneumonia     Small bowel obstruction, partial (HCC)     SOB (shortness of breath)        PAST SURGICAL HISTORY:  Past Surgical History:   Procedure Laterality Date    SKIN BIOPSY      TONSILLECTOMY AND ADENOIDECTOMY         FAMILY HISTORY:  Family History   Problem Relation Age of Onset    Heart disease Mother     Hypertension Mother     Breast cancer Sister        SOCIAL HISTORY:  Social History     Socioeconomic History    Marital status: Significant Other     Spouse name: Not on file    Number of children: 0    Years of education: Not on file   [employer] education level: Not on file   Occupational History    Occupation: [employer]     Comment: FULL TIME    Social Needs    Financial resource strain: Not on file    Food insecurity     Worry: Not on file     Inability: Not on file   [employer] needs     Medical: Not on file     Non-medical: Not on file   Tobacco Use    Smoking status: Never Smoker    Smokeless tobacco: Never Used   Substance and Sexual Activity    Alcohol use: Not Currently     Frequency: Monthly or less     Drinks per session: 1 or 2     Binge frequency: Never     Comment: occassional    Drug use: No    Sexual activity: Yes     Partners: Female     Comment: DENIED: HISTORY OF HIGH RISK SEXUAL BEHAVIOR    Lifestyle    Physical activity     Days per week: Not on file     Minutes per session: Not on file    Stress: Not on file   Relationships    Social connections     Talks on phone: Not on file     Gets together: Not on file     Attends Scientologist service: Not on file     Active member of club or organization: Not on file     Attends meetings of clubs or organizations: Not on file     Relationship status: Not on file    Intimate partner violence     Fear of current or ex partner: Not on file     Emotionally abused: Not on file     Physically abused: Not on file     Forced sexual activity: Not on file   Other Topics Concern    Not on file   Social History Narrative     as per all scripts        Review of Systems   Respiratory: Negative for shortness of breath  Cardiovascular: Negative for chest pain  Gastrointestinal: Negative for abdominal pain  Objective:  Vitals:    03/19/21 1040   BP: 146/72   BP Location: Left arm   Patient Position: Sitting   Cuff Size: Standard   Pulse: 60   Resp: 16   Temp: (!) 97 3 °F (36 3 °C)   TempSrc: Tympanic   SpO2: 95%   Weight: 103 kg (228 lb)   Height: 5' 4" (1 626 m)     Body mass index is 39 14 kg/m²  Physical Exam  Vitals signs and nursing note reviewed     Constitutional:       Appearance: He is well-developed  Cardiovascular:      Rate and Rhythm: Normal rate and regular rhythm  Heart sounds: Normal heart sounds  Pulmonary:      Effort: Pulmonary effort is normal       Breath sounds: Normal breath sounds  Abdominal:      Palpations: Abdomen is soft  Tenderness: There is no abdominal tenderness  Neurological:      Mental Status: He is alert and oriented to person, place, and time             RESULTS:    Recent Results (from the past 1008 hour(s))   Comprehensive metabolic panel    Collection Time: 03/11/21  9:58 AM   Result Value Ref Range    Sodium 144 136 - 145 mmol/L    Potassium 4 1 3 5 - 5 3 mmol/L    Chloride 107 100 - 108 mmol/L    CO2 30 21 - 32 mmol/L    ANION GAP 7 4 - 13 mmol/L    BUN 20 5 - 25 mg/dL    Creatinine 1 02 0 60 - 1 30 mg/dL    Glucose, Fasting 133 (H) 65 - 99 mg/dL    Calcium 8 6 8 3 - 10 1 mg/dL    Corrected Calcium 9 1 8 3 - 10 1 mg/dL    AST 25 5 - 45 U/L    ALT 41 12 - 78 U/L    Alkaline Phosphatase 53 46 - 116 U/L    Total Protein 7 0 6 4 - 8 2 g/dL    Albumin 3 4 (L) 3 5 - 5 0 g/dL    Total Bilirubin 0 42 0 20 - 1 00 mg/dL    eGFR 73 ml/min/1 73sq m   CBC and differential    Collection Time: 03/11/21  9:58 AM   Result Value Ref Range    WBC 9 25 4 31 - 10 16 Thousand/uL    RBC 5 25 3 88 - 5 62 Million/uL    Hemoglobin 15 8 12 0 - 17 0 g/dL    Hematocrit 47 2 36 5 - 49 3 %    MCV 90 82 - 98 fL    MCH 30 1 26 8 - 34 3 pg    MCHC 33 5 31 4 - 37 4 g/dL    RDW 13 8 11 6 - 15 1 %    MPV 10 0 8 9 - 12 7 fL    Platelets 314 783 - 186 Thousands/uL    nRBC 0 /100 WBCs    Neutrophils Relative 62 43 - 75 %    Immat GRANS % 0 0 - 2 %    Lymphocytes Relative 24 14 - 44 %    Monocytes Relative 10 4 - 12 %    Eosinophils Relative 3 0 - 6 %    Basophils Relative 1 0 - 1 %    Neutrophils Absolute 5 70 1 85 - 7 62 Thousands/µL    Immature Grans Absolute 0 03 0 00 - 0 20 Thousand/uL    Lymphocytes Absolute 2 25 0 60 - 4 47 Thousands/µL    Monocytes Absolute 0 93 0 17 - 1 22 Thousand/µL    Eosinophils Absolute 0 27 0 00 - 0 61 Thousand/µL    Basophils Absolute 0 07 0 00 - 0 10 Thousands/µL   Hemoglobin A1C    Collection Time: 03/11/21  9:58 AM   Result Value Ref Range    Hemoglobin A1C 6 4 (H) Normal 3 8-5 6%; PreDiabetic 5 7-6 4%; Diabetic >=6 5%; Glycemic control for adults with diabetes <7 0% %     mg/dl   Lipid panel    Collection Time: 03/11/21  9:58 AM   Result Value Ref Range    Cholesterol 112 50 - 200 mg/dL    Triglycerides 190 (H) <=150 mg/dL    HDL, Direct 32 (L) >=40 mg/dL    LDL Calculated 42 0 - 100 mg/dL    Non-HDL-Chol (CHOL-HDL) 80 mg/dl   PSA, Total Screen    Collection Time: 03/11/21  9:58 AM   Result Value Ref Range    PSA 1 0 0 0 - 4 0 ng/mL       This note was created with voice recognition software  Phonic, grammatical and spelling errors may be present within the note as a result

## 2021-03-28 ENCOUNTER — HOSPITAL ENCOUNTER (EMERGENCY)
Facility: HOSPITAL | Age: 76
Discharge: HOME | End: 2021-03-28
Attending: EMERGENCY MEDICINE | Admitting: EMERGENCY MEDICINE
Payer: MEDICARE

## 2021-03-28 VITALS
OXYGEN SATURATION: 94 % | HEART RATE: 59 BPM | DIASTOLIC BLOOD PRESSURE: 66 MMHG | SYSTOLIC BLOOD PRESSURE: 116 MMHG | RESPIRATION RATE: 16 BRPM | TEMPERATURE: 97.8 F

## 2021-03-28 DIAGNOSIS — M79.5 SOFT TISSUES FOREIGN BODY: ICD-10-CM
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
## 2021-03-28 PROCEDURE — 99282 EMERGENCY DEPT VISIT SF MDM: CPT | Performed by: PHYSICIAN ASSISTANT

## 2021-03-28 PROCEDURE — 99283 EMERGENCY DEPT VISIT LOW MDM: CPT

## 2021-03-28 PROCEDURE — 27372 REMOVAL OF FOREIGN BODY: CPT | Performed by: PHYSICIAN ASSISTANT

## 2021-03-28 RX ORDER — LIDOCAINE HYDROCHLORIDE AND EPINEPHRINE 10; 10 MG/ML; UG/ML
5 INJECTION, SOLUTION INFILTRATION; PERINEURAL ONCE
Status: COMPLETED | OUTPATIENT
Start: 2021-03-28 | End: 2021-03-28

## 2021-03-28 RX ADMIN — LIDOCAINE HYDROCHLORIDE,EPINEPHRINE BITARTRATE 5 ML: 10; .01 INJECTION, SOLUTION INFILTRATION; PERINEURAL at 08:58

## 2021-03-28 NOTE — ED PROVIDER NOTES
History  Chief Complaint   Patient presents with    Tick Bite     pt has two bites on his leg left and right      70 y o  male with past medical history significant for hypertension, hyperlipidemia, and basal cell carcinoma presents to ED with chief complaint of tick bite  Onset of symptoms reported as 1 day ago  Location of symptoms reported as back of left leg  Quality is reported as tick bite  Severity is reported as mild  Associated symptoms:  Denies headache denies fevers  Denies joint swelling or redness  Modifying factors: tried to remove tick with "spoon" at home but was unable to get the entire tick out because it was embedded  Context:  He reports it has been attached for less than 1 day  First noticed last night  Denies any other systemic symptoms  States he thinks he might have gotten bitten by a tick on the right leg but that has been removed  He came in today because he could not remove the remaining pieces of the tick  Reviewed past medical history and visits via [facility]: patient last seen and admitted on 11/12/2019 for hematochezia        History provided by:  Patient   used: No        Prior to Admission Medications   Prescriptions Last Dose Informant Patient Reported? Taking?    ALPRAZolam (XANAX) 0 5 mg tablet   No No   Sig: TAKE 1 TABLET(0 5 MG) BY MOUTH TWICE DAILY   B Complex-Folic Acid (B COMPLEX PLUS) TABS  Self Yes No   Sig: Take by mouth   Cholecalciferol (VITAMIN D3) 1000 units CAPS  Self Yes No   Sig: Take by mouth   Chondroitin Sulfate-Vit C-Mn (CHONDROITIN SULFATE COMPLEX) 400-60-2 5 MG CAPS  Self Yes No   Sig: Take by mouth   Coenzyme Q10 (COQ-10) 100 MG CAPS  Self Yes No   Sig: Take by mouth   Multiple Vitamin (MULTI-VITAMIN DAILY PO)  Self Yes No   Sig: Take by mouth   aspirin (ASPIRIN LOW DOSE) 81 mg EC tablet  Self Yes No   Sig: Take by mouth   fenofibrate (TRICOR) 54 MG tablet   No No   Sig: TAKE 1 TABLET BY MOUTH  DAILY   meloxicam (MOBIC) 15 mg tablet Yes No   Sig: Take 15 mg by mouth daily   metoprolol succinate (TOPROL-XL) 50 mg 24 hr tablet   No No   Sig: TAKE 1 TABLET BY MOUTH  DAILY   omeprazole (PriLOSEC) 40 MG capsule   No No   Sig: TAKE 1 CAPSULE BY MOUTH  DAILY   rosuvastatin (CRESTOR) 5 mg tablet   No No   Sig: Take 1 tablet (5 mg total) by mouth daily   solifenacin (VESIcare) 5 mg tablet   No No   Sig: Take 1 tablet (5 mg total) by mouth daily      Facility-Administered Medications: None       Past Medical History:   Diagnosis Date    Cancer ([facility] [address] )     basal call carcinoma    Colon polyps     DR Elizabeth Gardiner    Diverticulosis     Hyperlipidemia     Hypertension     Obesity     KADEN (obstructive sleep apnea)     cpap    Pneumonia     Small bowel obstruction, partial (HCC)     SOB (shortness of breath)        Past Surgical History:   Procedure Laterality Date    SKIN BIOPSY      TONSILLECTOMY AND ADENOIDECTOMY         Family History   Problem Relation Age of Onset    Heart disease Mother     Hypertension Mother     Breast cancer Sister      I have reviewed and agree with the history as documented  E-Cigarette/Vaping    E-Cigarette Use Never User      E-Cigarette/Vaping Substances     Social History     Tobacco Use    Smoking status: Never Smoker    Smokeless tobacco: Never Used   Substance Use Topics    Alcohol use: Not Currently     Frequency: Monthly or less     Drinks per session: 1 or 2     Binge frequency: Never     Comment: occassional    Drug use: No       Review of Systems   Constitutional: Negative for activity change, appetite change, chills, diaphoresis, fatigue, fever and unexpected weight change  HENT: Negative for congestion, dental problem, drooling, ear discharge, ear pain, facial swelling, hearing loss, mouth sores, nosebleeds, postnasal drip, rhinorrhea, sinus pressure, sinus pain, sneezing, sore throat, tinnitus, trouble swallowing and voice change      Eyes: Negative for photophobia, pain, discharge, redness, itching and visual disturbance  Respiratory: Negative for cough, choking, chest tightness, shortness of breath, wheezing and stridor  Cardiovascular: Negative for chest pain, palpitations and leg swelling  Gastrointestinal: Negative for abdominal distention, abdominal pain, anal bleeding, blood in stool, constipation, diarrhea, nausea, rectal pain and vomiting  Endocrine: Negative for cold intolerance, heat intolerance, polydipsia, polyphagia and polyuria  Genitourinary: Negative for decreased urine volume, difficulty urinating, dysuria, flank pain, frequency, hematuria and urgency  Musculoskeletal: Negative for arthralgias, back pain, gait problem, joint swelling, myalgias, neck pain and neck stiffness  Skin: Positive for wound  Negative for color change, pallor and rash  Allergic/Immunologic: Negative for environmental allergies, food allergies and immunocompromised state  Neurological: Negative for dizziness, tremors, seizures, syncope, facial asymmetry, speech difficulty, weakness, light-headedness, numbness and headaches  Hematological: Negative for adenopathy  Does not bruise/bleed easily  Psychiatric/Behavioral: Negative for agitation, confusion and hallucinations  The patient is not nervous/anxious  All other systems reviewed and are negative  Physical Exam  Physical Exam  Vitals signs and nursing note reviewed  Constitutional:       General: He is not in acute distress  Appearance: Normal appearance  Comments: /66   Pulse 59   Temp 97 8 °F (36 6 °C)   Resp 16   SpO2 94%      HENT:      Head: Normocephalic and atraumatic  Right Ear: External ear normal       Left Ear: External ear normal       Nose: Nose normal    Eyes:      General: No scleral icterus  Right eye: No discharge  Left eye: No discharge  Conjunctiva/sclera: Conjunctivae normal    Neck:      Musculoskeletal: Normal range of motion and neck supple  No neck rigidity  Cardiovascular:      Rate and Rhythm: Normal rate  Pulses: Normal pulses  Pulmonary:      Effort: Pulmonary effort is normal  No respiratory distress  Musculoskeletal:         General: No swelling, tenderness, deformity or signs of injury  Skin:     Coloration: Skin is not jaundiced  Findings: No rash  Comments: There small black foreign body embedded in skin to posterior left thigh  1 cm surrounding erythema  No fluctuance or induration  There is additionally a small 0 5 cm diameter area of redness to right this where patient reportedly removed  Tick yesterday  No surrounding erythema  No induration or fluctuance  Appears consistent with small area of hyperemia secondary to recent tick removal     Neurological:      General: No focal deficit present  Mental Status: He is alert and oriented to person, place, and time  Mental status is at baseline        Gait: Gait normal    Psychiatric:         Mood and Affect: Mood normal          Behavior: Behavior normal          Vital Signs  ED Triage Vitals [03/28/21 0855]   Temperature Pulse Respirations Blood Pressure SpO2   97 8 °F (36 6 °C) 62 16 108/73 96 %      Temp src Heart Rate Source Patient Position - Orthostatic VS BP Location FiO2 (%)   -- Monitor -- -- --      Pain Score       --           Vitals:    03/28/21 0855 03/28/21 0936   BP: 108/73 116/66   Pulse: 62 59         Visual Acuity      ED Medications  Medications   lidocaine-epinephrine (XYLOCAINE/EPINEPHRINE) 1 %-1:100,000 injection 5 mL (5 mL Infiltration Given 3/28/21 0858)       Diagnostic Studies  Results Reviewed     None                 No orders to display              Procedures  Foreign Body - Embedded    Date/Time: 3/28/2021 8:30 AM  Performed by: Erika Malhotra PA-C  Authorized by: Erika Malhotra PA-C     Patient location:  ED  Other Assisting Provider: No    Consent:     Consent obtained:  Verbal    Consent given by:  Patient    Risks discussed:  Bleeding, infection, incomplete removal, pain and worsening of condition    Alternatives discussed:  No treatment  Location:     Location:  Leg    Leg location:  L thigh    Depth:  Subcutaneous    Tendon involvement:  None  Pre-procedure details:     Imaging:  None    Neurovascular status: intact      Preparation: Patient was prepped and draped in usual sterile fashion    Anesthesia (see MAR for exact dosages): Anesthesia method:  Local infiltration    Local anesthetic:  Lidocaine 1% WITH epi  Procedure details:     Localization method:  Visualized    Dissection of underlying tissues: no      Bloodless field: yes      Removal mechanism:  Scalpel and forceps    Removal Method:  Open    Procedure complexity:  Simple    Foreign bodies recovered:  1    Description:  Tick    Intact foreign body removal: yes    Post-procedure details:     Neurovascular status: intact      Confirmation:  No additional foreign bodies on visualization    Skin closure:  None    Dressing:  Adhesive bandage    Patient tolerance of procedure: Tolerated well, no immediate complications    Complication (if applicable):  None             ED Course                                           MDM  Number of Diagnoses or Management Options  Soft tissues foreign body: minor  Tick bite, initial encounter: minor  Diagnosis management comments: Discussed with patient given length of time tick was attached no indication for lab testing or treatment at this time  Discussed he should obtain lyme titer/reflex WB in 6-8 weeks to assess any further need for treatment with antibiotics for possible lyme  Discussed care of foreign body removal site including cleanse with soap and water, use neosporin/band aid to area locally until healed  Discussed monitor for any signs of spreading or worsening infection   Discussed lab results for lyme test will go to Dr Jane Oglesby with whom he can follow up for further evaluation and treatment of tick bite and any future need for treatment  Discussed follow up with Dr Niño [place] in 6 weeks  Sooner if any increasing redness, swelling or other concerning symptoms  Reviewed reasons to return to ed  Patient verbalized understanding of diagnosis and agreement with discharge plan of care as well as understanding of reasons to return to ed        Patient was seen during the outbreak of the corona virus epidemic   Resources are limited due to the severity of patient illnesses associated with virus   Testing is also limited at this time   Discussed with patient at the time of this evaluation   Due to the fact that limited resources are available -treatment options are limited  Amount and/or Complexity of Data Reviewed  Review and summarize past medical records: yes    Patient Progress  Patient progress: stable      Disposition  Final diagnoses:   Tick bite, initial encounter   Soft tissues foreign body     Time reflects when diagnosis was documented in both MDM as applicable and the Disposition within this note     Time User Action Codes Description Comment    3/28/2021  9:27 AM Kit Pleasant Add Carolina Rosendo  XXXA] Tick bite, initial encounter     3/28/2021  9:27 AM Kit Pleasant Add [M79 5] Soft tissues foreign body       ED Disposition     ED Disposition Condition Date/Time Comment    Discharge Stable Sun Mar 28, 2021  9:27 AM Hollie Giles discharge to home/self care              Follow-up Information     Follow up With Specialties Details Why Contact Info Additional Information    Renetta Arteaga MD Internal Medicine Call in 2 days for further evaluation of symptoms [phone]  [facility] [place] [phone]       [address] [facility] Emergency Department Emergency Medicine Go to  If symptoms worsen [address] [facility] [address] [facility], [address] [facility], [address], [place], [address]          Discharge Medication List as of 3/28/2021  9:29 AM CONTINUE these medications which have NOT CHANGED    Details   ALPRAZolam (XANAX) 0 5 mg tablet TAKE 1 TABLET(0 5 MG) BY MOUTH TWICE DAILY, Normal      aspirin (ASPIRIN LOW DOSE) 81 mg EC tablet Take by mouth, Historical Med      B Complex-Folic Acid (B COMPLEX PLUS) TABS Take by mouth, Historical Med      Cholecalciferol (VITAMIN D3) 1000 units CAPS Take by mouth, Historical Med      Chondroitin Sulfate-Vit C-Mn (CHONDROITIN SULFATE COMPLEX) 400-60-2 5 MG CAPS Take by mouth, Historical Med      Coenzyme Q10 (COQ-10) 100 MG CAPS Take by mouth, Historical Med      fenofibrate (TRICOR) 54 MG tablet TAKE 1 TABLET BY MOUTH  DAILY, Normal      meloxicam (MOBIC) 15 mg tablet Take 15 mg by mouth daily, Historical Med      metoprolol succinate (TOPROL-XL) 50 mg 24 hr tablet TAKE 1 TABLET BY MOUTH  DAILY, Normal      Multiple Vitamin (MULTI-VITAMIN DAILY PO) Take by mouth, Historical Med      omeprazole (PriLOSEC) 40 MG capsule TAKE 1 CAPSULE BY MOUTH  DAILY, Normal      rosuvastatin (CRESTOR) 5 mg tablet Take 1 tablet (5 mg total) by mouth daily, Starting Mon 1/11/2021, Normal      solifenacin (VESIcare) 5 mg tablet Take 1 tablet (5 mg total) by mouth daily, Starting Mon 1/11/2021, Normal           Outpatient Discharge Orders   Lyme Ab/Western Blot Reflex   Standing Status: Future Standing Exp   Date: 03/28/22       PDMP Review       Value Time User    PDMP Reviewed  Yes 3/17/2021 11:48 AM Bridget Tavares MD          ED Provider  Electronically Signed by           Fei Martin PA-C  03/28/21 1954

## 2021-03-28 NOTE — DISCHARGE INSTRUCTIONS
Apply neosporin or triple antibiotic ointment to area 3 times per day until healed  Monitor for signs of infection including increasing redness, warmth, or increasing pain  Have outpatient lyme test performed in 6-8 weeks and follow up with pcp for further management of lyme test results

## 2021-03-31 ENCOUNTER — HOSPITAL ENCOUNTER (EMERGENCY)
Facility: HOSPITAL | Age: 76
Discharge: HOME | End: 2021-03-31
Attending: EMERGENCY MEDICINE | Admitting: EMERGENCY MEDICINE
Payer: MEDICARE

## 2021-03-31 ENCOUNTER — TELEPHONE (OUTPATIENT)
Dept: INTERNAL MEDICINE CLINIC | Facility: CLINIC | Age: 76
End: 2021-03-31

## 2021-03-31 VITALS
SYSTOLIC BLOOD PRESSURE: 160 MMHG | TEMPERATURE: 97.7 F | BODY MASS INDEX: 38.98 KG/M2 | RESPIRATION RATE: 16 BRPM | OXYGEN SATURATION: 93 % | DIASTOLIC BLOOD PRESSURE: 67 MMHG | WEIGHT: 227.07 LBS | HEART RATE: 61 BPM

## 2021-03-31 DIAGNOSIS — L03.032 PARONYCHIA OF GREAT TOE, LEFT: Primary | ICD-10-CM

## 2021-03-31 PROCEDURE — 10061 I&D ABSCESS COMP/MULTIPLE: CPT | Performed by: PHYSICIAN ASSISTANT

## 2021-03-31 PROCEDURE — 99284 EMERGENCY DEPT VISIT MOD MDM: CPT | Performed by: PHYSICIAN ASSISTANT

## 2021-03-31 PROCEDURE — 99283 EMERGENCY DEPT VISIT LOW MDM: CPT

## 2021-03-31 RX ORDER — CEPHALEXIN 500 MG/1
500 CAPSULE ORAL 3 TIMES DAILY
Qty: 21 CAPSULE | Refills: 0 | Status: SHIPPED | OUTPATIENT
Start: 2021-03-31 | End: 2021-04-07

## 2021-03-31 NOTE — ED PROVIDER NOTES
History  Chief Complaint   Patient presents with    Toe Pain     Pt with possible infection of right great toe, clipped his toe nails about 2 days ago      70 y o  male with past medical history significant for diverticulosis, pneumonia, hypertension, hyperlipidemia presents to ED with chief complaint of left great toe swelling/possible infection  Onset of symptoms reported as 2 days ago  Location of symptoms reported as left great toe  Quality is reported as localized redness/swelling  Severity is reported as moderate  Associated symptoms: denies paralysis, paraesthesia or weakness to left lower extremity  Denies fevers or chills  Modifying factors: nothing has been tried for treatment of symptoms  Context: 70year old male presents with atraumatic left great toe pain and swelling starting 2 days ago  He noticed a small bubble at the edge of his nail and is concerned about infection  Denies history of diabetes  Denies regular or frequent toe infections  He thinks he clipped his toenail too close which may have resulted in infection  Reviewed past medical history and visits via EPIC:  On 3/28/21 for evaluation of tick bite  History provided by:  Patient   used: No    Toe Pain  Associated symptoms: no abdominal pain, no chest pain, no congestion, no cough, no diarrhea, no ear pain, no fatigue, no fever, no headaches, no myalgias, no nausea, no rash, no rhinorrhea, no shortness of breath, no sore throat, no vomiting and no wheezing        Prior to Admission Medications   Prescriptions Last Dose Informant Patient Reported? Taking?    ALPRAZolam (XANAX) 0 5 mg tablet   No No   Sig: TAKE 1 TABLET(0 5 MG) BY MOUTH TWICE DAILY   B Complex-Folic Acid (B COMPLEX PLUS) TABS  Self Yes No   Sig: Take by mouth   Cholecalciferol (VITAMIN D3) 1000 units CAPS  Self Yes No   Sig: Take by mouth   Chondroitin Sulfate-Vit C-Mn (CHONDROITIN SULFATE COMPLEX) 400-60-2 5 MG CAPS  Self Yes No   Sig: Take by mouth   Coenzyme Q10 (COQ-10) 100 MG CAPS  Self Yes No   Sig: Take by mouth   Multiple Vitamin (MULTI-VITAMIN DAILY PO)  Self Yes No   Sig: Take by mouth   aspirin (ASPIRIN LOW DOSE) 81 mg EC tablet  Self Yes No   Sig: Take by mouth   fenofibrate (TRICOR) 54 MG tablet   No No   Sig: TAKE 1 TABLET BY MOUTH  DAILY   meloxicam (MOBIC) 15 mg tablet   Yes No   Sig: Take 15 mg by mouth daily   metoprolol succinate (TOPROL-XL) 50 mg 24 hr tablet   No No   Sig: TAKE 1 TABLET BY MOUTH  DAILY   omeprazole (PriLOSEC) 40 MG capsule   No No   Sig: TAKE 1 CAPSULE BY MOUTH  DAILY   rosuvastatin (CRESTOR) 5 mg tablet   No No   Sig: Take 1 tablet (5 mg total) by mouth daily   solifenacin (VESIcare) 5 mg tablet   No No   Sig: Take 1 tablet (5 mg total) by mouth daily      Facility-Administered Medications: None       Past Medical History:   Diagnosis Date    [facility] ([facility] )     basal call carcinoma    Colon polyps     DR Gianna Herrmann    Diverticulosis     Hyperlipidemia     Hypertension     Obesity     KADEN (obstructive sleep apnea)     cpap    Pneumonia     Small bowel obstruction, partial (HCC)     SOB (shortness of breath)        Past Surgical History:   Procedure Laterality Date    SKIN BIOPSY      TONSILLECTOMY AND ADENOIDECTOMY         Family History   Problem Relation Age of Onset    Heart disease Mother     Hypertension Mother     Breast cancer Sister      I have reviewed and agree with the history as documented      E-Cigarette/Vaping    E-Cigarette Use Never User      E-Cigarette/Vaping Substances     Social History     Tobacco Use    Smoking status: Never Smoker    Smokeless tobacco: Never Used   Substance Use Topics    Alcohol use: Not Currently     Frequency: Monthly or less     Drinks per session: 1 or 2     Binge frequency: Never     Comment: occassional    Drug use: No       Review of Systems   Constitutional: Negative for activity change, appetite change, chills, diaphoresis, fatigue, fever and unexpected weight change  HENT: Negative for congestion, dental problem, drooling, ear discharge, ear pain, facial swelling, hearing loss, mouth sores, nosebleeds, postnasal drip, rhinorrhea, sinus pressure, sinus pain, sneezing, sore throat, tinnitus, trouble swallowing and voice change  Eyes: Negative for photophobia, pain, discharge, redness, itching and visual disturbance  Respiratory: Negative for cough, choking, chest tightness, shortness of breath, wheezing and stridor  Cardiovascular: Negative for chest pain, palpitations and leg swelling  Gastrointestinal: Negative for abdominal distention, abdominal pain, anal bleeding, blood in stool, constipation, diarrhea, nausea, rectal pain and vomiting  Endocrine: Negative for cold intolerance, heat intolerance, polydipsia, polyphagia and polyuria  Genitourinary: Negative for decreased urine volume, difficulty urinating, dysuria, flank pain, frequency, hematuria and urgency  Musculoskeletal: Positive for arthralgias  Negative for back pain, gait problem, joint swelling, myalgias, neck pain and neck stiffness  Skin: Positive for wound  Negative for color change, pallor and rash  Allergic/Immunologic: Negative for environmental allergies, food allergies and immunocompromised state  Neurological: Negative for dizziness, tremors, seizures, syncope, facial asymmetry, speech difficulty, weakness, light-headedness, numbness and headaches  Hematological: Negative for adenopathy  Does not bruise/bleed easily  Psychiatric/Behavioral: Negative for agitation, confusion and hallucinations  The patient is not nervous/anxious  All other systems reviewed and are negative  Physical Exam  Physical Exam  Vitals signs and nursing note reviewed  Constitutional:       General: He is not in acute distress  Appearance: Normal appearance        Comments: /67   Pulse 61   Temp 97 7 °F (36 5 °C)   Resp 16   Wt 103 kg (227 lb 1 2 oz)   SpO2 93%   BMI 38 98 kg/m²      HENT:      Head: Normocephalic and atraumatic  Right Ear: External ear normal       Left Ear: External ear normal       Nose: Nose normal    Eyes:      General: No scleral icterus  Right eye: No discharge  Left eye: No discharge  Conjunctiva/sclera: Conjunctivae normal    Neck:      Musculoskeletal: Normal range of motion and neck supple  No neck rigidity or muscular tenderness  Cardiovascular:      Rate and Rhythm: Normal rate  Pulses: Normal pulses  Pulmonary:      Effort: Pulmonary effort is normal  No respiratory distress  Musculoskeletal: Normal range of motion  General: Swelling and tenderness present  No deformity or signs of injury  Comments: Small pustule noted to medial nail fold of toenail on left great toe  Small amount of surrounding erythema and swelling  Swelling and erythema does not track proximally into rest of toe or into left foot  No subungual hematoma or nail avulsion  Skin:     Coloration: Skin is not jaundiced  Findings: Erythema present  No rash  Neurological:      General: No focal deficit present  Mental Status: He is alert and oriented to person, place, and time  Mental status is at baseline        Gait: Gait normal    Psychiatric:         Mood and Affect: Mood normal          Behavior: Behavior normal          Vital Signs  ED Triage Vitals [03/31/21 0821]   Temperature Pulse Respirations Blood Pressure SpO2   97 7 °F (36 5 °C) 61 16 160/67 93 %      Temp src Heart Rate Source Patient Position - Orthostatic VS BP Location FiO2 (%)   -- Monitor -- -- --      Pain Score       --           Vitals:    03/31/21 0821   BP: 160/67   Pulse: 61         Visual Acuity      ED Medications  Medications - No data to display    Diagnostic Studies  Results Reviewed     None                 No orders to display              Procedures  Incision and drain    Date/Time: 3/31/2021 8:30 AM  Performed by: Carla Crump Lakia Rankin PA-C  Authorized by: Bola Hernandez PA-C   Universal Protocol:  Procedure performed by: (AP student)  Consent: The procedure was performed in an emergent situation  Consent given by: patient      Patient location:  ED  Location:     Type:  Abscess    Size:  0 5    Location:  Lower extremity    Lower extremity location:  L big toe  Anesthesia (see MAR for exact dosages): Anesthesia method:  Local infiltration    Local anesthetic:  Lidocaine 1% WITH epi  Procedure details:     Complexity:  Intermediate    Needle aspiration: yes      Needle size:  25 G    Incision types:  Stab incision    Scalpel blade:  11    Approach:  Puncture    Incision depth:  Subcutaneous    Wound management:  Probed and deloculated, extensive cleaning and debrided    Irrigation with saline:  No    Hemostat:  Yes    Drainage:  Purulent    Drainage amount: Moderate    Wound treatment:  Wound left open    Packing materials:  None  Post-procedure details:     Patient tolerance of procedure: Tolerated well, no immediate complications    Complication (if applicable):  None             ED Course                                           MDM  Number of Diagnoses or Management Options  Paronychia of great toe, left: new and does not require workup  Diagnosis management comments: ddx includes but is not limited to paronychia, ingrown toenail, cellulitis  Nail fold infection  Plan incision/drainage/antibiotics/local wound care/follow up with podiatry and pcp  Amount and/or Complexity of Data Reviewed  Review and summarize past medical records: yes    Risk of Complications, Morbidity, and/or Mortality  General comments: 14-year-old male presents with a localized area of erythema and pustule formation to the left great toe  Started 2 days ago  He thinks he does nail to shortness likely precipitated this episode  He is concerned about infection    Clinical exam demonstrates a small pustule consistent with a small paronychia to the nail fold of left great toe  Patient was anesthetized with lidocaine 1% epinephrine  Area was incised with 11 blade scalpel  Moderate perianal material was drained  Wound was debrided  Covered with Xeroform dressing and tube gauze  Discussed care of wound with patient  Discussed use of Keflex for the next week  Discussed Epsom salt soaks for the next week  Discussed Neosporin or bacitracin and a Band-Aid to the area for wound care  Discussed monitor the site for any spreading or worsening symptoms  Discussed follow up with primary care physician and Podiatry in 3-5 days recheck and further evaluation treatment of symptoms  Reviewed reasons to return to ed  Patient verbalized understanding of diagnosis and agreement with discharge plan of care as well as understanding of reasons to return to ed          Patient was seen during the outbreak of the corona virus epidemic   Resources are limited due to the severity of patient illnesses associated with virus   Testing is also limited at this time   Discussed with patient at the time of this evaluation   Due to the fact that limited resources are available -treatment options are limited  Patient Progress  Patient progress: stable      Disposition  Final diagnoses:   Paronychia of great toe, left     Time reflects when diagnosis was documented in both MDM as applicable and the Disposition within this note     Time User Action Codes Description Comment    3/31/2021  8:44 AM Tirso Mobley Add [L03 [id number]] Paronychia of great toe, left       ED Disposition     ED Disposition Condition Date/Time Comment    Discharge Stable Wed Mar 31, 2021  8:44 AM Julia Early discharge to home/self care              Follow-up Information     Follow up With Specialties Details Why Contact Info Additional Information    Leidy Ortiz MD Internal Medicine Call in 2 days for further evaluation of symptoms [phone]  [phone]       [place] [address] [facility] Emergency Department Emergency Medicine Go to  for further evaluation of symptoms [address]  [address] [facility] Emergency Department, [address] [facility], [place], [place], [facility] Call in 2 days for further evaluation of symptoms [address]  [facility] 16  [phone]             Discharge Medication List as of 3/31/2021  8:45 AM      START taking these medications    Details   cephalexin (KEFLEX) 500 mg capsule Take 1 capsule (500 mg total) by mouth 3 (three) times a day for 7 days, Starting Wed 3/31/2021, Until Wed 4/7/2021, Print         CONTINUE these medications which have NOT CHANGED    Details   ALPRAZolam (XANAX) 0 5 mg tablet TAKE 1 TABLET(0 5 MG) BY MOUTH TWICE DAILY, Normal      aspirin (ASPIRIN LOW DOSE) 81 mg EC tablet Take by mouth, Historical Med      B Complex-Folic Acid (B COMPLEX PLUS) TABS Take by mouth, Historical Med      Cholecalciferol (VITAMIN D3) 1000 units CAPS Take by mouth, Historical Med      Chondroitin Sulfate-Vit C-Mn (CHONDROITIN SULFATE COMPLEX) 400-60-2 5 MG CAPS Take by mouth, Historical Med      Coenzyme Q10 (COQ-10) 100 MG CAPS Take by mouth, Historical Med      fenofibrate (TRICOR) 54 MG tablet TAKE 1 TABLET BY MOUTH  DAILY, Normal      meloxicam (MOBIC) 15 mg tablet Take 15 mg by mouth daily, Historical Med      metoprolol succinate (TOPROL-XL) 50 mg 24 hr tablet TAKE 1 TABLET BY MOUTH  DAILY, Normal      Multiple Vitamin (MULTI-VITAMIN DAILY PO) Take by mouth, Historical Med      omeprazole (PriLOSEC) 40 MG capsule TAKE 1 CAPSULE BY MOUTH  DAILY, Normal      rosuvastatin (CRESTOR) 5 mg tablet Take 1 tablet (5 mg total) by mouth daily, Starting Mon 1/11/2021, Normal      solifenacin (VESIcare) 5 mg tablet Take 1 tablet (5 mg total) by mouth daily, Starting Mon 1/11/2021, Normal           No discharge procedures on file      PDMP Review Value Time User    PDMP Reviewed  Yes 3/17/2021 11:48 AM Lilia Mohr MD          ED Provider  Electronically Signed by           Mateo Jimenez PA-C  03/31/21 3286

## 2021-03-31 NOTE — TELEPHONE ENCOUNTER
Pt went to the ER today for an infected foot  He was placed on cephalexin for 7 days  He is concerned that his second Meehan Rayray Covid vaccine is scheduled for April 8th  Will he be able to get his vaccine while on the medication?   [phone]

## 2021-04-08 ENCOUNTER — IMMUNIZATIONS (OUTPATIENT)
Dept: FAMILY MEDICINE CLINIC | Facility: HOSPITAL | Age: 76
End: 2021-04-08

## 2021-04-08 DIAGNOSIS — Z23 ENCOUNTER FOR IMMUNIZATION: Primary | ICD-10-CM

## 2021-04-08 PROCEDURE — 0002A SARS-COV-2 / COVID-19 MRNA VACCINE (PFIZER-BIONTECH) 30 MCG: CPT

## 2021-04-08 PROCEDURE — 91300 SARS-COV-2 / COVID-19 MRNA VACCINE (PFIZER-BIONTECH) 30 MCG: CPT

## 2021-05-07 ENCOUNTER — APPOINTMENT (OUTPATIENT)
Dept: LAB | Facility: CLINIC | Age: 76
End: 2021-05-07
Payer: MEDICARE
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
## 2021-05-07 DIAGNOSIS — E78.2 MIXED HYPERLIPIDEMIA: ICD-10-CM

## 2021-05-07 DIAGNOSIS — I10 BENIGN HYPERTENSION: ICD-10-CM

## 2021-05-07 DIAGNOSIS — R73.01 IMPAIRED FASTING GLUCOSE: ICD-10-CM

## 2021-05-07 PROCEDURE — 36415 COLL VENOUS BLD VENIPUNCTURE: CPT

## 2021-05-07 PROCEDURE — 86618 LYME DISEASE ANTIBODY: CPT

## 2021-05-08 LAB — B BURGDOR IGG+IGM SER-ACNC: 4

## 2021-05-26 DIAGNOSIS — E78.2 MIXED HYPERLIPIDEMIA: ICD-10-CM

## 2021-05-26 RX ORDER — ROSUVASTATIN CALCIUM 5 MG/1
TABLET, COATED ORAL
Qty: 90 TABLET | Refills: 3 | Status: SHIPPED | OUTPATIENT
Start: 2021-05-26 | End: 2022-07-13

## 2021-06-18 DIAGNOSIS — F41.9 ANXIETY: ICD-10-CM

## 2021-06-18 RX ORDER — ALPRAZOLAM 0.5 MG/1
TABLET ORAL
Qty: 60 TABLET | Refills: 0 | Status: SHIPPED | OUTPATIENT
Start: 2021-06-18 | End: 2021-07-20 | Stop reason: SDUPTHER

## 2021-07-20 DIAGNOSIS — F41.9 ANXIETY: ICD-10-CM

## 2021-07-20 RX ORDER — ALPRAZOLAM 0.5 MG/1
0.5 TABLET ORAL 2 TIMES DAILY
Qty: 60 TABLET | Refills: 2 | Status: SHIPPED | OUTPATIENT
Start: 2021-07-20 | End: 2021-10-20

## 2021-07-20 NOTE — TELEPHONE ENCOUNTER
Controlled Substance Review    [place] or [place]  reviewed: No red flags were identified; safe to proceed with prescription  Jacoby Vicente

## 2021-07-29 ENCOUNTER — APPOINTMENT (OUTPATIENT)
Dept: LAB | Facility: HOSPITAL | Age: 76
End: 2021-07-29
Attending: INTERNAL MEDICINE
Payer: MEDICARE

## 2021-07-29 DIAGNOSIS — R73.01 IMPAIRED FASTING GLUCOSE: ICD-10-CM

## 2021-07-29 DIAGNOSIS — I10 BENIGN HYPERTENSION: ICD-10-CM

## 2021-07-29 DIAGNOSIS — E78.5 HYPERLIPIDEMIA, UNSPECIFIED HYPERLIPIDEMIA TYPE: ICD-10-CM

## 2021-07-29 LAB
ALBUMIN SERPL BCP-MCNC: 3.9 G/DL (ref 3.5–5)
ALP SERPL-CCNC: 60 U/L (ref 46–116)
ALT SERPL W P-5'-P-CCNC: 47 U/L (ref 12–78)
ANION GAP SERPL CALCULATED.3IONS-SCNC: 10 MMOL/L (ref 4–13)
AST SERPL W P-5'-P-CCNC: 33 U/L (ref 5–45)
BASOPHILS # BLD AUTO: 0.06 THOUSANDS/ΜL (ref 0–0.1)
BASOPHILS NFR BLD AUTO: 1 % (ref 0–1)
BILIRUB SERPL-MCNC: 0.66 MG/DL (ref 0.2–1)
BUN SERPL-MCNC: 19 MG/DL (ref 5–25)
CALCIUM SERPL-MCNC: 8.9 MG/DL (ref 8.3–10.1)
CHLORIDE SERPL-SCNC: 103 MMOL/L (ref 100–108)
CHOLEST SERPL-MCNC: 138 MG/DL (ref 50–200)
CO2 SERPL-SCNC: 25 MMOL/L (ref 21–32)
CREAT SERPL-MCNC: 1.17 MG/DL (ref 0.6–1.3)
EOSINOPHIL # BLD AUTO: 0.21 THOUSAND/ΜL (ref 0–0.61)
EOSINOPHIL NFR BLD AUTO: 3 % (ref 0–6)
ERYTHROCYTE [DISTWIDTH] IN BLOOD BY AUTOMATED COUNT: 14 % (ref 11.6–15.1)
EST. AVERAGE GLUCOSE BLD GHB EST-MCNC: 143 MG/DL
GFR SERPL CREATININE-BSD FRML MDRD: 61 ML/MIN/1.73SQ M
GLUCOSE P FAST SERPL-MCNC: 143 MG/DL (ref 65–99)
HBA1C MFR BLD: 6.6 %
HCT VFR BLD AUTO: 50.9 % (ref 36.5–49.3)
HDLC SERPL-MCNC: 36 MG/DL
HGB BLD-MCNC: 17.2 G/DL (ref 12–17)
IMM GRANULOCYTES # BLD AUTO: 0.03 THOUSAND/UL (ref 0–0.2)
IMM GRANULOCYTES NFR BLD AUTO: 0 % (ref 0–2)
LDLC SERPL CALC-MCNC: 75 MG/DL (ref 0–100)
LYMPHOCYTES # BLD AUTO: 1.96 THOUSANDS/ΜL (ref 0.6–4.47)
LYMPHOCYTES NFR BLD AUTO: 24 % (ref 14–44)
MCH RBC QN AUTO: 29.8 PG (ref 26.8–34.3)
MCHC RBC AUTO-ENTMCNC: 33.8 G/DL (ref 31.4–37.4)
MCV RBC AUTO: 88 FL (ref 82–98)
MONOCYTES # BLD AUTO: 0.63 THOUSAND/ΜL (ref 0.17–1.22)
MONOCYTES NFR BLD AUTO: 8 % (ref 4–12)
NEUTROPHILS # BLD AUTO: 5.15 THOUSANDS/ΜL (ref 1.85–7.62)
NEUTS SEG NFR BLD AUTO: 64 % (ref 43–75)
NONHDLC SERPL-MCNC: 102 MG/DL
NRBC BLD AUTO-RTO: 0 /100 WBCS
PLATELET # BLD AUTO: 210 THOUSANDS/UL (ref 149–390)
PMV BLD AUTO: 10.3 FL (ref 8.9–12.7)
POTASSIUM SERPL-SCNC: 3.9 MMOL/L (ref 3.5–5.3)
PROT SERPL-MCNC: 7.7 G/DL (ref 6.4–8.2)
RBC # BLD AUTO: 5.77 MILLION/UL (ref 3.88–5.62)
SODIUM SERPL-SCNC: 138 MMOL/L (ref 136–145)
TRIGL SERPL-MCNC: 136 MG/DL
WBC # BLD AUTO: 8.04 THOUSAND/UL (ref 4.31–10.16)

## 2021-07-29 PROCEDURE — 85025 COMPLETE CBC W/AUTO DIFF WBC: CPT

## 2021-07-29 PROCEDURE — 36415 COLL VENOUS BLD VENIPUNCTURE: CPT

## 2021-07-29 PROCEDURE — 80053 COMPREHEN METABOLIC PANEL: CPT

## 2021-07-29 PROCEDURE — 80061 LIPID PANEL: CPT

## 2021-07-29 PROCEDURE — 83036 HEMOGLOBIN GLYCOSYLATED A1C: CPT

## 2021-08-05 ENCOUNTER — OFFICE VISIT (OUTPATIENT)
Dept: INTERNAL MEDICINE CLINIC | Facility: CLINIC | Age: 76
End: 2021-08-05
Payer: MEDICARE

## 2021-08-05 VITALS
OXYGEN SATURATION: 95 % | HEART RATE: 85 BPM | BODY MASS INDEX: 39.09 KG/M2 | DIASTOLIC BLOOD PRESSURE: 74 MMHG | WEIGHT: 229 LBS | TEMPERATURE: 97.4 F | HEIGHT: 64 IN | SYSTOLIC BLOOD PRESSURE: 138 MMHG

## 2021-08-05 DIAGNOSIS — G47.33 OSA ON CPAP: ICD-10-CM

## 2021-08-05 DIAGNOSIS — E78.2 MIXED HYPERLIPIDEMIA: ICD-10-CM

## 2021-08-05 DIAGNOSIS — M25.512 CHRONIC LEFT SHOULDER PAIN: ICD-10-CM

## 2021-08-05 DIAGNOSIS — R73.01 IMPAIRED FASTING GLUCOSE: ICD-10-CM

## 2021-08-05 DIAGNOSIS — Z99.89 OSA ON CPAP: ICD-10-CM

## 2021-08-05 DIAGNOSIS — I10 BENIGN HYPERTENSION: Primary | ICD-10-CM

## 2021-08-05 DIAGNOSIS — G89.29 CHRONIC LEFT SHOULDER PAIN: ICD-10-CM

## 2021-08-05 DIAGNOSIS — E66.01 MORBID OBESITY (HCC): ICD-10-CM

## 2021-08-05 PROCEDURE — 99214 OFFICE O/P EST MOD 30 MIN: CPT | Performed by: INTERNAL MEDICINE

## 2021-08-05 NOTE — PROGRESS NOTES
Assessment/Plan:       Chronic problems appear stable  With his ongoing joint pains, suggested he try stopping his Tricor and Crestor for a few weeks  He had trouble with prior cholesterol medicines  But his shoulder is probably something different so will have him seen by Orthopedics  No other changes  Keep working on [employer]  Quality Measures:       Return in about 4 months (around 12/5/2021)  No problem-specific Assessment & Plan notes found for this encounter  Diagnoses and all orders for this visit:    Benign hypertension  -     Comprehensive metabolic panel; Future  -     CBC and differential; Future  -     Lipid panel; Future  -     TSH, 3rd generation with Free T4 reflex; Future    Mixed hyperlipidemia    Impaired fasting glucose  -     Hemoglobin A1C; Future    KADEN on CPAP    Morbid obesity (HCC)    Chronic left shoulder pain  -     Ambulatory referral to Orthopedic Surgery; Future          Subjective:      Patient ID: Nichole Osman is a 70 y o  male  Patient comes in today for routine follow-up  His blood pressure is controlled  His blood work shows his sugars are stable and his cholesterol is controlled  Reports his CPAP machine was recalled but he is not sure what he is going to do  He is still having trouble with his left shoulder  Really not getting better  Also reports that he is getting overall pains again when he wakes up in the morning  He had been doing better after we switched his cholesterol medicine but it seems to be starting up again        ALLERGIES:  No Known Allergies    CURRENT MEDICATIONS:    Current Outpatient Medications:     ALPRAZolam (XANAX) 0 5 mg tablet, Take 1 tablet (0 5 mg total) by mouth 2 (two) times a day, Disp: 60 tablet, Rfl: 2    aspirin (ASPIRIN LOW DOSE) 81 mg EC tablet, Take by mouth, Disp: , Rfl:     B Complex-Folic Acid (B COMPLEX PLUS) TABS, Take by mouth, Disp: , Rfl:     Cholecalciferol (VITAMIN D3) 1000 units CAPS, Take by mouth, Disp: , Rfl:     Chondroitin Sulfate-Vit C-Mn (CHONDROITIN SULFATE COMPLEX) 400-60-2 5 MG CAPS, Take by mouth, Disp: , Rfl:     Coenzyme Q10 (COQ-10) 100 MG CAPS, Take by mouth, Disp: , Rfl:     fenofibrate (TRICOR) 54 MG tablet, TAKE 1 TABLET BY MOUTH  DAILY, Disp: 90 tablet, Rfl: 3    meloxicam (MOBIC) 15 mg tablet, Take 15 mg by mouth daily, Disp: , Rfl:     metoprolol succinate (TOPROL-XL) 50 mg 24 hr tablet, TAKE 1 TABLET BY MOUTH  DAILY, Disp: 90 tablet, Rfl: 3    Multiple Vitamin (MULTI-VITAMIN DAILY PO), Take by mouth, Disp: , Rfl:     omeprazole (PriLOSEC) 40 MG capsule, TAKE 1 CAPSULE BY MOUTH  DAILY, Disp: 90 capsule, Rfl: 3    rosuvastatin (CRESTOR) 5 mg tablet, TAKE 1 TABLET BY MOUTH  DAILY, Disp: 90 tablet, Rfl: 3    solifenacin (VESIcare) 5 mg tablet, Take 1 tablet (5 mg total) by mouth daily, Disp: 90 tablet, Rfl: 1    ACTIVE PROBLEM LIST:  Patient Active Problem List   Diagnosis    KADEN on CPAP    Anxiety    Basal cell carcinoma of cheek    Basal cell carcinoma of skin    Benign hypertension    Deviated nasal septum    GERD (gastroesophageal reflux disease)    Hyperlipidemia    Morbid obesity (HCC)    OAB (overactive bladder)    Impaired fasting glucose    Bright red blood per rectum    Diverticulosis       PAST MEDICAL HISTORY:  Past Medical History:   Diagnosis Date    [facility] )     basal call carcinoma    Colon polyps     DR Marcelo Ingram    Diverticulosis     Hyperlipidemia     Hypertension     Obesity     KADEN (obstructive sleep apnea)     cpap    Pneumonia     Small bowel obstruction, partial (HCC)     SOB (shortness of breath)        PAST SURGICAL HISTORY:  Past Surgical History:   Procedure Laterality Date    SKIN BIOPSY      TONSILLECTOMY AND ADENOIDECTOMY         FAMILY HISTORY:  Family History   Problem Relation Age of Onset    Heart disease Mother     Hypertension Mother     Breast cancer Sister        SOCIAL HISTORY:  Social History     Socioeconomic History    Marital status: Significant Other     Spouse name: Not on file    Number of children: 0    Years of education: Not on file    Highest education level: Not on file   Occupational History    Occupation: SALES     Comment: FULL TIME    Tobacco Use    Smoking status: Never Smoker    Smokeless tobacco: Never Used   Vaping Use    Vaping Use: Never used   Substance and Sexual Activity    Alcohol use: Not Currently     Comment: occassional    Drug use: No    Sexual activity: Yes     Partners: Female     Comment: DENIED: HISTORY OF HIGH RISK SEXUAL BEHAVIOR    Other Topics Concern    Not on file   Social History Narrative     as per all scripts      Social Determinants of Health     Financial Resource Strain:     Difficulty of Paying Living Expenses:    Food Insecurity:     Worried About Running Out of Food in the Last Year:     [address] in the Last Year:    Transportation Needs:     Lack of Transportation (Medical):  Lack of Transportation (Non-Medical):    Physical Activity:     Days of Exercise per Week:     Minutes of Exercise per Session:    Stress:     Feeling of Stress :    Social Connections:     Frequency of Communication with Friends and Family:     Frequency of Social Gatherings with Friends and Family:     Attends Mandaeism Services:     Active Member of Clubs or Organizations:     Attends Club or Organization Meetings:     Marital Status:    Intimate Partner Violence:     Fear of Current or Ex-Partner:     Emotionally Abused:     Physically Abused:     Sexually Abused:        Review of Systems   Respiratory: Negative for shortness of breath  Cardiovascular: Negative for chest pain  Gastrointestinal: Negative for abdominal pain           Objective:  Vitals:    08/05/21 1536   BP: 138/74   BP Location: Left arm   Patient Position: Sitting   Cuff Size: Large   Pulse: 85   Temp: (!) 97 4 °F (36 3 °C)   TempSrc: Tympanic   SpO2: 95%   Weight: 104 kg (229 lb)   Height: 5' 4" (1 626 m)     Body mass index is 39 31 kg/m²  Physical Exam  Vitals and nursing note reviewed  Constitutional:       Appearance: He is well-developed  Cardiovascular:      Rate and Rhythm: Normal rate and regular rhythm  Heart sounds: Normal heart sounds  Pulmonary:      Effort: Pulmonary effort is normal       Breath sounds: Normal breath sounds  Abdominal:      Palpations: Abdomen is soft  Tenderness: There is no abdominal tenderness  Neurological:      Mental Status: He is alert and oriented to person, place, and time  RESULTS:    Recent Results (from the past 1008 hour(s))   Hemoglobin A1C    Collection Time: 07/29/21 10:59 AM   Result Value Ref Range    Hemoglobin A1C 6 6 (H) Normal 3 8-5 6%; PreDiabetic 5 7-6 4%;  Diabetic >=6 5%; Glycemic control for adults with diabetes <7 0% %     mg/dl   Lipid panel    Collection Time: 07/29/21 10:59 AM   Result Value Ref Range    Cholesterol 138 50 - 200 mg/dL    Triglycerides 136 <=150 mg/dL    HDL, Direct 36 (L) >=40 mg/dL    LDL Calculated 75 0 - 100 mg/dL    Non-HDL-Chol (CHOL-HDL) 102 mg/dl   CBC and differential    Collection Time: 07/29/21 10:59 AM   Result Value Ref Range    WBC 8 04 4 31 - 10 16 Thousand/uL    RBC 5 77 (H) 3 88 - 5 62 Million/uL    Hemoglobin 17 2 (H) 12 0 - 17 0 g/dL    Hematocrit 50 9 (H) 36 5 - 49 3 %    MCV 88 82 - 98 fL    MCH 29 8 26 8 - 34 3 pg    MCHC 33 8 31 4 - 37 4 g/dL    RDW 14 0 11 6 - 15 1 %    MPV 10 3 8 9 - 12 7 fL    Platelets 435 874 - 207 Thousands/uL    nRBC 0 /100 WBCs    Neutrophils Relative 64 43 - 75 %    Immat GRANS % 0 0 - 2 %    Lymphocytes Relative 24 14 - 44 %    Monocytes Relative 8 4 - 12 %    Eosinophils Relative 3 0 - 6 %    Basophils Relative 1 0 - 1 %    Neutrophils Absolute 5 15 1 85 - 7 62 Thousands/µL    Immature Grans Absolute 0 03 0 00 - 0 20 Thousand/uL    Lymphocytes Absolute 1 96 0 60 - 4 47 Thousands/µL    Monocytes Absolute 0 63 0 17 - 1 22 Thousand/µL    Eosinophils Absolute 0 21 0 00 - 0 61 Thousand/µL    Basophils Absolute 0 06 0 00 - 0 10 Thousands/µL   Comprehensive metabolic panel    Collection Time: 07/29/21 10:59 AM   Result Value Ref Range    Sodium 138 136 - 145 mmol/L    Potassium 3 9 3 5 - 5 3 mmol/L    Chloride 103 100 - 108 mmol/L    CO2 25 21 - 32 mmol/L    ANION GAP 10 4 - 13 mmol/L    BUN 19 5 - 25 mg/dL    Creatinine 1 17 0 60 - 1 30 mg/dL    Glucose, Fasting 143 (H) 65 - 99 mg/dL    Calcium 8 9 8 3 - 10 1 mg/dL    AST 33 5 - 45 U/L    ALT 47 12 - 78 U/L    Alkaline Phosphatase 60 46 - 116 U/L    Total Protein 7 7 6 4 - 8 2 g/dL    Albumin 3 9 3 5 - 5 0 g/dL    Total Bilirubin 0 66 0 20 - 1 00 mg/dL    eGFR 61 ml/min/1 73sq m       This note was created with voice recognition software  Phonic, grammatical and spelling errors may be present within the note as a result

## 2021-08-07 ENCOUNTER — APPOINTMENT (EMERGENCY)
Dept: CT IMAGING | Facility: HOSPITAL | Age: 76
End: 2021-08-07
Payer: MEDICARE

## 2021-08-07 ENCOUNTER — HOSPITAL ENCOUNTER (OUTPATIENT)
Facility: HOSPITAL | Age: 76
Setting detail: OBSERVATION
Discharge: HOME | End: 2021-08-08
Attending: EMERGENCY MEDICINE | Admitting: INTERNAL MEDICINE
Payer: MEDICARE

## 2021-08-07 DIAGNOSIS — S22.41XA CLOSED FRACTURE OF MULTIPLE RIBS OF RIGHT SIDE, INITIAL ENCOUNTER: ICD-10-CM
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
## 2021-08-07 DIAGNOSIS — S27.321A CONTUSION OF RIGHT LUNG, INITIAL ENCOUNTER: Primary | ICD-10-CM

## 2021-08-07 DIAGNOSIS — E04.1 THYROID NODULE: ICD-10-CM

## 2021-08-07 PROBLEM — S22.39XA RIB FRACTURE: Status: ACTIVE | Noted: 2021-08-07

## 2021-08-07 PROBLEM — R93.5 ABNORMAL CT OF THE ABDOMEN: Status: ACTIVE | Noted: 2021-08-07

## 2021-08-07 LAB
ALBUMIN SERPL BCP-MCNC: 4.2 G/DL (ref 3.5–5)
ALP SERPL-CCNC: 65 U/L (ref 46–116)
ALT SERPL W P-5'-P-CCNC: 58 U/L (ref 12–78)
ANION GAP SERPL CALCULATED.3IONS-SCNC: 10 MMOL/L (ref 4–13)
AST SERPL W P-5'-P-CCNC: 38 U/L (ref 5–45)
BASOPHILS # BLD AUTO: 0.08 THOUSANDS/ΜL (ref 0–0.1)
BASOPHILS NFR BLD AUTO: 1 % (ref 0–1)
BILIRUB SERPL-MCNC: 0.84 MG/DL (ref 0.2–1)
BILIRUB UR QL STRIP: NEGATIVE
BUN SERPL-MCNC: 17 MG/DL (ref 5–25)
CALCIUM SERPL-MCNC: 8.9 MG/DL (ref 8.3–10.1)
CHLORIDE SERPL-SCNC: 105 MMOL/L (ref 100–108)
CLARITY UR: CLEAR
CO2 SERPL-SCNC: 29 MMOL/L (ref 21–32)
COLOR UR: YELLOW
CREAT SERPL-MCNC: 1.21 MG/DL (ref 0.6–1.3)
EOSINOPHIL # BLD AUTO: 0.11 THOUSAND/ΜL (ref 0–0.61)
EOSINOPHIL NFR BLD AUTO: 1 % (ref 0–6)
ERYTHROCYTE [DISTWIDTH] IN BLOOD BY AUTOMATED COUNT: 14.2 % (ref 11.6–15.1)
GFR SERPL CREATININE-BSD FRML MDRD: 59 ML/MIN/1.73SQ M
GLUCOSE SERPL-MCNC: 123 MG/DL (ref 65–140)
GLUCOSE UR STRIP-MCNC: NEGATIVE MG/DL
HCT VFR BLD AUTO: 50.5 % (ref 36.5–49.3)
HGB BLD-MCNC: 17.3 G/DL (ref 12–17)
HGB UR QL STRIP.AUTO: NEGATIVE
IMM GRANULOCYTES # BLD AUTO: 0.04 THOUSAND/UL (ref 0–0.2)
IMM GRANULOCYTES NFR BLD AUTO: 0 % (ref 0–2)
KETONES UR STRIP-MCNC: NEGATIVE MG/DL
LEUKOCYTE ESTERASE UR QL STRIP: NEGATIVE
LIPASE SERPL-CCNC: 88 U/L (ref 73–393)
LYMPHOCYTES # BLD AUTO: 2.78 THOUSANDS/ΜL (ref 0.6–4.47)
LYMPHOCYTES NFR BLD AUTO: 24 % (ref 14–44)
MCH RBC QN AUTO: 30.6 PG (ref 26.8–34.3)
MCHC RBC AUTO-ENTMCNC: 34.3 G/DL (ref 31.4–37.4)
MCV RBC AUTO: 89 FL (ref 82–98)
MONOCYTES # BLD AUTO: 1.04 THOUSAND/ΜL (ref 0.17–1.22)
MONOCYTES NFR BLD AUTO: 9 % (ref 4–12)
NEUTROPHILS # BLD AUTO: 7.57 THOUSANDS/ΜL (ref 1.85–7.62)
NEUTS SEG NFR BLD AUTO: 65 % (ref 43–75)
NITRITE UR QL STRIP: NEGATIVE
NRBC BLD AUTO-RTO: 0 /100 WBCS
PH UR STRIP.AUTO: 5.5 [PH]
PLATELET # BLD AUTO: 232 THOUSANDS/UL (ref 149–390)
PMV BLD AUTO: 10.2 FL (ref 8.9–12.7)
POTASSIUM SERPL-SCNC: 3.8 MMOL/L (ref 3.5–5.3)
PROT SERPL-MCNC: 7.9 G/DL (ref 6.4–8.2)
PROT UR STRIP-MCNC: NEGATIVE MG/DL
RBC # BLD AUTO: 5.66 MILLION/UL (ref 3.88–5.62)
SODIUM SERPL-SCNC: 144 MMOL/L (ref 136–145)
SP GR UR STRIP.AUTO: 1.02 (ref 1–1.03)
TROPONIN I SERPL-MCNC: <0.02 NG/ML
UROBILINOGEN UR QL STRIP.AUTO: 0.2 E.U./DL
WBC # BLD AUTO: 11.62 THOUSAND/UL (ref 4.31–10.16)

## 2021-08-07 PROCEDURE — 74177 CT ABD & PELVIS W/CONTRAST: CPT

## 2021-08-07 PROCEDURE — 72125 CT NECK SPINE W/O DYE: CPT

## 2021-08-07 PROCEDURE — 99285 EMERGENCY DEPT VISIT HI MDM: CPT | Performed by: EMERGENCY MEDICINE

## 2021-08-07 PROCEDURE — 93005 ELECTROCARDIOGRAM TRACING: CPT

## 2021-08-07 PROCEDURE — 96361 HYDRATE IV INFUSION ADD-ON: CPT

## 2021-08-07 PROCEDURE — 83690 ASSAY OF LIPASE: CPT | Performed by: EMERGENCY MEDICINE

## 2021-08-07 PROCEDURE — 85025 COMPLETE CBC W/AUTO DIFF WBC: CPT | Performed by: EMERGENCY MEDICINE

## 2021-08-07 PROCEDURE — 71260 CT THORAX DX C+: CPT

## 2021-08-07 PROCEDURE — G1004 CDSM NDSC: HCPCS

## 2021-08-07 PROCEDURE — 1124F ACP DISCUSS-NO DSCNMKR DOCD: CPT | Performed by: EMERGENCY MEDICINE

## 2021-08-07 PROCEDURE — 80053 COMPREHEN METABOLIC PANEL: CPT | Performed by: EMERGENCY MEDICINE

## 2021-08-07 PROCEDURE — 96374 THER/PROPH/DIAG INJ IV PUSH: CPT

## 2021-08-07 PROCEDURE — 70450 CT HEAD/BRAIN W/O DYE: CPT

## 2021-08-07 PROCEDURE — 81003 URINALYSIS AUTO W/O SCOPE: CPT | Performed by: INTERNAL MEDICINE

## 2021-08-07 PROCEDURE — 84484 ASSAY OF TROPONIN QUANT: CPT | Performed by: EMERGENCY MEDICINE

## 2021-08-07 PROCEDURE — 99219 PR INITIAL OBSERVATION CARE/DAY 50 MINUTES: CPT | Performed by: INTERNAL MEDICINE

## 2021-08-07 PROCEDURE — 36415 COLL VENOUS BLD VENIPUNCTURE: CPT | Performed by: EMERGENCY MEDICINE

## 2021-08-07 PROCEDURE — 99285 EMERGENCY DEPT VISIT HI MDM: CPT

## 2021-08-07 RX ORDER — ALPRAZOLAM 0.5 MG/1
0.5 TABLET ORAL 2 TIMES DAILY
Status: DISCONTINUED | OUTPATIENT
Start: 2021-08-07 | End: 2021-08-08 | Stop reason: HOSPADM

## 2021-08-07 RX ORDER — PANTOPRAZOLE SODIUM 40 MG/1
40 TABLET, DELAYED RELEASE ORAL
Status: DISCONTINUED | OUTPATIENT
Start: 2021-08-07 | End: 2021-08-08 | Stop reason: HOSPADM

## 2021-08-07 RX ORDER — ACETAMINOPHEN 325 MG/1
650 TABLET ORAL EVERY 6 HOURS PRN
Status: DISCONTINUED | OUTPATIENT
Start: 2021-08-07 | End: 2021-08-08 | Stop reason: HOSPADM

## 2021-08-07 RX ORDER — ASPIRIN 81 MG/1
81 TABLET ORAL DAILY
Status: DISCONTINUED | OUTPATIENT
Start: 2021-08-07 | End: 2021-08-08 | Stop reason: HOSPADM

## 2021-08-07 RX ORDER — METOPROLOL SUCCINATE 50 MG/1
50 TABLET, EXTENDED RELEASE ORAL DAILY
Status: DISCONTINUED | OUTPATIENT
Start: 2021-08-07 | End: 2021-08-08 | Stop reason: HOSPADM

## 2021-08-07 RX ORDER — MORPHINE SULFATE 4 MG/ML
4 INJECTION, SOLUTION INTRAMUSCULAR; INTRAVENOUS ONCE
Status: COMPLETED | OUTPATIENT
Start: 2021-08-07 | End: 2021-08-07

## 2021-08-07 RX ORDER — HYDROMORPHONE HCL/PF 1 MG/ML
0.5 SYRINGE (ML) INJECTION EVERY 4 HOURS PRN
Status: DISCONTINUED | OUTPATIENT
Start: 2021-08-07 | End: 2021-08-08 | Stop reason: HOSPADM

## 2021-08-07 RX ORDER — FENTANYL CITRATE 50 UG/ML
50 INJECTION, SOLUTION INTRAMUSCULAR; INTRAVENOUS ONCE
Status: COMPLETED | OUTPATIENT
Start: 2021-08-07 | End: 2021-08-07

## 2021-08-07 RX ORDER — OXYCODONE HYDROCHLORIDE 10 MG/1
10 TABLET ORAL EVERY 4 HOURS PRN
Status: DISCONTINUED | OUTPATIENT
Start: 2021-08-07 | End: 2021-08-08 | Stop reason: HOSPADM

## 2021-08-07 RX ORDER — OXYCODONE HYDROCHLORIDE 5 MG/1
5 TABLET ORAL EVERY 4 HOURS PRN
Status: DISCONTINUED | OUTPATIENT
Start: 2021-08-07 | End: 2021-08-08 | Stop reason: HOSPADM

## 2021-08-07 RX ADMIN — ASPIRIN 81 MG: 81 TABLET, COATED ORAL at 10:53

## 2021-08-07 RX ADMIN — OXYCODONE HYDROCHLORIDE 10 MG: 10 TABLET ORAL at 20:08

## 2021-08-07 RX ADMIN — ALPRAZOLAM 0.5 MG: 0.5 TABLET ORAL at 17:29

## 2021-08-07 RX ADMIN — METOPROLOL SUCCINATE 50 MG: 50 TABLET, EXTENDED RELEASE ORAL at 10:53

## 2021-08-07 RX ADMIN — PANTOPRAZOLE SODIUM 40 MG: 40 TABLET, DELAYED RELEASE ORAL at 10:52

## 2021-08-07 RX ADMIN — OXYCODONE HYDROCHLORIDE 10 MG: 10 TABLET ORAL at 15:47

## 2021-08-07 RX ADMIN — SODIUM CHLORIDE 1000 ML: 0.9 INJECTION, SOLUTION INTRAVENOUS at 04:08

## 2021-08-07 RX ADMIN — ALPRAZOLAM 0.5 MG: 0.5 TABLET ORAL at 10:52

## 2021-08-07 RX ADMIN — FENTANYL CITRATE 50 MCG: 50 INJECTION, SOLUTION INTRAMUSCULAR; INTRAVENOUS at 04:07

## 2021-08-07 RX ADMIN — IOHEXOL 100 ML: 350 INJECTION, SOLUTION INTRAVENOUS at 04:30

## 2021-08-07 RX ADMIN — MORPHINE SULFATE 4 MG: 4 INJECTION INTRAVENOUS at 08:39

## 2021-08-07 NOTE — H&P
[address] [facility]  H&P- Jace Martin 1945, 70 y o  male MRN: [id number]  Unit/Bed#: FT 01 Encounter: [id number]  Primary Care Provider: Live Calzada MD   Date and time admitted to hospital: 8/7/2021  3:32 AM    * Rib fracture  Assessment & Plan  · Patient had fallen off 18 ft ladder with no symptoms prior to fall  · CT head-no acute intracranial abnormality seen  · CT cervical spine-degenerative changes no evidence of acute cervical injury  · CT chest abdomen pelvis-nondisplaced fracture of posterior right 9th and 10th ribs; patchy opacity concerning for pulmonary contusion; benign appearing left adrenal nodule    Plan  · Incentive spirometry  · Pain regimen  · Tylenol 650 mg Q 6 hour p r n  Mild pain  · Oxycodone 5 mg q 4 hours moderate pain  · Oxycodone 10 mg Q 4 hour severe pain  · Dilaudid 0 5 mg Q 4 hour breakthrough pain  · Will assess patient tomorrow and likely discharge if rib pain is well controlled    [address] as noted above    Abnormal CT of the abdomen  Assessment & Plan  · CT abdomen pelvis-noting benign adrenal nodule  · Outpatient follow-up with primary care provider    Hyperlipidemia  Assessment & Plan  · Patient states he was recently taken off his rosuvastatin secondary to myalgias  · Follow-up outpatient with primary care provider    GERD (gastroesophageal reflux disease)  Assessment & Plan  Protonix 40 mg daily    Benign hypertension  Assessment & Plan  Continue home blood pressure regimen    KADEN on CPAP  Assessment & Plan  · CPAP q h s  VTE Pharmacologic Prophylaxis: VTE Score: 3 Moderate Risk (Score 3-4) - Pharmacological DVT Prophylaxis Contraindicated  Sequential Compression Devices Ordered  Code Status: Level 1 - Full Code   Discussion with family: Patient declined call to        Anticipated Length of Stay: Patient will be admitted on an observation basis with an anticipated length of stay of less than 2 midnights secondary to Rib fracture  Total Time for Visit, including Counseling / Coordination of Care: 30 minutes Greater than 50% of this total time spent on direct patient counseling and coordination of care  Chief Complaint:  Fall    History of Present Illness:  Adi Whipple is a 70 y o  male with a PMH of hypertension, KADEN, hyperlipidemia, morbid obesity  who presents with fall from 18 ft ladder onto a tree  Patient states he was on a ladder and then had fallen off onto a tree and was brought to the emergency department  Patient had CT chest carried out showing rib fracture  Patient denies any lightheadedness, dizziness, palpitations, chest pain, shortness of breath, or blurry vision prior to fall  Patient regularly follows with primary care doctor and had no complaints on exam besides right-sided chest pain which is musculoskeletal    Review of Systems:  Review of Systems   Constitutional: Negative for chills, fatigue, fever and unexpected weight change  HENT: Negative for congestion and ear pain  Eyes: Negative for visual disturbance  Respiratory: Negative for cough, chest tightness, shortness of breath and wheezing  Cardiovascular: Negative for chest pain, palpitations and leg swelling  Gastrointestinal: Negative for abdominal pain, constipation, diarrhea, nausea and vomiting  Genitourinary: Negative for dysuria and frequency  Musculoskeletal: Negative for back pain  Skin: Negative for rash  Neurological: Negative for dizziness, weakness, light-headedness, numbness and headaches  Psychiatric/Behavioral: Negative for agitation  All other systems reviewed and are negative        Past Medical and Surgical History:   Past Medical History:   Diagnosis Date    Cancer ([facility] [address] )     basal call carcinoma    Colon polyps     DR Yolanda Landry    Diverticulosis     Hyperlipidemia     Hypertension     Obesity     KADEN (obstructive sleep apnea)     cpap    Pneumonia     Small bowel obstruction, partial (HCC)     SOB (shortness of breath)        Past Surgical History:   Procedure Laterality Date    SKIN BIOPSY      TONSILLECTOMY AND ADENOIDECTOMY         Meds/Allergies:  Prior to Admission medications    Medication Sig Start Date End Date Taking? Authorizing Provider   Zahida Voss) 0 5 mg tablet Take 1 tablet (0 5 mg total) by mouth 2 (two) times a day 7/20/21   Ashley Dickey MD   aspirin (ASPIRIN LOW DOSE) 81 mg EC tablet Take by mouth    Historical Provider, MD   B Complex-Folic Acid (B COMPLEX PLUS) TABS Take by mouth    Historical Provider, MD   Cholecalciferol (VITAMIN D3) 1000 units CAPS Take by mouth    Historical Provider, MD   Chondroitin Sulfate-Vit C-Mn (CHONDROITIN SULFATE COMPLEX) 400-60-2 5 MG CAPS Take by mouth    Historical Provider, MD   Coenzyme Q10 (COQ-10) 100 MG CAPS Take by mouth    Historical Provider, MD   fenofibrate (TRICOR) 54 MG tablet TAKE 1 TABLET BY MOUTH  DAILY 2/9/21   Ashley Dickey MD   meloxicam (MOBIC) 15 mg tablet Take 15 mg by mouth daily    Historical Provider, MD   metoprolol succinate (TOPROL-XL) 50 mg 24 hr tablet TAKE 1 TABLET BY MOUTH  DAILY 8/3/20   Juana Rodarte PA-C   Multiple Vitamin (MULTI-VITAMIN DAILY PO) Take by mouth    Historical Provider, MD   omeprazole (PriLOSEC) 40 MG capsule TAKE 1 CAPSULE BY MOUTH  DAILY 10/31/20   Juana Rodarte PA-C   rosuvastatin (CRESTOR) 5 mg tablet TAKE 1 TABLET BY MOUTH  DAILY 5/26/21   Ashley Dickey MD   solifenacin (VESIcare) 5 mg tablet Take 1 tablet (5 mg total) by mouth daily 1/11/21   Ashley Dickey MD     I have reviewed home medications with patient personally      Allergies: No Known Allergies    Social History:  Marital Status: Significant Other   Occupation: salesman  Patient Pre-hospital Living Situation: Home  Patient Pre-hospital Level of Mobility: walks  Substance Use History:   Social History     Substance and Sexual Activity   Alcohol Use Not Currently    Comment: occassional     Social History     Tobacco Use   Smoking Status Never Smoker   Smokeless Tobacco Never Used     Social History     Substance and Sexual Activity   Drug Use No       Family History:  Family History   Problem Relation Age of Onset    Heart disease Mother     Hypertension Mother     Breast cancer Sister        Physical Exam:     Vitals:   Blood Pressure: 142/68 (08/07/21 0800)  Pulse: 72 (08/07/21 0825)  Temperature: 97 9 °F (36 6 °C) (08/07/21 0329)  Temp Source: Oral (08/07/21 0329)  Respirations: 22 (08/07/21 0825)  Height: 5' 4" (162 6 cm) (08/07/21 0329)  Weight - Scale: 104 kg (229 lb) (08/07/21 0329)  SpO2: 95 % (08/07/21 0530)    Physical Exam  Vitals and nursing note reviewed  Constitutional:       General: He is not in acute distress  Appearance: He is well-developed  HENT:      Head: Normocephalic and atraumatic  Eyes:      General: No scleral icterus  Conjunctiva/sclera: Conjunctivae normal    Cardiovascular:      Rate and Rhythm: Normal rate and regular rhythm  Heart sounds: Normal heart sounds  No murmur heard  No friction rub  No gallop  Pulmonary:      Effort: Pulmonary effort is normal  No respiratory distress  Breath sounds: Normal breath sounds  No wheezing or rales  Comments: Pain to palpation of right chest wall  Abdominal:      General: Bowel sounds are normal  There is no distension  Palpations: Abdomen is soft  Tenderness: There is no abdominal tenderness  Musculoskeletal:         General: Normal range of motion  Skin:     General: Skin is warm  Findings: No rash  Neurological:      Mental Status: He is alert and oriented to person, place, and time            Additional Data:     Lab Results:  Results from last 7 days   Lab Units 08/07/21  0408   WBC Thousand/uL 11 62*   HEMOGLOBIN g/dL 17 3*   HEMATOCRIT % 50 5*   PLATELETS Thousands/uL 232   NEUTROS PCT % 65   LYMPHS PCT % 24   MONOS PCT % 9   EOS PCT % 1     Results from last 7 days   Lab Units 08/07/21  0408   SODIUM mmol/L 144 POTASSIUM mmol/L 3 8   CHLORIDE mmol/L 105   CO2 mmol/L 29   BUN mg/dL 17   CREATININE mg/dL 1 21   ANION GAP mmol/L 10   CALCIUM mg/dL 8 9   ALBUMIN g/dL 4 2   TOTAL BILIRUBIN mg/dL 0 84   ALK PHOS U/L 65   ALT U/L 58   AST U/L 38   GLUCOSE RANDOM mg/dL 123                       Imaging: Reviewed radiology reports from this admission including: chest CT scan, abdominal/pelvic CT, CT head and CT cervical spine  CT chest abdomen pelvis w contrast   Final Result by Sumanth Ochoa DO (08/07 4428)      No acute intracranial abnormality is seen  Other findings as above  CT CERVICAL SPINE - WITHOUT CONTRAST      INDICATION:   Fall  COMPARISON:  None  TECHNIQUE:  CT examination of the cervical spine was performed without intravenous contrast   Contiguous axial images were obtained  Sagittal and coronal reconstructions were performed  Radiation dose length product (DLP) for this visit:  927 mGy-cm  (accession [id number]), 273 mGy-cm  (accession [id number]), 027 mGy-cm  (accession [id number])  This examination, like all CT scans performed in the [facility], was performed    utilizing techniques to minimize radiation dose exposure, including the use of iterative reconstruction and automated exposure control  IMAGE QUALITY:  Diagnostic  FINDINGS:      ALIGNMENT:  Normal alignment of the cervical spine  No subluxation  VERTEBRAL BODIES:  No acute fracture  DEGENERATIVE CHANGES:  Intervertebral disc space narrowing at C6/C7 with anterior, marginal, and uncovertebral osteophytosis at this level  Facet joint arthropathy at C7/T1        PREVERTEBRAL AND PARASPINAL SOFT TISSUES: Incidental discovery of one or more thyroid nodule(s) measuring more than 1 5 cm and without suspicious features is noted in this patient who is above 28years old; according to guidelines published in the    February 2015 white paper on incidental thyroid nodules in the Journal of the [employer] of Radiology [facility]), further characterization with thyroid ultrasound is recommended  Edema in the posterior soft tissues in the upper cervical region,    nonspecific  THORACIC INLET:  Please refer to the concurrent chest, abdomen, and pelvic CT report for description of the thoracic inlet findings  IMPRESSION:      Degenerative changes as described but no evidence of acute cervical spine injury  Edema in the posterior soft tissues in the upper cervical region, nonspecific      Incidental thyroid nodule(s) for which nonemergent thyroid ultrasound is recommended  CT CHEST, ABDOMEN AND PELVIS WITH IV CONTRAST      INDICATION: Fall  COMPARISON: None  TECHNIQUE: CT examination of the chest, abdomen and pelvis was performed  Reformatted images were created in axial, sagittal, and coronal planes  Radiation dose length product (DLP) for this visit:  927 mGy-cm  (accession [id number]), 855 mGy-cm  (accession [id number]), 607 mGy-cm  (accession [id number])  This examination, like all CT scans performed in the [facility], was performed    utilizing techniques to minimize radiation dose exposure, including the use of iterative reconstruction and automated exposure control  IV Contrast:  100 mL of iohexol (OMNIPAQUE)   Enteric Contrast:  Enteric contrast was not administered  CHEST      LUNGS:  Patchy opacity in the posterior aspect of the superior segment of the right lower lobe (axial image 26, series 2), concerning for pulmonary contusion  Lungs otherwise appear grossly clear  PLEURA:  Unremarkable  HEART/GREAT VESSELS: The heart appears normal in size  Mild aortic and coronary atherosclerosis  No aortic aneurysm  MEDIASTINUM AND JAME:  Unremarkable  CHEST WALL AND LOWER NECK: Nondisplaced fractures of the posterior right 9th and 10th ribs  1 5 cm nodule in the right thyroid lobe          ABDOMEN LIVER/BILIARY TREE:  Liver is diffusely decreased in density consistent with fatty change  No CT evidence of suspicious hepatic mass  Normal hepatic contours  No biliary dilatation  GALLBLADDER:  No calcified gallstones  No pericholecystic inflammatory change  SPLEEN:  Unremarkable  PANCREAS:  Unremarkable  ADRENAL GLANDS:  1 8 cm nodule in the left adrenal gland redemonstrated, similar in appearance to the 2018 study, probably benign  Otherwise grossly unremarkable  KIDNEYS/URETERS:  Subcentimeter nonobstructing stone in the lower pole of the right kidney  Several scattered low-density lesions in the bilateral kidneys, too small to definitively characterize but of unlikely clinical significance  Bilateral kidneys    otherwise appear grossly unremarkable; no hydronephrosis  STOMACH AND BOWEL:  Scattered colonic diverticulosis without evidence of acute diverticulitis  No evidence of bowel obstruction  Otherwise grossly unremarkable  APPENDIX:  No findings to suggest appendicitis  ABDOMINOPELVIC CAVITY:  No ascites or free intraperitoneal air  No lymphadenopathy  VESSELS:  Scattered atherosclerotic calcifications  No aortic aneurysm  PELVIS      REPRODUCTIVE ORGANS:  Unremarkable for patient's age  URINARY BLADDER:  Unremarkable  ABDOMINAL WALL/INGUINAL REGIONS:  Unremarkable  OSSEOUS STRUCTURES: Multilevel degenerative changes of the spine  No acute fracture or destructive osseous lesion  IMPRESSION:     Nondisplaced fractures of the posterior right 9th and 10th ribs  Patchy opacity in the posterior aspect of the superior segment of the right lower lobe (axial image 26, series 2), concerning for pulmonary contusion  Fatty infiltration of the liver is suspected  In the setting of abdominal pain and/or elevated liver function tests, consider steatohepatitis          Coronary atherosclerosis, benign-appearing left adrenal nodule, right-sided nephrolithiasis, no hydronephrosis, colonic diverticulosis without evidence of acute diverticulitis, and other findings as above  Workstation performed: [id number]         CT head wo contrast   Final Result by Bar Talamantes DO (08/07 6798)      No acute intracranial abnormality is seen  Other findings as above  CT CERVICAL SPINE - WITHOUT CONTRAST      INDICATION:   Fall  COMPARISON:  None  TECHNIQUE:  CT examination of the cervical spine was performed without intravenous contrast   Contiguous axial images were obtained  Sagittal and coronal reconstructions were performed  Radiation dose length product (DLP) for this visit:  927 mGy-cm  (accession [id number]), 819 mGy-cm  (accession [id number]), 604 mGy-cm  (accession [id number])  This examination, like all CT scans performed in the [facility], was performed    utilizing techniques to minimize radiation dose exposure, including the use of iterative reconstruction and automated exposure control  IMAGE QUALITY:  Diagnostic  FINDINGS:      ALIGNMENT:  Normal alignment of the cervical spine  No subluxation  VERTEBRAL BODIES:  No acute fracture  DEGENERATIVE CHANGES:  Intervertebral disc space narrowing at C6/C7 with anterior, marginal, and uncovertebral osteophytosis at this level  Facet joint arthropathy at C7/T1  PREVERTEBRAL AND PARASPINAL SOFT TISSUES: Incidental discovery of one or more thyroid nodule(s) measuring more than 1 5 cm and without suspicious features is noted in this patient who is above 28years old; according to guidelines published in the    February 2015 white paper on incidental thyroid nodules in the Journal of the [employer] Omero Arriaga), further characterization with thyroid ultrasound is recommended  Edema in the posterior soft tissues in the upper cervical region,    nonspecific        THORACIC INLET:  Please refer to the concurrent chest, abdomen, and pelvic CT report for description of the thoracic inlet findings  IMPRESSION:      Degenerative changes as described but no evidence of acute cervical spine injury  Edema in the posterior soft tissues in the upper cervical region, nonspecific      Incidental thyroid nodule(s) for which nonemergent thyroid ultrasound is recommended  CT CHEST, ABDOMEN AND PELVIS WITH IV CONTRAST      INDICATION: Fall  COMPARISON: None  TECHNIQUE: CT examination of the chest, abdomen and pelvis was performed  Reformatted images were created in axial, sagittal, and coronal planes  Radiation dose length product (DLP) for this visit:  927 mGy-cm  (accession [id number]), 693 mGy-cm  (accession [id number]), 362 mGy-cm  (accession [id number])  This examination, like all CT scans performed in the [facility], was performed    utilizing techniques to minimize radiation dose exposure, including the use of iterative reconstruction and automated exposure control  IV Contrast:  100 mL of iohexol (OMNIPAQUE)   Enteric Contrast:  Enteric contrast was not administered  CHEST      LUNGS:  Patchy opacity in the posterior aspect of the superior segment of the right lower lobe (axial image 26, series 2), concerning for pulmonary contusion  Lungs otherwise appear grossly clear  PLEURA:  Unremarkable  HEART/GREAT VESSELS: The heart appears normal in size  Mild aortic and coronary atherosclerosis  No aortic aneurysm  MEDIASTINUM AND JAME:  Unremarkable  CHEST WALL AND LOWER NECK: Nondisplaced fractures of the posterior right 9th and 10th ribs  1 5 cm nodule in the right thyroid lobe  ABDOMEN      LIVER/BILIARY TREE:  Liver is diffusely decreased in density consistent with fatty change  No CT evidence of suspicious hepatic mass  Normal hepatic contours  No biliary dilatation  GALLBLADDER:  No calcified gallstones   No pericholecystic inflammatory change  SPLEEN:  Unremarkable  PANCREAS:  Unremarkable  ADRENAL GLANDS:  1 8 cm nodule in the left adrenal gland redemonstrated, similar in appearance to the 2018 study, probably benign  Otherwise grossly unremarkable  KIDNEYS/URETERS:  Subcentimeter nonobstructing stone in the lower pole of the right kidney  Several scattered low-density lesions in the bilateral kidneys, too small to definitively characterize but of unlikely clinical significance  Bilateral kidneys    otherwise appear grossly unremarkable; no hydronephrosis  STOMACH AND BOWEL:  Scattered colonic diverticulosis without evidence of acute diverticulitis  No evidence of bowel obstruction  Otherwise grossly unremarkable  APPENDIX:  No findings to suggest appendicitis  ABDOMINOPELVIC CAVITY:  No ascites or free intraperitoneal air  No lymphadenopathy  VESSELS:  Scattered atherosclerotic calcifications  No aortic aneurysm  PELVIS      REPRODUCTIVE ORGANS:  Unremarkable for patient's age  URINARY BLADDER:  Unremarkable  ABDOMINAL WALL/INGUINAL REGIONS:  Unremarkable  OSSEOUS STRUCTURES: Multilevel degenerative changes of the spine  No acute fracture or destructive osseous lesion  IMPRESSION:     Nondisplaced fractures of the posterior right 9th and 10th ribs  Patchy opacity in the posterior aspect of the superior segment of the right lower lobe (axial image 26, series 2), concerning for pulmonary contusion  Fatty infiltration of the liver is suspected  In the setting of abdominal pain and/or elevated liver function tests, consider steatohepatitis  Coronary atherosclerosis, benign-appearing left adrenal nodule, right-sided nephrolithiasis, no hydronephrosis, colonic diverticulosis without evidence of acute diverticulitis, and other findings as above        Workstation performed: [id number]         CT spine cervical without contrast Final Result by Fabian Tarango DO (08/07 1227)      No acute intracranial abnormality is seen  Other findings as above  CT CERVICAL SPINE - WITHOUT CONTRAST      INDICATION:   Fall  COMPARISON:  None  TECHNIQUE:  CT examination of the cervical spine was performed without intravenous contrast   Contiguous axial images were obtained  Sagittal and coronal reconstructions were performed  Radiation dose length product (DLP) for this visit:  927 mGy-cm  (accession [id number]), 731 mGy-cm  (accession [id number]), 193 mGy-cm  (accession [id number])  This examination, like all CT scans performed in the [facility], was performed    utilizing techniques to minimize radiation dose exposure, including the use of iterative reconstruction and automated exposure control  IMAGE QUALITY:  Diagnostic  FINDINGS:      ALIGNMENT:  Normal alignment of the cervical spine  No subluxation  VERTEBRAL BODIES:  No acute fracture  DEGENERATIVE CHANGES:  Intervertebral disc space narrowing at C6/C7 with anterior, marginal, and uncovertebral osteophytosis at this level  Facet joint arthropathy at C7/T1  PREVERTEBRAL AND PARASPINAL SOFT TISSUES: Incidental discovery of one or more thyroid nodule(s) measuring more than 1 5 cm and without suspicious features is noted in this patient who is above 28years old; according to guidelines published in the    February 2015 white paper on incidental thyroid nodules in the Journal of the [employer] Anjum Bowens), further characterization with thyroid ultrasound is recommended  Edema in the posterior soft tissues in the upper cervical region,    nonspecific  THORACIC INLET:  Please refer to the concurrent chest, abdomen, and pelvic CT report for description of the thoracic inlet findings  IMPRESSION:      Degenerative changes as described but no evidence of acute cervical spine injury        Edema in the posterior soft tissues in the upper cervical region, nonspecific      Incidental thyroid nodule(s) for which nonemergent thyroid ultrasound is recommended  CT CHEST, ABDOMEN AND PELVIS WITH IV CONTRAST      INDICATION: Fall  COMPARISON: None  TECHNIQUE: CT examination of the chest, abdomen and pelvis was performed  Reformatted images were created in axial, sagittal, and coronal planes  Radiation dose length product (DLP) for this visit:  927 mGy-cm  (accession [id number]), 700 mGy-cm  (accession [id number]), 109 mGy-cm  (accession [id number])  This examination, like all CT scans performed in the [facility], was performed    utilizing techniques to minimize radiation dose exposure, including the use of iterative reconstruction and automated exposure control  IV Contrast:  100 mL of iohexol (OMNIPAQUE)   Enteric Contrast:  Enteric contrast was not administered  CHEST      LUNGS:  Patchy opacity in the posterior aspect of the superior segment of the right lower lobe (axial image 26, series 2), concerning for pulmonary contusion  Lungs otherwise appear grossly clear  PLEURA:  Unremarkable  HEART/GREAT VESSELS: The heart appears normal in size  Mild aortic and coronary atherosclerosis  No aortic aneurysm  MEDIASTINUM AND JAME:  Unremarkable  CHEST WALL AND LOWER NECK: Nondisplaced fractures of the posterior right 9th and 10th ribs  1 5 cm nodule in the right thyroid lobe  ABDOMEN      LIVER/BILIARY TREE:  Liver is diffusely decreased in density consistent with fatty change  No CT evidence of suspicious hepatic mass  Normal hepatic contours  No biliary dilatation  GALLBLADDER:  No calcified gallstones  No pericholecystic inflammatory change  SPLEEN:  Unremarkable  PANCREAS:  Unremarkable  ADRENAL GLANDS:  1 8 cm nodule in the left adrenal gland redemonstrated, similar in appearance to the 2018 study, probably benign  Otherwise grossly unremarkable  KIDNEYS/URETERS:  Subcentimeter nonobstructing stone in the lower pole of the right kidney  Several scattered low-density lesions in the bilateral kidneys, too small to definitively characterize but of unlikely clinical significance  Bilateral kidneys    otherwise appear grossly unremarkable; no hydronephrosis  STOMACH AND BOWEL:  Scattered colonic diverticulosis without evidence of acute diverticulitis  No evidence of bowel obstruction  Otherwise grossly unremarkable  APPENDIX:  No findings to suggest appendicitis  ABDOMINOPELVIC CAVITY:  No ascites or free intraperitoneal air  No lymphadenopathy  VESSELS:  Scattered atherosclerotic calcifications  No aortic aneurysm  PELVIS      REPRODUCTIVE ORGANS:  Unremarkable for patient's age  URINARY BLADDER:  Unremarkable  ABDOMINAL WALL/INGUINAL REGIONS:  Unremarkable  OSSEOUS STRUCTURES: Multilevel degenerative changes of the spine  No acute fracture or destructive osseous lesion  IMPRESSION:     Nondisplaced fractures of the posterior right 9th and 10th ribs  Patchy opacity in the posterior aspect of the superior segment of the right lower lobe (axial image 26, series 2), concerning for pulmonary contusion  Fatty infiltration of the liver is suspected  In the setting of abdominal pain and/or elevated liver function tests, consider steatohepatitis  Coronary atherosclerosis, benign-appearing left adrenal nodule, right-sided nephrolithiasis, no hydronephrosis, colonic diverticulosis without evidence of acute diverticulitis, and other findings as above  Workstation performed: [id number]             EKG and Other Studies Reviewed on Admission:   · EKG: NSR  HR 81     ** Please Note: This note has been constructed using a voice recognition system   **

## 2021-08-07 NOTE — ED NOTES
Unable to perform neuro assessment and obtain vitals due to pt being in [facility] 81, [address] [facility]  08/07/21 [id number]

## 2021-08-07 NOTE — ASSESSMENT & PLAN NOTE
· Patient had fallen off 18 ft ladder with no symptoms prior to fall  · CT head-no acute intracranial abnormality seen  · CT cervical spine-degenerative changes no evidence of acute cervical injury  · CT chest abdomen pelvis-nondisplaced fracture of posterior right 9th and 10th ribs; patchy opacity concerning for pulmonary contusion; benign appearing left adrenal nodule    Plan  · Incentive spirometry  · Pain regimen  · Tylenol 650 mg Q 6 hour p r n   Mild pain  · Oxycodone 5 mg q 4 hours moderate pain  · Oxycodone 10 mg Q 4 hour severe pain  · Dilaudid 0 5 mg Q 4 hour breakthrough pain  · Will assess patient tomorrow and likely discharge if rib pain is well controlled

## 2021-08-07 NOTE — PLAN OF CARE
Problem: Potential for Falls  Goal: Patient will remain free of falls  Description: INTERVENTIONS:  - Educate patient/family on patient safety including physical limitations  - Instruct patient to call for assistance with activity   - Consult OT/PT to assist with strengthening/mobility   - Keep Call bell within reach  - Keep bed low and locked with side rails adjusted as appropriate  - Keep care items and personal belongings within reach  - Initiate and maintain comfort rounds  - Make Fall Risk Sign visible to staff  - Offer Toileting every  Hours, in advance of need  - Initiate/Maintain alarm  - Obtain necessary fall risk management equipment:   - Apply yellow socks and bracelet for high fall risk patients  - Consider moving patient to room near nurses station  Outcome: Progressing     Problem: PAIN - ADULT  Goal: Verbalizes/displays adequate comfort level or baseline comfort level  Description: Interventions:  - Encourage patient to monitor pain and request assistance  - Assess pain using appropriate pain scale  - Administer analgesics based on type and severity of pain and evaluate response  - Implement non-pharmacological measures as appropriate and evaluate response  - Consider cultural and social influences on pain and pain management  - Notify physician/advanced practitioner if interventions unsuccessful or patient reports new pain  Outcome: Progressing     Problem: INFECTION - ADULT  Goal: Absence or prevention of progression during hospitalization  Description: INTERVENTIONS:  - Assess and monitor for signs and symptoms of infection  - Monitor lab/diagnostic results  - Monitor all insertion sites, i e  indwelling lines, tubes, and drains  - Monitor endotracheal if appropriate and nasal secretions for changes in amount and color  - [place] appropriate cooling/warming therapies per order  - Administer medications as ordered  - Instruct and encourage patient and family to use good hand hygiene technique  - Identify and instruct in appropriate isolation precautions for identified infection/condition  Outcome: Progressing  Goal: Absence of fever/infection during neutropenic period  Description: INTERVENTIONS:  - Monitor WBC    Outcome: Progressing     Problem: SAFETY ADULT  Goal: Patient will remain free of falls  Description: INTERVENTIONS:  - Educate patient/family on patient safety including physical limitations  - Instruct patient to call for assistance with activity   - Consult OT/PT to assist with strengthening/mobility   - Keep Call bell within reach  - Keep bed low and locked with side rails adjusted as appropriate  - Keep care items and personal belongings within reach  - Initiate and maintain comfort rounds  - Make Fall Risk Sign visible to staff  - Offer Toileting every  Hours, in advance of need  - Initiate/Maintain bedalarm  - Obtain necessary fall risk management equipment:     - Apply yellow socks and bracelet for high fall risk patients  - Consider moving patient to room near nurses station  Outcome: Progressing  Goal: Maintain or return to baseline ADL function  Description: INTERVENTIONS:  -  Assess patient's ability to carry out ADLs; assess patient's baseline for ADL function and identify physical deficits which impact ability to perform ADLs (bathing, care of mouth/teeth, toileting, grooming, dressing, etc )  - Assess/evaluate cause of self-care deficits   - Assess range of motion  - Assess patient's mobility; develop plan if impaired  - Assess patient's need for assistive devices and provide as appropriate  - Encourage maximum independence but intervene and supervise when necessary  - Involve family in performance of ADLs  - Assess for home care needs following discharge   - Consider OT consult to assist with ADL evaluation and planning for discharge  - Provide patient education as appropriate  Outcome: Progressing  Goal: Maintains/Returns to pre admission functional level  Description: INTERVENTIONS:  - Perform BMAT or MOVE assessment daily    - Set and communicate daily mobility goal to care team and patient/family/caregiver  - Collaborate with rehabilitation services on mobility goals if consulted  - Perform Range of Motion  times a day  - Reposition patient every  hours    - Dangle patient  times a day  - Stand patient  times a day  - Ambulate patient  times a day  - Out of bed to chair  times a day   - Out of bed for meals  times a day  - Out of bed for toileting  - Record patient progress and toleration of activity level   Outcome: Progressing     Problem: DISCHARGE PLANNING  Goal: Discharge to home or other facility with appropriate resources  Description: INTERVENTIONS:  - Identify barriers to discharge w/patient and caregiver  - Arrange for needed discharge resources and transportation as appropriate  - Identify discharge learning needs (meds, wound care, etc )  - Arrange for interpretive services to assist at discharge as needed  - Refer to Case Management Department for coordinating discharge planning if the patient needs post-hospital services based on physician/advanced practitioner order or complex needs related to functional status, cognitive ability, or social support system  Outcome: Progressing

## 2021-08-07 NOTE — LETTER
[address] [address]  Dept: [phone]    August 8, 2021     Patient: Stanley Downs   YOB: 1945   Date of Visit: 8/7/2021       To Whom it May Concern:    Stanley Downs is under my professional care  He was seen in the hospital from 8/7/2021   to 08/08/21  He able to return to work but should not lift anything heavy for the next 2 weeks  If you have any questions or concerns, please don't hesitate to call           Sincerely,          Shea Cruz, DO

## 2021-08-07 NOTE — ED PROVIDER NOTES
History  Chief Complaint   Patient presents with    Fall     fall off a ladder earlier today, "i fell on a tree and ground" no LOC or head injury      HPI     69 yo M hx of hld, htn SBO obesity, presents to ed for eval of back pain  Patient fell 18 feet off a ladder  The ladder gave out under him  Landed on right side of his back on a tree trunk that had fallen  Now having 9/10 pain  On asa  No other thinners  No head strike neck pain or loc  Patient has no abd pain  No hematuria  He drove here  He ambulated back from the waiting room  He has no headache  Just concerned about his right flank / back he states  No neuro complaints  A and O x3  No other complaints on ROS  Prior to Admission Medications   Prescriptions Last Dose Informant Patient Reported? Taking?    ALPRAZolam (XANAX) 0 5 mg tablet 8/6/2021 at Unknown time  No Yes   Sig: Take 1 tablet (0 5 mg total) by mouth 2 (two) times a day   B Complex-Folic Acid (B COMPLEX PLUS) TABS 8/6/2021 at Unknown time Self Yes Yes   Sig: Take by mouth   Cholecalciferol (VITAMIN D3) 1000 units CAPS 8/6/2021 at Unknown time Self Yes Yes   Sig: Take by mouth   Chondroitin Sulfate-Vit C-Mn (CHONDROITIN SULFATE COMPLEX) 400-60-2 5 MG CAPS 8/6/2021 at Unknown time Self Yes Yes   Sig: Take by mouth   Coenzyme Q10 (COQ-10) 100 MG CAPS 8/6/2021 at Unknown time Self Yes Yes   Sig: Take by mouth   Multiple Vitamin (MULTI-VITAMIN DAILY PO) 8/6/2021 at Unknown time Self Yes Yes   Sig: Take by mouth   aspirin (ASPIRIN LOW DOSE) 81 mg EC tablet 8/6/2021 at Unknown time Self Yes Yes   Sig: Take by mouth   fenofibrate (TRICOR) 54 MG tablet Not Taking at Unknown time  No No   Sig: TAKE 1 TABLET BY MOUTH  DAILY   Patient not taking: Reported on 8/7/2021   meloxicam (MOBIC) 15 mg tablet Not Taking at Unknown time  Yes No   Sig: Take 15 mg by mouth daily   Patient not taking: Reported on 8/7/2021   metoprolol succinate (TOPROL-XL) 50 mg 24 hr tablet 8/6/2021 at Unknown time  No Yes   Sig: TAKE 1 TABLET BY MOUTH  DAILY   omeprazole (PriLOSEC) 40 MG capsule 8/6/2021 at Unknown time  No Yes   Sig: TAKE 1 CAPSULE BY MOUTH  DAILY   rosuvastatin (CRESTOR) 5 mg tablet Not Taking at Unknown time  No No   Sig: TAKE 1 TABLET BY MOUTH  DAILY   Patient not taking: Reported on 8/7/2021   solifenacin (VESIcare) 5 mg tablet Past Month at Unknown time  No Yes   Sig: Take 1 tablet (5 mg total) by mouth daily      Facility-Administered Medications: None       Past Medical History:   Diagnosis Date    Cancer ([facility] [address] )     basal call carcinoma    Colon polyps     DR Helen Mora    Diverticulosis     Hyperlipidemia     Hypertension     Obesity     KADEN (obstructive sleep apnea)     cpap    Pneumonia     Small bowel obstruction, partial (HCC)     SOB (shortness of breath)        Past Surgical History:   Procedure Laterality Date    SKIN BIOPSY      TONSILLECTOMY AND ADENOIDECTOMY         Family History   Problem Relation Age of Onset    Heart disease Mother     Hypertension Mother     Breast cancer Sister      I have reviewed and agree with the history as documented  E-Cigarette/Vaping    E-Cigarette Use Never User      E-Cigarette/Vaping Substances     Social History     Tobacco Use    Smoking status: Never Smoker    Smokeless tobacco: Never Used   Vaping Use    Vaping Use: Never used   Substance Use Topics    Alcohol use: Not Currently     Comment: occassional    Drug use: No       Review of Systems   Constitutional: Negative for chills, fatigue and fever  HENT: Negative for nosebleeds and sore throat  Eyes: Negative for redness and visual disturbance  Respiratory: Negative for shortness of breath and wheezing  Cardiovascular: Negative for chest pain and palpitations  Gastrointestinal: Negative for abdominal pain and diarrhea  Endocrine: Negative for polyuria  Genitourinary: Negative for difficulty urinating and testicular pain  Musculoskeletal: Positive for back pain   Negative for neck stiffness  Skin: Negative for rash and wound  Neurological: Negative for seizures, speech difficulty and headaches  Psychiatric/Behavioral: Negative for dysphoric mood and hallucinations  All other systems reviewed and are negative  Physical Exam  Primary Survey:   (A) Airway: in tact  (B) Breathing: CTAB  (C) Circulation: Pulses:   normal  (D) Disabliity:  GCS Total:  15  (E) Expose:  Completed      Physical Exam  Vitals and nursing note reviewed  Constitutional:       Appearance: He is well-developed  HENT:      Head: Normocephalic and atraumatic  Right Ear: External ear normal       Left Ear: External ear normal    Eyes:      Conjunctiva/sclera: Conjunctivae normal    Cardiovascular:      Rate and Rhythm: Normal rate and regular rhythm  Heart sounds: Normal heart sounds  Pulmonary:      Effort: Pulmonary effort is normal       Breath sounds: Normal breath sounds  No wheezing  Chest:      Chest wall: No tenderness  Abdominal:      General: Bowel sounds are normal       Palpations: Abdomen is soft  Tenderness: There is no abdominal tenderness  There is no guarding  Musculoskeletal:         General: Normal range of motion  Cervical back: Normal range of motion  Comments: No midline c t l spin tenderness  Right flank has mild echymosis and tenderness   Ambulated without difficulty   Skin:     General: Skin is warm and dry  Findings: No rash  Neurological:      Mental Status: He is alert and oriented to person, place, and time  Cranial Nerves: No cranial nerve deficit  Sensory: No sensory deficit  Motor: No abnormal muscle tone        Coordination: Coordination normal          Vital Signs  ED Triage Vitals   Temperature Pulse Respirations Blood Pressure SpO2   08/07/21 0329 08/07/21 0329 08/07/21 0329 08/07/21 0329 08/07/21 0329   97 9 °F (36 6 °C) 85 19 (!) 181/88 94 %      Temp Source Heart Rate Source Patient Position - Orthostatic VS BP Location FiO2 (%)   08/07/21 0329 08/07/21 0329 08/07/21 0329 08/07/21 0329 --   Oral Monitor Sitting Left arm       Pain Score       08/07/21 0407       9           Vitals:    08/07/21 2306 08/07/21 2308 08/08/21 0516 08/08/21 0903   BP: 98/56 100/57 148/72 146/75   Pulse: 63 62 69 95   Patient Position - Orthostatic VS: Lying - Orthostatic VS Lying - Orthostatic VS           Visual Acuity  Visual Acuity      Most Recent Value   L Pupil Size (mm)  3   R Pupil Size (mm)  3   L Pupil Shape  Round   R Pupil Shape  Round          ED Medications  Medications   sodium chloride 0 9 % bolus 1,000 mL (0 mL Intravenous Stopped 8/7/21 0621)   fentanyl citrate (PF) 100 MCG/2ML 50 mcg (50 mcg Intravenous Given 8/7/21 0407)   iohexol (OMNIPAQUE) 350 MG/ML injection (SINGLE-DOSE) 100 mL (100 mL Intravenous Given 8/7/21 0430)   morphine (PF) 4 mg/mL injection 4 mg (4 mg Intravenous Given 8/7/21 0839)       Diagnostic Studies  Results Reviewed     Procedure Component Value Units Date/Time    Basic metabolic panel [[id number]]  (Abnormal) Collected: 08/08/21 0439    Lab Status: Final result Specimen: Blood from Hand, Right Updated: 08/08/21 0529     Sodium 139 mmol/L      Potassium 4 0 mmol/L      Chloride 103 mmol/L      CO2 29 mmol/L      ANION GAP 7 mmol/L      BUN 17 mg/dL      Creatinine 1 11 mg/dL      Glucose 119 mg/dL      Glucose, Fasting 119 mg/dL      Calcium 8 4 mg/dL      eGFR 66 ml/min/1 73sq m     Narrative:      [place] guidelines for Chronic Kidney Disease (CKD):     Stage 1 with normal or high GFR (GFR > 90 mL/min/1 73 square meters)    Stage 2 Mild CKD (GFR = 60-89 mL/min/1 73 square meters)    Stage 3A Moderate CKD (GFR = 45-59 mL/min/1 73 square meters)    Stage 3B Moderate CKD (GFR = 30-44 mL/min/1 73 square meters)    Stage 4 Severe CKD (GFR = 15-29 mL/min/1 73 square meters)    Stage 5 End Stage CKD (GFR <15 mL/min/1 73 square meters)  Note: GFR calculation is accurate only with a steady state creatinine    UA (URINE) with reflex to Scope [[id number]] Collected: 08/07/21 1055    Lab Status: Final result Specimen: Urine, Clean Catch Updated: 08/07/21 1110     Color, UA Yellow     Clarity, UA Clear     Specific [place], UA 1 020     pH, UA 5 5     Leukocytes, UA Negative     Nitrite, UA Negative     Protein, UA Negative mg/dl      Glucose, UA Negative mg/dl      Ketones, UA Negative mg/dl      Urobilinogen, UA 0 2 E U /dl      Bilirubin, UA Negative     Blood, UA Negative    Comprehensive metabolic panel [[id number]] Collected: 08/07/21 0408    Lab Status: Final result Specimen: Blood from Arm, Right Updated: 08/07/21 0444     Sodium 144 mmol/L      Potassium 3 8 mmol/L      Chloride 105 mmol/L      CO2 29 mmol/L      ANION GAP 10 mmol/L      BUN 17 mg/dL      Creatinine 1 21 mg/dL      Glucose 123 mg/dL      Calcium 8 9 mg/dL      AST 38 U/L      ALT 58 U/L      Alkaline Phosphatase 65 U/L      Total Protein 7 9 g/dL      Albumin 4 2 g/dL      Total Bilirubin 0 84 mg/dL      eGFR 59 ml/min/1 73sq m     Narrative:      [facility] guidelines for Chronic Kidney Disease (CKD):     Stage 1 with normal or high GFR (GFR > 90 mL/min/1 73 square meters)    Stage 2 Mild CKD (GFR = 60-89 mL/min/1 73 square meters)    Stage 3A Moderate CKD (GFR = 45-59 mL/min/1 73 square meters)    Stage 3B Moderate CKD (GFR = 30-44 mL/min/1 73 square meters)    Stage 4 Severe CKD (GFR = 15-29 mL/min/1 73 square meters)    Stage 5 End Stage CKD (GFR <15 mL/min/1 73 square meters)  Note: GFR calculation is accurate only with a steady state creatinine    Lipase [[id number]]  (Normal) Collected: 08/07/21 0408    Lab Status: Final result Specimen: Blood from Arm, Right Updated: 08/07/21 0444     Lipase 88 u/L     Troponin I [[id number]]  (Normal) Collected: 08/07/21 0408    Lab Status: Final result Specimen: Blood from Arm, Right Updated: 08/07/21 0437     Troponin I <0 02 ng/mL     CBC and differential [[id number]]  (Abnormal) Collected: 08/07/21 0408    Lab Status: Final result Specimen: Blood from Arm, Right Updated: 08/07/21 0417     WBC 11 62 Thousand/uL      RBC 5 66 Million/uL      Hemoglobin 17 3 g/dL      Hematocrit 50 5 %      MCV 89 fL      MCH 30 6 pg      MCHC 34 3 g/dL      RDW 14 2 %      MPV 10 2 fL      Platelets 701 Thousands/uL      nRBC 0 /100 WBCs      Neutrophils Relative 65 %      Immat GRANS % 0 %      Lymphocytes Relative 24 %      Monocytes Relative 9 %      Eosinophils Relative 1 %      Basophils Relative 1 %      Neutrophils Absolute 7 57 Thousands/µL      Immature Grans Absolute 0 04 Thousand/uL      Lymphocytes Absolute 2 78 Thousands/µL      Monocytes Absolute 1 04 Thousand/µL      Eosinophils Absolute 0 11 Thousand/µL      Basophils Absolute 0 08 Thousands/µL                  CT chest abdomen pelvis w contrast   Final Result by Joann Cranker, DO (08/07 5886)      No acute intracranial abnormality is seen  Other findings as above  CT CERVICAL SPINE - WITHOUT CONTRAST      INDICATION:   Fall  COMPARISON:  None  TECHNIQUE:  CT examination of the cervical spine was performed without intravenous contrast   Contiguous axial images were obtained  Sagittal and coronal reconstructions were performed  Radiation dose length product (DLP) for this visit:  927 mGy-cm  (accession [id number]), 459 mGy-cm  (accession [id number]), 692 mGy-cm  (accession [id number])  This examination, like all CT scans performed in the [facility], was performed    utilizing techniques to minimize radiation dose exposure, including the use of iterative reconstruction and automated exposure control  IMAGE QUALITY:  Diagnostic  FINDINGS:      ALIGNMENT:  Normal alignment of the cervical spine  No subluxation  VERTEBRAL BODIES:  No acute fracture        DEGENERATIVE CHANGES:  Intervertebral disc space narrowing at C6/C7 with anterior, marginal, and uncovertebral osteophytosis at this level  Facet joint arthropathy at C7/T1  PREVERTEBRAL AND PARASPINAL SOFT TISSUES: Incidental discovery of one or more thyroid nodule(s) measuring more than 1 5 cm and without suspicious features is noted in this patient who is above 28years old; according to guidelines published in the    February 2015 white paper on incidental thyroid nodules in the Journal of the [employer] of Radiology Rafi Mosher), further characterization with thyroid ultrasound is recommended  Edema in the posterior soft tissues in the upper cervical region,    nonspecific  THORACIC INLET:  Please refer to the concurrent chest, abdomen, and pelvic CT report for description of the thoracic inlet findings  IMPRESSION:      Degenerative changes as described but no evidence of acute cervical spine injury  Edema in the posterior soft tissues in the upper cervical region, nonspecific      Incidental thyroid nodule(s) for which nonemergent thyroid ultrasound is recommended  CT CHEST, ABDOMEN AND PELVIS WITH IV CONTRAST      INDICATION: Fall  COMPARISON: None  TECHNIQUE: CT examination of the chest, abdomen and pelvis was performed  Reformatted images were created in axial, sagittal, and coronal planes  Radiation dose length product (DLP) for this visit:  927 mGy-cm  (accession [id number]), 621 mGy-cm  (accession [id number]), 706 mGy-cm  (accession [id number])  This examination, like all CT scans performed in the [facility], was performed    utilizing techniques to minimize radiation dose exposure, including the use of iterative reconstruction and automated exposure control  IV Contrast:  100 mL of iohexol (OMNIPAQUE)   Enteric Contrast:  Enteric contrast was not administered        CHEST      LUNGS:  Patchy opacity in the posterior aspect of the superior segment of the right lower lobe (axial image 26, series 2), concerning for pulmonary contusion  Lungs otherwise appear grossly clear  PLEURA:  Unremarkable  HEART/GREAT VESSELS: The heart appears normal in size  Mild aortic and coronary atherosclerosis  No aortic aneurysm  MEDIASTINUM AND JAME:  Unremarkable  CHEST WALL AND LOWER NECK: Nondisplaced fractures of the posterior right 9th and 10th ribs  1 5 cm nodule in the right thyroid lobe  ABDOMEN      LIVER/BILIARY TREE:  Liver is diffusely decreased in density consistent with fatty change  No CT evidence of suspicious hepatic mass  Normal hepatic contours  No biliary dilatation  GALLBLADDER:  No calcified gallstones  No pericholecystic inflammatory change  SPLEEN:  Unremarkable  PANCREAS:  Unremarkable  ADRENAL GLANDS:  1 8 cm nodule in the left adrenal gland redemonstrated, similar in appearance to the 2018 study, probably benign  Otherwise grossly unremarkable  KIDNEYS/URETERS:  Subcentimeter nonobstructing stone in the lower pole of the right kidney  Several scattered low-density lesions in the bilateral kidneys, too small to definitively characterize but of unlikely clinical significance  Bilateral kidneys    otherwise appear grossly unremarkable; no hydronephrosis  STOMACH AND BOWEL:  Scattered colonic diverticulosis without evidence of acute diverticulitis  No evidence of bowel obstruction  Otherwise grossly unremarkable  APPENDIX:  No findings to suggest appendicitis  ABDOMINOPELVIC CAVITY:  No ascites or free intraperitoneal air  No lymphadenopathy  VESSELS:  Scattered atherosclerotic calcifications  No aortic aneurysm  PELVIS      REPRODUCTIVE ORGANS:  Unremarkable for patient's age  URINARY BLADDER:  Unremarkable  ABDOMINAL WALL/INGUINAL REGIONS:  Unremarkable  OSSEOUS STRUCTURES: Multilevel degenerative changes of the spine  No acute fracture or destructive osseous lesion           IMPRESSION:     Nondisplaced fractures of the posterior right 9th and 10th ribs  Patchy opacity in the posterior aspect of the superior segment of the right lower lobe (axial image 26, series 2), concerning for pulmonary contusion  Fatty infiltration of the liver is suspected  In the setting of abdominal pain and/or elevated liver function tests, consider steatohepatitis  Coronary atherosclerosis, benign-appearing left adrenal nodule, right-sided nephrolithiasis, no hydronephrosis, colonic diverticulosis without evidence of acute diverticulitis, and other findings as above  Workstation performed: [id number]         CT head wo contrast   Final Result by Joann Cranker, DO (08/07 0956)      No acute intracranial abnormality is seen  Other findings as above  CT CERVICAL SPINE - WITHOUT CONTRAST      INDICATION:   Fall  COMPARISON:  None  TECHNIQUE:  CT examination of the cervical spine was performed without intravenous contrast   Contiguous axial images were obtained  Sagittal and coronal reconstructions were performed  Radiation dose length product (DLP) for this visit:  927 mGy-cm  (accession [id number]), 775 mGy-cm  (accession [id number]), 073 mGy-cm  (accession [id number])  This examination, like all CT scans performed in the [facility], was performed    utilizing techniques to minimize radiation dose exposure, including the use of iterative reconstruction and automated exposure control  IMAGE QUALITY:  Diagnostic  FINDINGS:      ALIGNMENT:  Normal alignment of the cervical spine  No subluxation  VERTEBRAL BODIES:  No acute fracture  DEGENERATIVE CHANGES:  Intervertebral disc space narrowing at C6/C7 with anterior, marginal, and uncovertebral osteophytosis at this level  Facet joint arthropathy at C7/T1        PREVERTEBRAL AND PARASPINAL SOFT TISSUES: Incidental discovery of one or more thyroid nodule(s) measuring more than 1 5 cm and without suspicious features is noted in this patient who is above 28years old; according to guidelines published in the    February 2015 white paper on incidental thyroid nodules in the Journal of the [employer] of Radiology [facility]), further characterization with thyroid ultrasound is recommended  Edema in the posterior soft tissues in the upper cervical region,    nonspecific  THORACIC INLET:  Please refer to the concurrent chest, abdomen, and pelvic CT report for description of the thoracic inlet findings  IMPRESSION:      Degenerative changes as described but no evidence of acute cervical spine injury  Edema in the posterior soft tissues in the upper cervical region, nonspecific      Incidental thyroid nodule(s) for which nonemergent thyroid ultrasound is recommended  CT CHEST, ABDOMEN AND PELVIS WITH IV CONTRAST      INDICATION: Fall  COMPARISON: None  TECHNIQUE: CT examination of the chest, abdomen and pelvis was performed  Reformatted images were created in axial, sagittal, and coronal planes  Radiation dose length product (DLP) for this visit:  927 mGy-cm  (accession [id number]), 497 mGy-cm  (accession [id number]), 405 mGy-cm  (accession [id number])  This examination, like all CT scans performed in the [facility], was performed    utilizing techniques to minimize radiation dose exposure, including the use of iterative reconstruction and automated exposure control  IV Contrast:  100 mL of iohexol (OMNIPAQUE)   Enteric Contrast:  Enteric contrast was not administered  CHEST      LUNGS:  Patchy opacity in the posterior aspect of the superior segment of the right lower lobe (axial image 26, series 2), concerning for pulmonary contusion  Lungs otherwise appear grossly clear  PLEURA:  Unremarkable  HEART/GREAT VESSELS: The heart appears normal in size  Mild aortic and coronary atherosclerosis  No aortic aneurysm  MEDIASTINUM AND JAME:  Unremarkable  CHEST WALL AND LOWER NECK: Nondisplaced fractures of the posterior right 9th and 10th ribs  1 5 cm nodule in the right thyroid lobe  ABDOMEN      LIVER/BILIARY TREE:  Liver is diffusely decreased in density consistent with fatty change  No CT evidence of suspicious hepatic mass  Normal hepatic contours  No biliary dilatation  GALLBLADDER:  No calcified gallstones  No pericholecystic inflammatory change  SPLEEN:  Unremarkable  PANCREAS:  Unremarkable  ADRENAL GLANDS:  1 8 cm nodule in the left adrenal gland redemonstrated, similar in appearance to the 2018 study, probably benign  Otherwise grossly unremarkable  KIDNEYS/URETERS:  Subcentimeter nonobstructing stone in the lower pole of the right kidney  Several scattered low-density lesions in the bilateral kidneys, too small to definitively characterize but of unlikely clinical significance  Bilateral kidneys    otherwise appear grossly unremarkable; no hydronephrosis  STOMACH AND BOWEL:  Scattered colonic diverticulosis without evidence of acute diverticulitis  No evidence of bowel obstruction  Otherwise grossly unremarkable  APPENDIX:  No findings to suggest appendicitis  ABDOMINOPELVIC CAVITY:  No ascites or free intraperitoneal air  No lymphadenopathy  VESSELS:  Scattered atherosclerotic calcifications  No aortic aneurysm  PELVIS      REPRODUCTIVE ORGANS:  Unremarkable for patient's age  URINARY BLADDER:  Unremarkable  ABDOMINAL WALL/INGUINAL REGIONS:  Unremarkable  OSSEOUS STRUCTURES: Multilevel degenerative changes of the spine  No acute fracture or destructive osseous lesion  IMPRESSION:     Nondisplaced fractures of the posterior right 9th and 10th ribs  Patchy opacity in the posterior aspect of the superior segment of the right lower lobe (axial image 26, series 2), concerning for pulmonary contusion  Fatty infiltration of the liver is suspected    In the setting of abdominal pain and/or elevated liver function tests, consider steatohepatitis  Coronary atherosclerosis, benign-appearing left adrenal nodule, right-sided nephrolithiasis, no hydronephrosis, colonic diverticulosis without evidence of acute diverticulitis, and other findings as above  Workstation performed: [id number]         CT spine cervical without contrast   Final Result by Leonard Edwards DO (08/07 3908)      No acute intracranial abnormality is seen  Other findings as above  CT CERVICAL SPINE - WITHOUT CONTRAST      INDICATION:   Fall  COMPARISON:  None  TECHNIQUE:  CT examination of the cervical spine was performed without intravenous contrast   Contiguous axial images were obtained  Sagittal and coronal reconstructions were performed  Radiation dose length product (DLP) for this visit:  927 mGy-cm  (accession [id number]), 596 mGy-cm  (accession [id number]), 442 mGy-cm  (accession [id number])  This examination, like all CT scans performed in the [facility], was performed    utilizing techniques to minimize radiation dose exposure, including the use of iterative reconstruction and automated exposure control  IMAGE QUALITY:  Diagnostic  FINDINGS:      ALIGNMENT:  Normal alignment of the cervical spine  No subluxation  VERTEBRAL BODIES:  No acute fracture  DEGENERATIVE CHANGES:  Intervertebral disc space narrowing at C6/C7 with anterior, marginal, and uncovertebral osteophytosis at this level  Facet joint arthropathy at C7/T1        PREVERTEBRAL AND PARASPINAL SOFT TISSUES: Incidental discovery of one or more thyroid nodule(s) measuring more than 1 5 cm and without suspicious features is noted in this patient who is above 28years old; according to guidelines published in the    February 2015 white paper on incidental thyroid nodules in the Journal of the [address] [place] [address] of Radiology Rafi Mosher), further characterization with thyroid ultrasound is recommended  Edema in the posterior soft tissues in the upper cervical region,    nonspecific  THORACIC INLET:  Please refer to the concurrent chest, abdomen, and pelvic CT report for description of the thoracic inlet findings  IMPRESSION:      Degenerative changes as described but no evidence of acute cervical spine injury  Edema in the posterior soft tissues in the upper cervical region, nonspecific      Incidental thyroid nodule(s) for which nonemergent thyroid ultrasound is recommended  CT CHEST, ABDOMEN AND PELVIS WITH IV CONTRAST      INDICATION: Fall  COMPARISON: None  TECHNIQUE: CT examination of the chest, abdomen and pelvis was performed  Reformatted images were created in axial, sagittal, and coronal planes  Radiation dose length product (DLP) for this visit:  927 mGy-cm  (accession [id number]), 523 mGy-cm  (accession [id number]), 875 mGy-cm  (accession [id number])  This examination, like all CT scans performed in the [facility], was performed    utilizing techniques to minimize radiation dose exposure, including the use of iterative reconstruction and automated exposure control  IV Contrast:  100 mL of iohexol (OMNIPAQUE)   Enteric Contrast:  Enteric contrast was not administered  CHEST      LUNGS:  Patchy opacity in the posterior aspect of the superior segment of the right lower lobe (axial image 26, series 2), concerning for pulmonary contusion  Lungs otherwise appear grossly clear  PLEURA:  Unremarkable  HEART/GREAT VESSELS: The heart appears normal in size  Mild aortic and coronary atherosclerosis  No aortic aneurysm  MEDIASTINUM AND JAME:  Unremarkable  CHEST WALL AND LOWER NECK: Nondisplaced fractures of the posterior right 9th and 10th ribs  1 5 cm nodule in the right thyroid lobe          ABDOMEN      LIVER/BILIARY TREE:  Liver is diffusely decreased in density consistent with fatty change  No CT evidence of suspicious hepatic mass  Normal hepatic contours  No biliary dilatation  GALLBLADDER:  No calcified gallstones  No pericholecystic inflammatory change  SPLEEN:  Unremarkable  PANCREAS:  Unremarkable  ADRENAL GLANDS:  1 8 cm nodule in the left adrenal gland redemonstrated, similar in appearance to the 2018 study, probably benign  Otherwise grossly unremarkable  KIDNEYS/URETERS:  Subcentimeter nonobstructing stone in the lower pole of the right kidney  Several scattered low-density lesions in the bilateral kidneys, too small to definitively characterize but of unlikely clinical significance  Bilateral kidneys    otherwise appear grossly unremarkable; no hydronephrosis  STOMACH AND BOWEL:  Scattered colonic diverticulosis without evidence of acute diverticulitis  No evidence of bowel obstruction  Otherwise grossly unremarkable  APPENDIX:  No findings to suggest appendicitis  ABDOMINOPELVIC CAVITY:  No ascites or free intraperitoneal air  No lymphadenopathy  VESSELS:  Scattered atherosclerotic calcifications  No aortic aneurysm  PELVIS      REPRODUCTIVE ORGANS:  Unremarkable for patient's age  URINARY BLADDER:  Unremarkable  ABDOMINAL WALL/INGUINAL REGIONS:  Unremarkable  OSSEOUS STRUCTURES: Multilevel degenerative changes of the spine  No acute fracture or destructive osseous lesion  IMPRESSION:     Nondisplaced fractures of the posterior right 9th and 10th ribs  Patchy opacity in the posterior aspect of the superior segment of the right lower lobe (axial image 26, series 2), concerning for pulmonary contusion  Fatty infiltration of the liver is suspected  In the setting of abdominal pain and/or elevated liver function tests, consider steatohepatitis          Coronary atherosclerosis, benign-appearing left adrenal nodule, right-sided nephrolithiasis, no hydronephrosis, colonic diverticulosis without evidence of acute diverticulitis, and other findings as above  Workstation performed: [id number]                    Procedures  Procedures         ED Course  ED Course as of Aug 10 1628   Sat Aug 07, 2021   3075 EKG: nsr no acute st changes      0557 Spoke to [address], OK to not transfer and admit for pain control and IS here  SBIRT 20yo+      Most Recent Value   SBIRT (24 yo +)   In order to provide better care to our patients, we are screening all of our patients for alcohol and drug use  Would it be okay to ask you these screening questions? No Filed at: 08/07/2021 7717              MDM     69 yo M presents after fall  Ct head c spine and chest abd pelvis given mechanism and height  imaging shows rib fractures and pulm contusion  Discussed it [facility] trauma  See ed course  [address] Padmin[address] Pulido for admission here  Admitted to medicine for pain control and pulmonary toilet  Disposition  Final diagnoses:   Fall, initial encounter   Contusion of right lung, initial encounter   Closed fracture of multiple ribs of right side, initial encounter   Thyroid nodule     Time reflects when diagnosis was documented in both MDM as applicable and the Disposition within this note     Time User Action Codes Description Comment    8/7/2021  5:40 AM Glorietta Ritu Add Minh Raúl  FWRM] Fall, initial encounter     8/7/2021  5:40 AM Glorietta [place] Add [Q60 945D] Contusion of right lung, initial encounter     8/7/2021  5:41 AM Glorietta [place] Add [S22 41XA] Closed fracture of multiple ribs of right side, initial encounter     8/7/2021  5:41 AM Glorietta Ritu Modify [G77  NCTV] Fall, initial encounter     8/7/2021  5:41 AM Glorietta Ritu Modify [E26 126A] Contusion of right lung, initial encounter     8/7/2021  5:41 AM Glorietta [place] Add [E04 1] Thyroid nodule       ED Disposition     ED Disposition Condition Date/Time Comment    Admit Stable Sat Aug 7, 2021  7:25 AM Case was discussed with MARY LOU and the patient's admission status was agreed to be Admission Status: observation status to the service of Dr Mary Gonzalez            Follow-up Information     Follow up With Specialties Details Why Randall Bustos MD Internal Medicine Follow up  [facility]or[facility]ley  [phone]            Discharge Medication List as of 8/8/2021 11:57 AM      START taking these medications    Details   oxyCODONE (ROXICODONE) 10 MG TABS Take 1 tablet (10 mg total) by mouth every 6 (six) hours as needed for moderate pain for up to 10 daysMax Daily Amount: 40 mg, Starting Sun 8/8/2021, Until Wed 8/18/2021 at 2359, Normal         CONTINUE these medications which have NOT CHANGED    Details   ALPRAZolam (XANAX) 0 5 mg tablet Take 1 tablet (0 5 mg total) by mouth 2 (two) times a day, Starting Tue 7/20/2021, Normal      aspirin (ASPIRIN LOW DOSE) 81 mg EC tablet Take by mouth, Historical Med      B Complex-Folic Acid (B COMPLEX PLUS) TABS Take by mouth, Historical Med      Cholecalciferol (VITAMIN D3) 1000 units CAPS Take by mouth, Historical Med      Chondroitin Sulfate-Vit C-Mn (CHONDROITIN SULFATE COMPLEX) 400-60-2 5 MG CAPS Take by mouth, Historical Med      Coenzyme Q10 (COQ-10) 100 MG CAPS Take by mouth, Historical Med      fenofibrate (TRICOR) 54 MG tablet TAKE 1 TABLET BY MOUTH  DAILY, Normal      meloxicam (MOBIC) 15 mg tablet Take 15 mg by mouth daily, Historical Med      metoprolol succinate (TOPROL-XL) 50 mg 24 hr tablet TAKE 1 TABLET BY MOUTH  DAILY, Normal      Multiple Vitamin (MULTI-VITAMIN DAILY PO) Take by mouth, Historical Med      omeprazole (PriLOSEC) 40 MG capsule TAKE 1 CAPSULE BY MOUTH  DAILY, Normal      rosuvastatin (CRESTOR) 5 mg tablet TAKE 1 TABLET BY MOUTH  DAILY, Normal      solifenacin (VESIcare) 5 mg tablet Take 1 tablet (5 mg total) by mouth daily, Starting Mon 1/11/2021, Normal           Outpatient Discharge Orders   Call provider for:  persistent nausea or vomiting     Call provider for:  redness, tenderness, or signs of infection (pain, swelling, redness, odor or green/yellow discharge around incision site)     Call provider for:  difficulty breathing, headache or visual disturbances       PDMP Review       Value Time User    PDMP Reviewed  Yes 7/20/2021 10:55 AM Bindu Vallejo MD          ED Provider  Electronically Signed by           Jihan Christianson MD  08/10/21 0849

## 2021-08-07 NOTE — ASSESSMENT & PLAN NOTE
· Patient states he was recently taken off his rosuvastatin secondary to myalgias  · Follow-up outpatient with primary care provider

## 2021-08-08 LAB
ANION GAP SERPL CALCULATED.3IONS-SCNC: 7 MMOL/L (ref 4–13)
BUN SERPL-MCNC: 17 MG/DL (ref 5–25)
CALCIUM SERPL-MCNC: 8.4 MG/DL (ref 8.3–10.1)
CHLORIDE SERPL-SCNC: 103 MMOL/L (ref 100–108)
CO2 SERPL-SCNC: 29 MMOL/L (ref 21–32)
CREAT SERPL-MCNC: 1.11 MG/DL (ref 0.6–1.3)
GFR SERPL CREATININE-BSD FRML MDRD: 66 ML/MIN/1.73SQ M
GLUCOSE P FAST SERPL-MCNC: 119 MG/DL (ref 65–99)
GLUCOSE SERPL-MCNC: 119 MG/DL (ref 65–140)
POTASSIUM SERPL-SCNC: 4 MMOL/L (ref 3.5–5.3)
SODIUM SERPL-SCNC: 139 MMOL/L (ref 136–145)

## 2021-08-08 PROCEDURE — 80048 BASIC METABOLIC PNL TOTAL CA: CPT | Performed by: INTERNAL MEDICINE

## 2021-08-08 PROCEDURE — 99217 PR OBSERVATION CARE DISCHARGE MANAGEMENT: CPT | Performed by: INTERNAL MEDICINE

## 2021-08-08 RX ORDER — OXYCODONE HYDROCHLORIDE 10 MG/1
10 TABLET ORAL EVERY 6 HOURS PRN
Qty: 30 TABLET | Refills: 0 | Status: SHIPPED | OUTPATIENT
Start: 2021-08-08 | End: 2021-08-19

## 2021-08-08 RX ADMIN — HYDROMORPHONE HYDROCHLORIDE 0.5 MG: 1 INJECTION, SOLUTION INTRAMUSCULAR; INTRAVENOUS; SUBCUTANEOUS at 08:44

## 2021-08-08 RX ADMIN — OXYCODONE HYDROCHLORIDE 10 MG: 10 TABLET ORAL at 05:18

## 2021-08-08 RX ADMIN — ASPIRIN 81 MG: 81 TABLET, COATED ORAL at 08:44

## 2021-08-08 RX ADMIN — PANTOPRAZOLE SODIUM 40 MG: 40 TABLET, DELAYED RELEASE ORAL at 05:18

## 2021-08-08 RX ADMIN — ALPRAZOLAM 0.5 MG: 0.5 TABLET ORAL at 08:44

## 2021-08-08 RX ADMIN — METOPROLOL SUCCINATE 50 MG: 50 TABLET, EXTENDED RELEASE ORAL at 08:44

## 2021-08-08 NOTE — DISCHARGE SUMMARY
[address] [facility]  Discharge- Aster Naik 1945, 70 y o  male MRN: [id number]  Unit/Bed#: -01 Encounter: [id number]  Primary Care Provider: Mario Schwartz MD   Date and time admitted to hospital: 8/7/2021  3:32 AM    * Rib fracture  Assessment & Plan  · Patient had fallen off 18 ft ladder with no symptoms prior to fall  · CT head-no acute intracranial abnormality seen  · CT cervical spine-degenerative changes no evidence of acute cervical injury  · CT chest abdomen pelvis-nondisplaced fracture of posterior right 9th and 10th ribs; patchy opacity concerning for pulmonary contusion; benign appearing left adrenal nodule    Plan  · Incentive spirometry to be used upon discharge  · Patient given script for oxycodone 10 mg Q 6 hour p r n  Moderate pain  · Follow-up with primary care provider    [facility] 12 as noted above    Abnormal CT of the abdomen  Assessment & Plan  · CT abdomen pelvis-noting benign adrenal nodule  · Outpatient follow-up with primary care provider; patient was made aware finding    Hyperlipidemia  Assessment & Plan  · Patient states he was recently taken off his rosuvastatin secondary to myalgias  · Follow-up outpatient with primary care provider    GERD (gastroesophageal reflux disease)  Assessment & Plan  Protonix 40 mg daily    Benign hypertension  Assessment & Plan  Continue home blood pressure regimen    KADEN on CPAP  Assessment & Plan  · CPAP q h s        Medical Problems     Resolved Problems  Date Reviewed: 3/31/2021    None              Discharging Physician / Practitioner: Zenaida Ortiz DO  PCP: Mario Schwartz MD  Admission Date:   Admission Orders (From admission, onward)     Ordered        08/07/21 0726  Place in Observation  Once                   Discharge Date: 08/08/21    Consultations During Hospital Stay:  · trauma    Procedures Performed:   · none    Significant Findings / Test Results:   · none    Incidental Findings:   · As noted above    Test Results Pending at Discharge (will require follow up):   · none     Outpatient Tests Requested:  · none    Complications:  None    Reason for Admission:  [facility] Course:   Evans Bush is a 70 y o  male patient who originally presented to the hospital on 8/7/2021 due to fall and rib fracture  Patient was monitored in the hospital for 1 day secondary to rib fracture  Patient pain was well controlled and will be sent home with home pain medications  Did advised patient will likely be around 2 weeks before his pain improves in could be up to 4-6 until it is completely gone  Advised patient to continue with incentive spirometry  Patient had no further questions and will follow-up with primary care provider  Please see above list of diagnoses and related plan for additional information  Condition at Discharge: stable    Discharge Day Visit / Exam:   Subjective:  Resting comfortably on examination without any complaints  Vitals: Blood Pressure: 146/75 (08/08/21 0903)  Pulse: 95 (08/08/21 0903)  Temperature: 99 °F (37 2 °C) (08/08/21 0903)  Temp Source: Oral (08/07/21 2306)  Respirations: 17 (08/07/21 2308)  Height: 5' 4" (162 6 cm) (08/07/21 0329)  Weight - Scale: 104 kg (229 lb) (08/07/21 0329)  SpO2: (!) 89 % (08/08/21 0903)  Exam:   Physical Exam  Vitals and nursing note reviewed  Constitutional:       General: He is not in acute distress  Appearance: He is well-developed  HENT:      Head: Normocephalic and atraumatic  Eyes:      General: No scleral icterus  Conjunctiva/sclera: Conjunctivae normal    Cardiovascular:      Rate and Rhythm: Normal rate and regular rhythm  Heart sounds: Normal heart sounds  No murmur heard  No friction rub  No gallop  Pulmonary:      Effort: Pulmonary effort is normal  No respiratory distress  Breath sounds: Normal breath sounds  No wheezing or rales        Comments: Pain to palpation on right chest wall  Abdominal: General: Bowel sounds are normal  There is no distension  Palpations: Abdomen is soft  Tenderness: There is no abdominal tenderness  Musculoskeletal:         General: Normal range of motion  Skin:     General: Skin is warm  Findings: No rash  Neurological:      Mental Status: He is alert and oriented to person, place, and time  Discussion with Family: Patient declined call to   Discharge instructions/Information to patient and family:   See after visit summary for information provided to patient and family  Provisions for Follow-Up Care:  See after visit summary for information related to follow-up care and any pertinent home health orders  Disposition:   Home    Planned Readmission: no     Discharge Statement:  I spent 33 minutes discharging the patient  This time was spent on the day of discharge  I had direct contact with the patient on the day of discharge  Greater than 50% of the total time was spent examining patient, answering all patient questions, arranging and discussing plan of care with patient as well as directly providing post-discharge instructions  Additional time then spent on discharge activities  Discharge Medications:  See after visit summary for reconciled discharge medications provided to patient and/or family        **Please Note: This note may have been constructed using a voice recognition system**

## 2021-08-08 NOTE — ASSESSMENT & PLAN NOTE
· Patient had fallen off 18 ft ladder with no symptoms prior to fall  · CT head-no acute intracranial abnormality seen  · CT cervical spine-degenerative changes no evidence of acute cervical injury  · CT chest abdomen pelvis-nondisplaced fracture of posterior right 9th and 10th ribs; patchy opacity concerning for pulmonary contusion; benign appearing left adrenal nodule    Plan  · Incentive spirometry to be used upon discharge  · Patient given script for oxycodone 10 mg Q 6 hour p r n   Moderate pain  · Follow-up with primary care provider

## 2021-08-08 NOTE — PLAN OF CARE
Problem: Potential for Falls  Goal: Patient will remain free of falls  Description: INTERVENTIONS:  - Educate patient/family on patient safety including physical limitations  - Instruct patient to call for assistance with activity   - Consult OT/PT to assist with strengthening/mobility   - Keep Call bell within reach  - Keep bed low and locked with side rails adjusted as appropriate  - Keep care items and personal belongings within reach  - Initiate and maintain comfort rounds  - Make Fall Risk Sign visible to staff  - Offer Toileting every  Hours, in advance of need  - Initiate/Maintain alarm  - Obtain necessary fall risk management equipment:   - Apply yellow socks and bracelet for high fall risk patients  - Consider moving patient to room near nurses station  8/8/2021 1154 by Pari Valladares, RN  Outcome: Adequate for Discharge  8/8/2021 1153 by Pari Valladares, RN  Outcome: Progressing

## 2021-08-08 NOTE — ASSESSMENT & PLAN NOTE
· CT abdomen pelvis-noting benign adrenal nodule  · Outpatient follow-up with primary care provider; patient was made aware finding

## 2021-08-09 ENCOUNTER — TELEPHONE (OUTPATIENT)
Dept: INTERNAL MEDICINE CLINIC | Facility: CLINIC | Age: 76
End: 2021-08-09

## 2021-08-09 ENCOUNTER — TRANSITIONAL CARE MANAGEMENT (OUTPATIENT)
Dept: INTERNAL MEDICINE CLINIC | Facility: CLINIC | Age: 76
End: 2021-08-09

## 2021-08-09 VITALS
HEIGHT: 64 IN | RESPIRATION RATE: 17 BRPM | SYSTOLIC BLOOD PRESSURE: 146 MMHG | WEIGHT: 229 LBS | BODY MASS INDEX: 39.09 KG/M2 | OXYGEN SATURATION: 89 % | TEMPERATURE: 99 F | DIASTOLIC BLOOD PRESSURE: 75 MMHG | HEART RATE: 95 BPM

## 2021-08-09 NOTE — TELEPHONE ENCOUNTER
Patient is on oxycodone 10 mg tablets, due to a fall off a ladder  He is constipated from the pain meds  Elida Bryan gave him 2 Colace 100 mg tablets last night and 2 Colace 100 mg, today  She also gave him peaches  It doesn't seem to be helping him  She would like to know if there is something else he could take? Please advise       Call back #[phone]  Elida Bryan ([place])

## 2021-08-09 NOTE — TELEPHONE ENCOUNTER
Miralax    Also see if he can tolerate taking a half of the oxycodone with a regular strength Tylenol

## 2021-08-12 ENCOUNTER — OFFICE VISIT (OUTPATIENT)
Dept: INTERNAL MEDICINE CLINIC | Facility: CLINIC | Age: 76
End: 2021-08-12
Payer: MEDICARE

## 2021-08-12 VITALS
WEIGHT: 215 LBS | TEMPERATURE: 98.6 F | HEIGHT: 64 IN | SYSTOLIC BLOOD PRESSURE: 110 MMHG | BODY MASS INDEX: 36.7 KG/M2 | RESPIRATION RATE: 16 BRPM | OXYGEN SATURATION: 93 % | HEART RATE: 78 BPM | DIASTOLIC BLOOD PRESSURE: 80 MMHG

## 2021-08-12 DIAGNOSIS — S22.41XA CLOSED FRACTURE OF MULTIPLE RIBS OF RIGHT SIDE, INITIAL ENCOUNTER: Primary | ICD-10-CM

## 2021-08-12 DIAGNOSIS — E04.1 THYROID NODULE: ICD-10-CM

## 2021-08-12 PROCEDURE — 99495 TRANSJ CARE MGMT MOD F2F 14D: CPT | Performed by: INTERNAL MEDICINE

## 2021-08-12 NOTE — LETTER
August 12, 2021     Patient: Alexa Watts   YOB: 1945   Date of Visit: 8/12/2021       To Whom it May Concern:    Alexa Watts is under my professional care  He was seen in my office on 8/12/2021  He may return to work with limitations on 8/14 with restrictions of no heavy lifting  No more than 5 lbs for total of 6 weeks from time of injury       If you have any questions or concerns, please don't hesitate to call           Sincerely,          Brayan Silveira MD        CC: No Recipients

## 2021-08-12 NOTE — PROGRESS NOTES
Assessment/Plan:       Appears to be improving  No signs of pneumonia  Continue incentive spirometry  Continue tapering the pain medicines since is causing significant constipation and decreased appetite  He wants to go back to work so I gave him a note with lifting restrictions  Quality Measures:       Return in about 1 week (around 8/19/2021) for Recheck  No problem-specific Assessment & Plan notes found for this encounter  Diagnoses and all orders for this visit:    Closed fracture of multiple ribs of right side, initial encounter    Thyroid nodule  -     US thyroid; Future          Subjective:      Patient ID: Mellisa Catherine is a 70 y o  male  Patient comes in today for  Hospital follow-up  He had fallen off a ladder and broke 2 ribs  He is komal that was all  He fell from a height of 18 ft  He was discharged home with pain medicine and incentive spirometry  He had to call within a day or 2 because of significant constipation from the pain medicine  I had him reduce the dose and use Tylenol  That has been working  He is using the incentive spirometry  He has been improving  He actually wants to go back to work on Saturday no shortness of breath  No coughing  He does have a rash at the site of the injury  States he hit a tree branch on the way down  TCM Call (since 7/12/2021)     Date and time call was made  8/9/2021  1:35 PM    Hospital care reviewed  Records reviewed    Patient was hospitialized at  [facility]        Date of Admission  08/07/21    Date of discharge  08/08/21    Diagnosis  Contusion on RT Lung    Disposition  Home    Current Symptoms  Fever      TCM Call (since 7/12/2021)     Post hospital issues  None    Should patient be enrolled in anticoag monitoring? No    Scheduled for follow up?   Yes    Did you obtain your prescribed medications  Yes    Do you need help managing your prescriptions or medications  Yes    Is transportation to your appointment needed Yes    I have advised the patient to call PCP with any new or worsening symptoms    [address]  None    Are you recieving home care services  No    Are you using any community resources  No    Current waiver services  No    Have you fallen in the last 12 months  No    Interperter language line needed  No    Counseling  Family          ALLERGIES:  No Known Allergies    CURRENT MEDICATIONS:    Current Outpatient Medications:     ALPRAZolam (XANAX) 0 5 mg tablet, Take 1 tablet (0 5 mg total) by mouth 2 (two) times a day, Disp: 60 tablet, Rfl: 2    aspirin (ASPIRIN LOW DOSE) 81 mg EC tablet, Take by mouth, Disp: , Rfl:     B Complex-Folic Acid (B COMPLEX PLUS) TABS, Take by mouth, Disp: , Rfl:     Cholecalciferol (VITAMIN D3) 1000 units CAPS, Take by mouth, Disp: , Rfl:     Chondroitin Sulfate-Vit C-Mn (CHONDROITIN SULFATE COMPLEX) 400-60-2 5 MG CAPS, Take by mouth, Disp: , Rfl:     Coenzyme Q10 (COQ-10) 100 MG CAPS, Take by mouth, Disp: , Rfl:     fenofibrate (TRICOR) 54 MG tablet, TAKE 1 TABLET BY MOUTH  DAILY, Disp: 90 tablet, Rfl: 3    meloxicam (MOBIC) 15 mg tablet, Take 15 mg by mouth daily , Disp: , Rfl:     metoprolol succinate (TOPROL-XL) 50 mg 24 hr tablet, TAKE 1 TABLET BY MOUTH  DAILY, Disp: 90 tablet, Rfl: 3    Multiple Vitamin (MULTI-VITAMIN DAILY PO), Take by mouth, Disp: , Rfl:     omeprazole (PriLOSEC) 40 MG capsule, TAKE 1 CAPSULE BY MOUTH  DAILY, Disp: 90 capsule, Rfl: 3    oxyCODONE (ROXICODONE) 10 MG TABS, Take 1 tablet (10 mg total) by mouth every 6 (six) hours as needed for moderate pain for up to 10 daysMax Daily Amount: 40 mg, Disp: 30 tablet, Rfl: 0    rosuvastatin (CRESTOR) 5 mg tablet, TAKE 1 TABLET BY MOUTH  DAILY, Disp: 90 tablet, Rfl: 3    solifenacin (VESIcare) 5 mg tablet, Take 1 tablet (5 mg total) by mouth daily, Disp: 90 tablet, Rfl: 1    ACTIVE PROBLEM LIST:  Patient Active Problem List   Diagnosis    KADEN on CPAP    Anxiety    Basal cell carcinoma of cheek    Basal cell carcinoma of skin    Benign hypertension    Deviated nasal septum    GERD (gastroesophageal reflux disease)    Hyperlipidemia    Morbid obesity (HCC)    OAB (overactive bladder)    Impaired fasting glucose    Bright red blood per rectum    Diverticulosis    Rib fracture    Abnormal CT of the abdomen    Fall       PAST MEDICAL HISTORY:  Past Medical History:   Diagnosis Date    Cancer ([facility] [address] )     basal call carcinoma    Colon polyps     [facility] [place]    Diverticulosis     Hyperlipidemia     Hypertension     Obesity     KADEN (obstructive sleep apnea)     cpap    Pneumonia     Small bowel obstruction, partial (HCC)     SOB (shortness of breath)        PAST SURGICAL HISTORY:  Past Surgical History:   Procedure Laterality Date    SKIN BIOPSY      TONSILLECTOMY AND ADENOIDECTOMY         FAMILY HISTORY:  Family History   Problem Relation Age of Onset    Heart disease Mother     Hypertension Mother     Breast cancer Sister        SOCIAL HISTORY:  Social History     Socioeconomic History    Marital status: Significant Other     Spouse name: Not on file    Number of children: 0    Years of education: Not on file    Highest education level: Not on file   Occupational History    Occupation: SALES     Comment: FULL TIME    Tobacco Use    Smoking status: Never Smoker    Smokeless tobacco: Never Used   Vaping Use    Vaping Use: Never used   Substance and Sexual Activity    Alcohol use: Not Currently     Comment: occassional    Drug use: No    Sexual activity: Yes     Partners: Female     Comment: DENIED: HISTORY OF HIGH RISK SEXUAL BEHAVIOR    Other Topics Concern    Not on file   Social History Narrative     as per all scripts      Social Determinants of Health     Financial Resource Strain:     Difficulty of Paying Living Expenses:    Food Insecurity:     Worried About Running Out of Food in the Last Year:     Brigido of Food in the Last Year:    Transportation Needs:     Lack of Transportation (Medical):  Lack of Transportation (Non-Medical):    Physical Activity:     Days of Exercise per Week:     Minutes of Exercise per Session:    Stress:     Feeling of Stress :    Social Connections:     Frequency of Communication with Friends and Family:     Frequency of Social Gatherings with Friends and Family:     Attends [employer] Services:     Active Member of Clubs or Organizations:     Attends Club or Organization Meetings:     Marital Status:    Intimate Partner Violence:     Fear of Current or Ex-Partner:     Emotionally Abused:     Physically Abused:     Sexually Abused:        Review of Systems   Constitutional: Negative for fever  Respiratory: Negative for shortness of breath  Cardiovascular: Negative for chest pain  Objective:  Vitals:    08/12/21 1308   BP: 110/80   Pulse: 78   Resp: 16   Temp: 98 6 °F (37 °C)   SpO2: 93%   Weight: 97 5 kg (215 lb)   Height: 5' 4" (1 626 m)     Body mass index is 36 9 kg/m²  Physical Exam  Vitals and nursing note reviewed  Constitutional:       Appearance: He is well-developed  Cardiovascular:      Rate and Rhythm: Normal rate and regular rhythm  Heart sounds: Normal heart sounds  Pulmonary:      Effort: Pulmonary effort is normal       Breath sounds: Normal breath sounds  Neurological:      Mental Status: He is alert  RESULTS:    Recent Results (from the past 1008 hour(s))   Hemoglobin A1C    Collection Time: 07/29/21 10:59 AM   Result Value Ref Range    Hemoglobin A1C 6 6 (H) Normal 3 8-5 6%; PreDiabetic 5 7-6 4%;  Diabetic >=6 5%; Glycemic control for adults with diabetes <7 0% %     mg/dl   Lipid panel    Collection Time: 07/29/21 10:59 AM   Result Value Ref Range    Cholesterol 138 50 - 200 mg/dL    Triglycerides 136 <=150 mg/dL    HDL, Direct 36 (L) >=40 mg/dL    LDL Calculated 75 0 - 100 mg/dL    Non-HDL-Chol (CHOL-HDL) 102 mg/dl   CBC and differential    Collection Time: 07/29/21 10:59 AM   Result Value Ref Range    WBC 8 04 4 31 - 10 16 Thousand/uL    RBC 5 77 (H) 3 88 - 5 62 Million/uL    Hemoglobin 17 2 (H) 12 0 - 17 0 g/dL    Hematocrit 50 9 (H) 36 5 - 49 3 %    MCV 88 82 - 98 fL    MCH 29 8 26 8 - 34 3 pg    MCHC 33 8 31 4 - 37 4 g/dL    RDW 14 0 11 6 - 15 1 %    MPV 10 3 8 9 - 12 7 fL    Platelets 877 726 - 792 Thousands/uL    nRBC 0 /100 WBCs    Neutrophils Relative 64 43 - 75 %    Immat GRANS % 0 0 - 2 %    Lymphocytes Relative 24 14 - 44 %    Monocytes Relative 8 4 - 12 %    Eosinophils Relative 3 0 - 6 %    Basophils Relative 1 0 - 1 %    Neutrophils Absolute 5 15 1 85 - 7 62 Thousands/µL    Immature Grans Absolute 0 03 0 00 - 0 20 Thousand/uL    Lymphocytes Absolute 1 96 0 60 - 4 47 Thousands/µL    Monocytes Absolute 0 63 0 17 - 1 22 Thousand/µL    Eosinophils Absolute 0 21 0 00 - 0 61 Thousand/µL    Basophils Absolute 0 06 0 00 - 0 10 Thousands/µL   Comprehensive metabolic panel    Collection Time: 07/29/21 10:59 AM   Result Value Ref Range    Sodium 138 136 - 145 mmol/L    Potassium 3 9 3 5 - 5 3 mmol/L    Chloride 103 100 - 108 mmol/L    CO2 25 21 - 32 mmol/L    ANION GAP 10 4 - 13 mmol/L    BUN 19 5 - 25 mg/dL    Creatinine 1 17 0 60 - 1 30 mg/dL    Glucose, Fasting 143 (H) 65 - 99 mg/dL    Calcium 8 9 8 3 - 10 1 mg/dL    AST 33 5 - 45 U/L    ALT 47 12 - 78 U/L    Alkaline Phosphatase 60 46 - 116 U/L    Total Protein 7 7 6 4 - 8 2 g/dL    Albumin 3 9 3 5 - 5 0 g/dL    Total Bilirubin 0 66 0 20 - 1 00 mg/dL    eGFR 61 ml/min/1 73sq m   CBC and differential    Collection Time: 08/07/21  4:08 AM   Result Value Ref Range    WBC 11 62 (H) 4 31 - 10 16 Thousand/uL    RBC 5 66 (H) 3 88 - 5 62 Million/uL    Hemoglobin 17 3 (H) 12 0 - 17 0 g/dL    Hematocrit 50 5 (H) 36 5 - 49 3 %    MCV 89 82 - 98 fL    MCH 30 6 26 8 - 34 3 pg    MCHC 34 3 31 4 - 37 4 g/dL    RDW 14 2 11 6 - 15 1 %    MPV 10 2 8 9 - 12 7 fL    Platelets 622 902 - 390 Thousands/uL    nRBC 0 /100 WBCs    Neutrophils Relative 65 43 - 75 %    Immat GRANS % 0 0 - 2 %    Lymphocytes Relative 24 14 - 44 %    Monocytes Relative 9 4 - 12 %    Eosinophils Relative 1 0 - 6 %    Basophils Relative 1 0 - 1 %    Neutrophils Absolute 7 57 1 85 - 7 62 Thousands/µL    Immature Grans Absolute 0 04 0 00 - 0 20 Thousand/uL    Lymphocytes Absolute 2 78 0 60 - 4 47 Thousands/µL    Monocytes Absolute 1 04 0 17 - 1 22 Thousand/µL    Eosinophils Absolute 0 11 0 00 - 0 61 Thousand/µL    Basophils Absolute 0 08 0 00 - 0 10 Thousands/µL   Comprehensive metabolic panel    Collection Time: 08/07/21  4:08 AM   Result Value Ref Range    Sodium 144 136 - 145 mmol/L    Potassium 3 8 3 5 - 5 3 mmol/L    Chloride 105 100 - 108 mmol/L    CO2 29 21 - 32 mmol/L    ANION GAP 10 4 - 13 mmol/L    BUN 17 5 - 25 mg/dL    Creatinine 1 21 0 60 - 1 30 mg/dL    Glucose 123 65 - 140 mg/dL    Calcium 8 9 8 3 - 10 1 mg/dL    AST 38 5 - 45 U/L    ALT 58 12 - 78 U/L    Alkaline Phosphatase 65 46 - 116 U/L    Total Protein 7 9 6 4 - 8 2 g/dL    Albumin 4 2 3 5 - 5 0 g/dL    Total Bilirubin 0 84 0 20 - 1 00 mg/dL    eGFR 59 ml/min/1 73sq m   Lipase    Collection Time: 08/07/21  4:08 AM   Result Value Ref Range    Lipase 88 73 - 393 u/L   Troponin I    Collection Time: 08/07/21  4:08 AM   Result Value Ref Range    Troponin I <0 02 <=0 04 ng/mL   UA (URINE) with reflex to Scope    Collection Time: 08/07/21 10:55 AM   Result Value Ref Range    Color, UA Yellow     Clarity, UA Clear     Specific Gravity, UA 1 020 1 003 - 1 030    pH, UA 5 5 4 5, 5 0, 5 5, 6 0, 6 5, 7 0, 7 5, 8 0    Leukocytes, UA Negative Negative    Nitrite, UA Negative Negative    Protein, UA Negative Negative mg/dl    Glucose, UA Negative Negative mg/dl    Ketones, UA Negative Negative mg/dl    Urobilinogen, UA 0 2 0 2, 1 0 E U /dl E U /dl    Bilirubin, UA Negative Negative    Blood, UA Negative Negative   Basic metabolic panel    Collection Time: 08/08/21 4:39 AM   Result Value Ref Range    Sodium 139 136 - 145 mmol/L    Potassium 4 0 3 5 - 5 3 mmol/L    Chloride 103 100 - 108 mmol/L    CO2 29 21 - 32 mmol/L    ANION GAP 7 4 - 13 mmol/L    BUN 17 5 - 25 mg/dL    Creatinine 1 11 0 60 - 1 30 mg/dL    Glucose 119 65 - 140 mg/dL    Glucose, Fasting 119 (H) 65 - 99 mg/dL    Calcium 8 4 8 3 - 10 1 mg/dL    eGFR 66 ml/min/1 73sq m       This note was created with voice recognition software  Phonic, grammatical and spelling errors may be present within the note as a result

## 2021-08-15 LAB
ATRIAL RATE: 81 BPM
P AXIS: 53 DEGREES
PR INTERVAL: 178 MS
QRS AXIS: 66 DEGREES
QRSD INTERVAL: 82 MS
QT INTERVAL: 392 MS
QTC INTERVAL: 455 MS
T WAVE AXIS: 7 DEGREES
VENTRICULAR RATE: 81 BPM

## 2021-08-15 PROCEDURE — 93010 ELECTROCARDIOGRAM REPORT: CPT | Performed by: INTERNAL MEDICINE

## 2021-08-19 ENCOUNTER — HOSPITAL ENCOUNTER (OUTPATIENT)
Dept: RADIOLOGY | Facility: HOSPITAL | Age: 76
Discharge: HOME | End: 2021-08-19
Attending: INTERNAL MEDICINE
Payer: MEDICARE

## 2021-08-19 ENCOUNTER — OFFICE VISIT (OUTPATIENT)
Dept: INTERNAL MEDICINE CLINIC | Facility: CLINIC | Age: 76
End: 2021-08-19
Payer: MEDICARE

## 2021-08-19 VITALS
TEMPERATURE: 98.3 F | DIASTOLIC BLOOD PRESSURE: 70 MMHG | SYSTOLIC BLOOD PRESSURE: 124 MMHG | WEIGHT: 220.2 LBS | HEART RATE: 65 BPM | HEIGHT: 64 IN | BODY MASS INDEX: 37.59 KG/M2 | OXYGEN SATURATION: 97 %

## 2021-08-19 DIAGNOSIS — S22.41XA CLOSED FRACTURE OF MULTIPLE RIBS OF RIGHT SIDE, INITIAL ENCOUNTER: ICD-10-CM

## 2021-08-19 DIAGNOSIS — S22.41XA CLOSED FRACTURE OF MULTIPLE RIBS OF RIGHT SIDE, INITIAL ENCOUNTER: Primary | ICD-10-CM

## 2021-08-19 DIAGNOSIS — R05.9 COUGH: ICD-10-CM

## 2021-08-19 PROCEDURE — 99213 OFFICE O/P EST LOW 20 MIN: CPT | Performed by: INTERNAL MEDICINE

## 2021-08-19 PROCEDURE — 71046 X-RAY EXAM CHEST 2 VIEWS: CPT

## 2021-08-19 RX ORDER — MELOXICAM 15 MG/1
15 TABLET ORAL DAILY
Qty: 30 TABLET | Refills: 0 | Status: SHIPPED | OUTPATIENT
Start: 2021-08-19 | End: 2021-08-19

## 2021-08-19 NOTE — PROGRESS NOTES
Assessment/Plan:      since he is having more trouble at night, will order chest x-ray to make sure he is not starting with pneumonia  Switch him to an anti-inflammatory  He tolerated meloxicam in the past      Continue incentive spirometer  Quality Measures:       Return in about 1 week (around 8/26/2021) for Recheck  No problem-specific Assessment & Plan notes found for this encounter  Diagnoses and all orders for this visit:    Closed fracture of multiple ribs of right side, initial encounter  -     meloxicam (MOBIC) 15 mg tablet; Take 1 tablet (15 mg total) by mouth daily  -     XR chest pa & lateral; Future    Cough          Subjective:      Patient ID: Ariana Landa is a 70 y o  male  Patient comes in today for follow-up  He states he seems like he got worse  He is having trouble at night  He had to start taking the oxycodone again  Although, he really does not like that medicine  It bothers his stomach  And Tylenol by itself does not work  He is still coughing but not productive  He is using the incentive spirometer  He did go back to work at [employer]  No shortness of breath        ALLERGIES:  No Known Allergies    CURRENT MEDICATIONS:    Current Outpatient Medications:     ALPRAZolam (XANAX) 0 5 mg tablet, Take 1 tablet (0 5 mg total) by mouth 2 (two) times a day, Disp: 60 tablet, Rfl: 2    aspirin (ASPIRIN LOW DOSE) 81 mg EC tablet, Take by mouth, Disp: , Rfl:     B Complex-Folic Acid (B COMPLEX PLUS) TABS, Take by mouth, Disp: , Rfl:     Cholecalciferol (VITAMIN D3) 1000 units CAPS, Take by mouth, Disp: , Rfl:     Chondroitin Sulfate-Vit C-Mn (CHONDROITIN SULFATE COMPLEX) 400-60-2 5 MG CAPS, Take by mouth, Disp: , Rfl:     Coenzyme Q10 (COQ-10) 100 MG CAPS, Take by mouth, Disp: , Rfl:     metoprolol succinate (TOPROL-XL) 50 mg 24 hr tablet, TAKE 1 TABLET BY MOUTH  DAILY, Disp: 90 tablet, Rfl: 3    Multiple Vitamin (MULTI-VITAMIN DAILY PO), Take by mouth, Disp: , Rfl:   omeprazole (PriLOSEC) 40 MG capsule, TAKE 1 CAPSULE BY MOUTH  DAILY, Disp: 90 capsule, Rfl: 3    oxyCODONE (ROXICODONE) 10 MG TABS, Take 1 tablet (10 mg total) by mouth every 6 (six) hours as needed for moderate pain for up to 10 daysMax Daily Amount: 40 mg, Disp: 30 tablet, Rfl: 0    fenofibrate (TRICOR) 54 MG tablet, TAKE 1 TABLET BY MOUTH  DAILY (Patient not taking: Reported on 8/19/2021), Disp: 90 tablet, Rfl: 3    meloxicam (MOBIC) 15 mg tablet, Take 1 tablet (15 mg total) by mouth daily, Disp: 30 tablet, Rfl: 0    rosuvastatin (CRESTOR) 5 mg tablet, TAKE 1 TABLET BY MOUTH  DAILY (Patient not taking: Reported on 8/19/2021), Disp: 90 tablet, Rfl: 3    solifenacin (VESIcare) 5 mg tablet, Take 1 tablet (5 mg total) by mouth daily (Patient not taking: Reported on 8/19/2021), Disp: 90 tablet, Rfl: 1    ACTIVE PROBLEM LIST:  Patient Active Problem List   Diagnosis    KADEN on CPAP    Anxiety    Basal cell carcinoma of cheek    Basal cell carcinoma of skin    Benign hypertension    Deviated nasal septum    GERD (gastroesophageal reflux disease)    Hyperlipidemia    Morbid obesity (HCC)    OAB (overactive bladder)    Impaired fasting glucose    Bright red blood per rectum    Diverticulosis    Rib fracture    Abnormal CT of the abdomen    Fall       PAST MEDICAL HISTORY:  Past Medical History:   Diagnosis Date    Cancer ([facility] [address] )     basal call carcinoma    Colon polyps     DR Camacho Aguirre    Diverticulosis     Hyperlipidemia     Hypertension     Obesity     KADEN (obstructive sleep apnea)     cpap    Pneumonia     Small bowel obstruction, partial (HCC)     SOB (shortness of breath)        PAST SURGICAL HISTORY:  Past Surgical History:   Procedure Laterality Date    SKIN BIOPSY      TONSILLECTOMY AND ADENOIDECTOMY         FAMILY HISTORY:  Family History   Problem Relation Age of Onset    Heart disease Mother     Hypertension Mother     Breast cancer Sister        SOCIAL HISTORY:  Social History     Socioeconomic History    Marital status: Significant Other     Spouse name: Not on file    Number of children: 0    Years of education: Not on file    Highest education level: Not on file   Occupational History    Occupation: SALES     Comment: FULL TIME    Tobacco Use    Smoking status: Never Smoker    Smokeless tobacco: Never Used   Vaping Use    Vaping Use: Never used   Substance and Sexual Activity    Alcohol use: Not Currently     Comment: occassional    Drug use: No    Sexual activity: Yes     Partners: Female     Comment: DENIED: HISTORY OF HIGH RISK SEXUAL BEHAVIOR    Other Topics Concern    Not on file   Social History Narrative     as per all scripts      Social Determinants of Health     Financial Resource Strain:     Difficulty of Paying Living Expenses:    Food Insecurity:     Worried About Running Out of Food in the Last Year:     [address] in the Last Year:    Transportation Needs:     Lack of Transportation (Medical):  Lack of Transportation (Non-Medical):    Physical Activity:     Days of Exercise per Week:     Minutes of Exercise per Session:    Stress:     Feeling of Stress :    Social Connections:     Frequency of Communication with Friends and Family:     Frequency of Social Gatherings with Friends and Family:     Attends Confucianist Services:     Active Member of Clubs or Organizations:     Attends Club or Organization Meetings:     Marital Status:    Intimate Partner Violence:     Fear of Current or Ex-Partner:     Emotionally Abused:     Physically Abused:     Sexually Abused:        Review of Systems   Constitutional: Negative for fever  Respiratory: Positive for chest tightness  Negative for shortness of breath            Objective:  Vitals:    08/19/21 1556   BP: 124/70   BP Location: Left arm   Patient Position: Sitting   Cuff Size: Adult   Pulse: 65   Temp: 98 3 °F (36 8 °C)   TempSrc: Tympanic   SpO2: 97%   Weight: 99 9 kg (220 lb 3 2 oz)   Height: 5' 4" (1 626 m)     Body mass index is 37 8 kg/m²  Physical Exam  Vitals and nursing note reviewed  Constitutional:       Appearance: He is well-developed  Cardiovascular:      Rate and Rhythm: Normal rate and regular rhythm  Heart sounds: Normal heart sounds  Pulmonary:      Effort: Pulmonary effort is normal       Breath sounds: Normal breath sounds  Neurological:      Mental Status: He is alert  RESULTS:    Recent Results (from the past 1008 hour(s))   Hemoglobin A1C    Collection Time: 07/29/21 10:59 AM   Result Value Ref Range    Hemoglobin A1C 6 6 (H) Normal 3 8-5 6%; PreDiabetic 5 7-6 4%;  Diabetic >=6 5%; Glycemic control for adults with diabetes <7 0% %     mg/dl   Lipid panel    Collection Time: 07/29/21 10:59 AM   Result Value Ref Range    Cholesterol 138 50 - 200 mg/dL    Triglycerides 136 <=150 mg/dL    HDL, Direct 36 (L) >=40 mg/dL    LDL Calculated 75 0 - 100 mg/dL    Non-HDL-Chol (CHOL-HDL) 102 mg/dl   CBC and differential    Collection Time: 07/29/21 10:59 AM   Result Value Ref Range    WBC 8 04 4 31 - 10 16 Thousand/uL    RBC 5 77 (H) 3 88 - 5 62 Million/uL    Hemoglobin 17 2 (H) 12 0 - 17 0 g/dL    Hematocrit 50 9 (H) 36 5 - 49 3 %    MCV 88 82 - 98 fL    MCH 29 8 26 8 - 34 3 pg    MCHC 33 8 31 4 - 37 4 g/dL    RDW 14 0 11 6 - 15 1 %    MPV 10 3 8 9 - 12 7 fL    Platelets 722 524 - 435 Thousands/uL    nRBC 0 /100 WBCs    Neutrophils Relative 64 43 - 75 %    Immat GRANS % 0 0 - 2 %    Lymphocytes Relative 24 14 - 44 %    Monocytes Relative 8 4 - 12 %    Eosinophils Relative 3 0 - 6 %    Basophils Relative 1 0 - 1 %    Neutrophils Absolute 5 15 1 85 - 7 62 Thousands/µL    Immature Grans Absolute 0 03 0 00 - 0 20 Thousand/uL    Lymphocytes Absolute 1 96 0 60 - 4 47 Thousands/µL    Monocytes Absolute 0 63 0 17 - 1 22 Thousand/µL    Eosinophils Absolute 0 21 0 00 - 0 61 Thousand/µL    Basophils Absolute 0 06 0 00 - 0 10 Thousands/µL   Comprehensive metabolic panel    Collection Time: 07/29/21 10:59 AM   Result Value Ref Range    Sodium 138 136 - 145 mmol/L    Potassium 3 9 3 5 - 5 3 mmol/L    Chloride 103 100 - 108 mmol/L    CO2 25 21 - 32 mmol/L    ANION GAP 10 4 - 13 mmol/L    BUN 19 5 - 25 mg/dL    Creatinine 1 17 0 60 - 1 30 mg/dL    Glucose, Fasting 143 (H) 65 - 99 mg/dL    Calcium 8 9 8 3 - 10 1 mg/dL    AST 33 5 - 45 U/L    ALT 47 12 - 78 U/L    Alkaline Phosphatase 60 46 - 116 U/L    Total Protein 7 7 6 4 - 8 2 g/dL    Albumin 3 9 3 5 - 5 0 g/dL    Total Bilirubin 0 66 0 20 - 1 00 mg/dL    eGFR 61 ml/min/1 73sq m   CBC and differential    Collection Time: 08/07/21  4:08 AM   Result Value Ref Range    WBC 11 62 (H) 4 31 - 10 16 Thousand/uL    RBC 5 66 (H) 3 88 - 5 62 Million/uL    Hemoglobin 17 3 (H) 12 0 - 17 0 g/dL    Hematocrit 50 5 (H) 36 5 - 49 3 %    MCV 89 82 - 98 fL    MCH 30 6 26 8 - 34 3 pg    MCHC 34 3 31 4 - 37 4 g/dL    RDW 14 2 11 6 - 15 1 %    MPV 10 2 8 9 - 12 7 fL    Platelets 886 589 - 008 Thousands/uL    nRBC 0 /100 WBCs    Neutrophils Relative 65 43 - 75 %    Immat GRANS % 0 0 - 2 %    Lymphocytes Relative 24 14 - 44 %    Monocytes Relative 9 4 - 12 %    Eosinophils Relative 1 0 - 6 %    Basophils Relative 1 0 - 1 %    Neutrophils Absolute 7 57 1 85 - 7 62 Thousands/µL    Immature Grans Absolute 0 04 0 00 - 0 20 Thousand/uL    Lymphocytes Absolute 2 78 0 60 - 4 47 Thousands/µL    Monocytes Absolute 1 04 0 17 - 1 22 Thousand/µL    Eosinophils Absolute 0 11 0 00 - 0 61 Thousand/µL    Basophils Absolute 0 08 0 00 - 0 10 Thousands/µL   Comprehensive metabolic panel    Collection Time: 08/07/21  4:08 AM   Result Value Ref Range    Sodium 144 136 - 145 mmol/L    Potassium 3 8 3 5 - 5 3 mmol/L    Chloride 105 100 - 108 mmol/L    CO2 29 21 - 32 mmol/L    ANION GAP 10 4 - 13 mmol/L    BUN 17 5 - 25 mg/dL    Creatinine 1 21 0 60 - 1 30 mg/dL    Glucose 123 65 - 140 mg/dL    Calcium 8 9 8 3 - 10 1 mg/dL    AST 38 5 - 45 U/L    ALT 58 12 - 78 U/L    Alkaline Phosphatase 65 46 - 116 U/L    Total Protein 7 9 6 4 - 8 2 g/dL    Albumin 4 2 3 5 - 5 0 g/dL    Total Bilirubin 0 84 0 20 - 1 00 mg/dL    eGFR 59 ml/min/1 73sq m   Lipase    Collection Time: 08/07/21  4:08 AM   Result Value Ref Range    Lipase 88 73 - 393 u/L   Troponin I    Collection Time: 08/07/21  4:08 AM   Result Value Ref Range    Troponin I <0 02 <=0 04 ng/mL   ECG 12 lead    Collection Time: 08/07/21  4:35 AM   Result Value Ref Range    Ventricular Rate 81 BPM    Atrial Rate 81 BPM    TX Interval 178 ms    QRSD Interval 82 ms    QT Interval 392 ms    QTC Interval 455 ms    P Axis 53 degrees    QRS Axis 66 degrees    T Wave Axis 7 degrees   UA (URINE) with reflex to Scope    Collection Time: 08/07/21 10:55 AM   Result Value Ref Range    Color, UA Yellow     Clarity, UA Clear     Specific Gravity, UA 1 020 1 003 - 1 030    pH, UA 5 5 4 5, 5 0, 5 5, 6 0, 6 5, 7 0, 7 5, 8 0    Leukocytes, UA Negative Negative    Nitrite, UA Negative Negative    Protein, UA Negative Negative mg/dl    Glucose, UA Negative Negative mg/dl    Ketones, UA Negative Negative mg/dl    Urobilinogen, UA 0 2 0 2, 1 0 E U /dl E U /dl    Bilirubin, UA Negative Negative    Blood, UA Negative Negative   Basic metabolic panel    Collection Time: 08/08/21  4:39 AM   Result Value Ref Range    Sodium 139 136 - 145 mmol/L    Potassium 4 0 3 5 - 5 3 mmol/L    Chloride 103 100 - 108 mmol/L    CO2 29 21 - 32 mmol/L    ANION GAP 7 4 - 13 mmol/L    BUN 17 5 - 25 mg/dL    Creatinine 1 11 0 60 - 1 30 mg/dL    Glucose 119 65 - 140 mg/dL    Glucose, Fasting 119 (H) 65 - 99 mg/dL    Calcium 8 4 8 3 - 10 1 mg/dL    eGFR 66 ml/min/1 73sq m       This note was created with voice recognition software  Phonic, grammatical and spelling errors may be present within the note as a result

## 2021-08-27 DIAGNOSIS — I10 BENIGN HYPERTENSION: ICD-10-CM

## 2021-08-27 RX ORDER — METOPROLOL SUCCINATE 50 MG/1
TABLET, EXTENDED RELEASE ORAL
Qty: 90 TABLET | Refills: 3 | Status: SHIPPED | OUTPATIENT
Start: 2021-08-27 | End: 2022-07-13

## 2021-08-30 ENCOUNTER — TELEPHONE (OUTPATIENT)
Dept: INTERNAL MEDICINE CLINIC | Facility: CLINIC | Age: 76
End: 2021-08-30

## 2021-08-30 DIAGNOSIS — S22.41XA CLOSED FRACTURE OF MULTIPLE RIBS OF RIGHT SIDE, INITIAL ENCOUNTER: Primary | ICD-10-CM

## 2021-08-30 RX ORDER — OXYCODONE HYDROCHLORIDE AND ACETAMINOPHEN 5; 325 MG/1; MG/1
1 TABLET ORAL 2 TIMES DAILY PRN
Qty: 10 TABLET | Refills: 0 | Status: SHIPPED | OUTPATIENT
Start: 2021-08-30 | End: 2021-09-03 | Stop reason: SDUPTHER

## 2021-08-30 RX ORDER — OXYCODONE HCL 10 MG/1
10 TABLET, FILM COATED, EXTENDED RELEASE ORAL EVERY 12 HOURS SCHEDULED
COMMUNITY
End: 2021-12-10 | Stop reason: ALTCHOICE

## 2021-08-30 RX ORDER — OXYCODONE HCL 10 MG/1
10 TABLET, FILM COATED, EXTENDED RELEASE ORAL EVERY 12 HOURS SCHEDULED
Status: CANCELLED | OUTPATIENT
Start: 2021-08-30

## 2021-08-30 NOTE — TELEPHONE ENCOUNTER
Patient is requesting a refill on oxycodone 10 mg tablet  The meloxicam  Is not helping that much  He is just asking for enough for a few days, to take one in the AM and take one at bedtime  He does have an appointment on 09/02      [facility]    Call back #[phone]

## 2021-09-02 ENCOUNTER — OFFICE VISIT (OUTPATIENT)
Dept: INTERNAL MEDICINE CLINIC | Facility: CLINIC | Age: 76
End: 2021-09-02
Payer: MEDICARE

## 2021-09-02 VITALS
HEART RATE: 63 BPM | BODY MASS INDEX: 38.07 KG/M2 | DIASTOLIC BLOOD PRESSURE: 68 MMHG | OXYGEN SATURATION: 95 % | HEIGHT: 64 IN | WEIGHT: 223 LBS | SYSTOLIC BLOOD PRESSURE: 116 MMHG | TEMPERATURE: 97.5 F

## 2021-09-02 DIAGNOSIS — S22.41XA CLOSED FRACTURE OF MULTIPLE RIBS OF RIGHT SIDE, INITIAL ENCOUNTER: Primary | ICD-10-CM

## 2021-09-02 PROCEDURE — 99213 OFFICE O/P EST LOW 20 MIN: CPT | Performed by: INTERNAL MEDICINE

## 2021-09-03 ENCOUNTER — TELEPHONE (OUTPATIENT)
Dept: INTERNAL MEDICINE CLINIC | Facility: CLINIC | Age: 76
End: 2021-09-03

## 2021-09-03 DIAGNOSIS — S22.41XA CLOSED FRACTURE OF MULTIPLE RIBS OF RIGHT SIDE, INITIAL ENCOUNTER: ICD-10-CM

## 2021-09-03 RX ORDER — OXYCODONE HYDROCHLORIDE AND ACETAMINOPHEN 5; 325 MG/1; MG/1
1 TABLET ORAL 2 TIMES DAILY PRN
Qty: 10 TABLET | Refills: 0 | Status: SHIPPED | OUTPATIENT
Start: 2021-09-03 | End: 2021-12-10 | Stop reason: ALTCHOICE

## 2021-09-03 NOTE — TELEPHONE ENCOUNTER
Patient is curious, due to [place] system being down, if you could [facility] the [facility] [address] [facility]?

## 2021-09-03 NOTE — TELEPHONE ENCOUNTER
Pt claims [facility] , will not give him the pain meds, because their systems are down      hes asking to resend to [facility] and he will get it from them please

## 2021-09-03 NOTE — PROGRESS NOTES
Assessment/Plan:      Slow but steady improvement  Reminded him it will take 6-8 weeks  Quality Measures:       No follow-ups on file  No problem-specific Assessment & Plan notes found for this encounter  There are no diagnoses linked to this encounter  Subjective:      Patient ID: Liz Evans is a 78 y o  male  Patient comes in today for follow-up of his rib fractures  Still having some trouble at night  He is back to work but promises he is not lifting anything  Taking his pain medicine still at night  I reduced the dose from what the hospital gave him  The meloxicam itself was not helping  No trouble breathing        ALLERGIES:  No Known Allergies    CURRENT MEDICATIONS:    Current Outpatient Medications:     ALPRAZolam (XANAX) 0 5 mg tablet, Take 1 tablet (0 5 mg total) by mouth 2 (two) times a day, Disp: 60 tablet, Rfl: 2    aspirin (ASPIRIN LOW DOSE) 81 mg EC tablet, Take by mouth, Disp: , Rfl:     B Complex-Folic Acid (B COMPLEX PLUS) TABS, Take by mouth, Disp: , Rfl:     Cholecalciferol (VITAMIN D3) 1000 units CAPS, Take by mouth, Disp: , Rfl:     Chondroitin Sulfate-Vit C-Mn (CHONDROITIN SULFATE COMPLEX) 400-60-2 5 MG CAPS, Take by mouth, Disp: , Rfl:     Coenzyme Q10 (COQ-10) 100 MG CAPS, Take by mouth, Disp: , Rfl:     meloxicam (MOBIC) 15 mg tablet, TAKE 1 TABLET(15 MG) BY MOUTH DAILY, Disp: 90 tablet, Rfl: 0    metoprolol succinate (TOPROL-XL) 50 mg 24 hr tablet, TAKE 1 TABLET BY MOUTH  DAILY, Disp: 90 tablet, Rfl: 3    Multiple Vitamin (MULTI-VITAMIN DAILY PO), Take by mouth, Disp: , Rfl:     omeprazole (PriLOSEC) 40 MG capsule, TAKE 1 CAPSULE BY MOUTH  DAILY, Disp: 90 capsule, Rfl: 3    oxyCODONE (OxyCONTIN) 10 mg 12 hr tablet, Take 10 mg by mouth every 12 (twelve) hours, Disp: , Rfl:     oxyCODONE-acetaminophen (PERCOCET) 5-325 mg per tablet, Take 1 tablet by mouth 2 (two) times a day as needed for moderate painMax Daily Amount: 2 tablets, Disp: 10 tablet, Rfl: 0    solifenacin (VESIcare) 5 mg tablet, Take 1 tablet (5 mg total) by mouth daily, Disp: 90 tablet, Rfl: 1    fenofibrate (TRICOR) 54 MG tablet, TAKE 1 TABLET BY MOUTH  DAILY (Patient not taking: Reported on 9/2/2021), Disp: 90 tablet, Rfl: 3    rosuvastatin (CRESTOR) 5 mg tablet, TAKE 1 TABLET BY MOUTH  DAILY (Patient not taking: Reported on 9/2/2021), Disp: 90 tablet, Rfl: 3    ACTIVE PROBLEM LIST:  Patient Active Problem List   Diagnosis    KADEN on CPAP    Anxiety    Basal cell carcinoma of cheek    Basal cell carcinoma of skin    Benign hypertension    Deviated nasal septum    GERD (gastroesophageal reflux disease)    Hyperlipidemia    Morbid obesity (HCC)    OAB (overactive bladder)    Impaired fasting glucose    Bright red blood per rectum    Diverticulosis    Rib fracture    Abnormal CT of the abdomen    Fall       PAST MEDICAL HISTORY:  Past Medical History:   Diagnosis Date    [facility]     basal call carcinoma    Colon polyps     DR Dolly Martinez    Diverticulosis     Hyperlipidemia     Hypertension     Obesity     KADEN (obstructive sleep apnea)     cpap    Pneumonia     Small bowel obstruction, partial (HCC)     SOB (shortness of breath)        PAST SURGICAL HISTORY:  Past Surgical History:   Procedure Laterality Date    SKIN BIOPSY      TONSILLECTOMY AND ADENOIDECTOMY         FAMILY HISTORY:  Family History   Problem Relation Age of Onset    Heart disease Mother     Hypertension Mother     Breast cancer Sister        SOCIAL HISTORY:  Social History     Socioeconomic History    Marital status: Significant Other     Spouse name: Not on file    Number of children: 0    Years of education: Not on file    Highest education level: Not on file   Occupational History    Occupation: SALES     Comment: FULL TIME    Tobacco Use    Smoking status: Never Smoker    Smokeless tobacco: Never Used   Vaping Use    Vaping Use: Never used   Substance and Sexual Activity    Alcohol use: Not Currently     Comment: occassional    Drug use: No    Sexual activity: Yes     Partners: Female     Comment: DENIED: HISTORY OF HIGH RISK SEXUAL BEHAVIOR    Other Topics Concern    Not on file   Social History Narrative     as per all scripts      Social Determinants of Health     Financial Resource Strain:     Difficulty of Paying Living Expenses:    Food Insecurity:     Worried About Running Out of Food in the Last Year:     [address] in the Last Year:    Transportation Needs:     Lack of Transportation (Medical):  Lack of Transportation (Non-Medical):    Physical Activity:     Days of Exercise per Week:     Minutes of Exercise per Session:    Stress:     Feeling of Stress :    Social Connections:     Frequency of Communication with Friends and Family:     Frequency of Social Gatherings with Friends and Family:     Attends Sabianism Services:     Active Member of Clubs or Organizations:     Attends Club or Organization Meetings:     Marital Status:    Intimate Partner Violence:     Fear of Current or Ex-Partner:     Emotionally Abused:     Physically Abused:     Sexually Abused:        Review of Systems   Respiratory: Negative for shortness of breath  Cardiovascular: Negative for chest pain  Gastrointestinal: Negative for abdominal pain  Objective:  Vitals:    09/02/21 1028   BP: 116/68   BP Location: Left arm   Patient Position: Sitting   Cuff Size: Large   Pulse: 63   Temp: 97 5 °F (36 4 °C)   TempSrc: Tympanic   SpO2: 95%   Weight: 101 kg (223 lb)   Height: 5' 4" (1 626 m)     Body mass index is 38 28 kg/m²  Physical Exam  Vitals and nursing note reviewed  Constitutional:       Appearance: He is well-developed  Cardiovascular:      Rate and Rhythm: Normal rate and regular rhythm  Heart sounds: Normal heart sounds  Pulmonary:      Effort: Pulmonary effort is normal       Breath sounds: Normal breath sounds     Neurological:      Mental Status: He is alert  RESULTS:    Recent Results (from the past 1008 hour(s))   Hemoglobin A1C    Collection Time: 07/29/21 10:59 AM   Result Value Ref Range    Hemoglobin A1C 6 6 (H) Normal 3 8-5 6%; PreDiabetic 5 7-6 4%;  Diabetic >=6 5%; Glycemic control for adults with diabetes <7 0% %     mg/dl   Lipid panel    Collection Time: 07/29/21 10:59 AM   Result Value Ref Range    Cholesterol 138 50 - 200 mg/dL    Triglycerides 136 <=150 mg/dL    HDL, Direct 36 (L) >=40 mg/dL    LDL Calculated 75 0 - 100 mg/dL    Non-HDL-Chol (CHOL-HDL) 102 mg/dl   CBC and differential    Collection Time: 07/29/21 10:59 AM   Result Value Ref Range    WBC 8 04 4 31 - 10 16 Thousand/uL    RBC 5 77 (H) 3 88 - 5 62 Million/uL    Hemoglobin 17 2 (H) 12 0 - 17 0 g/dL    Hematocrit 50 9 (H) 36 5 - 49 3 %    MCV 88 82 - 98 fL    MCH 29 8 26 8 - 34 3 pg    MCHC 33 8 31 4 - 37 4 g/dL    RDW 14 0 11 6 - 15 1 %    MPV 10 3 8 9 - 12 7 fL    Platelets 861 776 - 638 Thousands/uL    nRBC 0 /100 WBCs    Neutrophils Relative 64 43 - 75 %    Immat GRANS % 0 0 - 2 %    Lymphocytes Relative 24 14 - 44 %    Monocytes Relative 8 4 - 12 %    Eosinophils Relative 3 0 - 6 %    Basophils Relative 1 0 - 1 %    Neutrophils Absolute 5 15 1 85 - 7 62 Thousands/µL    Immature Grans Absolute 0 03 0 00 - 0 20 Thousand/uL    Lymphocytes Absolute 1 96 0 60 - 4 47 Thousands/µL    Monocytes Absolute 0 63 0 17 - 1 22 Thousand/µL    Eosinophils Absolute 0 21 0 00 - 0 61 Thousand/µL    Basophils Absolute 0 06 0 00 - 0 10 Thousands/µL   Comprehensive metabolic panel    Collection Time: 07/29/21 10:59 AM   Result Value Ref Range    Sodium 138 136 - 145 mmol/L    Potassium 3 9 3 5 - 5 3 mmol/L    Chloride 103 100 - 108 mmol/L    CO2 25 21 - 32 mmol/L    ANION GAP 10 4 - 13 mmol/L    BUN 19 5 - 25 mg/dL    Creatinine 1 17 0 60 - 1 30 mg/dL    Glucose, Fasting 143 (H) 65 - 99 mg/dL    Calcium 8 9 8 3 - 10 1 mg/dL    AST 33 5 - 45 U/L    ALT 47 12 - 78 U/L Alkaline Phosphatase 60 46 - 116 U/L    Total Protein 7 7 6 4 - 8 2 g/dL    Albumin 3 9 3 5 - 5 0 g/dL    Total Bilirubin 0 66 0 20 - 1 00 mg/dL    eGFR 61 ml/min/1 73sq m   CBC and differential    Collection Time: 08/07/21  4:08 AM   Result Value Ref Range    WBC 11 62 (H) 4 31 - 10 16 Thousand/uL    RBC 5 66 (H) 3 88 - 5 62 Million/uL    Hemoglobin 17 3 (H) 12 0 - 17 0 g/dL    Hematocrit 50 5 (H) 36 5 - 49 3 %    MCV 89 82 - 98 fL    MCH 30 6 26 8 - 34 3 pg    MCHC 34 3 31 4 - 37 4 g/dL    RDW 14 2 11 6 - 15 1 %    MPV 10 2 8 9 - 12 7 fL    Platelets 271 709 - 068 Thousands/uL    nRBC 0 /100 WBCs    Neutrophils Relative 65 43 - 75 %    Immat GRANS % 0 0 - 2 %    Lymphocytes Relative 24 14 - 44 %    Monocytes Relative 9 4 - 12 %    Eosinophils Relative 1 0 - 6 %    Basophils Relative 1 0 - 1 %    Neutrophils Absolute 7 57 1 85 - 7 62 Thousands/µL    Immature Grans Absolute 0 04 0 00 - 0 20 Thousand/uL    Lymphocytes Absolute 2 78 0 60 - 4 47 Thousands/µL    Monocytes Absolute 1 04 0 17 - 1 22 Thousand/µL    Eosinophils Absolute 0 11 0 00 - 0 61 Thousand/µL    Basophils Absolute 0 08 0 00 - 0 10 Thousands/µL   Comprehensive metabolic panel    Collection Time: 08/07/21  4:08 AM   Result Value Ref Range    Sodium 144 136 - 145 mmol/L    Potassium 3 8 3 5 - 5 3 mmol/L    Chloride 105 100 - 108 mmol/L    CO2 29 21 - 32 mmol/L    ANION GAP 10 4 - 13 mmol/L    BUN 17 5 - 25 mg/dL    Creatinine 1 21 0 60 - 1 30 mg/dL    Glucose 123 65 - 140 mg/dL    Calcium 8 9 8 3 - 10 1 mg/dL    AST 38 5 - 45 U/L    ALT 58 12 - 78 U/L    Alkaline Phosphatase 65 46 - 116 U/L    Total Protein 7 9 6 4 - 8 2 g/dL    Albumin 4 2 3 5 - 5 0 g/dL    Total Bilirubin 0 84 0 20 - 1 00 mg/dL    eGFR 59 ml/min/1 73sq m   Lipase    Collection Time: 08/07/21  4:08 AM   Result Value Ref Range    Lipase 88 73 - 393 u/L   Troponin I    Collection Time: 08/07/21  4:08 AM   Result Value Ref Range    Troponin I <0 02 <=0 04 ng/mL   ECG 12 lead    Collection Time: 08/07/21  4:35 AM   Result Value Ref Range    Ventricular Rate 81 BPM    Atrial Rate 81 BPM    GA Interval 178 ms    QRSD Interval 82 ms    QT Interval 392 ms    QTC Interval 455 ms    P Axis 53 degrees    QRS Axis 66 degrees    T Wave Axis 7 degrees   UA (URINE) with reflex to Scope    Collection Time: 08/07/21 10:55 AM   Result Value Ref Range    Color, UA Yellow     Clarity, UA Clear     Specific Gravity, UA 1 020 1 003 - 1 030    pH, UA 5 5 4 5, 5 0, 5 5, 6 0, 6 5, 7 0, 7 5, 8 0    Leukocytes, UA Negative Negative    Nitrite, UA Negative Negative    Protein, UA Negative Negative mg/dl    Glucose, UA Negative Negative mg/dl    Ketones, UA Negative Negative mg/dl    Urobilinogen, UA 0 2 0 2, 1 0 E U /dl E U /dl    Bilirubin, UA Negative Negative    Blood, UA Negative Negative   Basic metabolic panel    Collection Time: 08/08/21  4:39 AM   Result Value Ref Range    Sodium 139 136 - 145 mmol/L    Potassium 4 0 3 5 - 5 3 mmol/L    Chloride 103 100 - 108 mmol/L    CO2 29 21 - 32 mmol/L    ANION GAP 7 4 - 13 mmol/L    BUN 17 5 - 25 mg/dL    Creatinine 1 11 0 60 - 1 30 mg/dL    Glucose 119 65 - 140 mg/dL    Glucose, Fasting 119 (H) 65 - 99 mg/dL    Calcium 8 4 8 3 - 10 1 mg/dL    eGFR 66 ml/min/1 73sq m       This note was created with voice recognition software  Phonic, grammatical and spelling errors may be present within the note as a result

## 2021-09-03 NOTE — TELEPHONE ENCOUNTER
Called the patient and lmom [facility]  I contacted [facility] and canceled the rx with their pharmacy

## 2021-09-09 ENCOUNTER — TELEPHONE (OUTPATIENT)
Dept: PULMONOLOGY | Facility: CLINIC | Age: 76
End: 2021-09-09

## 2021-09-09 NOTE — TELEPHONE ENCOUNTER
Patient is calling today asking for a call back regarding a new cpap he received from [facility] in the mail  He wants to discuss if he should be using this new device and if it is safe  Can you please give him a call to discuss when you have a moment? Thank you

## 2021-09-09 NOTE — TELEPHONE ENCOUNTER
Can you let him know that if this is his new device that he received due to the recall, then yes he can use it  Can you send the most recent CPAP pressure to his DME company so they can set up the pressure? We last changed it in March

## 2021-09-29 ENCOUNTER — TELEPHONE (OUTPATIENT)
Dept: PULMONOLOGY | Facility: CLINIC | Age: 76
End: 2021-09-29

## 2021-09-29 NOTE — TELEPHONE ENCOUNTER
Patient is calling Agustin back from 9/8  Can one of you please give him a call back when you have a moment? Thank you

## 2021-10-19 DIAGNOSIS — F41.9 ANXIETY: ICD-10-CM

## 2021-10-20 RX ORDER — ALPRAZOLAM 0.5 MG/1
TABLET ORAL
Qty: 60 TABLET | Refills: 3 | Status: SHIPPED | OUTPATIENT
Start: 2021-10-20 | End: 2022-02-22

## 2021-10-26 ENCOUNTER — TELEPHONE (OUTPATIENT)
Dept: OTHER | Facility: OTHER | Age: 76
End: 2021-10-26

## 2021-10-31 VITALS
SYSTOLIC BLOOD PRESSURE: 155 MMHG | TEMPERATURE: 98 F | HEART RATE: 82 BPM | DIASTOLIC BLOOD PRESSURE: 78 MMHG | OXYGEN SATURATION: 94 % | RESPIRATION RATE: 20 BRPM

## 2021-10-31 PROCEDURE — 99283 EMERGENCY DEPT VISIT LOW MDM: CPT

## 2021-11-01 ENCOUNTER — HOSPITAL ENCOUNTER (EMERGENCY)
Facility: HOSPITAL | Age: 76
Discharge: HOME | End: 2021-11-01
Attending: EMERGENCY MEDICINE | Admitting: EMERGENCY MEDICINE
Payer: MEDICARE

## 2021-11-01 DIAGNOSIS — L08.9 TOE INFECTION: ICD-10-CM

## 2021-11-01 PROCEDURE — 99284 EMERGENCY DEPT VISIT MOD MDM: CPT | Performed by: EMERGENCY MEDICINE

## 2021-11-01 RX ORDER — CLINDAMYCIN HYDROCHLORIDE 300 MG/1
300 CAPSULE ORAL 4 TIMES DAILY
Qty: 28 CAPSULE | Refills: 0 | Status: SHIPPED | OUTPATIENT
Start: 2021-11-01 | End: 2021-11-08

## 2021-11-01 RX ORDER — CLINDAMYCIN HYDROCHLORIDE 150 MG/1
300 CAPSULE ORAL ONCE
Status: COMPLETED | OUTPATIENT
Start: 2021-11-01 | End: 2021-11-01

## 2021-11-01 RX ADMIN — CLINDAMYCIN HYDROCHLORIDE 300 MG: 150 CAPSULE ORAL at 00:21

## 2021-11-09 ENCOUNTER — TELEPHONE (OUTPATIENT)
Dept: PULMONOLOGY | Facility: CLINIC | Age: 76
End: 2021-11-09

## 2021-12-03 ENCOUNTER — RA CDI HCC (OUTPATIENT)
Dept: OTHER | Facility: HOSPITAL | Age: 76
End: 2021-12-03

## 2021-12-03 ENCOUNTER — APPOINTMENT (OUTPATIENT)
Dept: LAB | Facility: HOSPITAL | Age: 76
End: 2021-12-03
Payer: MEDICARE

## 2021-12-03 DIAGNOSIS — R73.01 IMPAIRED FASTING GLUCOSE: ICD-10-CM

## 2021-12-03 DIAGNOSIS — I10 BENIGN HYPERTENSION: ICD-10-CM

## 2021-12-03 LAB
ALBUMIN SERPL BCP-MCNC: 3.6 G/DL (ref 3.5–5)
ALP SERPL-CCNC: 61 U/L (ref 46–116)
ALT SERPL W P-5'-P-CCNC: 43 U/L (ref 12–78)
ANION GAP SERPL CALCULATED.3IONS-SCNC: 9 MMOL/L (ref 4–13)
AST SERPL W P-5'-P-CCNC: 30 U/L (ref 5–45)
BASOPHILS # BLD AUTO: 0.07 THOUSANDS/ΜL (ref 0–0.1)
BASOPHILS NFR BLD AUTO: 1 % (ref 0–1)
BILIRUB SERPL-MCNC: 0.46 MG/DL (ref 0.2–1)
BUN SERPL-MCNC: 15 MG/DL (ref 5–25)
CALCIUM SERPL-MCNC: 8.3 MG/DL (ref 8.3–10.1)
CHLORIDE SERPL-SCNC: 106 MMOL/L (ref 100–108)
CHOLEST SERPL-MCNC: 141 MG/DL
CO2 SERPL-SCNC: 28 MMOL/L (ref 21–32)
CREAT SERPL-MCNC: 1.1 MG/DL (ref 0.6–1.3)
EOSINOPHIL # BLD AUTO: 0.23 THOUSAND/ΜL (ref 0–0.61)
EOSINOPHIL NFR BLD AUTO: 3 % (ref 0–6)
ERYTHROCYTE [DISTWIDTH] IN BLOOD BY AUTOMATED COUNT: 14.2 % (ref 11.6–15.1)
GFR SERPL CREATININE-BSD FRML MDRD: 66 ML/MIN/1.73SQ M
GLUCOSE P FAST SERPL-MCNC: 136 MG/DL (ref 65–99)
HCT VFR BLD AUTO: 49.4 % (ref 36.5–49.3)
HDLC SERPL-MCNC: 38 MG/DL
HGB BLD-MCNC: 16.6 G/DL (ref 12–17)
IMM GRANULOCYTES # BLD AUTO: 0.01 THOUSAND/UL (ref 0–0.2)
IMM GRANULOCYTES NFR BLD AUTO: 0 % (ref 0–2)
LDLC SERPL CALC-MCNC: 76 MG/DL (ref 0–100)
LYMPHOCYTES # BLD AUTO: 2.88 THOUSANDS/ΜL (ref 0.6–4.47)
LYMPHOCYTES NFR BLD AUTO: 36 % (ref 14–44)
MCH RBC QN AUTO: 30.7 PG (ref 26.8–34.3)
MCHC RBC AUTO-ENTMCNC: 33.6 G/DL (ref 31.4–37.4)
MCV RBC AUTO: 91 FL (ref 82–98)
MONOCYTES # BLD AUTO: 0.59 THOUSAND/ΜL (ref 0.17–1.22)
MONOCYTES NFR BLD AUTO: 7 % (ref 4–12)
NEUTROPHILS # BLD AUTO: 4.14 THOUSANDS/ΜL (ref 1.85–7.62)
NEUTS SEG NFR BLD AUTO: 53 % (ref 43–75)
NONHDLC SERPL-MCNC: 103 MG/DL
NRBC BLD AUTO-RTO: 0 /100 WBCS
PLATELET # BLD AUTO: 227 THOUSANDS/UL (ref 149–390)
PMV BLD AUTO: 10.1 FL (ref 8.9–12.7)
POTASSIUM SERPL-SCNC: 4.6 MMOL/L (ref 3.5–5.3)
PROT SERPL-MCNC: 7.1 G/DL (ref 6.4–8.2)
RBC # BLD AUTO: 5.41 MILLION/UL (ref 3.88–5.62)
SODIUM SERPL-SCNC: 143 MMOL/L (ref 136–145)
TRIGL SERPL-MCNC: 136 MG/DL
TSH SERPL DL<=0.05 MIU/L-ACNC: 0.98 UIU/ML (ref 0.36–3.74)
WBC # BLD AUTO: 7.92 THOUSAND/UL (ref 4.31–10.16)

## 2021-12-03 PROCEDURE — 85025 COMPLETE CBC W/AUTO DIFF WBC: CPT

## 2021-12-03 PROCEDURE — 36415 COLL VENOUS BLD VENIPUNCTURE: CPT

## 2021-12-03 PROCEDURE — 84443 ASSAY THYROID STIM HORMONE: CPT

## 2021-12-03 PROCEDURE — 83036 HEMOGLOBIN GLYCOSYLATED A1C: CPT

## 2021-12-03 PROCEDURE — 80053 COMPREHEN METABOLIC PANEL: CPT

## 2021-12-03 PROCEDURE — 80061 LIPID PANEL: CPT

## 2021-12-04 LAB
EST. AVERAGE GLUCOSE BLD GHB EST-MCNC: 137 MG/DL
HBA1C MFR BLD: 6.4 %

## 2021-12-10 ENCOUNTER — IMMUNIZATIONS (OUTPATIENT)
Dept: FAMILY MEDICINE CLINIC | Facility: HOSPITAL | Age: 76
End: 2021-12-10

## 2021-12-10 ENCOUNTER — OFFICE VISIT (OUTPATIENT)
Dept: INTERNAL MEDICINE CLINIC | Facility: CLINIC | Age: 76
End: 2021-12-10
Payer: MEDICARE

## 2021-12-10 VITALS
HEIGHT: 64 IN | BODY MASS INDEX: 39.09 KG/M2 | RESPIRATION RATE: 16 BRPM | HEART RATE: 59 BPM | SYSTOLIC BLOOD PRESSURE: 110 MMHG | DIASTOLIC BLOOD PRESSURE: 62 MMHG | OXYGEN SATURATION: 97 % | TEMPERATURE: 98.4 F | WEIGHT: 229 LBS

## 2021-12-10 DIAGNOSIS — Z23 ENCOUNTER FOR IMMUNIZATION: Primary | ICD-10-CM

## 2021-12-10 DIAGNOSIS — I10 BENIGN HYPERTENSION: Primary | ICD-10-CM

## 2021-12-10 DIAGNOSIS — K21.9 GASTROESOPHAGEAL REFLUX DISEASE WITHOUT ESOPHAGITIS: ICD-10-CM

## 2021-12-10 DIAGNOSIS — R42 VERTIGO: ICD-10-CM

## 2021-12-10 DIAGNOSIS — R73.01 IMPAIRED FASTING GLUCOSE: ICD-10-CM

## 2021-12-10 PROCEDURE — 99214 OFFICE O/P EST MOD 30 MIN: CPT | Performed by: INTERNAL MEDICINE

## 2021-12-10 PROCEDURE — 0001A COVID-19 PFIZER VACC 0.3 ML: CPT

## 2021-12-10 PROCEDURE — 91300 COVID-19 PFIZER VACC 0.3 ML: CPT

## 2021-12-10 RX ORDER — MECLIZINE HYDROCHLORIDE 25 MG/1
25 TABLET ORAL 3 TIMES DAILY PRN
Qty: 30 TABLET | Refills: 0 | Status: SHIPPED | OUTPATIENT
Start: 2021-12-10 | End: 2022-01-11 | Stop reason: SDUPTHER

## 2022-01-11 DIAGNOSIS — R42 VERTIGO: ICD-10-CM

## 2022-01-11 RX ORDER — MECLIZINE HYDROCHLORIDE 25 MG/1
25 TABLET ORAL 3 TIMES DAILY PRN
Qty: 60 TABLET | Refills: 1 | Status: SHIPPED | OUTPATIENT
Start: 2022-01-11 | End: 2022-04-07

## 2022-01-11 NOTE — TELEPHONE ENCOUNTER
Patient is requesting a refill on meclizine 25 mg tablet, take 1 tablet (25 mg total) by mouth 3 (three) times a day as needed for dizziness      [facility]/[place]    Call back #[phone]

## 2022-02-22 DIAGNOSIS — F41.9 ANXIETY: ICD-10-CM

## 2022-02-22 RX ORDER — ALPRAZOLAM 0.5 MG/1
TABLET ORAL
Qty: 60 TABLET | Refills: 3 | Status: SHIPPED | OUTPATIENT
Start: 2022-02-22 | End: 2022-06-24

## 2022-02-22 NOTE — TELEPHONE ENCOUNTER
Controlled Substance Review    [place] PDMP or [place]  reviewed: No red flags were identified; safe to proceed with prescription  Dara Soulier

## 2022-03-11 ENCOUNTER — APPOINTMENT (OUTPATIENT)
Dept: LAB | Facility: HOSPITAL | Age: 77
End: 2022-03-11
Payer: MEDICARE

## 2022-03-11 DIAGNOSIS — I10 BENIGN HYPERTENSION: ICD-10-CM

## 2022-03-11 DIAGNOSIS — R73.01 IMPAIRED FASTING GLUCOSE: ICD-10-CM

## 2022-03-11 LAB
ALBUMIN SERPL BCP-MCNC: 3.7 G/DL (ref 3.5–5)
ALP SERPL-CCNC: 54 U/L (ref 46–116)
ALT SERPL W P-5'-P-CCNC: 52 U/L (ref 12–78)
ANION GAP SERPL CALCULATED.3IONS-SCNC: 7 MMOL/L (ref 4–13)
AST SERPL W P-5'-P-CCNC: 25 U/L (ref 5–45)
BASOPHILS # BLD AUTO: 0.07 THOUSANDS/ΜL (ref 0–0.1)
BASOPHILS NFR BLD AUTO: 1 % (ref 0–1)
BILIRUB SERPL-MCNC: 0.47 MG/DL (ref 0.2–1)
BUN SERPL-MCNC: 20 MG/DL (ref 5–25)
CALCIUM SERPL-MCNC: 8.6 MG/DL (ref 8.3–10.1)
CHLORIDE SERPL-SCNC: 105 MMOL/L (ref 100–108)
CHOLEST SERPL-MCNC: 139 MG/DL
CO2 SERPL-SCNC: 27 MMOL/L (ref 21–32)
CREAT SERPL-MCNC: 1.14 MG/DL (ref 0.6–1.3)
EOSINOPHIL # BLD AUTO: 0.17 THOUSAND/ΜL (ref 0–0.61)
EOSINOPHIL NFR BLD AUTO: 2 % (ref 0–6)
ERYTHROCYTE [DISTWIDTH] IN BLOOD BY AUTOMATED COUNT: 13.5 % (ref 11.6–15.1)
EST. AVERAGE GLUCOSE BLD GHB EST-MCNC: 146 MG/DL
GFR SERPL CREATININE-BSD FRML MDRD: 63 ML/MIN/1.73SQ M
GLUCOSE P FAST SERPL-MCNC: 154 MG/DL (ref 65–99)
HBA1C MFR BLD: 6.7 %
HCT VFR BLD AUTO: 46.7 % (ref 36.5–49.3)
HDLC SERPL-MCNC: 32 MG/DL
HGB BLD-MCNC: 16.7 G/DL (ref 12–17)
IMM GRANULOCYTES # BLD AUTO: 0.01 THOUSAND/UL (ref 0–0.2)
IMM GRANULOCYTES NFR BLD AUTO: 0 % (ref 0–2)
LDLC SERPL CALC-MCNC: 62 MG/DL (ref 0–100)
LYMPHOCYTES # BLD AUTO: 2.43 THOUSANDS/ΜL (ref 0.6–4.47)
LYMPHOCYTES NFR BLD AUTO: 29 % (ref 14–44)
MCH RBC QN AUTO: 31.5 PG (ref 26.8–34.3)
MCHC RBC AUTO-ENTMCNC: 35.8 G/DL (ref 31.4–37.4)
MCV RBC AUTO: 88 FL (ref 82–98)
MONOCYTES # BLD AUTO: 0.68 THOUSAND/ΜL (ref 0.17–1.22)
MONOCYTES NFR BLD AUTO: 8 % (ref 4–12)
NEUTROPHILS # BLD AUTO: 4.95 THOUSANDS/ΜL (ref 1.85–7.62)
NEUTS SEG NFR BLD AUTO: 60 % (ref 43–75)
NONHDLC SERPL-MCNC: 107 MG/DL
NRBC BLD AUTO-RTO: 0 /100 WBCS
PLATELET # BLD AUTO: 217 THOUSANDS/UL (ref 149–390)
PMV BLD AUTO: 10.3 FL (ref 8.9–12.7)
POTASSIUM SERPL-SCNC: 4.1 MMOL/L (ref 3.5–5.3)
PROT SERPL-MCNC: 7 G/DL (ref 6.4–8.2)
RBC # BLD AUTO: 5.31 MILLION/UL (ref 3.88–5.62)
SODIUM SERPL-SCNC: 139 MMOL/L (ref 136–145)
TRIGL SERPL-MCNC: 225 MG/DL
WBC # BLD AUTO: 8.31 THOUSAND/UL (ref 4.31–10.16)

## 2022-03-11 PROCEDURE — 85025 COMPLETE CBC W/AUTO DIFF WBC: CPT

## 2022-03-11 PROCEDURE — 80053 COMPREHEN METABOLIC PANEL: CPT

## 2022-03-11 PROCEDURE — 36415 COLL VENOUS BLD VENIPUNCTURE: CPT

## 2022-03-11 PROCEDURE — 83036 HEMOGLOBIN GLYCOSYLATED A1C: CPT

## 2022-03-11 PROCEDURE — 80061 LIPID PANEL: CPT

## 2022-04-07 DIAGNOSIS — R42 VERTIGO: ICD-10-CM

## 2022-04-07 RX ORDER — MECLIZINE HYDROCHLORIDE 25 MG/1
TABLET ORAL
Qty: 60 TABLET | Refills: 1 | Status: SHIPPED | OUTPATIENT
Start: 2022-04-07 | End: 2022-06-09

## 2022-05-05 ENCOUNTER — HOSPITAL ENCOUNTER (OUTPATIENT)
Dept: RADIOLOGY | Facility: HOSPITAL | Age: 77
Discharge: HOME | End: 2022-05-05
Payer: MEDICARE

## 2022-05-05 ENCOUNTER — OFFICE VISIT (OUTPATIENT)
Dept: INTERNAL MEDICINE CLINIC | Facility: CLINIC | Age: 77
End: 2022-05-05
Payer: MEDICARE

## 2022-05-05 VITALS
HEIGHT: 64 IN | TEMPERATURE: 98.6 F | SYSTOLIC BLOOD PRESSURE: 112 MMHG | DIASTOLIC BLOOD PRESSURE: 80 MMHG | RESPIRATION RATE: 14 BRPM | OXYGEN SATURATION: 92 % | BODY MASS INDEX: 39.78 KG/M2 | HEART RATE: 87 BPM | WEIGHT: 233 LBS

## 2022-05-05 DIAGNOSIS — M25.571 CHRONIC PAIN OF RIGHT ANKLE: ICD-10-CM

## 2022-05-05 DIAGNOSIS — Z00.00 ENCOUNTER FOR MEDICARE ANNUAL WELLNESS EXAM: Primary | ICD-10-CM

## 2022-05-05 DIAGNOSIS — I10 BENIGN HYPERTENSION: ICD-10-CM

## 2022-05-05 DIAGNOSIS — G89.29 CHRONIC PAIN OF RIGHT ANKLE: ICD-10-CM

## 2022-05-05 DIAGNOSIS — G47.33 OSA ON CPAP: ICD-10-CM

## 2022-05-05 DIAGNOSIS — E78.2 MIXED HYPERLIPIDEMIA: ICD-10-CM

## 2022-05-05 DIAGNOSIS — N32.81 OAB (OVERACTIVE BLADDER): ICD-10-CM

## 2022-05-05 DIAGNOSIS — Z99.89 OSA ON CPAP: ICD-10-CM

## 2022-05-05 DIAGNOSIS — R73.01 IMPAIRED FASTING GLUCOSE: ICD-10-CM

## 2022-05-05 DIAGNOSIS — Z13.31 DEPRESSION SCREEN: ICD-10-CM

## 2022-05-05 DIAGNOSIS — E66.01 MORBID OBESITY (HCC): ICD-10-CM

## 2022-05-05 PROCEDURE — G0439 PPPS, SUBSEQ VISIT: HCPCS | Performed by: PHYSICIAN ASSISTANT

## 2022-05-05 PROCEDURE — 73610 X-RAY EXAM OF ANKLE: CPT

## 2022-05-05 PROCEDURE — G0444 DEPRESSION SCREEN ANNUAL: HCPCS | Performed by: PHYSICIAN ASSISTANT

## 2022-05-05 PROCEDURE — 99214 OFFICE O/P EST MOD 30 MIN: CPT | Performed by: PHYSICIAN ASSISTANT

## 2022-05-05 NOTE — PROGRESS NOTES
Assessment/Plan: For ongoing ankle pain, have x-ray of ankle done, we will report the results to you when available  Also would recommend trial of OTC flaxseed oil caplets 1 daily  Remember it will take at least 1 month to see any benefit  Continue other medications  As we discussed gradual weight reduction will be beneficial for all health concerns but especially for sugars  Reduce portion sizes of carbohydrates  Schedule follow-up in 3 months, sooner as needed  Quality Measures:   Depression Screening and Follow-up Plan: Patient was screened for depression during today's encounter  They screened negative with a PHQ-2 score of 0  Falls Plan of Care: balance, strength, and gait training instructions were provided  Return in about 3 months (around 8/5/2022) for Next scheduled follow up-Dr Neftaly Mccauley  Diagnoses and all orders for this visit:    Encounter for Medicare annual wellness exam    Chronic pain of right ankle  -     XR ankle 3+ vw right; Future    Impaired fasting glucose  -     Hemoglobin A1C; Future    Benign hypertension  -     Comprehensive metabolic panel; Future    Mixed hyperlipidemia  -     Lipid panel; Future    Morbid obesity (HCC)    OAB (overactive bladder)    KADEN on CPAP          Subjective:      Patient ID: Ania Farrell is a 78 y o  male  Follow-up, labs reviewed with patient     Hypertension:  Good control on current medication  Meena cp, palp, sob, edema, HA, dizziness, diaphoresis, syncope, visual disturbance  Hyperlipidemia:  Good control on low-dose rosuvastatin  Denies side effects  Low HDL  Impaired fasting glucose / prediabetes:  , A1c 6 7  Long discussion held with the patient that he is in the diabetic range  It is important for him to reduce his weight  Reduce intake of concentrated sweets, and reduce portion sizes of carbohydrates (rice, bread, pasta, potatoes)   OAB:  On VESIcare which he finds effective      KADEN:  States he has obtained his new CPAP machine  Finds this effective  Resting better  Morbid obesity:  Discussion held on weight reduction  Right ankle pain:  Not aware of any trauma  No previous history of gout  Not particularly red or swollen  Hurts on weight-bearing and walking        ALLERGIES:  No Known Allergies    CURRENT MEDICATIONS:    Current Outpatient Medications:     ALPRAZolam (XANAX) 0 5 mg tablet, TAKE 1 TABLET(0 5 MG) BY MOUTH TWICE DAILY, Disp: 60 tablet, Rfl: 3    aspirin (ASPIRIN LOW DOSE) 81 mg EC tablet, Take by mouth, Disp: , Rfl:     B Complex-Folic Acid (B COMPLEX PLUS) TABS, Take by mouth, Disp: , Rfl:     Cholecalciferol (VITAMIN D3) 1000 units CAPS, Take by mouth, Disp: , Rfl:     Chondroitin Sulfate-Vit C-Mn (CHONDROITIN SULFATE COMPLEX) 400-60-2 5 MG CAPS, Take by mouth, Disp: , Rfl:     Coenzyme Q10 (COQ-10) 100 MG CAPS, Take by mouth, Disp: , Rfl:     fenofibrate (TRICOR) 54 MG tablet, TAKE 1 TABLET BY MOUTH  DAILY, Disp: 90 tablet, Rfl: 3    meclizine (ANTIVERT) 25 mg tablet, TAKE 1 TABLET(25 MG) BY MOUTH THREE TIMES DAILY AS NEEDED FOR DIZZINESS, Disp: 60 tablet, Rfl: 1    meloxicam (MOBIC) 15 mg tablet, TAKE 1 TABLET(15 MG) BY MOUTH DAILY, Disp: 90 tablet, Rfl: 0    metoprolol succinate (TOPROL-XL) 50 mg 24 hr tablet, TAKE 1 TABLET BY MOUTH  DAILY, Disp: 90 tablet, Rfl: 3    Multiple Vitamin (MULTI-VITAMIN DAILY PO), Take by mouth, Disp: , Rfl:     omeprazole (PriLOSEC) 40 MG capsule, TAKE 1 CAPSULE BY MOUTH  DAILY, Disp: 90 capsule, Rfl: 3    rosuvastatin (CRESTOR) 5 mg tablet, TAKE 1 TABLET BY MOUTH  DAILY, Disp: 90 tablet, Rfl: 3    solifenacin (VESICARE) 5 mg tablet, TAKE 1 TABLET BY MOUTH  DAILY, Disp: 90 tablet, Rfl: 3    ACTIVE PROBLEM LIST:  Patient Active Problem List   Diagnosis    KADEN on CPAP    Anxiety    Basal cell carcinoma of cheek    Basal cell carcinoma of skin    Benign hypertension    Deviated nasal septum    GERD (gastroesophageal reflux disease)  Hyperlipidemia    Morbid obesity (HCC)    OAB (overactive bladder)    Impaired fasting glucose    Bright red blood per rectum    Diverticulosis    Rib fracture    Abnormal CT of the abdomen    Fall       PAST MEDICAL HISTORY:  Past Medical History:   Diagnosis Date    [facility] )     basal call carcinoma    Colon polyps     DR Jesus Andrew    Diverticulosis     Hyperlipidemia     Hypertension     Obesity     KADEN (obstructive sleep apnea)     cpap    Pneumonia     Small bowel obstruction, partial (HCC)     SOB (shortness of breath)        PAST SURGICAL HISTORY:  Past Surgical History:   Procedure Laterality Date    SKIN BIOPSY      TONSILLECTOMY AND ADENOIDECTOMY         FAMILY HISTORY:  Family History   Problem Relation Age of Onset    Heart disease Mother     Hypertension Mother     Breast cancer Sister        SOCIAL HISTORY:  Social History     Socioeconomic History    Marital status: Significant Other     Spouse name: Not on file    Number of children: 0    Years of education: Not on file    Highest education level: Not on file   Occupational History    Occupation: SALES     Comment: FULL TIME    Tobacco Use    Smoking status: Never Smoker    Smokeless tobacco: Never Used   Vaping Use    Vaping Use: Never used   Substance and Sexual Activity    Alcohol use: Not Currently     Comment: occassional    Drug use: No    Sexual activity: Yes     Partners: Female     Comment: DENIED: HISTORY OF HIGH RISK SEXUAL BEHAVIOR    Other Topics Concern    Not on file   Social History Narrative     as per all scripts      Social Determinants of Health     Financial Resource Strain: Not on file   Food Insecurity: Not on file   Transportation Needs: Not on file   Physical Activity: Not on file   Stress: Not on file   Social Connections: Not on file   Intimate Partner Violence: Not on file   Housing Stability: Not on file       Review of Systems   Constitutional: Negative for activity change, chills, fatigue and fever  HENT: Negative for congestion  Eyes: Negative for discharge  Respiratory: Negative for cough, chest tightness and shortness of breath  Cardiovascular: Negative for chest pain, palpitations and leg swelling  Gastrointestinal: Negative for abdominal pain, blood in stool, constipation, diarrhea, nausea and vomiting  Endocrine: Negative for polydipsia, polyphagia and polyuria  Genitourinary: Negative for difficulty urinating  Musculoskeletal: Positive for arthralgias  Negative for myalgias  Skin: Negative for rash  Allergic/Immunologic: Negative for immunocompromised state  Neurological: Negative for dizziness, syncope, weakness, light-headedness and headaches  Hematological: Negative for adenopathy  Does not bruise/bleed easily  Psychiatric/Behavioral: Negative for dysphoric mood, sleep disturbance and suicidal ideas  The patient is not nervous/anxious  Objective:  Vitals:    05/05/22 1330   BP: 112/80   BP Location: Left arm   Patient Position: Sitting   Cuff Size: Standard   Pulse: 87   Resp: 14   Temp: 98 6 °F (37 °C)   TempSrc: Tympanic   SpO2: 92%   Weight: 106 kg (233 lb)   Height: 5' 4" (1 626 m)     Body mass index is 39 99 kg/m²  Physical Exam  Vitals and nursing note reviewed  Constitutional:       General: He is not in acute distress  Appearance: He is well-developed  He is not ill-appearing  Comments: Morbidly obese   HENT:      Head: Normocephalic and atraumatic  Eyes:      Extraocular Movements: Extraocular movements intact  Pupils: Pupils are equal, round, and reactive to light  Neck:      Thyroid: No thyromegaly  Vascular: No carotid bruit or JVD  Cardiovascular:      Rate and Rhythm: Normal rate and regular rhythm  Heart sounds: Normal heart sounds  Pulmonary:      Effort: Pulmonary effort is normal  No respiratory distress  Breath sounds: Normal breath sounds     Musculoskeletal:      Cervical back: Neck supple  Right lower leg: No edema  Left lower leg: No edema  Comments: Hypertrophic changes of the right ankle noted  Tenderness of the Achilles  Limitation motion of the ankle noted  Tenderness along lateral ligaments  No instability  No neurovascular compromise  Lymphadenopathy:      Cervical: No cervical adenopathy  Skin:     General: Skin is warm and dry  Findings: No rash  Neurological:      General: No focal deficit present  Mental Status: He is alert and oriented to person, place, and time  Mental status is at baseline  Psychiatric:         Mood and Affect: Mood normal          Behavior: Behavior normal            RESULTS:    No results found for this or any previous visit (from the past 1008 hour(s))  This note was created with voice recognition software  Phonic, grammatical and spelling errors may be present within the note as a result

## 2022-05-05 NOTE — PROGRESS NOTES
Assessment and Plan: For ongoing ankle pain, have x-ray of ankle done, we will report the results to you when available  Also would recommend trial of OTC flaxseed oil caplets 1 daily  Remember it will take at least 1 month to see any benefit  Continue other medications  As we discussed gradual weight reduction will be beneficial for all health concerns but especially for sugars  Reduce portion sizes of carbohydrates  Schedule follow-up in 3 months, sooner as needed  Problem List Items Addressed This Visit        Endocrine    Impaired fasting glucose    Relevant Orders    Hemoglobin A1C       Respiratory    KADEN on CPAP       Cardiovascular and Mediastinum    Benign hypertension    Relevant Orders    Comprehensive metabolic panel       Genitourinary    OAB (overactive bladder)       Other    Hyperlipidemia    Relevant Orders    Lipid panel    Morbid obesity ([facility] [address] )      Other Visit Diagnoses     Encounter for Medicare annual wellness exam    -  Primary    Chronic pain of right ankle        Relevant Orders    XR ankle 3+ vw right        BMI Counseling: Body mass index is 39 99 kg/m²  The BMI is above normal  Nutrition recommendations include decreasing portion sizes, encouraging healthy choices of fruits and vegetables, consuming healthier snacks, moderation in carbohydrate intake and reducing intake of cholesterol  Exercise recommendations include exercising 3-5 times per week  Rationale for BMI follow-up plan is due to patient being overweight or obese  Depression Screening and Follow-up Plan: Patient was screened for depression during today's encounter  They screened negative with a PHQ-2 score of 0  Falls Plan of Care: balance, strength, and gait training instructions were provided  Medications that increase falls were reviewed  Preventive health issues were discussed with patient, and age appropriate screening tests were ordered as noted in patient's After Visit Summary    [employer] advice and appropriate referrals for health education or preventive services given if needed, as noted in patient's After Visit Summary  History of Present Illness:     Patient presents for Medicare Annual Wellness visit  Questionnaire has been reviewed, clarified, and updated with the patient today      Patient Care Team:  Presley Gibson MD as PCP - CHERSIE Pretty MD     Problem List:     Patient Active Problem List   Diagnosis    KADEN on CPAP    Anxiety    Basal cell carcinoma of cheek    Basal cell carcinoma of skin    Benign hypertension    Deviated nasal septum    GERD (gastroesophageal reflux disease)    Hyperlipidemia    Morbid obesity ([address] )    OAB (overactive bladder)    Impaired fasting glucose    Bright red blood per rectum    Diverticulosis    Rib fracture    Abnormal CT of the abdomen    Fall      Past Medical and Surgical History:     Past Medical History:   Diagnosis Date    Cancer ([facility] [address] )     basal call carcinoma    Colon polyps     DR Deja Cavazos    Diverticulosis     Hyperlipidemia     Hypertension     Obesity     KADEN (obstructive sleep apnea)     cpap    Pneumonia     Small bowel obstruction, partial ([address] )     SOB (shortness of breath)      Past Surgical History:   Procedure Laterality Date    SKIN BIOPSY      TONSILLECTOMY AND ADENOIDECTOMY        Family History:     Family History   Problem Relation Age of Onset    Heart disease Mother     Hypertension Mother     Breast cancer Sister       Social History:     Social History     Socioeconomic History    Marital status: Significant Other     Spouse name: None    Number of children: 0    Years of education: None    Highest education level: None   Occupational History    Occupation: SALES     Comment: FULL TIME    Tobacco Use    Smoking status: Never Smoker    Smokeless tobacco: Never Used   Vaping Use    Vaping Use: Never used   Substance and Sexual Activity    Alcohol use: Not Currently     Comment: occassional    Drug use: No    Sexual activity: Yes     Partners: Female     Comment: DENIED: HISTORY OF HIGH RISK SEXUAL BEHAVIOR    Other Topics Concern    None   Social History Narrative     as per all scripts      Social Determinants of Health     Financial Resource Strain: Not on file   Food Insecurity: Not on file   Transportation Needs: Not on file   Physical Activity: Not on file   Stress: Not on file   Social Connections: Not on file   Intimate Partner Violence: Not on file   Housing Stability: Not on file      Medications and Allergies:     Current Outpatient Medications   Medication Sig Dispense Refill    ALPRAZolam (XANAX) 0 5 mg tablet TAKE 1 TABLET(0 5 MG) BY MOUTH TWICE DAILY 60 tablet 3    aspirin (ASPIRIN LOW DOSE) 81 mg EC tablet Take by mouth      B Complex-Folic Acid (B COMPLEX PLUS) TABS Take by mouth      Cholecalciferol (VITAMIN D3) 1000 units CAPS Take by mouth      Chondroitin Sulfate-Vit C-Mn (CHONDROITIN SULFATE COMPLEX) 400-60-2 5 MG CAPS Take by mouth      Coenzyme Q10 (COQ-10) 100 MG CAPS Take by mouth      fenofibrate (TRICOR) 54 MG tablet TAKE 1 TABLET BY MOUTH  DAILY 90 tablet 3    meclizine (ANTIVERT) 25 mg tablet TAKE 1 TABLET(25 MG) BY MOUTH THREE TIMES DAILY AS NEEDED FOR DIZZINESS 60 tablet 1    meloxicam (MOBIC) 15 mg tablet TAKE 1 TABLET(15 MG) BY MOUTH DAILY 90 tablet 0    metoprolol succinate (TOPROL-XL) 50 mg 24 hr tablet TAKE 1 TABLET BY MOUTH  DAILY 90 tablet 3    Multiple Vitamin (MULTI-VITAMIN DAILY PO) Take by mouth      omeprazole (PriLOSEC) 40 MG capsule TAKE 1 CAPSULE BY MOUTH  DAILY 90 capsule 3    rosuvastatin (CRESTOR) 5 mg tablet TAKE 1 TABLET BY MOUTH  DAILY 90 tablet 3    solifenacin (VESICARE) 5 mg tablet TAKE 1 TABLET BY MOUTH  DAILY 90 tablet 3     No current facility-administered medications for this visit       No Known Allergies   Immunizations:     Immunization History   Administered Date(s) Administered   [facility] COVID-19 PFIZER VACCINE 0 3 ML IM 03/18/2021, 04/08/2021, 12/10/2021    Tdap 02/10/2019      Health Maintenance:         Topic Date Due    Hepatitis C Screening  Never done    Colorectal Cancer Screening  11/14/2022         Topic Date Due    Pneumococcal Vaccine: 65+ Years (1 of 1 - PPSV23) Never done      Medicare Health Risk Assessment:     /80 (BP Location: Left arm, Patient Position: Sitting, Cuff Size: Standard)   Pulse 87   Temp 98 6 °F (37 °C) (Tympanic)   Resp 14   Ht 5' 4" (1 626 m)   Wt 106 kg (233 lb)   SpO2 92%   BMI 39 99 kg/m²      Precious Austin is here for his Subsequent Wellness visit  Last Medicare Wellness visit information reviewed, patient interviewed and updates made to the record today  Health Risk Assessment:   Patient rates overall health as good  Patient feels that their physical health rating is same  Patient is satisfied with their life  Eyesight was rated as same  Hearing was rated as slightly worse  Patient feels that their emotional and mental health rating is same  Patients states they are never, rarely angry  Patient states they are never, rarely unusually tired/fatigued  Pain experienced in the last 7 days has been some  Patient's pain rating has been 3/10  Patient states that he has experienced no weight loss or gain in last 6 months  R ankle pain    Depression Screening:   PHQ-2 Score: 0      Fall Risk Screening: In the past year, patient has experienced: history of falling in past year    Number of falls: 1  Injured during fall?: Yes    Feels unsteady when standing or walking?: Yes    Worried about falling?: No      Home Safety:  Patient does not have trouble with stairs inside or outside of their home  Patient has working smoke alarms and has working carbon monoxide detector  Home safety hazards include: none  Wayne Montane off a ladder causing fractured ribs and lung contusion    Nutrition:   Current diet is Regular       Medications:   Patient is currently taking over-the-counter supplements  OTC medications include: see medication list  Patient is able to manage medications  Activities of Daily Living (ADLs)/Instrumental Activities of Daily Living (IADLs):   Walk and transfer into and out of bed and chair?: Yes  Dress and groom yourself?: Yes    Bathe or shower yourself?: Yes    Feed yourself? Yes  Do your laundry/housekeeping?: Yes  Manage your money, pay your bills and track your expenses?: Yes  Make your own meals?: Yes    Do your own shopping?: Yes    Previous Hospitalizations:   Any hospitalizations or ED visits within the last 12 months?: No    How many hospitalizations have you had in the last year?: 1-2    Advance Care Planning:   Living will: No    Durable POA for healthcare: No    Advanced directive: No    Five wishes given: Yes    End of Life Decisions reviewed with patient: Yes      Cognitive Screening:   Provider or family/friend/caregiver concerned regarding cognition?: No    PREVENTIVE SCREENINGS      Cardiovascular Screening:    General: Screening Not Indicated, History Lipid Disorder and Screening Current      Diabetes Screening:     General: Screening Current      Colorectal Cancer Screening:     General: Screening Current      Prostate Cancer Screening:    General: Screening Current      Osteoporosis Screening:    General: Screening Not Indicated      Abdominal Aortic Aneurysm (AAA) Screening:    Risk factors include: age between 73-67 yo        General: Screening Current      Lung Cancer Screening:     General: Screening Not Indicated      Hepatitis C Screening:    General: Screening Not Indicated    Screening, Brief Intervention, and Referral to Treatment (SBIRT)    Screening  Typical number of drinks in a day: 0  Typical number of drinks in a week: 0  Interpretation: Low risk drinking behavior      Single Item Drug Screening:  How often have you used an illegal drug (including marijuana) or a prescription medication for non-medical reasons in the past year? never    Single Item Drug Screen Score: 0  Interpretation: Negative screen for possible drug use disorder    Brief Intervention  Alcohol & drug use screenings were reviewed  No concerns regarding substance use disorder identified         Dacia Parker PA-C

## 2022-05-05 NOTE — PATIENT INSTRUCTIONS
For ongoing ankle pain, have x-ray of ankle done, we will report the results to you when available  Also would recommend trial of OTC flaxseed oil caplets 1 daily  Remember it will take at least 1 month to see any benefit  Continue other medications  As we discussed gradual weight reduction will be beneficial for all health concerns but especially for sugars  Reduce portion sizes of carbohydrates  Schedule follow-up in 3 months, sooner as needed  Medicare Preventive Visit Patient Instructions  Thank you for completing your Welcome to Medicare Visit or Medicare Annual Wellness Visit today  Your next wellness visit will be due in one year (5/6/2023)  The screening/preventive services that you may require over the next 5-10 years are detailed below  Some tests may not apply to you based off risk factors and/or age  Screening tests ordered at today's visit but not completed yet may show as past due  Also, please note that scanned in results may not display below  Preventive Screenings:  Service Recommendations Previous Testing/Comments   Colorectal Cancer Screening  · Colonoscopy    · Fecal Occult Blood Test (FOBT)/Fecal Immunochemical Test (FIT)  · Fecal DNA/Cologuard Test  · Flexible Sigmoidoscopy Age: 54-65 years old   Colonoscopy: every 10 years (May be performed more frequently if at higher risk)  OR  FOBT/FIT: every 1 year  OR  Cologuard: every 3 years  OR  Sigmoidoscopy: every 5 years  Screening may be recommended earlier than age 48 if at higher risk for colorectal cancer  Also, an individualized decision between you and your healthcare provider will decide whether screening between the ages of 74-80 would be appropriate   Colonoscopy: 11/14/2019  FOBT/FIT: Not on file  Cologuard: Not on file  Sigmoidoscopy: Not on file    Screening Current     Prostate Cancer Screening Individualized decision between patient and health care provider in men between ages of 53-78   Medicare will cover every 12 months beginning on the day after your 50th birthday PSA: 1 0 ng/mL           Hepatitis C Screening Once for adults born between 1945 and 1965  More frequently in patients at high risk for Hepatitis C Hep C Antibody: Not on file        Diabetes Screening 1-2 times per year if you're at risk for diabetes or have pre-diabetes Fasting glucose: 154 mg/dL   A1C: 6 7 %    Screening Current   Cholesterol Screening Once every 5 years if you don't have a lipid disorder  May order more often based on risk factors  Lipid panel: 03/11/2022    Screening Not Indicated  History Lipid Disorder      Other Preventive Screenings Covered by Medicare:  1  Abdominal Aortic Aneurysm (AAA) Screening: covered once if your at risk  You're considered to be at risk if you have a family history of AAA or a male between the age of 73-68 who smoking at least 100 cigarettes in your lifetime  2  Lung Cancer Screening: covers low dose CT scan once per year if you meet all of the following conditions: (1) Age 50-69; (2) No signs or symptoms of lung cancer; (3) Current smoker or have quit smoking within the last 15 years; (4) You have a tobacco smoking history of at least 30 pack years (packs per day x number of years you smoked); (5) You get a written order from a healthcare provider  3  Glaucoma Screening: covered annually if you're considered high risk: (1) You have diabetes OR (2) Family history of glaucoma OR (3)  aged 48 and older OR (3)  American aged 72 and older  3  Osteoporosis Screening: covered every 2 years if you meet one of the following conditions: (1) Have a vertebral abnormality; (2) On glucocorticoid therapy for more than 3 months; (3) Have primary hyperparathyroidism; (4) On osteoporosis medications and need to assess response to drug therapy  5  HIV Screening: covered annually if you're between the age of 12-76   Also covered annually if you are younger than 13 and older than 72 with risk factors for HIV infection  For pregnant patients, it is covered up to 3 times per pregnancy  Immunizations:  Immunization Recommendations   Influenza Vaccine Annual influenza vaccination during flu season is recommended for all persons aged >= 6 months who do not have contraindications   Pneumococcal Vaccine (Prevnar and Pneumovax)  * Prevnar = PCV13  * Pneumovax = PPSV23 Adults 25-60 years old: 1-3 doses may be recommended based on certain risk factors  Adults 72 years old: Prevnar (PCV13) vaccine recommended followed by Pneumovax (PPSV23) vaccine  If already received PPSV23 since turning 65, then PCV13 recommended at least one year after PPSV23 dose  Hepatitis B Vaccine 3 dose series if at intermediate or high risk (ex: diabetes, end stage renal disease, liver disease)   Tetanus (Td) Vaccine - COST NOT COVERED BY MEDICARE PART B Following completion of primary series, a booster dose should be given every 10 years to maintain immunity against tetanus  Td may also be given as tetanus wound prophylaxis  Tdap Vaccine - COST NOT COVERED BY MEDICARE PART B Recommended at least once for all adults  For pregnant patients, recommended with each pregnancy  Shingles Vaccine (Shingrix) - COST NOT COVERED BY MEDICARE PART B  2 shot series recommended in those aged 48 and above     Health Maintenance Due:      Topic Date Due    Hepatitis C Screening  Never done    Colorectal Cancer Screening  11/14/2022     Immunizations Due:      Topic Date Due    Pneumococcal Vaccine: 65+ Years (1 of 1 - PPSV23) Never done     Advance Directives   What are advance directives? Advance directives are legal documents that state your wishes and plans for medical care  These plans are made ahead of time in case you lose your ability to make decisions for yourself  Advance directives can apply to any medical decision, such as the treatments you want, and if you want to donate organs  What are the types of advance directives?   There are many types of advance directives, and each state has rules about how to use them  You may choose a combination of any of the following:  · Living will: This is a written record of the treatment you want  You can also choose which treatments you do not want, which to limit, and which to stop at a certain time  This includes surgery, medicine, IV fluid, and tube feedings  · Durable power of  for healthcare [facility]): This is a written record that states who you want to make healthcare choices for you when you are unable to make them for yourself  This person, called a proxy, is usually a family member or a friend  You may choose more than 1 proxy  · Do not resuscitate (DNR) order:  A DNR order is used in case your heart stops beating or you stop breathing  It is a request not to have certain forms of treatment, such as CPR  A DNR order may be included in other types of advance directives  · Medical directive: This covers the care that you want if you are in a coma, near death, or unable to make decisions for yourself  You can list the treatments you want for each condition  Treatment may include pain medicine, surgery, blood transfusions, dialysis, IV or tube feedings, and a ventilator (breathing machine)  · Values history: This document has questions about your views, beliefs, and how you feel and think about life  This information can help others choose the care that you would choose  Why are advance directives important? An advance directive helps you control your care  Although spoken wishes may be used, it is better to have your wishes written down  Spoken wishes can be misunderstood, or not followed  Treatments may be given even if you do not want them  An advance directive may make it easier for your family to make difficult choices about your care  Fall Prevention    Fall prevention  includes ways to make your home and other areas safer   It also includes ways you can move more carefully to prevent a fall  Health conditions that cause changes in your blood pressure, vision, or muscle strength and coordination may increase your risk for falls  Medicines may also increase your risk for falls if they make you dizzy, weak, or sleepy  Fall prevention tips:   · Stand or sit up slowly  · Use assistive devices as directed  · Wear shoes that fit well and have soles that   · Wear a personal alarm  · Stay active  · Manage your medical conditions  Home Safety Tips:  · Add items to prevent falls in the bathroom  · Keep paths clear  · Install bright lights in your home  · Keep items you use often on shelves within reach  · Paint or place reflective tape on the edges of your stairs  Weight Management   Why it is important to manage your weight:  Being overweight increases your risk of health conditions such as heart disease, high blood pressure, type 2 diabetes, and certain types of cancer  It can also increase your risk for osteoarthritis, sleep apnea, and other respiratory problems  Aim for a slow, steady weight loss  Even a small amount of weight loss can lower your risk of health problems  How to lose weight safely:  A safe and healthy way to lose weight is to eat fewer calories and get regular exercise  You can lose up about 1 pound a week by decreasing the number of calories you eat by 500 calories each day  Healthy meal plan for weight management:  A healthy meal plan includes a variety of foods, contains fewer calories, and helps you stay healthy  A healthy meal plan includes the following:  · Eat whole-grain foods more often  A healthy meal plan should contain fiber  Fiber is the part of grains, fruits, and vegetables that is not broken down by your body  Whole-grain foods are healthy and provide extra fiber in your diet  Some examples of whole-grain foods are whole-wheat breads and pastas, oatmeal, brown rice, and bulgur  · Eat a variety of vegetables every day    Include dark, leafy greens such as spinach, kale, terence greens, and mustard greens  Eat yellow and orange vegetables such as carrots, sweet potatoes, and winter squash  · Eat a variety of fruits every day  Choose fresh or canned fruit (canned in its own juice or light syrup) instead of juice  Fruit juice has very little or no fiber  · Eat low-fat dairy foods  Drink fat-free (skim) milk or 1% milk  Eat fat-free yogurt and low-fat cottage cheese  Try low-fat cheeses such as mozzarella and other reduced-fat cheeses  · Choose meat and other protein foods that are low in fat  Choose beans or other legumes such as split peas or lentils  Choose fish, skinless poultry (chicken or turkey), or lean cuts of red meat (beef or pork)  Before you cook meat or poultry, cut off any visible fat  · Use less fat and oil  Try baking foods instead of frying them  Add less fat, such as margarine, sour cream, regular salad dressing and mayonnaise to foods  Eat fewer high-fat foods  Some examples of high-fat foods include french fries, doughnuts, ice cream, and cakes  · Eat fewer sweets  Limit foods and drinks that are high in sugar  This includes candy, cookies, regular soda, and sweetened drinks  Exercise:  Exercise at least 30 minutes per day on most days of the week  Some examples of exercise include walking, biking, dancing, and swimming  You can also fit in more physical activity by taking the stairs instead of the elevator or parking farther away from stores  Ask your healthcare provider about the best exercise plan for you  © Copyright [address] 2018 Information is for End User's use only and may not be sold, redistributed or otherwise used for commercial purposes   All illustrations and images included in CareNotes® are the copyrighted property of A D A M , Inc  or [address] [place] [address]

## 2022-05-17 ENCOUNTER — TELEPHONE (OUTPATIENT)
Dept: INTERNAL MEDICINE CLINIC | Facility: CLINIC | Age: 77
End: 2022-05-17

## 2022-05-17 DIAGNOSIS — K04.7 TOOTH ABSCESS: Primary | ICD-10-CM

## 2022-05-17 RX ORDER — AMOXICILLIN 500 MG/1
500 CAPSULE ORAL EVERY 8 HOURS SCHEDULED
Qty: 21 CAPSULE | Refills: 0 | Status: SHIPPED | OUTPATIENT
Start: 2022-05-17 | End: 2022-05-24

## 2022-05-17 NOTE — TELEPHONE ENCOUNTER
Patient has a tooth infection and he took 2 of his wives Penicillin pills which seem to help a bit so patient is wanting to know if you could send in a script for him  Patient's dentist retired and the new dentist can not get him in till next week  Please advise    Magi Esqueda      [facility] [address]    Please call patient one way or another

## 2022-06-09 DIAGNOSIS — R42 VERTIGO: ICD-10-CM

## 2022-06-09 RX ORDER — MECLIZINE HYDROCHLORIDE 25 MG/1
TABLET ORAL
Qty: 60 TABLET | Refills: 1 | Status: SHIPPED | OUTPATIENT
Start: 2022-06-09

## 2022-06-24 DIAGNOSIS — F41.9 ANXIETY: ICD-10-CM

## 2022-06-24 RX ORDER — ALPRAZOLAM 0.5 MG/1
TABLET ORAL
Qty: 60 TABLET | Refills: 0 | Status: SHIPPED | OUTPATIENT
Start: 2022-06-24 | End: 2022-07-26 | Stop reason: SDUPTHER

## 2022-07-13 DIAGNOSIS — I10 BENIGN HYPERTENSION: ICD-10-CM

## 2022-07-13 RX ORDER — METOPROLOL SUCCINATE 50 MG/1
TABLET, EXTENDED RELEASE ORAL
Qty: 90 TABLET | Refills: 3 | Status: SHIPPED | OUTPATIENT
Start: 2022-07-13

## 2022-07-26 DIAGNOSIS — F41.9 ANXIETY: ICD-10-CM

## 2022-07-26 RX ORDER — ALPRAZOLAM 0.5 MG/1
0.5 TABLET ORAL 2 TIMES DAILY
Qty: 60 TABLET | Refills: 3 | Status: SHIPPED | OUTPATIENT
Start: 2022-07-26

## 2022-07-26 NOTE — TELEPHONE ENCOUNTER
Controlled Substance Review    [place] or [place]  reviewed: No red flags were identified; safe to proceed with prescription  Blake Martin

## 2022-08-04 ENCOUNTER — RA CDI HCC (OUTPATIENT)
Dept: OTHER | Facility: HOSPITAL | Age: 77
End: 2022-08-04

## 2022-08-04 NOTE — PROGRESS NOTES
[facility] [address]  coding opportunities       Chart reviewed, no opportunity found: CHART REVIEWED, NO OPPORTUNITY FOUND        Patients Insurance     Medicare Insurance: Medicare

## 2022-08-11 ENCOUNTER — APPOINTMENT (OUTPATIENT)
Dept: LAB | Facility: HOSPITAL | Age: 77
End: 2022-08-11
Payer: MEDICARE

## 2022-08-11 ENCOUNTER — OFFICE VISIT (OUTPATIENT)
Dept: INTERNAL MEDICINE CLINIC | Facility: CLINIC | Age: 77
End: 2022-08-11
Payer: MEDICARE

## 2022-08-11 VITALS
WEIGHT: 230 LBS | RESPIRATION RATE: 18 BRPM | HEART RATE: 71 BPM | HEIGHT: 64 IN | OXYGEN SATURATION: 95 % | DIASTOLIC BLOOD PRESSURE: 80 MMHG | TEMPERATURE: 98.2 F | SYSTOLIC BLOOD PRESSURE: 114 MMHG | BODY MASS INDEX: 39.27 KG/M2

## 2022-08-11 DIAGNOSIS — Z12.11 SCREENING FOR MALIGNANT NEOPLASM OF COLON: ICD-10-CM

## 2022-08-11 DIAGNOSIS — I10 BENIGN HYPERTENSION: ICD-10-CM

## 2022-08-11 DIAGNOSIS — R73.01 IMPAIRED FASTING GLUCOSE: ICD-10-CM

## 2022-08-11 DIAGNOSIS — E78.2 MIXED HYPERLIPIDEMIA: ICD-10-CM

## 2022-08-11 DIAGNOSIS — F41.9 ANXIETY: ICD-10-CM

## 2022-08-11 DIAGNOSIS — I10 BENIGN HYPERTENSION: Primary | ICD-10-CM

## 2022-08-11 DIAGNOSIS — R53.83 FATIGUE, UNSPECIFIED TYPE: ICD-10-CM

## 2022-08-11 LAB
ALBUMIN SERPL BCP-MCNC: 3.8 G/DL (ref 3.5–5)
ALP SERPL-CCNC: 54 U/L (ref 46–116)
ALT SERPL W P-5'-P-CCNC: 47 U/L (ref 12–78)
ANION GAP SERPL CALCULATED.3IONS-SCNC: 9 MMOL/L (ref 4–13)
AST SERPL W P-5'-P-CCNC: 41 U/L (ref 5–45)
BILIRUB SERPL-MCNC: 0.66 MG/DL (ref 0.2–1)
BUN SERPL-MCNC: 16 MG/DL (ref 5–25)
CALCIUM SERPL-MCNC: 8.7 MG/DL (ref 8.3–10.1)
CHLORIDE SERPL-SCNC: 103 MMOL/L (ref 96–108)
CHOLEST SERPL-MCNC: 144 MG/DL
CO2 SERPL-SCNC: 28 MMOL/L (ref 21–32)
CREAT SERPL-MCNC: 1.19 MG/DL (ref 0.6–1.3)
EST. AVERAGE GLUCOSE BLD GHB EST-MCNC: 177 MG/DL
GFR SERPL CREATININE-BSD FRML MDRD: 59 ML/MIN/1.73SQ M
GLUCOSE P FAST SERPL-MCNC: 170 MG/DL (ref 65–99)
HBA1C MFR BLD: 7.8 %
HDLC SERPL-MCNC: 34 MG/DL
LDLC SERPL CALC-MCNC: 67 MG/DL (ref 0–100)
NONHDLC SERPL-MCNC: 110 MG/DL
POTASSIUM SERPL-SCNC: 4.4 MMOL/L (ref 3.5–5.3)
PROT SERPL-MCNC: 7.6 G/DL (ref 6.4–8.4)
SODIUM SERPL-SCNC: 140 MMOL/L (ref 135–147)
TRIGL SERPL-MCNC: 217 MG/DL
TSH SERPL DL<=0.05 MIU/L-ACNC: 1.13 UIU/ML (ref 0.45–4.5)

## 2022-08-11 PROCEDURE — 80061 LIPID PANEL: CPT

## 2022-08-11 PROCEDURE — 36415 COLL VENOUS BLD VENIPUNCTURE: CPT

## 2022-08-11 PROCEDURE — 84443 ASSAY THYROID STIM HORMONE: CPT

## 2022-08-11 PROCEDURE — 80053 COMPREHEN METABOLIC PANEL: CPT

## 2022-08-11 PROCEDURE — 99214 OFFICE O/P EST MOD 30 MIN: CPT | Performed by: INTERNAL MEDICINE

## 2022-08-11 PROCEDURE — 83036 HEMOGLOBIN GLYCOSYLATED A1C: CPT

## 2022-08-11 NOTE — PROGRESS NOTES
Assessment/Plan:       Chronic problems are stable but he is due for blood work  Continue current medications  Reinforced he should get the blood work to make sure nothing is causing his fatigue  Discussed better options for eating  With his work schedule, he may do better having a Glucerna shake late afternoon  Quality Measures:       Return in about 4 months (around 12/11/2022)  No problem-specific Assessment & Plan notes found for this encounter  Diagnoses and all orders for this visit:    Benign hypertension  -     TSH, 3rd generation with Free T4 reflex; Future    Mixed hyperlipidemia    Impaired fasting glucose    Anxiety    Screening for malignant neoplasm of colon  -     Ambulatory referral for colonoscopy; Future    Fatigue, unspecified type        Subjective:      Patient ID: Marcia Lawler is a 78 y o  male  Patient comes in today for follow-up  His blood pressure is controlled  His cholesterol has been controlled but he is due for blood work  Anxiety is controlled  His main complaint is fatigue  But he thinks that is related to his work schedule  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  He is by himself the entire shift  Really does not have any time to stopping heat  States he goes home at 9:00 p m  and is exhausted  Does not want to cook        ALLERGIES:  No Known Allergies    CURRENT MEDICATIONS:    Current Outpatient Medications:     ALPRAZolam (XANAX) 0 5 mg tablet, Take 1 tablet (0 5 mg total) by mouth 2 (two) times a day, Disp: 60 tablet, Rfl: 3    aspirin (ECOTRIN LOW STRENGTH) 81 mg EC tablet, Take by mouth, Disp: , Rfl:     B Complex-Folic Acid (B COMPLEX PLUS) TABS, Take by mouth, Disp: , Rfl:     Cholecalciferol (VITAMIN D3) 1000 units CAPS, Take by mouth, Disp: , Rfl:     Chondroitin Sulfate-Vit C-Mn 400-60-2 5 MG CAPS, Take by mouth, Disp: , Rfl:     Coenzyme Q10 (COQ-10) 100 MG CAPS, Take by mouth, Disp: , Rfl:     fenofibrate (TRICOR) 54 MG tablet, TAKE 1 TABLET BY MOUTH  DAILY, Disp: 90 tablet, Rfl: 3    meclizine (ANTIVERT) 25 mg tablet, TAKE 1 TABLET(25 MG) BY MOUTH THREE TIMES DAILY AS NEEDED FOR DIZZINESS, Disp: 60 tablet, Rfl: 1    metoprolol succinate (TOPROL-XL) 50 mg 24 hr tablet, TAKE 1 TABLET BY MOUTH  DAILY, Disp: 90 tablet, Rfl: 3    Multiple Vitamin (MULTI-VITAMIN DAILY PO), Take by mouth, Disp: , Rfl:     omeprazole (PriLOSEC) 40 MG capsule, TAKE 1 CAPSULE BY MOUTH  DAILY, Disp: 90 capsule, Rfl: 3    rosuvastatin (CRESTOR) 5 mg tablet, TAKE 1 TABLET BY MOUTH  DAILY, Disp: 90 tablet, Rfl: 3    solifenacin (VESICARE) 5 mg tablet, TAKE 1 TABLET BY MOUTH  DAILY, Disp: 90 tablet, Rfl: 3    ACTIVE PROBLEM LIST:  Patient Active Problem List   Diagnosis    KADEN on CPAP    Anxiety    Basal cell carcinoma of cheek    Basal cell carcinoma of skin    Benign hypertension    Deviated nasal septum    GERD (gastroesophageal reflux disease)    Hyperlipidemia    Morbid obesity (HCC)    OAB (overactive bladder)    Impaired fasting glucose    Bright red blood per rectum    Diverticulosis    Rib fracture    Abnormal CT of the abdomen    Fall       PAST MEDICAL HISTORY:  Past Medical History:   Diagnosis Date    [facility]     basal call carcinoma    Colon polyps     DR Gianna Herrmann    Diverticulosis     Hyperlipidemia     Hypertension     Obesity     KADEN (obstructive sleep apnea)     cpap    Pneumonia     Small bowel obstruction, partial (HCC)     SOB (shortness of breath)        PAST SURGICAL HISTORY:  Past Surgical History:   Procedure Laterality Date    SKIN BIOPSY      TONSILLECTOMY AND ADENOIDECTOMY         FAMILY HISTORY:  Family History   Problem Relation Age of Onset    Heart disease Mother     Hypertension Mother     Breast cancer Sister        SOCIAL HISTORY:  Social History     Socioeconomic History    Marital status: Significant Other     Spouse name: Not on file    Number of children: 0    Years of education: Not on file    Highest education level: Not on file   Occupational History    Occupation: [employer]     Comment: FULL TIME    Tobacco Use    Smoking status: Never Smoker    Smokeless tobacco: Never Used   Vaping Use    Vaping Use: Never used   Substance and Sexual Activity    Alcohol use: Not Currently     Comment: occassional    Drug use: No    Sexual activity: Yes     Partners: Female     Comment: DENIED: HISTORY OF HIGH RISK SEXUAL BEHAVIOR    Other Topics Concern    Not on file   Social History Narrative     as per all scripts      Social Determinants of Health     Financial Resource Strain: Not on file   Food Insecurity: Not on file   Transportation Needs: Not on file   Physical Activity: Not on file   Stress: Not on file   Social Connections: Not on file   Intimate Partner Violence: Not on file   Housing Stability: Not on file       Review of Systems   Respiratory: Negative for shortness of breath  Cardiovascular: Negative for chest pain  Gastrointestinal: Negative for abdominal pain  Objective:  Vitals:    08/11/22 0904   BP: 114/80   BP Location: Left arm   Patient Position: Sitting   Cuff Size: Standard   Pulse: 71   Resp: 18   Temp: 98 2 °F (36 8 °C)   TempSrc: Tympanic   SpO2: 95%   Weight: 104 kg (230 lb)   Height: 5' 4" (1 626 m)     Body mass index is 39 48 kg/m²  Physical Exam  Vitals and nursing note reviewed  Constitutional:       Appearance: He is well-developed  Cardiovascular:      Rate and Rhythm: Normal rate and regular rhythm  Heart sounds: Normal heart sounds  Pulmonary:      Effort: Pulmonary effort is normal       Breath sounds: Normal breath sounds  Abdominal:      Palpations: Abdomen is soft  Tenderness: There is no abdominal tenderness  Neurological:      Mental Status: He is alert and oriented to person, place, and time             RESULTS:    No results found for this or any previous visit (from the past 1008 hour(s))  This note was created with voice recognition software  Phonic, grammatical and spelling errors may be present within the note as a result

## 2022-09-29 DIAGNOSIS — N32.81 OAB (OVERACTIVE BLADDER): ICD-10-CM

## 2022-09-29 RX ORDER — SOLIFENACIN SUCCINATE 5 MG/1
TABLET, FILM COATED ORAL
Qty: 90 TABLET | Refills: 3 | Status: SHIPPED | OUTPATIENT
Start: 2022-09-29

## 2022-10-30 ENCOUNTER — HOSPITAL ENCOUNTER (INPATIENT)
Facility: HOSPITAL | Age: 77
LOS: 3 days | Discharge: HOME | End: 2022-11-02
Attending: EMERGENCY MEDICINE | Admitting: INTERNAL MEDICINE

## 2022-10-30 ENCOUNTER — APPOINTMENT (EMERGENCY)
Dept: RADIOLOGY | Facility: HOSPITAL | Age: 77
End: 2022-10-30

## 2022-10-30 ENCOUNTER — NURSE TRIAGE (OUTPATIENT)
Dept: OTHER | Facility: OTHER | Age: 77
End: 2022-10-30

## 2022-10-30 DIAGNOSIS — U07.1 COVID-19: Primary | ICD-10-CM

## 2022-10-30 PROBLEM — J96.00 ACUTE RESPIRATORY FAILURE DUE TO COVID-19 (HCC): Status: ACTIVE | Noted: 2022-10-30

## 2022-10-30 PROBLEM — A41.9 SEPSIS (HCC): Status: ACTIVE | Noted: 2022-10-30

## 2022-10-30 LAB
ALBUMIN SERPL BCP-MCNC: 4.1 G/DL (ref 3.5–5)
ALP SERPL-CCNC: 57 U/L (ref 46–116)
ALT SERPL W P-5'-P-CCNC: 68 U/L (ref 12–78)
ANION GAP SERPL CALCULATED.3IONS-SCNC: 8 MMOL/L (ref 4–13)
APTT PPP: 37 SECONDS (ref 23–37)
AST SERPL W P-5'-P-CCNC: 69 U/L (ref 5–45)
BASOPHILS # BLD AUTO: 0.04 THOUSANDS/ÂΜL (ref 0–0.1)
BASOPHILS NFR BLD AUTO: 1 % (ref 0–1)
BILIRUB SERPL-MCNC: 0.68 MG/DL (ref 0.2–1)
BILIRUB UR QL STRIP: NEGATIVE
BUN SERPL-MCNC: 16 MG/DL (ref 5–25)
CALCIUM SERPL-MCNC: 9.5 MG/DL (ref 8.3–10.1)
CARDIAC TROPONIN I PNL SERPL HS: 4 NG/L
CHLORIDE SERPL-SCNC: 102 MMOL/L (ref 96–108)
CLARITY UR: CLEAR
CO2 SERPL-SCNC: 26 MMOL/L (ref 21–32)
COLOR UR: YELLOW
CREAT SERPL-MCNC: 1.19 MG/DL (ref 0.6–1.3)
EOSINOPHIL # BLD AUTO: 0.03 THOUSAND/ÂΜL (ref 0–0.61)
EOSINOPHIL NFR BLD AUTO: 0 % (ref 0–6)
ERYTHROCYTE [DISTWIDTH] IN BLOOD BY AUTOMATED COUNT: 13.7 % (ref 11.6–15.1)
FLUAV RNA RESP QL NAA+PROBE: NEGATIVE
FLUBV RNA RESP QL NAA+PROBE: NEGATIVE
GFR SERPL CREATININE-BSD FRML MDRD: 59 ML/MIN/1.73SQ M
GLUCOSE SERPL-MCNC: 150 MG/DL (ref 65–140)
GLUCOSE UR STRIP-MCNC: NEGATIVE MG/DL
HCT VFR BLD AUTO: 47.9 % (ref 36.5–49.3)
HGB BLD-MCNC: 16.7 G/DL (ref 12–17)
HGB UR QL STRIP.AUTO: NEGATIVE
IMM GRANULOCYTES # BLD AUTO: 0.03 THOUSAND/UL (ref 0–0.2)
IMM GRANULOCYTES NFR BLD AUTO: 0 % (ref 0–2)
INR PPP: 1.2 (ref 0.84–1.19)
KETONES UR STRIP-MCNC: NEGATIVE MG/DL
LACTATE SERPL-SCNC: 1.9 MMOL/L (ref 0.5–2)
LEUKOCYTE ESTERASE UR QL STRIP: NEGATIVE
LYMPHOCYTES # BLD AUTO: 0.46 THOUSANDS/ÂΜL (ref 0.6–4.47)
LYMPHOCYTES NFR BLD AUTO: 6 % (ref 14–44)
MCH RBC QN AUTO: 31.6 PG (ref 26.8–34.3)
MCHC RBC AUTO-ENTMCNC: 34.9 G/DL (ref 31.4–37.4)
MCV RBC AUTO: 91 FL (ref 82–98)
MONOCYTES # BLD AUTO: 1.04 THOUSAND/ÂΜL (ref 0.17–1.22)
MONOCYTES NFR BLD AUTO: 13 % (ref 4–12)
NEUTROPHILS # BLD AUTO: 6.49 THOUSANDS/ÂΜL (ref 1.85–7.62)
NEUTS SEG NFR BLD AUTO: 80 % (ref 43–75)
NITRITE UR QL STRIP: NEGATIVE
NRBC BLD AUTO-RTO: 0 /100 WBCS
PH UR STRIP.AUTO: 5 [PH]
PLATELET # BLD AUTO: 203 THOUSANDS/UL (ref 149–390)
PMV BLD AUTO: 10.2 FL (ref 8.9–12.7)
POTASSIUM SERPL-SCNC: 4.8 MMOL/L (ref 3.5–5.3)
PROCALCITONIN SERPL-MCNC: 0.19 NG/ML
PROT SERPL-MCNC: 8.2 G/DL (ref 6.4–8.4)
PROT UR STRIP-MCNC: NEGATIVE MG/DL
PROTHROMBIN TIME: 15 SECONDS (ref 11.6–14.5)
RBC # BLD AUTO: 5.29 MILLION/UL (ref 3.88–5.62)
RSV RNA RESP QL NAA+PROBE: NEGATIVE
SARS-COV-2 RNA RESP QL NAA+PROBE: POSITIVE
SODIUM SERPL-SCNC: 136 MMOL/L (ref 135–147)
SP GR UR STRIP.AUTO: 1.03 (ref 1–1.03)
UROBILINOGEN UR STRIP-ACNC: <2 MG/DL
WBC # BLD AUTO: 8.09 THOUSAND/UL (ref 4.31–10.16)

## 2022-10-30 PROCEDURE — XW033E5 INTRODUCTION OF REMDESIVIR ANTI-INFECTIVE INTO PERIPHERAL VEIN, PERCUTANEOUS APPROACH, NEW TECHNOLOGY GROUP 5: ICD-10-PCS | Performed by: INTERNAL MEDICINE

## 2022-10-30 PROCEDURE — XW0DXM6 INTRODUCTION OF BARICITINIB INTO MOUTH AND PHARYNX, EXTERNAL APPROACH, NEW TECHNOLOGY GROUP 6: ICD-10-PCS | Performed by: INTERNAL MEDICINE

## 2022-10-30 RX ORDER — METOPROLOL SUCCINATE 50 MG/1
50 TABLET, EXTENDED RELEASE ORAL DAILY
Status: DISCONTINUED | OUTPATIENT
Start: 2022-10-31 | End: 2022-11-02 | Stop reason: HOSPADM

## 2022-10-30 RX ORDER — PANTOPRAZOLE SODIUM 40 MG/1
40 TABLET, DELAYED RELEASE ORAL
Refills: 3 | Status: DISCONTINUED | OUTPATIENT
Start: 2022-10-31 | End: 2022-11-02 | Stop reason: HOSPADM

## 2022-10-30 RX ORDER — FENOFIBRATE 48 MG/1
54 TABLET, COATED ORAL DAILY
Status: DISCONTINUED | OUTPATIENT
Start: 2022-10-31 | End: 2022-11-02 | Stop reason: HOSPADM

## 2022-10-30 RX ORDER — ACETAMINOPHEN 325 MG/1
975 TABLET ORAL ONCE
Status: COMPLETED | OUTPATIENT
Start: 2022-10-30 | End: 2022-10-30

## 2022-10-30 RX ORDER — OXYBUTYNIN CHLORIDE 5 MG/1
5 TABLET, EXTENDED RELEASE ORAL DAILY
Refills: 3 | Status: DISCONTINUED | OUTPATIENT
Start: 2022-10-31 | End: 2022-11-02 | Stop reason: HOSPADM

## 2022-10-30 RX ORDER — ASPIRIN 81 MG/1
81 TABLET ORAL DAILY
Status: DISCONTINUED | OUTPATIENT
Start: 2022-10-31 | End: 2022-11-02 | Stop reason: HOSPADM

## 2022-10-30 RX ORDER — DEXAMETHASONE SODIUM PHOSPHATE 4 MG/ML
6 INJECTION, SOLUTION INTRA-ARTICULAR; INTRALESIONAL; INTRAMUSCULAR; INTRAVENOUS; SOFT TISSUE EVERY 24 HOURS
Status: DISCONTINUED | OUTPATIENT
Start: 2022-10-30 | End: 2022-11-02 | Stop reason: HOSPADM

## 2022-10-30 RX ORDER — ENOXAPARIN SODIUM 100 MG/ML
30 INJECTION SUBCUTANEOUS EVERY 12 HOURS SCHEDULED
Status: DISCONTINUED | OUTPATIENT
Start: 2022-10-30 | End: 2022-10-31

## 2022-10-30 RX ORDER — PRAVASTATIN SODIUM 40 MG
40 TABLET ORAL
Refills: 3 | Status: DISCONTINUED | OUTPATIENT
Start: 2022-10-31 | End: 2022-11-02 | Stop reason: HOSPADM

## 2022-10-30 RX ORDER — ACETAMINOPHEN 325 MG/1
650 TABLET ORAL EVERY 6 HOURS PRN
Status: DISCONTINUED | OUTPATIENT
Start: 2022-10-30 | End: 2022-11-02 | Stop reason: HOSPADM

## 2022-10-30 RX ADMIN — ENOXAPARIN SODIUM 30 MG: 30 INJECTION SUBCUTANEOUS at 23:40

## 2022-10-30 RX ADMIN — ACETAMINOPHEN 975 MG: 325 TABLET, FILM COATED ORAL at 22:09

## 2022-10-30 RX ADMIN — DEXAMETHASONE SODIUM PHOSPHATE 6 MG: 4 INJECTION INTRA-ARTICULAR; INTRALESIONAL; INTRAMUSCULAR; INTRAVENOUS; SOFT TISSUE at 23:36

## 2022-10-30 RX ADMIN — CEFTRIAXONE SODIUM 1000 MG: 10 INJECTION, POWDER, FOR SOLUTION INTRAVENOUS at 23:38

## 2022-10-30 RX ADMIN — SODIUM CHLORIDE 1000 ML: 0.9 INJECTION, SOLUTION INTRAVENOUS at 22:10

## 2022-10-30 NOTE — LETTER
[address] [facility]  [address] [facility] [address]  Dept: [phone]    November 2, 2022     Patient: Minnie Deleon   YOB: 1945   Date of Visit: 10/30/2022       To Whom it May Concern:    Minnie Deleon is under my professional care  He was seen in the hospital from 10/30/2022   to 11/02/22  He may return to work on 11/09/22 or sooner if able without limitations  If you have any questions or concerns, please don't hesitate to call           Sincerely,          Brian Hawthorne MD

## 2022-10-31 ENCOUNTER — APPOINTMENT (INPATIENT)
Dept: CT IMAGING | Facility: HOSPITAL | Age: 77
End: 2022-10-31

## 2022-10-31 PROBLEM — R73.9 HYPERGLYCEMIA: Status: ACTIVE | Noted: 2022-10-31

## 2022-10-31 LAB
2HR DELTA HS TROPONIN: 0 NG/L
4HR DELTA HS TROPONIN: 0 NG/L
ALBUMIN SERPL BCP-MCNC: 3.5 G/DL (ref 3.5–5)
ALP SERPL-CCNC: 50 U/L (ref 46–116)
ALT SERPL W P-5'-P-CCNC: 60 U/L (ref 12–78)
ANION GAP SERPL CALCULATED.3IONS-SCNC: 12 MMOL/L (ref 4–13)
APTT PPP: 116 SECONDS (ref 23–37)
APTT PPP: 132 SECONDS (ref 23–37)
APTT PPP: 37 SECONDS (ref 23–37)
APTT PPP: 48 SECONDS (ref 23–37)
AST SERPL W P-5'-P-CCNC: 50 U/L (ref 5–45)
ATRIAL RATE: 104 BPM
BASOPHILS # BLD AUTO: 0.03 THOUSANDS/ÂΜL (ref 0–0.1)
BASOPHILS NFR BLD AUTO: 1 % (ref 0–1)
BILIRUB SERPL-MCNC: 0.47 MG/DL (ref 0.2–1)
BUN SERPL-MCNC: 17 MG/DL (ref 5–25)
CALCIUM SERPL-MCNC: 8.7 MG/DL (ref 8.3–10.1)
CARDIAC TROPONIN I PNL SERPL HS: 4 NG/L
CARDIAC TROPONIN I PNL SERPL HS: 4 NG/L
CHLORIDE SERPL-SCNC: 104 MMOL/L (ref 96–108)
CK SERPL-CCNC: 91 U/L (ref 39–308)
CO2 SERPL-SCNC: 25 MMOL/L (ref 21–32)
CREAT SERPL-MCNC: 1.21 MG/DL (ref 0.6–1.3)
CRP SERPL QL: 22.4 MG/L
D DIMER PPP FEU-MCNC: 2.29 UG/ML FEU
EOSINOPHIL # BLD AUTO: 0 THOUSAND/ÂΜL (ref 0–0.61)
EOSINOPHIL NFR BLD AUTO: 0 % (ref 0–6)
ERYTHROCYTE [DISTWIDTH] IN BLOOD BY AUTOMATED COUNT: 13.6 % (ref 11.6–15.1)
ERYTHROCYTE [DISTWIDTH] IN BLOOD BY AUTOMATED COUNT: 13.7 % (ref 11.6–15.1)
GFR SERPL CREATININE-BSD FRML MDRD: 58 ML/MIN/1.73SQ M
GLUCOSE SERPL-MCNC: 228 MG/DL (ref 65–140)
HCT VFR BLD AUTO: 45.3 % (ref 36.5–49.3)
HCT VFR BLD AUTO: 45.4 % (ref 36.5–49.3)
HGB BLD-MCNC: 15.8 G/DL (ref 12–17)
HGB BLD-MCNC: 15.9 G/DL (ref 12–17)
IMM GRANULOCYTES # BLD AUTO: 0.01 THOUSAND/UL (ref 0–0.2)
IMM GRANULOCYTES NFR BLD AUTO: 0 % (ref 0–2)
INR PPP: 1.32 (ref 0.84–1.19)
LYMPHOCYTES # BLD AUTO: 0.72 THOUSANDS/ÂΜL (ref 0.6–4.47)
LYMPHOCYTES NFR BLD AUTO: 11 % (ref 14–44)
MCH RBC QN AUTO: 31.1 PG (ref 26.8–34.3)
MCH RBC QN AUTO: 31.2 PG (ref 26.8–34.3)
MCHC RBC AUTO-ENTMCNC: 34.9 G/DL (ref 31.4–37.4)
MCHC RBC AUTO-ENTMCNC: 35 G/DL (ref 31.4–37.4)
MCV RBC AUTO: 89 FL (ref 82–98)
MCV RBC AUTO: 89 FL (ref 82–98)
MONOCYTES # BLD AUTO: 0.25 THOUSAND/ÂΜL (ref 0.17–1.22)
MONOCYTES NFR BLD AUTO: 4 % (ref 4–12)
NEUTROPHILS # BLD AUTO: 5.34 THOUSANDS/ÂΜL (ref 1.85–7.62)
NEUTS SEG NFR BLD AUTO: 84 % (ref 43–75)
NRBC BLD AUTO-RTO: 0 /100 WBCS
NT-PROBNP SERPL-MCNC: 83 PG/ML
P AXIS: 60 DEGREES
PLATELET # BLD AUTO: 185 THOUSANDS/UL (ref 149–390)
PLATELET # BLD AUTO: 190 THOUSANDS/UL (ref 149–390)
PMV BLD AUTO: 10.6 FL (ref 8.9–12.7)
PMV BLD AUTO: 10.7 FL (ref 8.9–12.7)
POTASSIUM SERPL-SCNC: 4.1 MMOL/L (ref 3.5–5.3)
PR INTERVAL: 198 MS
PROCALCITONIN SERPL-MCNC: 0.16 NG/ML
PROT SERPL-MCNC: 7.5 G/DL (ref 6.4–8.4)
PROTHROMBIN TIME: 16.2 SECONDS (ref 11.6–14.5)
QRS AXIS: 68 DEGREES
QRSD INTERVAL: 94 MS
QT INTERVAL: 332 MS
QTC INTERVAL: 436 MS
RBC # BLD AUTO: 5.08 MILLION/UL (ref 3.88–5.62)
RBC # BLD AUTO: 5.1 MILLION/UL (ref 3.88–5.62)
SODIUM SERPL-SCNC: 141 MMOL/L (ref 135–147)
T WAVE AXIS: 47 DEGREES
VENTRICULAR RATE: 104 BPM
WBC # BLD AUTO: 6.05 THOUSAND/UL (ref 4.31–10.16)
WBC # BLD AUTO: 6.35 THOUSAND/UL (ref 4.31–10.16)

## 2022-10-31 RX ORDER — DIAPER,BRIEF,INFANT-TODD,DISP
EACH MISCELLANEOUS 4 TIMES DAILY PRN
Status: DISCONTINUED | OUTPATIENT
Start: 2022-10-31 | End: 2022-11-02 | Stop reason: HOSPADM

## 2022-10-31 RX ORDER — HEPARIN SODIUM 10000 [USP'U]/100ML
3-20 INJECTION, SOLUTION INTRAVENOUS
Status: DISCONTINUED | OUTPATIENT
Start: 2022-10-31 | End: 2022-11-01

## 2022-10-31 RX ORDER — HEPARIN SODIUM 1000 [USP'U]/ML
4000 INJECTION, SOLUTION INTRAVENOUS; SUBCUTANEOUS ONCE
Status: COMPLETED | OUTPATIENT
Start: 2022-10-31 | End: 2022-10-31

## 2022-10-31 RX ORDER — HEPARIN SODIUM 1000 [USP'U]/ML
4000 INJECTION, SOLUTION INTRAVENOUS; SUBCUTANEOUS
Status: DISCONTINUED | OUTPATIENT
Start: 2022-10-31 | End: 2022-11-01

## 2022-10-31 RX ORDER — LIDOCAINE 40 MG/G
CREAM TOPICAL ONCE
Status: DISCONTINUED | OUTPATIENT
Start: 2022-10-31 | End: 2022-11-02 | Stop reason: HOSPADM

## 2022-10-31 RX ORDER — HEPARIN SODIUM 1000 [USP'U]/ML
2000 INJECTION, SOLUTION INTRAVENOUS; SUBCUTANEOUS
Status: DISCONTINUED | OUTPATIENT
Start: 2022-10-31 | End: 2022-11-01

## 2022-10-31 RX ADMIN — CEFTRIAXONE SODIUM 1000 MG: 10 INJECTION, POWDER, FOR SOLUTION INTRAVENOUS at 22:40

## 2022-10-31 RX ADMIN — ACETAMINOPHEN 650 MG: 325 TABLET, FILM COATED ORAL at 02:32

## 2022-10-31 RX ADMIN — HEPARIN SODIUM 2000 UNITS: 1000 INJECTION INTRAVENOUS; SUBCUTANEOUS at 16:23

## 2022-10-31 RX ADMIN — ASPIRIN 81 MG: 81 TABLET, COATED ORAL at 09:37

## 2022-10-31 RX ADMIN — DEXAMETHASONE SODIUM PHOSPHATE 6 MG: 4 INJECTION INTRA-ARTICULAR; INTRALESIONAL; INTRAMUSCULAR; INTRAVENOUS; SOFT TISSUE at 22:40

## 2022-10-31 RX ADMIN — HEPARIN SODIUM 4000 UNITS: 1000 INJECTION INTRAVENOUS; SUBCUTANEOUS at 01:38

## 2022-10-31 RX ADMIN — HEPARIN SODIUM 11.1 UNITS/KG/HR: 10000 INJECTION, SOLUTION INTRAVENOUS at 01:38

## 2022-10-31 RX ADMIN — BARICITINIB 2 MG: 2 TABLET, FILM COATED ORAL at 02:32

## 2022-10-31 RX ADMIN — PANTOPRAZOLE SODIUM 40 MG: 40 TABLET, DELAYED RELEASE ORAL at 05:49

## 2022-10-31 RX ADMIN — METOPROLOL SUCCINATE 50 MG: 50 TABLET, EXTENDED RELEASE ORAL at 09:37

## 2022-10-31 RX ADMIN — PRAVASTATIN SODIUM 40 MG: 40 TABLET ORAL at 16:19

## 2022-10-31 RX ADMIN — OXYBUTYNIN 5 MG: 5 TABLET, FILM COATED, EXTENDED RELEASE ORAL at 09:37

## 2022-10-31 RX ADMIN — REMDESIVIR 200 MG: 100 INJECTION, POWDER, LYOPHILIZED, FOR SOLUTION INTRAVENOUS at 01:51

## 2022-10-31 RX ADMIN — IOHEXOL 85 ML: 350 INJECTION, SOLUTION INTRAVENOUS at 01:11

## 2022-10-31 RX ADMIN — FENOFIBRATE 48 MG: 48 TABLET, FILM COATED ORAL at 09:37

## 2022-10-31 NOTE — RESPIRATORY THERAPY NOTE
RT Protocol Note  Abdi Kerr 78 y o  male MRN: [id number]  Unit/Bed#: ED 19 Encounter: [id number]    Assessment    Principal Problem:    Acute respiratory failure due to COVID-19 [facility])  Active Problems:    KADEN on CPAP    Benign hypertension    Hyperlipidemia    Morbid obesity (HCC)    Sepsis ([facility] )      Home Pulmonary Medications:  none  Home Devices/Therapy: BiPAP/CPAP    Past Medical History:   Diagnosis Date   • Cancer ([place] [address] )     basal call carcinoma   • Colon polyps     DR Berny Jimenes   • Diverticulosis    • Hyperlipidemia    • Hypertension    • Obesity    • KADEN (obstructive sleep apnea)     cpap   • Pneumonia    • Small bowel obstruction, partial (HCC)    • SOB (shortness of breath)      Social History     Socioeconomic History   • Marital status: Significant Other     Spouse name: None   • Number of children: 0   • Years of education: None   • Highest education level: None   Occupational History   • Occupation: SALES     Comment: FULL TIME    Tobacco Use   • Smoking status: Never Smoker   • Smokeless tobacco: Never Used   Vaping Use   • Vaping Use: Never used   Substance and Sexual Activity   • Alcohol use: Not Currently     Comment: occassional   • Drug use: No   • Sexual activity: Yes     Partners: Female     Comment: DENIED: HISTORY OF HIGH RISK SEXUAL BEHAVIOR    Other Topics Concern   • None   Social History Narrative     as per all scripts      Social Determinants of Health     Financial Resource Strain: Not on file   Food Insecurity: Not on file   Transportation Needs: Not on file   Physical Activity: Not on file   Stress: Not on file   Social Connections: Not on file   Intimate Partner Violence: Not on file   Housing Stability: Not on file       Subjective         Objective    Physical Exam:   Assessment Type: Assess only  General Appearance: Alert, Awake  Respiratory Pattern: Normal  Chest Assessment: Chest expansion symmetrical  Bilateral Breath Sounds: Diminished, Coarse  O2 Device: NC    Vitals:  Blood pressure (!) 104/47, pulse 101, temperature (!) 102 3 °F (39 1 °C), temperature source Tympanic, resp  rate (!) 29, SpO2 93 %  Imaging and other studies: I have personally reviewed pertinent reports        O2 Device: NC     Plan    Respiratory Plan: Discontinue Protocol        Resp Comments: Patient with no pulmonary history takes no meds at home, uses CPAP for KADEN but is currently refusing a cpap machine and is comfortable with NC

## 2022-10-31 NOTE — ASSESSMENT & PLAN NOTE
· If sugars continue to stay elevated will likely place on correctional scale insulin tomorrow  · Will check hemoglobin A1c  · Continue to monitor

## 2022-10-31 NOTE — PROGRESS NOTES
[address] [facility]  Progress Note - Debra Saucedo 1945, 78 y o  male MRN: [id number]  Unit/Bed#: ED 19 Encounter: [id number]  Primary Care Provider: Presley Gibson MD   Date and time admitted to hospital: 10/30/2022  7:55 PM    * Acute respiratory failure due to COVID-19 [facility])  Assessment & Plan  Patient presenting to the ED for fever, cough, nausea, chills that started yesterday evening  He also reports associated shortness of breath  Denies any chest pain, abdominal pain, or vomiting  · COVID 19 positive on 10/30/22  · Currently requiring 4L NC oxygen  · Follow moderate pathway  · Decadron 6mg IV x 10 days  · Remdesivir x5 days  · Baricitinib 4mg daily x 14 days  · Dimer elevated 2 29  · CTA PE study negative for any pulmonary embolism  · Procalcitonin x2 negative will discontinue antibiotics  · Respiratory protocol, monitor pulse ox  · Contact precautions  · Continue to monitor    Sepsis [facility])  Assessment & Plan  · Patient meeting sepsis criteria for fever, tachypnea, tachycardia  · COVID + on admission  · Will discontinue IV ceftriaxone at this time  · Blood culture x2 pending  · Continue to monitor    Hyperglycemia  Assessment & Plan  · If sugars continue to stay elevated will likely place on correctional scale insulin tomorrow  · Will check hemoglobin A1c  · Continue to monitor     Morbid obesity (HCC)  Assessment & Plan  · Current BMI 39 48 kg/m2  · Encouraged healthy diet and lifestyle modifications    Hyperlipidemia  Assessment & Plan  · Continue statin and fenofibrate therapy    Benign hypertension  Assessment & Plan  · Blood pressure currently well controlled  · Continue pre-hospital metoprolol  · Continue to monitor    KADEN on CPAP  Assessment & Plan  · Ordered CPAP qHS        VTE Pharmacologic Prophylaxis: VTE Score: 9 High Risk (Score >/= 5) - Pharmacological DVT Prophylaxis Ordered: heparin drip  Sequential Compression Devices Ordered      Patient Centered Rounds: I performed bedside rounds with nursing staff today  Discussions with Specialists or Other Care Team Provider:  Case management    Education and Discussions with Family / Patient: Patient declined call to   Time Spent for Care: 45 minutes  More than 50% of total time spent on counseling and coordination of care as described above  Current Length of Stay: 1 day(s)  Current Patient Status: Inpatient   Certification Statement: The patient will continue to require additional inpatient hospital stay due to COVID pneumonia  Discharge Plan: Anticipate discharge in 48-72 hrs to home  Code Status: Level 1 - Full Code    Subjective:   Patient resting comfortably on examination  Patient had no overnight events or complaints on exam this morning  Objective:     Vitals:   Temp (24hrs), Av 4 °F (38 °C), Min:98 4 °F (36 9 °C), Max:102 3 °F (39 1 °C)    Temp:  [98 4 °F (36 9 °C)-102 3 °F (39 1 °C)] 98 4 °F (36 9 °C)  HR:  [] 74  Resp:  [22-31] 22  BP: (104-138)/(47-71) 120/66  SpO2:  [88 %-94 %] 94 %  There is no height or weight on file to calculate BMI  Input and Output Summary (last 24 hours): Intake/Output Summary (Last 24 hours) at 10/31/2022 1317  Last data filed at 10/31/2022 1257  Gross per 24 hour   Intake 1050 ml   Output 400 ml   Net 650 ml       Physical Exam:   Physical Exam  Vitals and nursing note reviewed  Constitutional:       General: He is not in acute distress  Appearance: He is well-developed  Comments: 4L nasal cannula  Obese   HENT:      Head: Normocephalic and atraumatic  Eyes:      General: No scleral icterus  Conjunctiva/sclera: Conjunctivae normal    Cardiovascular:      Rate and Rhythm: Normal rate and regular rhythm  Heart sounds: Normal heart sounds  No murmur heard  No friction rub  No gallop  Pulmonary:      Effort: Pulmonary effort is normal  No respiratory distress  Breath sounds: Normal breath sounds  No wheezing or rales  Abdominal:      General: Bowel sounds are normal  There is no distension  Palpations: Abdomen is soft  Tenderness: There is no abdominal tenderness  Musculoskeletal:         General: Normal range of motion  Skin:     General: Skin is warm  Findings: No rash  Neurological:      Mental Status: He is alert and oriented to person, place, and time  Additional Data:     Labs:  Results from last 7 days   Lab Units 10/31/22  0553   WBC Thousand/uL 6 05  6 35   HEMOGLOBIN g/dL 15 8  15 9   HEMATOCRIT % 45 3  45 4   PLATELETS Thousands/uL 190  185   NEUTROS PCT % 84*   LYMPHS PCT % 11*   MONOS PCT % 4   EOS PCT % 0     Results from last 7 days   Lab Units 10/31/22  0553   SODIUM mmol/L 141   POTASSIUM mmol/L 4 1   CHLORIDE mmol/L 104   CO2 mmol/L 25   BUN mg/dL 17   CREATININE mg/dL 1 21   ANION GAP mmol/L 12   CALCIUM mg/dL 8 7   ALBUMIN g/dL 3 5   TOTAL BILIRUBIN mg/dL 0 47   ALK PHOS U/L 50   ALT U/L 60   AST U/L 50*   GLUCOSE RANDOM mg/dL 228*     Results from last 7 days   Lab Units 10/30/22  2346   INR  1 32*             Results from last 7 days   Lab Units 10/31/22  0553 10/30/22  2107   LACTIC ACID mmol/L  --  1 9   PROCALCITONIN ng/ml 0 16 0 19       Lines/Drains:  Invasive Devices  Report    Peripheral Intravenous Line  Duration           Peripheral IV 10/30/22 Left Forearm <1 day    Peripheral IV 10/30/22 Right Forearm <1 day                      Imaging: No pertinent imaging reviewed  Recent Cultures (last 7 days):   Results from last 7 days   Lab Units 10/30/22  2107 10/30/22  2057   BLOOD CULTURE  Received in Microbiology Lab  Culture in Progress  Received in Microbiology Lab  Culture in Progress         Last 24 Hours Medication List:   Current Facility-Administered Medications   Medication Dose Route Frequency Provider Last Rate   • acetaminophen  650 mg Oral Q6H PRN Panchito Payan PA-C     • aspirin  81 mg Oral Daily Arlet Song PA-C     • baricitinib  2 mg Oral Q24H Misa Mansfield CHERISE Song     • cefTRIAXone  1,000 mg Intravenous Q24H Jean Marie Myers PA-C Stopped (10/31/22 0008)   • dexamethasone  6 mg Intravenous Q24H Arlet Song PA-C     • fenofibrate  48 mg Oral Daily Arlet Song PA-C     • heparin (porcine)  3-20 Units/kg/hr (Order-Specific) Intravenous Titrated Jean Marie Myers PA-C 8 Units/kg/hr (10/31/22 0845)   • heparin (porcine)  2,000 Units Intravenous Q1H PRN Jean Marie Myers PA-C     • heparin (porcine)  4,000 Units Intravenous Q1H PRN Jean Marie Myers PA-C     • hydrocortisone   Topical 4x Daily PRN Celesta Leriche, DO     • lidocaine   Topical Once Celesta Leriche, DO     • metoprolol succinate  50 mg Oral Daily Arlet Song PA-C     • oxybutynin  5 mg Oral Daily Arlet Song PA-C     • pantoprazole  40 mg Oral Early Morning Arlet Song PA-C     • pravastatin  40 mg Oral Daily With [employer]ISE     • remdesivir  100 mg Intravenous Q24H Jean Marie Myers PA-C          Today, Patient Was Seen By: Benson Wesley    **Please Note: This note may have been constructed using a voice recognition system  **

## 2022-10-31 NOTE — TELEPHONE ENCOUNTER
Regarding: Did at home covid test having difficulty with the test looking for medical advice   ----- Message from Nicky Cristina sent at 10/30/2022  6:33 PM EDT -----  '' I did a at home covid test I'm having some difficulty with the test looking for medical advice  ''

## 2022-10-31 NOTE — ASSESSMENT & PLAN NOTE
· Patient meeting sepsis criteria for fever, tachypnea, tachycardia  · COVID + on admission  · Will discontinue IV ceftriaxone at this time  · Blood culture x2 pending  · Continue to monitor

## 2022-10-31 NOTE — ASSESSMENT & PLAN NOTE
Patient presenting to the ED for fever, cough, nausea, chills that started yesterday evening  He also reports associated shortness of breath  Denies any chest pain, abdominal pain, or vomiting    · COVID 19 positive on 10/30/22  · Currently requiring 4L NC oxygen  · Follow moderate pathway  · Decadron 6mg IV x 10 days  · Remdesivir x5 days  · Baricitinib 4mg daily x 14 days  · Dimer elevated 2 29  · CTA PE study negative for any pulmonary embolism  · Procalcitonin x2 negative will discontinue antibiotics  · Respiratory protocol, monitor pulse ox  · Contact precautions  · Continue to monitor

## 2022-10-31 NOTE — ED PROVIDER NOTES
History  Chief Complaint   Patient presents with   • Fever - 9 weeks to 74 years     Fever, cough, nausea, and shaky since last night        Marii Hou is a 78 y o  male with a pertinent past medical history for hypertension hyperlipidemia, obstructive sleep apnea, small-bowel obstruction, presenting today with fever, cough, rigors, nausea  Patient reports that symptoms began about 24 hours ago and have been getting progressively worse prompting the ED visit  Reports that about 6 hours ago he began to experience severe shaking consistent with rigors  Fever of 102 3 F  Patient is not taking anything for symptoms  Not exposed to anybody else with viral illness  Patient does not use oxygen at baseline  Denies any immunocompromise  Has been compliant with all medications  Denies any lightheadedness, dizziness, syncopal episodes, vomiting, diaphoresis  No history of MI as per patient  Has associated chest tightness and shortness of breath  No further complaints at this time  Prior to Admission Medications   Prescriptions Last Dose Informant Patient Reported? Taking?    ALPRAZolam (XANAX) 0 5 mg tablet   No No   Sig: Take 1 tablet (0 5 mg total) by mouth 2 (two) times a day   B Complex-Folic Acid (B COMPLEX PLUS) TABS  Self Yes No   Sig: Take by mouth   Cholecalciferol (VITAMIN D3) 1000 units CAPS  Self Yes No   Sig: Take by mouth   Chondroitin Sulfate-Vit C-Mn 400-60-2 5 MG CAPS  Self Yes No   Sig: Take by mouth   Coenzyme Q10 (COQ-10) 100 MG CAPS  Self Yes No   Sig: Take by mouth   Multiple Vitamin (MULTI-VITAMIN DAILY PO)  Self Yes No   Sig: Take by mouth   aspirin (ECOTRIN LOW STRENGTH) 81 mg EC tablet  Self Yes No   Sig: Take by mouth   fenofibrate (TRICOR) 54 MG tablet   No No   Sig: TAKE 1 TABLET BY MOUTH  DAILY   meclizine (ANTIVERT) 25 mg tablet   No No   Sig: TAKE 1 TABLET(25 MG) BY MOUTH THREE TIMES DAILY AS NEEDED FOR DIZZINESS   metoprolol succinate (TOPROL-XL) 50 mg 24 hr tablet   No No Sig: TAKE 1 TABLET BY MOUTH  DAILY   omeprazole (PriLOSEC) 40 MG capsule   No No   Sig: TAKE 1 CAPSULE BY MOUTH  DAILY   rosuvastatin (CRESTOR) 5 mg tablet   No No   Sig: TAKE 1 TABLET BY MOUTH  DAILY   solifenacin (VESICARE) 5 mg tablet   No No   Sig: TAKE 1 TABLET BY MOUTH  DAILY      Facility-Administered Medications: None       Past Medical History:   Diagnosis Date   • [facility]     basal call carcinoma   • Colon polyps     DR Jacki Conn   • Diverticulosis    • Hyperlipidemia    • Hypertension    • Obesity    • KADEN (obstructive sleep apnea)     cpap   • Pneumonia    • Small bowel obstruction, partial (HCC)    • SOB (shortness of breath)        Past Surgical History:   Procedure Laterality Date   • SKIN BIOPSY     • TONSILLECTOMY AND ADENOIDECTOMY         Family History   Problem Relation Age of Onset   • Heart disease Mother    • Hypertension Mother    • Breast cancer Sister      I have reviewed and agree with the history as documented  E-Cigarette/Vaping   • E-Cigarette Use Never User      E-Cigarette/Vaping Substances     Social History     Tobacco Use   • Smoking status: Never Smoker   • Smokeless tobacco: Never Used   Vaping Use   • Vaping Use: Never used   Substance Use Topics   • Alcohol use: Not Currently     Comment: occassional   • Drug use: No       Review of Systems   Constitutional: Positive for fatigue and fever  Negative for chills  HENT: Positive for congestion  Negative for ear pain and sore throat  Eyes: Negative for pain and visual disturbance  Respiratory: Positive for cough, chest tightness and shortness of breath  Cardiovascular: Negative for chest pain and palpitations  Gastrointestinal: Negative for abdominal pain and vomiting  Genitourinary: Negative for dysuria and hematuria  Musculoskeletal: Negative for arthralgias and back pain  Skin: Negative for color change and rash  Neurological: Negative for seizures and syncope     All other systems reviewed and are negative  Physical Exam  Physical Exam  Vitals and nursing note reviewed  Constitutional:       General: He is not in acute distress  Appearance: He is well-developed  He is not ill-appearing or toxic-appearing  HENT:      Head: Normocephalic and atraumatic  Right Ear: Tympanic membrane and external ear normal       Left Ear: Tympanic membrane and external ear normal       Nose: Nose normal  No congestion or rhinorrhea  Mouth/Throat:      Mouth: Mucous membranes are moist       Pharynx: Oropharynx is clear  Eyes:      Conjunctiva/sclera: Conjunctivae normal       Pupils: Pupils are equal, round, and reactive to light  Cardiovascular:      Rate and Rhythm: Normal rate and regular rhythm  Heart sounds: No murmur heard  Pulmonary:      Effort: Pulmonary effort is normal  No respiratory distress  Breath sounds: No wheezing  Abdominal:      General: Abdomen is flat  Bowel sounds are normal       Palpations: Abdomen is soft  Tenderness: There is no abdominal tenderness  Musculoskeletal:         General: No swelling or tenderness  Normal range of motion  Cervical back: Normal range of motion and neck supple  No tenderness  Skin:     General: Skin is warm and dry  Capillary Refill: Capillary refill takes less than 2 seconds  Neurological:      General: No focal deficit present  Mental Status: He is alert and oriented to person, place, and time  Mental status is at baseline  Cranial Nerves: No cranial nerve deficit  Sensory: No sensory deficit  Motor: No weakness        Coordination: Coordination normal       Gait: Gait normal          Vital Signs  ED Triage Vitals [10/30/22 1954]   Temperature Pulse Respirations Blood Pressure SpO2   (!) 102 3 °F (39 1 °C) 103 22 138/71 94 %      Temp Source Heart Rate Source Patient Position - Orthostatic VS BP Location FiO2 (%)   Tympanic -- -- -- --      Pain Score       --           Vitals:    10/30/22 1954 10/30/22 2159   BP: 138/71 129/61   Pulse: 103 104         Visual Acuity      ED Medications  Medications   sodium chloride 0 9 % bolus 1,000 mL (1,000 mL Intravenous New Bag 10/30/22 2210)   acetaminophen (TYLENOL) tablet 975 mg (975 mg Oral Given 10/30/22 2209)       Diagnostic Studies  Results Reviewed     Procedure Component Value Units Date/Time    CK (with reflex to MB) [[id number]]     Lab Status: No result Specimen: Blood     C-reactive protein [[id number]]     Lab Status: No result Specimen: Blood     D-dimer, quantitative [[id number]]     Lab Status: No result Specimen: Blood     NT-BNP PRO [[id number]]     Lab Status: No result Specimen: Blood     UA w Reflex to Microscopic w Reflex to Culture [[id number]] Collected: 10/30/22 2212    Lab Status: No result Specimen: Urine, Clean Catch     FLU/RSV/COVID - if FLU/RSV clinically relevant [[id number]]  (Abnormal) Collected: 10/30/22 2106    Lab Status: Final result Specimen: Nasopharyngeal Swab Updated: 10/30/22 2158     SARS-CoV-2 Positive     INFLUENZA A PCR Negative     INFLUENZA B PCR Negative     RSV PCR Negative    Narrative:      FOR PEDIATRIC PATIENTS - copy/paste COVID Guidelines URL to browser: [email or web address]  [email or web address]    SARS-CoV-2 assay is a Nucleic Acid Amplification assay intended for the  qualitative detection of nucleic acid from SARS-CoV-2 in nasopharyngeal  swabs  Results are for the presumptive identification of SARS-CoV-2 RNA  Positive results are indicative of infection with SARS-CoV-2, the virus  causing COVID-19, but do not rule out bacterial infection or co-infection  with other viruses  Laboratories within the [place] and its  territories are required to report all positive results to the appropriate  public health authorities  Negative results do not preclude SARS-CoV-2  infection and should not be used as the sole basis for treatment or other  patient management decisions   Negative results must be combined with  clinical observations, patient history, and epidemiological information  This test has not been FDA cleared or approved  This test has been authorized by FDA under an Emergency Use Authorization  (EUA)  This test is only authorized for the duration of time the  declaration that circumstances exist justifying the authorization of the  emergency use of an in vitro diagnostic tests for detection of SARS-CoV-2  virus and/or diagnosis of COVID-19 infection under section 564(b)(1) of  the Act, 21 U  S C  408AVC-0(V)(5), unless the authorization is terminated  or revoked sooner  The test has been validated but independent review by FDA  and CLIA is pending  Test performed using [employer] GeneXpert: This RT-PCR assay targets N2,  a region unique to SARS-CoV-2  A conserved region in the E-gene was chosen  for pan-Sarbecovirus detection which includes SARS-CoV-2  According to CMS-2020-01-R, this platform meets the definition of high-throughput technology  Procalcitonin [[id number]]  (Normal) Collected: 10/30/22 2107    Lab Status: Final result Specimen: Blood from Arm, Left Updated: 10/30/22 2149     Procalcitonin 0 19 ng/ml     HS Troponin 0hr (reflex protocol) [[id number]]  (Normal) Collected: 10/30/22 2107    Lab Status: Final result Specimen: Blood from Arm, Left Updated: 10/30/22 2146     hs TnI 0hr 4 ng/L     HS Troponin I 2hr [[id number]]     Lab Status: No result Specimen: Blood     Lactic acid [[id number]]  (Normal) Collected: 10/30/22 2107    Lab Status: Final result Specimen: Blood from Arm, Left Updated: 10/30/22 2141     LACTIC ACID 1 9 mmol/L     Narrative:      Result may be elevated if tourniquet was used during collection      Comprehensive metabolic panel [[id number]]  (Abnormal) Collected: 10/30/22 2057    Lab Status: Final result Specimen: Blood from Arm, Right Updated: 10/30/22 2138     Sodium 136 mmol/L      Potassium 4 8 mmol/L      Chloride 102 mmol/L      CO2 26 mmol/L      ANION GAP 8 mmol/L      BUN 16 mg/dL      Creatinine 1 19 mg/dL      Glucose 150 mg/dL      Calcium 9 5 mg/dL      AST 69 U/L      ALT 68 U/L      Alkaline Phosphatase 57 U/L      Total Protein 8 2 g/dL      Albumin 4 1 g/dL      Total Bilirubin 0 68 mg/dL      eGFR 59 ml/min/1 73sq m     Narrative:      [place] guidelines for Chronic Kidney Disease (CKD):   •  Stage 1 with normal or high GFR (GFR > 90 mL/min/1 73 square meters)  •  Stage 2 Mild CKD (GFR = 60-89 mL/min/1 73 square meters)  •  Stage 3A Moderate CKD (GFR = 45-59 mL/min/1 73 square meters)  •  Stage 3B Moderate CKD (GFR = 30-44 mL/min/1 73 square meters)  •  Stage 4 Severe CKD (GFR = 15-29 mL/min/1 73 square meters)  •  Stage 5 End Stage CKD (GFR <15 mL/min/1 73 square meters)  Note: GFR calculation is accurate only with a steady state creatinine    Protime-INR [[id number]]  (Abnormal) Collected: 10/30/22 2107    Lab Status: Final result Specimen: Blood from Arm, Left Updated: 10/30/22 2134     Protime 15 0 seconds      INR 1 20    APTT [[id number]]  (Normal) Collected: 10/30/22 2107    Lab Status: Final result Specimen: Blood from Arm, Left Updated: 10/30/22 2134     PTT 37 seconds     CBC and differential [[id number]]  (Abnormal) Collected: 10/30/22 2107    Lab Status: Final result Specimen: Blood from Arm, Left Updated: 10/30/22 2120     WBC 8 09 Thousand/uL      RBC 5 29 Million/uL      Hemoglobin 16 7 g/dL      Hematocrit 47 9 %      MCV 91 fL      MCH 31 6 pg      MCHC 34 9 g/dL      RDW 13 7 %      MPV 10 2 fL      Platelets 654 Thousands/uL      nRBC 0 /100 WBCs      Neutrophils Relative 80 %      Immat GRANS % 0 %      Lymphocytes Relative 6 %      Monocytes Relative 13 %      Eosinophils Relative 0 %      Basophils Relative 1 %      Neutrophils Absolute 6 49 Thousands/µL      Immature Grans Absolute 0 03 Thousand/uL      Lymphocytes Absolute 0 46 Thousands/µL      Monocytes Absolute 1 04 Thousand/µL Eosinophils Absolute 0 03 Thousand/µL      Basophils Absolute 0 04 Thousands/µL     Blood culture #2 [[id number]] Collected: 10/30/22 2107    Lab Status: In process Specimen: Blood from Arm, Left Updated: 10/30/22 2116    Blood culture #1 [[id number]] Collected: 10/30/22 2057    Lab Status: In process Specimen: Blood from Arm, Right Updated: 10/30/22 2115                 XR chest 1 view portable   Final Result by Herlinda Benitez DO (10/30 2209)   1  No evidence of acute cardiopulmonary process  The right lung is partially obscured by overlying lines  Workstation performed: [id number]                    Procedures  Procedures         ED Course  ED Course as of 10/30/22 2221   Spenser Hoyt Oct 30, 2022   2039 Patient was 88% on room air on my initial assessment  Placed on 3L NC with correction to 91%   2158 Patient meeting SIRS criteria but there is no confirmed source of infection  Will hold abx at this time as symptoms are likely viral in nature  This is not SEPSIS  Will fluid resuscitate adequately for current condition  2212 SARS-COV-2(!): Positive  TT to SLIM                                                MDM  Number of Diagnoses or Management Options  COVID-19: new and requires workup     Amount and/or Complexity of Data Reviewed  Clinical lab tests: ordered and reviewed  Tests in the radiology section of CPT®: ordered and reviewed  Tests in the medicine section of CPT®: ordered and reviewed  Review and summarize past medical records: yes    Risk of Complications, Morbidity, and/or Mortality  Presenting problems: moderate  Diagnostic procedures: moderate  Management options: moderate    Patient Progress  Patient progress: stable      Disposition  Final diagnoses:   COVID-19     Time reflects when diagnosis was documented in both MDM as applicable and the Disposition within this note     Time User Action Codes Description Comment    10/30/2022 10:20 PM Betty [place] 10, [address] [U07 1] COVID-19       ED Disposition     ED Disposition   Admit    Condition   Stable    Date/Time   Sun Oct 30, 2022 10:20 PM    Comment   Case was discussed with Jenny Aguillon PA-C and the patient's admission status was agreed to be Admission Status: inpatient status to the service of Dr Heydi Friedman   Follow-up Information    None         Patient's Medications   Discharge Prescriptions    No medications on file       No discharge procedures on file      PDMP Review       Value Time User    PDMP Reviewed  Yes 7/26/2022 12:13 PM Leland Gonzales MD          ED Provider  Electronically Signed by           Katheleen Collet, PA-C  10/30/22 0188

## 2022-10-31 NOTE — PLAN OF CARE
Problem: PAIN - ADULT  Goal: Verbalizes/displays adequate comfort level or baseline comfort level  Description: Interventions:  - Encourage patient to monitor pain and request assistance  - Assess pain using appropriate pain scale  - Administer analgesics based on type and severity of pain and evaluate response  - Implement non-pharmacological measures as appropriate and evaluate response  - Consider cultural and social influences on pain and pain management  - Notify physician/advanced practitioner if interventions unsuccessful or patient reports new pain  Outcome: Progressing     Problem: INFECTION - ADULT  Goal: Absence or prevention of progression during hospitalization  Description: INTERVENTIONS:  - Assess and monitor for signs and symptoms of infection  - Monitor lab/diagnostic results  - Monitor all insertion sites, i e  indwelling lines, tubes, and drains  - Monitor endotracheal if appropriate and nasal secretions for changes in amount and color  - [place] appropriate cooling/warming therapies per order  - Administer medications as ordered  - Instruct and encourage patient and family to use good hand hygiene technique  - Identify and instruct in appropriate isolation precautions for identified infection/condition  Outcome: Progressing  Goal: Absence of fever/infection during neutropenic period  Description: INTERVENTIONS:  - Monitor WBC    Outcome: Progressing     Problem: SAFETY ADULT  Goal: Patient will remain free of falls  Description: INTERVENTIONS:  - Educate patient/family on patient safety including physical limitations  - Instruct patient to call for assistance with activity   - Consult OT/PT to assist with strengthening/mobility   - Keep Call bell within reach  - Keep bed low and locked with side rails adjusted as appropriate  - Keep care items and personal belongings within reach  - Initiate and maintain comfort rounds  - Make Fall Risk Sign visible to staff  - Offer Toileting every  Hours, in advance of need  - Initiate/Maintain alarm  - Obtain necessary fall risk management equipment:   - Apply yellow socks and bracelet for high fall risk patients  - Consider moving patient to room near nurses station  Outcome: Progressing  Goal: Maintain or return to baseline ADL function  Description: INTERVENTIONS:  -  Assess patient's ability to carry out ADLs; assess patient's baseline for ADL function and identify physical deficits which impact ability to perform ADLs (bathing, care of mouth/teeth, toileting, grooming, dressing, etc )  - Assess/evaluate cause of self-care deficits   - Assess range of motion  - Assess patient's mobility; develop plan if impaired  - Assess patient's need for assistive devices and provide as appropriate  - Encourage maximum independence but intervene and supervise when necessary  - Involve family in performance of ADLs  - Assess for home care needs following discharge   - Consider OT consult to assist with ADL evaluation and planning for discharge  - Provide patient education as appropriate  Outcome: Progressing  Goal: Maintains/Returns to pre admission functional level  Description: INTERVENTIONS:  - Perform BMAT or MOVE assessment daily    - Set and communicate daily mobility goal to care team and patient/family/caregiver  - Collaborate with rehabilitation services on mobility goals if consulted  - Perform Range of Motion  times a day  - Reposition patient every  hours    - Dangle patient  times a day  - Stand patient  times a day  - Ambulate patient  times a day  - Out of bed to chair  times a day   - Out of bed for meal times a day  - Out of bed for toileting  - Record patient progress and toleration of activity level   Outcome: Progressing     Problem: DISCHARGE PLANNING  Goal: Discharge to home or other facility with appropriate resources  Description: INTERVENTIONS:  - Identify barriers to discharge w/patient and caregiver  - Arrange for needed discharge resources and transportation as appropriate  - Identify discharge learning needs (meds, wound care, etc )  - Arrange for interpretive services to assist at discharge as needed  - Refer to Case Management Department for coordinating discharge planning if the patient needs post-hospital services based on physician/advanced practitioner order or complex needs related to functional status, cognitive ability, or social support system  Outcome: Progressing     Problem: Knowledge Deficit  Goal: Patient/family/caregiver demonstrates understanding of disease process, treatment plan, medications, and discharge instructions  Description: Complete learning assessment and assess knowledge base    Interventions:  - Provide teaching at level of understanding  - Provide teaching via preferred learning methods  Outcome: Progressing     Problem: RESPIRATORY - ADULT  Goal: Achieves optimal ventilation and oxygenation  Description: INTERVENTIONS:  - Assess for changes in respiratory status  - Assess for changes in mentation and behavior  - Position to facilitate oxygenation and minimize respiratory effort  - Oxygen administered by appropriate delivery if ordered  - Initiate smoking cessation education as indicated  - Encourage broncho-pulmonary hygiene including cough, deep breathe, Incentive Spirometry  - Assess the need for suctioning and aspirate as needed  - Assess and instruct to report SOB or any respiratory difficulty  - Respiratory Therapy support as indicated  Outcome: Progressing

## 2022-10-31 NOTE — ASSESSMENT & PLAN NOTE
· Blood pressure currently well controlled  · Continue pre-hospital metoprolol  · Continue to monitor

## 2022-10-31 NOTE — ASSESSMENT & PLAN NOTE
Patient presenting to the ED for fever, cough, nausea, chills that started yesterday evening  He also reports associated shortness of breath  Denies any chest pain, abdominal pain, or vomiting    · COVID 19 positive on 10/30/22  · Currently requiring 4L NC oxygen  · Follow moderate pathway  · Decadron 6mg IV x 10 days  · Remdesivir taper  · Baricitinib 4mg daily x 14 days  · Serial COVID labs  · Meeting sepsis criteria, treat with IV ceftriaxone for now  · CXR is pending  · Respiratory protocol, monitor pulse ox  · Contact precautions

## 2022-10-31 NOTE — H&P
[facility]  H&P- Meliza Patience 1945, 78 y o  male MRN: [id number]  Unit/Bed#: ED 19 Encounter: [id number]  Primary Care Provider: Celso Phoenix, MD   Date and time admitted to hospital: 10/30/2022  7:55 PM    * Acute respiratory failure due to COVID-19 [facility])  Assessment & Plan  Patient presenting to the ED for fever, cough, nausea, chills that started yesterday evening  He also reports associated shortness of breath  Denies any chest pain, abdominal pain, or vomiting  · COVID 19 positive on 10/30/22  · Currently requiring 4L NC oxygen  · Follow moderate pathway  · Decadron 6mg IV x 10 days  · Remdesivir taper  · Baricitinib 4mg daily x 14 days  · Dimer elevated 2 29  Will start on IV heparin for [facility], [place], operated by [facility] per guideline criteria  However, because patient is tachypneic and tachycardic will obtain CTA chest to r/o pe  · Serial COVID labs  · Meeting sepsis criteria, treat with IV ceftriaxone for now  · CXR is pending  · Respiratory protocol, monitor pulse ox  · Contact precautions    Sepsis ([address] )  Assessment & Plan  · Patient meeting sepsis criteria for fever, tachypnea, tachycardia  · COVID + on admission  · CXR pending  · Will start on IV ceftriaxone  · Lactic acid and procalcitonin negative   Follow up AM reflex  · Follow up BC x2  · Trend WBC and fever curve    Morbid obesity (HCC)  Assessment & Plan  · Current BMI 39 48 kg/m2  · Encouraged healthy diet and lifestyle modifications    Hyperlipidemia  Assessment & Plan  · Continue statin and fenofibrate therapy    Benign hypertension  Assessment & Plan  · BP well controlled  · Continue pre-hospital metoprolol  · Monitor vitals per routine    KADEN on CPAP  Assessment & Plan  · Ordered CPAP qHS    VTE Prophylaxis: Enoxaparin (Lovenox)  / sequential compression device   Code Status: Level 1 code  Discussion with family: Update in the AM    Anticipated Length of Stay:  Patient will be admitted on an Inpatient basis with an anticipated length of stay of More than 2 midnights  Justification for Hospital Stay: AFR due to COVID    Total Time for Visit, including Counseling / Coordination of Care: 60 minutes  Greater than 50% of this total time spent on direct patient counseling and coordination of care  Chief Complaint:   Fever    History of Present Illness:    Wiliam Nicolas is a 78 y o  male who has a past medical history significant for hypertension, hyperlipidemia, GERD, obesity  Patient presenting to the ED for fever, cough, nausea, chills that started yesterday evening  He also reports associated shortness of breath  Denies any chest pain, abdominal pain, or vomiting  Patient requiring medical admission secondary to acute respiratory failure due to COVID-19 infection  All patient questions answered to the best my ability  Review of Systems:    Review of Systems   Constitutional: Positive for chills, fatigue and fever  HENT: Negative for ear pain and sore throat  Eyes: Negative for pain and visual disturbance  Respiratory: Positive for cough and shortness of breath  Cardiovascular: Negative for chest pain and palpitations  Gastrointestinal: Positive for nausea  Negative for abdominal pain and vomiting  Genitourinary: Negative for dysuria and hematuria  Musculoskeletal: Negative for arthralgias and back pain  Skin: Negative for color change and rash  Neurological: Negative for seizures and syncope  All other systems reviewed and are negative        Past Medical and Surgical History:     Past Medical History:   Diagnosis Date   • Cancer ([facility] [address] )     basal call carcinoma   • Colon polyps     DR Deanna Wallace   • Diverticulosis    • Hyperlipidemia    • Hypertension    • Obesity    • KADEN (obstructive sleep apnea)     cpap   • Pneumonia    • Small bowel obstruction, partial (HCC)    • SOB (shortness of breath)        Past Surgical History:   Procedure Laterality Date   • SKIN BIOPSY     • TONSILLECTOMY AND ADENOIDECTOMY Meds/Allergies:    Prior to Admission medications    Medication Sig Start Date End Date Taking? Authorizing Provider   ALPRAZolam Birtha Love) 0 5 mg tablet Take 1 tablet (0 5 mg total) by mouth 2 (two) times a day 7/26/22   Lay Page, MD   aspirin (ECOTRIN LOW STRENGTH) 81 mg EC tablet Take by mouth    Historical Provider, MD   B Complex-Folic Acid (B COMPLEX PLUS) TABS Take by mouth    Historical Provider, MD   Cholecalciferol (VITAMIN D3) 1000 units CAPS Take by mouth    Historical Provider, MD   Chondroitin Sulfate-Vit C-Mn 400-60-2 5 MG CAPS Take by mouth    Historical Provider, MD   Coenzyme Q10 (COQ-10) 100 MG CAPS Take by mouth    Historical Provider, MD   fenofibrate (TRICOR) 54 MG tablet TAKE 1 TABLET BY MOUTH  DAILY 2/10/22   [facility]   meclizine (ANTIVERT) 25 mg tablet TAKE 1 TABLET(25 MG) BY MOUTH THREE TIMES DAILY AS NEEDED FOR DIZZINESS 8/18/22   Lya Page, MD   metoprolol succinate (TOPROL-XL) 50 mg 24 hr tablet TAKE 1 TABLET BY MOUTH  DAILY 7/13/22   Teresa Mckeon PA-C   Multiple Vitamin (MULTI-VITAMIN DAILY PO) Take by mouth    Historical Provider, MD   omeprazole (PriLOSEC) 40 MG capsule TAKE 1 CAPSULE BY MOUTH  DAILY 9/29/21   [facility]   rosuvastatin (CRESTOR) 5 mg tablet TAKE 1 TABLET BY MOUTH  DAILY 7/13/22   [facility], MD   solifenacin (VESICARE) 5 mg tablet TAKE 1 TABLET BY MOUTH  DAILY 9/29/22   Lay Page, MD     I have reviewed home medications using allscripts      Allergies: No Known Allergies    Social History:     Marital Status: Significant Other   Occupation: NA  Patient Pre-hospital Living Situation: Home  Patient Pre-hospital Level of Mobility: Walks  Patient Pre-hospital Diet Restrictions: None  Substance Use History:   Social History     Substance and Sexual Activity   Alcohol Use Not Currently    Comment: occassional     Social History     Tobacco Use   Smoking Status Never Smoker   Smokeless Tobacco Never Used     Social History     Substance and Sexual Activity   Drug Use No       Family History:    Family History   Problem Relation Age of Onset   • Heart disease Mother    • Hypertension Mother    • Breast cancer Sister        Physical Exam:     Vitals:   Blood Pressure: 129/61 (10/30/22 2159)  Pulse: 104 (10/30/22 2159)  Temperature: (!) 102 3 °F (39 1 °C) (10/30/22 1954)  Temp Source: Tympanic (10/30/22 1954)  Respirations: (!) 31 (10/30/22 2159)  SpO2: 93 % (10/30/22 2159)    Physical Exam  Vitals and nursing note reviewed  Constitutional:       General: He is in acute distress  Appearance: He is well-developed  He is obese  HENT:      Head: Normocephalic and atraumatic  Mouth/Throat:      Pharynx: Oropharynx is clear  Eyes:      Conjunctiva/sclera: Conjunctivae normal    Cardiovascular:      Rate and Rhythm: Regular rhythm  Tachycardia present  Pulses: Normal pulses  Heart sounds: No murmur heard  Pulmonary:      Effort: Respiratory distress present  Comments: 4L NC  Abdominal:      General: Bowel sounds are normal       Palpations: Abdomen is soft  Tenderness: There is no abdominal tenderness  Musculoskeletal:      Cervical back: Neck supple  Right lower leg: No edema  Left lower leg: No edema  Skin:     General: Skin is warm and dry  Neurological:      Mental Status: He is alert and oriented to person, place, and time  Additional Data:     Lab Results: I have personally reviewed pertinent reports        Results from last 7 days   Lab Units 10/30/22  2107   WBC Thousand/uL 8 09   HEMOGLOBIN g/dL 16 7   HEMATOCRIT % 47 9   PLATELETS Thousands/uL 203   NEUTROS PCT % 80*   LYMPHS PCT % 6*   MONOS PCT % 13*   EOS PCT % 0     Results from last 7 days   Lab Units 10/30/22  2057   SODIUM mmol/L 136   POTASSIUM mmol/L 4 8   CHLORIDE mmol/L 102   CO2 mmol/L 26   BUN mg/dL 16   CREATININE mg/dL 1 19   ANION GAP mmol/L 8   CALCIUM mg/dL 9 5   ALBUMIN g/dL 4 1   TOTAL BILIRUBIN mg/dL 0 68   ALK PHOS U/L 57   ALT U/L 68   AST U/L 69*   GLUCOSE RANDOM mg/dL 150*     Results from last 7 days   Lab Units 10/30/22  2107   INR  1 20*             Results from last 7 days   Lab Units 10/30/22  2107   LACTIC ACID mmol/L 1 9   PROCALCITONIN ng/ml 0 19       Imaging: I have personally reviewed pertinent reports  XR chest 1 view portable   Final Result by Pauline Vivas DO (10/30 2209)   1  No evidence of acute cardiopulmonary process  The right lung is partially obscured by overlying lines  Workstation performed: [id number]             EKG, Pathology, and Other Studies Reviewed on Admission:   · EKG: Reviewed    Allscripts / Epic Records Reviewed: Yes     ** Please Note: This note has been constructed using a voice recognition system   **

## 2022-10-31 NOTE — TELEPHONE ENCOUNTER
Reason for Disposition  • Patient already left for the hospital/clinic      Protocols used: NO CONTACT OR DUPLICATE CONTACT CALL-ADULT-

## 2022-10-31 NOTE — ASSESSMENT & PLAN NOTE
· Patient meeting sepsis criteria for fever, tachypnea, tachycardia  · COVID + on admission  · CXR pending  · Will start on IV ceftriaxone  · Lactic acid and procalcitonin negative   Follow up AM reflex  · Follow up BC x2  · Trend WBC and fever curve

## 2022-11-01 LAB
ALBUMIN SERPL BCP-MCNC: 3.5 G/DL (ref 3.5–5)
ALP SERPL-CCNC: 50 U/L (ref 46–116)
ALT SERPL W P-5'-P-CCNC: 53 U/L (ref 12–78)
ANION GAP SERPL CALCULATED.3IONS-SCNC: 10 MMOL/L (ref 4–13)
APTT PPP: 66 SECONDS (ref 23–37)
AST SERPL W P-5'-P-CCNC: 44 U/L (ref 5–45)
BASOPHILS # BLD AUTO: 0.01 THOUSANDS/ÂΜL (ref 0–0.1)
BASOPHILS NFR BLD AUTO: 0 % (ref 0–1)
BILIRUB SERPL-MCNC: 0.42 MG/DL (ref 0.2–1)
BUN SERPL-MCNC: 21 MG/DL (ref 5–25)
CALCIUM SERPL-MCNC: 8.7 MG/DL (ref 8.3–10.1)
CHLORIDE SERPL-SCNC: 104 MMOL/L (ref 96–108)
CO2 SERPL-SCNC: 27 MMOL/L (ref 21–32)
CREAT SERPL-MCNC: 1.18 MG/DL (ref 0.6–1.3)
EOSINOPHIL # BLD AUTO: 0 THOUSAND/ÂΜL (ref 0–0.61)
EOSINOPHIL NFR BLD AUTO: 0 % (ref 0–6)
ERYTHROCYTE [DISTWIDTH] IN BLOOD BY AUTOMATED COUNT: 13.9 % (ref 11.6–15.1)
EST. AVERAGE GLUCOSE BLD GHB EST-MCNC: 177 MG/DL
GFR SERPL CREATININE-BSD FRML MDRD: 60 ML/MIN/1.73SQ M
GLUCOSE SERPL-MCNC: 106 MG/DL (ref 65–140)
GLUCOSE SERPL-MCNC: 132 MG/DL (ref 65–140)
GLUCOSE SERPL-MCNC: 141 MG/DL (ref 65–140)
GLUCOSE SERPL-MCNC: 155 MG/DL (ref 65–140)
GLUCOSE SERPL-MCNC: 223 MG/DL (ref 65–140)
HBA1C MFR BLD: 7.8 %
HCT VFR BLD AUTO: 47 % (ref 36.5–49.3)
HGB BLD-MCNC: 16.2 G/DL (ref 12–17)
IMM GRANULOCYTES # BLD AUTO: 0.01 THOUSAND/UL (ref 0–0.2)
IMM GRANULOCYTES NFR BLD AUTO: 0 % (ref 0–2)
LYMPHOCYTES # BLD AUTO: 1.08 THOUSANDS/ÂΜL (ref 0.6–4.47)
LYMPHOCYTES NFR BLD AUTO: 17 % (ref 14–44)
MCH RBC QN AUTO: 31.3 PG (ref 26.8–34.3)
MCHC RBC AUTO-ENTMCNC: 34.5 G/DL (ref 31.4–37.4)
MCV RBC AUTO: 91 FL (ref 82–98)
MONOCYTES # BLD AUTO: 0.49 THOUSAND/ÂΜL (ref 0.17–1.22)
MONOCYTES NFR BLD AUTO: 8 % (ref 4–12)
NEUTROPHILS # BLD AUTO: 4.85 THOUSANDS/ÂΜL (ref 1.85–7.62)
NEUTS SEG NFR BLD AUTO: 75 % (ref 43–75)
NRBC BLD AUTO-RTO: 0 /100 WBCS
PLATELET # BLD AUTO: 198 THOUSANDS/UL (ref 149–390)
PMV BLD AUTO: 10.3 FL (ref 8.9–12.7)
POTASSIUM SERPL-SCNC: 4.1 MMOL/L (ref 3.5–5.3)
PROT SERPL-MCNC: 7.5 G/DL (ref 6.4–8.4)
RBC # BLD AUTO: 5.18 MILLION/UL (ref 3.88–5.62)
SODIUM SERPL-SCNC: 141 MMOL/L (ref 135–147)
WBC # BLD AUTO: 6.44 THOUSAND/UL (ref 4.31–10.16)

## 2022-11-01 RX ADMIN — METOPROLOL SUCCINATE 50 MG: 50 TABLET, EXTENDED RELEASE ORAL at 09:38

## 2022-11-01 RX ADMIN — DEXAMETHASONE SODIUM PHOSPHATE 6 MG: 4 INJECTION INTRA-ARTICULAR; INTRALESIONAL; INTRAMUSCULAR; INTRAVENOUS; SOFT TISSUE at 22:36

## 2022-11-01 RX ADMIN — HEPARIN SODIUM 7.1 UNITS/KG/HR: 10000 INJECTION, SOLUTION INTRAVENOUS at 09:49

## 2022-11-01 RX ADMIN — OXYBUTYNIN 5 MG: 5 TABLET, FILM COATED, EXTENDED RELEASE ORAL at 09:38

## 2022-11-01 RX ADMIN — BARICITINIB 2 MG: 2 TABLET, FILM COATED ORAL at 00:25

## 2022-11-01 RX ADMIN — FENOFIBRATE 48 MG: 48 TABLET, FILM COATED ORAL at 09:38

## 2022-11-01 RX ADMIN — BARICITINIB 2 MG: 2 TABLET, FILM COATED ORAL at 22:36

## 2022-11-01 RX ADMIN — REMDESIVIR 100 MG: 100 INJECTION, POWDER, LYOPHILIZED, FOR SOLUTION INTRAVENOUS at 00:24

## 2022-11-01 RX ADMIN — CEFTRIAXONE SODIUM 1000 MG: 10 INJECTION, POWDER, FOR SOLUTION INTRAVENOUS at 22:35

## 2022-11-01 RX ADMIN — REMDESIVIR 100 MG: 100 INJECTION, POWDER, LYOPHILIZED, FOR SOLUTION INTRAVENOUS at 22:36

## 2022-11-01 RX ADMIN — PRAVASTATIN SODIUM 40 MG: 40 TABLET ORAL at 17:43

## 2022-11-01 RX ADMIN — ASPIRIN 81 MG: 81 TABLET, COATED ORAL at 09:38

## 2022-11-01 RX ADMIN — PANTOPRAZOLE SODIUM 40 MG: 40 TABLET, DELAYED RELEASE ORAL at 05:50

## 2022-11-01 NOTE — PLAN OF CARE
Problem: PAIN - ADULT  Goal: Verbalizes/displays adequate comfort level or baseline comfort level  Description: Interventions:  - Encourage patient to monitor pain and request assistance  - Assess pain using appropriate pain scale  - Administer analgesics based on type and severity of pain and evaluate response  - Implement non-pharmacological measures as appropriate and evaluate response  - Consider cultural and social influences on pain and pain management  - Notify physician/advanced practitioner if interventions unsuccessful or patient reports new pain  Outcome: Progressing     Problem: INFECTION - ADULT  Goal: Absence or prevention of progression during hospitalization  Description: INTERVENTIONS:  - Assess and monitor for signs and symptoms of infection  - Monitor lab/diagnostic results  - Monitor all insertion sites, i e  indwelling lines, tubes, and drains  - Monitor endotracheal if appropriate and nasal secretions for changes in amount and color  - [place] appropriate cooling/warming therapies per order  - Administer medications as ordered  - Instruct and encourage patient and family to use good hand hygiene technique  - Identify and instruct in appropriate isolation precautions for identified infection/condition  Outcome: Progressing  Goal: Absence of fever/infection during neutropenic period  Description: INTERVENTIONS:  - Monitor WBC    Outcome: Progressing     Problem: SAFETY ADULT  Goal: Patient will remain free of falls  Description: INTERVENTIONS:  - Educate patient/family on patient safety including physical limitations  - Instruct patient to call for assistance with activity   - Consult OT/PT to assist with strengthening/mobility   - Keep Call bell within reach  - Keep bed low and locked with side rails adjusted as appropriate  - Keep care items and personal belongings within reach  - Initiate and maintain comfort rounds  - Make Fall Risk Sign visible to staff  - Offer Toileting every Hours, in advance of need  - Initiate/Maintainalarm  - Obtain necessary fall risk management equipment:   - Apply yellow socks and bracelet for high fall risk patients  - Consider moving patient to room near nurses station  Outcome: Progressing  Goal: Maintain or return to baseline ADL function  Description: INTERVENTIONS:  -  Assess patient's ability to carry out ADLs; assess patient's baseline for ADL function and identify physical deficits which impact ability to perform ADLs (bathing, care of mouth/teeth, toileting, grooming, dressing, etc )  - Assess/evaluate cause of self-care deficits   - Assess range of motion  - Assess patient's mobility; develop plan if impaired  - Assess patient's need for assistive devices and provide as appropriate  - Encourage maximum independence but intervene and supervise when necessary  - Involve family in performance of ADLs  - Assess for home care needs following discharge   - Consider OT consult to assist with ADL evaluation and planning for discharge  - Provide patient education as appropriate  Outcome: Progressing  Goal: Maintains/Returns to pre admission functional level  Description: INTERVENTIONS:  - Perform BMAT or MOVE assessment daily    - Set and communicate daily mobility goal to care team and patient/family/caregiver  - Collaborate with rehabilitation services on mobility goals if consulted  - Perform Range of Motion  times a day  - Reposition patient every  hours    - Dangle patient times a day  - Stand patient  times a day  - Ambulate patient  times a day  - Out of bed to chair  times a day   - Out of bed for meals times a day  - Out of bed for toileting  - Record patient progress and toleration of activity level   Outcome: Progressing     Problem: DISCHARGE PLANNING  Goal: Discharge to home or other facility with appropriate resources  Description: INTERVENTIONS:  - Identify barriers to discharge w/patient and caregiver  - Arrange for needed discharge resources and transportation as appropriate  - Identify discharge learning needs (meds, wound care, etc )  - Arrange for interpretive services to assist at discharge as needed  - Refer to Case Management Department for coordinating discharge planning if the patient needs post-hospital services based on physician/advanced practitioner order or complex needs related to functional status, cognitive ability, or social support system  Outcome: Progressing     Problem: Knowledge Deficit  Goal: Patient/family/caregiver demonstrates understanding of disease process, treatment plan, medications, and discharge instructions  Description: Complete learning assessment and assess knowledge base    Interventions:  - Provide teaching at level of understanding  - Provide teaching via preferred learning methods  Outcome: Progressing     Problem: RESPIRATORY - ADULT  Goal: Achieves optimal ventilation and oxygenation  Description: INTERVENTIONS:  - Assess for changes in respiratory status  - Assess for changes in mentation and behavior  - Position to facilitate oxygenation and minimize respiratory effort  - Oxygen administered by appropriate delivery if ordered  - Initiate smoking cessation education as indicated  - Encourage broncho-pulmonary hygiene including cough, deep breathe, Incentive Spirometry  - Assess the need for suctioning and aspirate as needed  - Assess and instruct to report SOB or any respiratory difficulty  - Respiratory Therapy support as indicated  Outcome: Progressing

## 2022-11-01 NOTE — RESPIRATORY THERAPY NOTE
Discussed use of CPAP for hours of sleep patient states that he has adjusted his bed in the more upright position and states he doesn't need to use the CPAP at this time

## 2022-11-01 NOTE — PROGRESS NOTES
[address] [facility]  Progress Note - Joslyn Lopez 1945, 78 y o  male MRN: [id number]  Unit/Bed#: -Todd Encounter: [id number]  Primary Care Provider: Danni Deleon MD   Date and time admitted to hospital: 10/30/2022  7:55 PM    * Acute respiratory failure due to COVID-19 [facility])  Assessment & Plan  · Patient presenting to the ED for fever, cough, nausea, chills that started yesterday evening  He also reports associated shortness of breath  Denies any chest pain, abdominal pain, or vomiting  · COVID 19 positive on 10/30/22  · Currently requiring 4L NC oxygen  · Follow moderate pathway  · Decadron 6mg IV x 10 days  · Remdesivir x5 days  · Baricitinib 4mg daily x 14 days  · CTA PE study negative for any pulmonary embolism      Sepsis ([facility] )  Assessment & Plan  · Patient meeting sepsis criteria for fever, tachypnea, tachycardia  · COVID + on admission  · Will discontinue IV ceftriaxone at this time  · Blood culture x2 pending  · Continue to monitor    [facility] ([facility] )  Assessment & Plan  Current BMI 39 48 kg/m2  Encouraged healthy diet and lifestyle modifications    Hyperlipidemia  Assessment & Plan  · Continue statin and fenofibrate therapy    Benign hypertension  Assessment & Plan  · Blood pressure currently well controlled  · Continue pre-hospital metoprolol  · Continue to monitor    KADEN on CPAP  Assessment & Plan  · Ordered CPAP qHS      VTE Pharmacologic Prophylaxis:   Pharmacologic: Heparin  Mechanical VTE Prophylaxis in Place: Yes    Patient Centered Rounds: I have performed bedside rounds with nursing staff today  Discussions with Specialists or Other Care Team Provider: CM, NURSING    Education and Discussions with Family / Patient: PT    Time Spent for Care: 30 minutes  More than 50% of total time spent on counseling and coordination of care as described above      Current Length of Stay: 2 day(s)    Current Patient Status: Inpatient   Certification Statement: The patient will continue to require additional inpatient hospital stay due to SEE BELOW    Discharge Plan:  Still requiring significant oxygen    Code Status: Level 1 - Full Code      Subjective:   Denies chest pain, shortness breath, cough no fevers    Objective:     Vitals:   Temp (24hrs), Av 7 °F (36 5 °C), Min:97 3 °F (36 3 °C), Max:98 2 °F (36 8 °C)    Temp:  [97 3 °F (36 3 °C)-98 2 °F (36 8 °C)] 97 5 °F (36 4 °C)  HR:  [60-89] 89  Resp:  [15-25] 18  BP: (118-144)/(58-86) 144/86  SpO2:  [92 %-96 %] 94 %  Body mass index is 39 48 kg/m²  Input and Output Summary (last 24 hours): Intake/Output Summary (Last 24 hours) at 2022 0935  Last data filed at 2022 0553  Gross per 24 hour   Intake --   Output 1100 ml   Net -1100 ml       Physical Exam:     Physical Exam  Constitutional:       General: He is not in acute distress  Appearance: He is well-developed  He is not diaphoretic  HENT:      Head: Normocephalic and atraumatic  Nose: Nose normal       Mouth/Throat:      Pharynx: No oropharyngeal exudate  Eyes:      General: No scleral icterus  Conjunctiva/sclera: Conjunctivae normal    Cardiovascular:      Rate and Rhythm: Normal rate and regular rhythm  Heart sounds: Normal heart sounds  No murmur heard  No friction rub  No gallop  Pulmonary:      Effort: Pulmonary effort is normal  No respiratory distress  Breath sounds: Normal breath sounds  No wheezing or rales  Chest:      Chest wall: No tenderness  Abdominal:      General: Bowel sounds are normal  There is no distension  Palpations: Abdomen is soft  Tenderness: There is no abdominal tenderness  There is no guarding  Musculoskeletal:         General: No tenderness or deformity  Normal range of motion  Cervical back: Normal range of motion and neck supple  Skin:     General: Skin is warm and dry  Findings: No erythema  Neurological:      Mental Status: He is alert  Mental status is at baseline  (     Additional Data:     Labs:    Results from last 7 days   Lab Units 11/01/22  0445   WBC Thousand/uL 6 44   HEMOGLOBIN g/dL 16 2   HEMATOCRIT % 47 0   PLATELETS Thousands/uL 198   NEUTROS PCT % 75   LYMPHS PCT % 17   MONOS PCT % 8   EOS PCT % 0     Results from last 7 days   Lab Units 11/01/22  0445   SODIUM mmol/L 141   POTASSIUM mmol/L 4 1   CHLORIDE mmol/L 104   CO2 mmol/L 27   BUN mg/dL 21   CREATININE mg/dL 1 18   ANION GAP mmol/L 10   CALCIUM mg/dL 8 7   ALBUMIN g/dL 3 5   TOTAL BILIRUBIN mg/dL 0 42   ALK PHOS U/L 50   ALT U/L 53   AST U/L 44   GLUCOSE RANDOM mg/dL 223*     Results from last 7 days   Lab Units 10/30/22  2346   INR  1 32*     Results from last 7 days   Lab Units 11/01/22  0752   POC GLUCOSE mg/dl 155*     Results from last 7 days   Lab Units 10/31/22  0553   HEMOGLOBIN A1C % 7 8*     Results from last 7 days   Lab Units 10/31/22  0553 10/30/22  2107   LACTIC ACID mmol/L  --  1 9   PROCALCITONIN ng/ml 0 16 0 19           * I Have Reviewed All Lab Data Listed Above  * Additional Pertinent Lab Tests Reviewed: All Labs Within Last 24 Hours Reviewed    Imaging:    Imaging Reports Reviewed Today Include: na  Imaging Personally Reviewed by Myself Includes:  na    Recent Cultures (last 7 days):     Results from last 7 days   Lab Units 10/30/22  2107 10/30/22  2057   BLOOD CULTURE  No Growth at 24 hrs  No Growth at 24 hrs         Last 24 Hours Medication List:   Current Facility-Administered Medications   Medication Dose Route Frequency Provider Last Rate   • acetaminophen  650 mg Oral Q6H PRN Bunny Levy PA-C     • aspirin  81 mg Oral Daily Arlet Song PA-C     • baricitinib  2 mg Oral Q24H Arlet Song PA-C     • cefTRIAXone  1,000 mg Intravenous Q24H Bunny Levy PA-C 1,000 mg (10/31/22 2240)   • dexamethasone  6 mg Intravenous Q24H Arlet Song PA-C     • fenofibrate  48 mg Oral Daily Arlet Song PA-C     • heparin (porcine)  3-20 Units/kg/hr (Order-Specific) Intravenous Titrated Sylvia López PA-C Stopped (11/01/22 8820)   • heparin (porcine)  2,000 Units Intravenous Q1H PRN Sylvia López PA-C     • heparin (porcine)  4,000 Units Intravenous Q1H PRN Sylvia López PA-C     • hydrocortisone   Topical 4x Daily PRN Sriram Vaca, DO     • lidocaine   Topical Once Sriram Vaca, DO     • metoprolol succinate  50 mg Oral Daily Arlet Song PA-C     • oxybutynin  5 mg Oral Daily Arlet Song PA-C     • pantoprazole  40 mg Oral Early Morning Arlet Song PA-C     • pravastatin  40 mg Oral Daily With [employer], PA-C     • remdesivir  100 mg Intravenous Q24H Sylvia López PA-C          Today, Patient Was Seen By: Spencer Schaefer    ** Please Note: Dictation voice to text software may have been used in the creation of this document   **

## 2022-11-01 NOTE — ASSESSMENT & PLAN NOTE
· Patient presenting to the ED for fever, cough, nausea, chills that started yesterday evening  He also reports associated shortness of breath  Denies any chest pain, abdominal pain, or vomiting    · COVID 19 positive on 10/30/22  · Currently requiring 4L NC oxygen  · Follow moderate pathway  · Decadron 6mg IV x 10 days  · Remdesivir x5 days  · Baricitinib 4mg daily x 14 days  · CTA PE study negative for any pulmonary embolism

## 2022-11-02 ENCOUNTER — TRANSITIONAL CARE MANAGEMENT (OUTPATIENT)
Dept: INTERNAL MEDICINE CLINIC | Facility: CLINIC | Age: 77
End: 2022-11-02

## 2022-11-02 VITALS
SYSTOLIC BLOOD PRESSURE: 135 MMHG | BODY MASS INDEX: 39.27 KG/M2 | OXYGEN SATURATION: 94 % | DIASTOLIC BLOOD PRESSURE: 77 MMHG | TEMPERATURE: 97.6 F | WEIGHT: 230 LBS | RESPIRATION RATE: 17 BRPM | HEART RATE: 57 BPM | HEIGHT: 64 IN

## 2022-11-02 LAB
ANION GAP SERPL CALCULATED.3IONS-SCNC: 9 MMOL/L (ref 4–13)
BASOPHILS # BLD AUTO: 0.01 THOUSANDS/ÂΜL (ref 0–0.1)
BASOPHILS NFR BLD AUTO: 0 % (ref 0–1)
BUN SERPL-MCNC: 25 MG/DL (ref 5–25)
CALCIUM SERPL-MCNC: 8.7 MG/DL (ref 8.3–10.1)
CHLORIDE SERPL-SCNC: 103 MMOL/L (ref 96–108)
CO2 SERPL-SCNC: 27 MMOL/L (ref 21–32)
CREAT SERPL-MCNC: 1.17 MG/DL (ref 0.6–1.3)
CRP SERPL QL: 9.2 MG/L
EOSINOPHIL # BLD AUTO: 0 THOUSAND/ÂΜL (ref 0–0.61)
EOSINOPHIL NFR BLD AUTO: 0 % (ref 0–6)
ERYTHROCYTE [DISTWIDTH] IN BLOOD BY AUTOMATED COUNT: 13.9 % (ref 11.6–15.1)
GFR SERPL CREATININE-BSD FRML MDRD: 60 ML/MIN/1.73SQ M
GLUCOSE SERPL-MCNC: 147 MG/DL (ref 65–140)
GLUCOSE SERPL-MCNC: 156 MG/DL (ref 65–140)
GLUCOSE SERPL-MCNC: 213 MG/DL (ref 65–140)
HCT VFR BLD AUTO: 49.2 % (ref 36.5–49.3)
HGB BLD-MCNC: 16.8 G/DL (ref 12–17)
IMM GRANULOCYTES # BLD AUTO: 0.01 THOUSAND/UL (ref 0–0.2)
IMM GRANULOCYTES NFR BLD AUTO: 0 % (ref 0–2)
LYMPHOCYTES # BLD AUTO: 1.23 THOUSANDS/ÂΜL (ref 0.6–4.47)
LYMPHOCYTES NFR BLD AUTO: 22 % (ref 14–44)
MCH RBC QN AUTO: 31.3 PG (ref 26.8–34.3)
MCHC RBC AUTO-ENTMCNC: 34.1 G/DL (ref 31.4–37.4)
MCV RBC AUTO: 92 FL (ref 82–98)
MONOCYTES # BLD AUTO: 0.3 THOUSAND/ÂΜL (ref 0.17–1.22)
MONOCYTES NFR BLD AUTO: 6 % (ref 4–12)
NEUTROPHILS # BLD AUTO: 3.93 THOUSANDS/ÂΜL (ref 1.85–7.62)
NEUTS SEG NFR BLD AUTO: 72 % (ref 43–75)
NRBC BLD AUTO-RTO: 0 /100 WBCS
PLATELET # BLD AUTO: 211 THOUSANDS/UL (ref 149–390)
PMV BLD AUTO: 10.1 FL (ref 8.9–12.7)
POTASSIUM SERPL-SCNC: 4.1 MMOL/L (ref 3.5–5.3)
RBC # BLD AUTO: 5.37 MILLION/UL (ref 3.88–5.62)
SODIUM SERPL-SCNC: 139 MMOL/L (ref 135–147)
WBC # BLD AUTO: 5.48 THOUSAND/UL (ref 4.31–10.16)

## 2022-11-02 RX ADMIN — FENOFIBRATE 48 MG: 48 TABLET, FILM COATED ORAL at 09:00

## 2022-11-02 RX ADMIN — PANTOPRAZOLE SODIUM 40 MG: 40 TABLET, DELAYED RELEASE ORAL at 05:07

## 2022-11-02 RX ADMIN — METOPROLOL SUCCINATE 50 MG: 50 TABLET, EXTENDED RELEASE ORAL at 09:00

## 2022-11-02 RX ADMIN — OXYBUTYNIN 5 MG: 5 TABLET, FILM COATED, EXTENDED RELEASE ORAL at 09:00

## 2022-11-02 RX ADMIN — ASPIRIN 81 MG: 81 TABLET, COATED ORAL at 09:00

## 2022-11-02 NOTE — PLAN OF CARE
Problem: PAIN - ADULT  Goal: Verbalizes/displays adequate comfort level or baseline comfort level  Description: Interventions:  - Encourage patient to monitor pain and request assistance  - Assess pain using appropriate pain scale  - Administer analgesics based on type and severity of pain and evaluate response  - Implement non-pharmacological measures as appropriate and evaluate response  - Consider cultural and social influences on pain and pain management  - Notify physician/advanced practitioner if interventions unsuccessful or patient reports new pain  Outcome: Progressing     Problem: INFECTION - ADULT  Goal: Absence or prevention of progression during hospitalization  Description: INTERVENTIONS:  - Assess and monitor for signs and symptoms of infection  - Monitor lab/diagnostic results  - Monitor all insertion sites, i e  indwelling lines, tubes, and drains  - Monitor endotracheal if appropriate and nasal secretions for changes in amount and color  - [place] appropriate cooling/warming therapies per order  - Administer medications as ordered  - Instruct and encourage patient and family to use good hand hygiene technique  - Identify and instruct in appropriate isolation precautions for identified infection/condition  Outcome: Progressing  Goal: Absence of fever/infection during neutropenic period  Description: INTERVENTIONS:  - Monitor WBC    Outcome: Progressing     Problem: SAFETY ADULT  Goal: Patient will remain free of falls  Description: INTERVENTIONS:  - Educate patient/family on patient safety including physical limitations  - Instruct patient to call for assistance with activity   - Consult OT/PT to assist with strengthening/mobility   - Keep Call bell within reach  - Keep bed low and locked with side rails adjusted as appropriate  - Keep care items and personal belongings within reach  - Initiate and maintain comfort rounds  - Make Fall Risk Sign visible to staff  - Offer Toileting every  Hours, in advance of need  - Initiate/Maintain alarm  - Obtain necessary fall risk management equipment:   - Apply yellow socks and bracelet for high fall risk patients  - Consider moving patient to room near nurses station  Outcome: Progressing  Goal: Maintain or return to baseline ADL function  Description: INTERVENTIONS:  -  Assess patient's ability to carry out ADLs; assess patient's baseline for ADL function and identify physical deficits which impact ability to perform ADLs (bathing, care of mouth/teeth, toileting, grooming, dressing, etc )  - Assess/evaluate cause of self-care deficits   - Assess range of motion  - Assess patient's mobility; develop plan if impaired  - Assess patient's need for assistive devices and provide as appropriate  - Encourage maximum independence but intervene and supervise when necessary  - Involve family in performance of ADLs  - Assess for home care needs following discharge   - Consider OT consult to assist with ADL evaluation and planning for discharge  - Provide patient education as appropriate  Outcome: Progressing  Goal: Maintains/Returns to pre admission functional level  Description: INTERVENTIONS:  - Perform BMAT or MOVE assessment daily    - Set and communicate daily mobility goal to care team and patient/family/caregiver  - Collaborate with rehabilitation services on mobility goals if consulted  - Perform Range of Motion  times a day  - Reposition patient every  hours    - Dangle patient  times a day  - Stand patient  times a day  - Ambulate patient  times a day  - Out of bed to chair  times a day   - Out of bed for meal times a day  - Out of bed for toileting  - Record patient progress and toleration of activity level   Outcome: Progressing     Problem: DISCHARGE PLANNING  Goal: Discharge to home or other facility with appropriate resources  Description: INTERVENTIONS:  - Identify barriers to discharge w/patient and caregiver  - Arrange for needed discharge resources and transportation as appropriate  - Identify discharge learning needs (meds, wound care, etc )  - Arrange for interpretive services to assist at discharge as needed  - Refer to Case Management Department for coordinating discharge planning if the patient needs post-hospital services based on physician/advanced practitioner order or complex needs related to functional status, cognitive ability, or social support system  Outcome: Progressing     Problem: Knowledge Deficit  Goal: Patient/family/caregiver demonstrates understanding of disease process, treatment plan, medications, and discharge instructions  Description: Complete learning assessment and assess knowledge base    Interventions:  - Provide teaching at level of understanding  - Provide teaching via preferred learning methods  Outcome: Progressing     Problem: RESPIRATORY - ADULT  Goal: Achieves optimal ventilation and oxygenation  Description: INTERVENTIONS:  - Assess for changes in respiratory status  - Assess for changes in mentation and behavior  - Position to facilitate oxygenation and minimize respiratory effort  - Oxygen administered by appropriate delivery if ordered  - Initiate smoking cessation education as indicated  - Encourage broncho-pulmonary hygiene including cough, deep breathe, Incentive Spirometry  - Assess the need for suctioning and aspirate as needed  - Assess and instruct to report SOB or any respiratory difficulty  - Respiratory Therapy support as indicated  Outcome: Progressing

## 2022-11-02 NOTE — ASSESSMENT & PLAN NOTE
· Patient meeting sepsis criteria for fever, tachypnea, tachycardia  · COVID + on admission  · Will discontinue IV ceftriaxone at this time  · Blood culture x2 negative  · Continue to monitor

## 2022-11-02 NOTE — RESPIRATORY THERAPY NOTE
discussed us of CPAP for hours of sleep patient refusing at this time stating he sleeps fine in semi upright position

## 2022-11-02 NOTE — DISCHARGE SUMMARY
[address] [facility]  Discharge- Wiliam Nicolas 1945, 78 y o  male MRN: [id number]  Unit/Bed#: [facility]-01 Encounter: [id number]  Primary Care Provider: Jose F Tam MD   Date and time admitted to hospital: 10/30/2022  7:55 PM    * Acute respiratory failure due to COVID-19 [facility])  Assessment & Plan  Patient presenting to the ED for fever, cough, nausea, chills that started yesterday evening  He also reports associated shortness of breath  Denies any chest pain, abdominal pain, or vomiting  COVID 19 positive on 10/30/22  Has since resolved  No longer requiring oxygen        Hyperglycemia  Assessment & Plan  · If sugars continue to stay elevated will likely place on correctional scale insulin tomorrow  · Continue to monitor     Sepsis [facility])  Assessment & Plan  · Patient meeting sepsis criteria for fever, tachypnea, tachycardia  · COVID + on admission  · Will discontinue IV ceftriaxone at this time  · Blood culture x2 negative  · Continue to monitor     Morbid obesity ([address] )  Assessment & Plan  Current BMI 39 48 kg/m2  Encouraged healthy diet and lifestyle modifications    Hyperlipidemia  Assessment & Plan  · Continue statin fenofibrate    Benign hypertension  Assessment & Plan  · BP controlled    KADEN on CPAP  Assessment & Plan  · CPAP q h s  Discharging Physician / Practitioner: Lesia Manuel  PCP: Jose F Tam MD  Admission Date:   Admission Orders (From admission, onward)     Ordered        10/30/22 2221  INPATIENT ADMISSION  Once                      Discharge Date: 11/02/22    Medical Problems             Resolved Problems  Date Reviewed: 11/2/2022   None                 Consultations During Hospital Stay:  · none    Procedures Performed:   · none    Significant Findings / Test Results:   XR chest 1 view portable    Result Date: 10/30/2022  Impression: 1  No evidence of acute cardiopulmonary process  The right lung is partially obscured by overlying lines   Workstation performed: [id number] CTA chest pe study    Result Date: 10/31/2022  · Impression: Mildly limited evaluation, however no evidence of pulmonary embolism Workstation performed: [id number]     Incidental Findings:   · none     Test Results Pending at Discharge (will require follow up):     XR chest 1 view portable    Result Date: 10/30/2022  Impression: 1  No evidence of acute cardiopulmonary process  The right lung is partially obscured by overlying lines  Workstation performed: [id number]     CTA chest pe study    Result Date: 10/31/2022  · Impression: Mildly limited evaluation, however no evidence of pulmonary embolism Workstation performed: [id number]      Outpatient Tests Requested:  · none    Complications:  none    Reason for Admission:  Acute respiratory failure secondary [address] Course:     Lindsey Domínguez is a 78 y o  male patient who originally presented to the hospital on 10/30/2022 with past medical history significant hypertension initially presented acute respiratory failure with hypoxia secondary to COVID  Initially required 4 L nasal cannula was treated with IV steroids and and remdesivir with resolution  No longer required oxygen  Will follow-up outpatient with PCP      Please see above list of diagnoses and related plan for additional information  Condition at Discharge: stable     Discharge Day Visit / Exam:     Subjective:  Denies chest pain, shortness breath and cough, fevers, chills  Vitals: Blood Pressure: 135/77 (11/02/22 0822)  Pulse: 57 (11/02/22 0822)  Temperature: 97 6 °F (36 4 °C) (11/02/22 0822)  Temp Source: Oral (11/01/22 2244)  Respirations: 17 (11/02/22 0822)  Height: 5' 4" (162 6 cm) (10/31/22 1953)  Weight - Scale: 104 kg (230 lb) (10/31/22 1953)  SpO2: 94 % (11/02/22 6401)  Exam:   Physical Exam  Constitutional:       General: He is not in acute distress  Appearance: He is well-developed  He is not diaphoretic  HENT:      Head: Normocephalic and atraumatic        Nose: Nose normal  Mouth/Throat:      Pharynx: No oropharyngeal exudate  Eyes:      General: No scleral icterus  Conjunctiva/sclera: Conjunctivae normal    Cardiovascular:      Rate and Rhythm: Normal rate and regular rhythm  Heart sounds: Normal heart sounds  No murmur heard  No friction rub  No gallop  Pulmonary:      Effort: Pulmonary effort is normal  No respiratory distress  Breath sounds: Normal breath sounds  No wheezing or rales  Chest:      Chest wall: No tenderness  Abdominal:      General: Bowel sounds are normal  There is no distension  Palpations: Abdomen is soft  Tenderness: There is no abdominal tenderness  There is no guarding  Musculoskeletal:         General: No tenderness or deformity  Normal range of motion  Cervical back: Normal range of motion and neck supple  Skin:     General: Skin is warm and dry  Findings: No erythema  Neurological:      Mental Status: He is alert  Mental status is at baseline  Discussion with Family: pt    Discharge instructions/Information to patient and family:   See after visit summary for information provided to patient and family  Provisions for Follow-Up Care:  See after visit summary for information related to follow-up care and any pertinent home health orders  Disposition:     Home    For Discharges to [facility] SNF:   · Not Applicable to this Patient - Not Applicable to this Patient    Planned Readmission: none     Discharge Statement:  I spent 60 minutes discharging the patient  This time was spent on the day of discharge  I had direct contact with the patient on the day of discharge  Greater than 50% of the total time was spent examining patient, answering all patient questions, arranging and discussing plan of care with patient as well as directly providing post-discharge instructions  Additional time then spent on discharge activities      Discharge Medications:  See after visit summary for reconciled discharge medications provided to patient and family        ** Please Note: This note has been constructed using a voice recognition system **

## 2022-11-02 NOTE — ASSESSMENT & PLAN NOTE
Patient presenting to the ED for fever, cough, nausea, chills that started yesterday evening  He also reports associated shortness of breath  Denies any chest pain, abdominal pain, or vomiting    COVID 19 positive on 10/30/22  Has since resolved  No longer requiring oxygen

## 2022-11-02 NOTE — NURSING NOTE
Patient ambulated in room on room air  Oxygen saturation maintained between 92-97% on room air  Dr Jae Huddleston made aware

## 2022-11-02 NOTE — ASSESSMENT & PLAN NOTE
· If sugars continue to stay elevated will likely place on correctional scale insulin tomorrow  · Continue to monitor

## 2022-11-05 LAB
BACTERIA BLD CULT: NORMAL
BACTERIA BLD CULT: NORMAL

## 2022-11-07 ENCOUNTER — OFFICE VISIT (OUTPATIENT)
Dept: INTERNAL MEDICINE CLINIC | Facility: CLINIC | Age: 77
End: 2022-11-07

## 2022-11-07 VITALS
DIASTOLIC BLOOD PRESSURE: 62 MMHG | HEART RATE: 54 BPM | OXYGEN SATURATION: 95 % | TEMPERATURE: 97.5 F | RESPIRATION RATE: 16 BRPM | WEIGHT: 222.2 LBS | HEIGHT: 64 IN | BODY MASS INDEX: 37.94 KG/M2 | SYSTOLIC BLOOD PRESSURE: 116 MMHG

## 2022-11-07 DIAGNOSIS — A41.9 SEPSIS, DUE TO UNSPECIFIED ORGANISM, UNSPECIFIED WHETHER ACUTE ORGAN DYSFUNCTION PRESENT (HCC): ICD-10-CM

## 2022-11-07 DIAGNOSIS — U07.1 COVID-19 VIRUS INFECTION: Primary | ICD-10-CM

## 2022-11-07 NOTE — PROGRESS NOTES
Assessment & Plan     1  COVID-19 virus infection    2  Sepsis, due to unspecified organism, unspecified whether acute organ dysfunction present ([address] )    Appears to be recovering as expected  Reinforce that he will be tired for a few more weeks  Continue present over-the-counter remedies  Call with any issues  Subjective     Transitional Care Management Review:   Geetha Vanegas is a 78 y o  male here for TCM follow up  During the TCM phone call patient stated:  TCM Call     Date and time call was made  11/2/2022  2:58 PM    Hospital care reviewed  Records reviewed    Patient was hospitialized at  [facility]    Date of Admission  10/30/22    Date of discharge  11/02/22    Diagnosis  Contusion on RT Lung    Disposition  Home    Were the patients medications reviewed and updated  Yes    Current Symptoms  None      TCM Call     Post hospital issues  None    Should patient be enrolled in anticoag monitoring? No    Scheduled for follow up? Yes    Did you obtain your prescribed medications  Yes    Do you need help managing your prescriptions or medications  No    Is transportation to your appointment needed  No    I have advised the patient to call PCP with any new or worsening symptoms  flip mohan A    Living Arrangements  Family members    Support System  None    The type of support provided  Emotional; Financial; Physical    Do you have social support  Yes, as much as I need    Are you recieving any outpatient services  No    Are you recieving home care services  No    Are you using any community resources  No    Current waiver services  No    Have you fallen in the last 12 months  No    Interperter language line needed  No    Counseling  Patient          Patient comes in today for hospital follow-up  He was admitted to the hospital with COVID pneumonia and sepsis  Respond well to treatment  Was in the hospital for 3 days  No complications  Since discharge she has been doing okay    Just tired  No trouble with his breathing  Taking his medicines as directed  Hospital records were reviewed  Review of Systems   Respiratory: Negative for shortness of breath  Cardiovascular: Negative for chest pain  Gastrointestinal: Negative for abdominal pain  Objective     /62 (BP Location: Left arm, Patient Position: Sitting, Cuff Size: Large)   Pulse (!) 54   Temp 97 5 °F (36 4 °C) (Tympanic)   Resp 16   Ht 5' 4" (1 626 m)   Wt 101 kg (222 lb 3 2 oz)   SpO2 95%   BMI 38 14 kg/m²      Physical Exam  Vitals and nursing note reviewed  Constitutional:       Appearance: Normal appearance  Cardiovascular:      Rate and Rhythm: Normal rate and regular rhythm  Pulmonary:      Effort: Pulmonary effort is normal       Breath sounds: Normal breath sounds  No wheezing, rhonchi or rales  Neurological:      Mental Status: He is alert and oriented to person, place, and time         Carlos Rojas MD

## 2022-11-08 ENCOUNTER — TELEPHONE (OUTPATIENT)
Dept: INTERNAL MEDICINE CLINIC | Facility: CLINIC | Age: 77
End: 2022-11-08

## 2022-11-08 NOTE — TELEPHONE ENCOUNTER
Good morning  My name is Josselyn Gonzalez  I had seen Doctor Cecil Powell yesterday at an appointment and on the follow up paperwork it says that he had noted that I have stepsis, yet I have not had any kind of a medication prescribed at that time  I was just wondering how I should proceed from this point  Please give me a call at [phone]  Let me know what the status is on that, if I should be on medication according to everything I've read so much, be treated  So if you can give me a call back, let me know what I should do  It wasn't brought to my attention yesterday, so I need to know exactly what the status is as soon as possible  Thank you

## 2022-11-08 NOTE — TELEPHONE ENCOUNTER
Spoke with the patient and notified him of this  He verbalized understanding regarding the sepsis  He did mention something about his toe being drained; states you two spoke about it yesterday?  He would like to know where he should go to have that done - back to the podiatrist?

## 2022-11-08 NOTE — TELEPHONE ENCOUNTER
Those were the hospital diagnoses so they are listed on the hospital followup visit  He had sepsis from his COVID  Now resolved  He's good

## 2022-12-08 ENCOUNTER — APPOINTMENT (OUTPATIENT)
Dept: LAB | Facility: HOSPITAL | Age: 77
End: 2022-12-08

## 2022-12-08 ENCOUNTER — RA CDI HCC (OUTPATIENT)
Dept: OTHER | Facility: HOSPITAL | Age: 77
End: 2022-12-08

## 2022-12-08 DIAGNOSIS — I10 BENIGN HYPERTENSION: ICD-10-CM

## 2022-12-08 DIAGNOSIS — R73.01 IMPAIRED FASTING GLUCOSE: Primary | ICD-10-CM

## 2022-12-08 DIAGNOSIS — E78.2 MIXED HYPERLIPIDEMIA: ICD-10-CM

## 2022-12-08 DIAGNOSIS — R73.01 IMPAIRED FASTING GLUCOSE: ICD-10-CM

## 2022-12-08 LAB
ALBUMIN SERPL BCP-MCNC: 3.7 G/DL (ref 3.5–5)
ALP SERPL-CCNC: 50 U/L (ref 46–116)
ALT SERPL W P-5'-P-CCNC: 41 U/L (ref 12–78)
ANION GAP SERPL CALCULATED.3IONS-SCNC: 4 MMOL/L (ref 4–13)
AST SERPL W P-5'-P-CCNC: 28 U/L (ref 5–45)
BASOPHILS # BLD AUTO: 0.06 THOUSANDS/ÂΜL (ref 0–0.1)
BASOPHILS NFR BLD AUTO: 1 % (ref 0–1)
BILIRUB SERPL-MCNC: 0.69 MG/DL (ref 0.2–1)
BUN SERPL-MCNC: 14 MG/DL (ref 5–25)
CALCIUM SERPL-MCNC: 9.1 MG/DL (ref 8.3–10.1)
CHLORIDE SERPL-SCNC: 109 MMOL/L (ref 96–108)
CHOLEST SERPL-MCNC: 129 MG/DL
CO2 SERPL-SCNC: 28 MMOL/L (ref 21–32)
CREAT SERPL-MCNC: 1.1 MG/DL (ref 0.6–1.3)
EOSINOPHIL # BLD AUTO: 0.13 THOUSAND/ÂΜL (ref 0–0.61)
EOSINOPHIL NFR BLD AUTO: 2 % (ref 0–6)
ERYTHROCYTE [DISTWIDTH] IN BLOOD BY AUTOMATED COUNT: 13.8 % (ref 11.6–15.1)
GFR SERPL CREATININE-BSD FRML MDRD: 65 ML/MIN/1.73SQ M
GLUCOSE P FAST SERPL-MCNC: 157 MG/DL (ref 65–99)
HCT VFR BLD AUTO: 47.4 % (ref 36.5–49.3)
HDLC SERPL-MCNC: 33 MG/DL
HGB BLD-MCNC: 16.1 G/DL (ref 12–17)
IMM GRANULOCYTES # BLD AUTO: 0.02 THOUSAND/UL (ref 0–0.2)
IMM GRANULOCYTES NFR BLD AUTO: 0 % (ref 0–2)
LDLC SERPL CALC-MCNC: 63 MG/DL (ref 0–100)
LYMPHOCYTES # BLD AUTO: 2.43 THOUSANDS/ÂΜL (ref 0.6–4.47)
LYMPHOCYTES NFR BLD AUTO: 38 % (ref 14–44)
MCH RBC QN AUTO: 30.9 PG (ref 26.8–34.3)
MCHC RBC AUTO-ENTMCNC: 34 G/DL (ref 31.4–37.4)
MCV RBC AUTO: 91 FL (ref 82–98)
MONOCYTES # BLD AUTO: 0.57 THOUSAND/ÂΜL (ref 0.17–1.22)
MONOCYTES NFR BLD AUTO: 9 % (ref 4–12)
NEUTROPHILS # BLD AUTO: 3.12 THOUSANDS/ÂΜL (ref 1.85–7.62)
NEUTS SEG NFR BLD AUTO: 50 % (ref 43–75)
NONHDLC SERPL-MCNC: 96 MG/DL
NRBC BLD AUTO-RTO: 0 /100 WBCS
PLATELET # BLD AUTO: 234 THOUSANDS/UL (ref 149–390)
PMV BLD AUTO: 10 FL (ref 8.9–12.7)
POTASSIUM SERPL-SCNC: 4 MMOL/L (ref 3.5–5.3)
PROT SERPL-MCNC: 7.1 G/DL (ref 6.4–8.4)
RBC # BLD AUTO: 5.21 MILLION/UL (ref 3.88–5.62)
SODIUM SERPL-SCNC: 141 MMOL/L (ref 135–147)
TRIGL SERPL-MCNC: 166 MG/DL
WBC # BLD AUTO: 6.33 THOUSAND/UL (ref 4.31–10.16)

## 2022-12-08 NOTE — PROGRESS NOTES
[facility]  coding opportunities          Chart Reviewed number of suggestions sent to Provider: 1     Patients Insurance     Medicare Insurance: Medicare        [id number]

## 2022-12-09 LAB
EST. AVERAGE GLUCOSE BLD GHB EST-MCNC: 160 MG/DL
HBA1C MFR BLD: 7.2 %

## 2022-12-15 ENCOUNTER — OFFICE VISIT (OUTPATIENT)
Dept: INTERNAL MEDICINE CLINIC | Facility: CLINIC | Age: 77
End: 2022-12-15

## 2022-12-15 VITALS
HEIGHT: 64 IN | SYSTOLIC BLOOD PRESSURE: 114 MMHG | HEART RATE: 58 BPM | BODY MASS INDEX: 38.31 KG/M2 | RESPIRATION RATE: 16 BRPM | DIASTOLIC BLOOD PRESSURE: 68 MMHG | WEIGHT: 224.4 LBS | OXYGEN SATURATION: 95 %

## 2022-12-15 DIAGNOSIS — I10 BENIGN HYPERTENSION: ICD-10-CM

## 2022-12-15 DIAGNOSIS — R73.01 IMPAIRED FASTING GLUCOSE: Primary | ICD-10-CM

## 2022-12-15 DIAGNOSIS — E78.2 MIXED HYPERLIPIDEMIA: ICD-10-CM

## 2022-12-15 PROBLEM — J96.00 ACUTE RESPIRATORY FAILURE DUE TO COVID-19 (HCC): Status: RESOLVED | Noted: 2022-10-30 | Resolved: 2022-12-15

## 2022-12-15 PROBLEM — U07.1 ACUTE RESPIRATORY FAILURE DUE TO COVID-19 (HCC): Status: RESOLVED | Noted: 2022-10-30 | Resolved: 2022-12-15

## 2022-12-15 PROBLEM — R73.9 HYPERGLYCEMIA: Status: RESOLVED | Noted: 2022-10-31 | Resolved: 2022-12-15

## 2022-12-15 NOTE — PROGRESS NOTES
Assessment/Plan:     Chronic problems are stable  His fatigue may actually be from his beta-blocker so he is going to watch his pressure at home with his machine and then cut his metoprolol in half  If he notices a difference we can work from there and add more if needed or adjust   Keep working on [employer]  But I told him if it does not get lower again he really is going to be classified diabetic  Quality Measures:       Return in about 4 months (around 4/15/2023) for Recheck  No problem-specific Assessment & Plan notes found for this encounter  Diagnoses and all orders for this visit:    Impaired fasting glucose  -     Hemoglobin A1C; Future    Benign hypertension  -     Comprehensive metabolic panel; Future  -     CBC and differential; Future    Mixed hyperlipidemia  -     Lipid panel; Future        Subjective:      Patient ID: Darcie Judge is a 78 y o  male  Patient comes in today for follow-up  His sugars are coming back down after his recent illness  This is without any medicine  His blood pressure is controlled  Cholesterol is even lower  Just states that he is tired when trying to do his chores  But he is still working at [employer]  Was there till 1030 last night  No other complaints  No lingering aftereffects from his COVID        ALLERGIES:  No Known Allergies    CURRENT MEDICATIONS:    Current Outpatient Medications:   •  ALPRAZolam (XANAX) 0 5 mg tablet, Take 1 tablet (0 5 mg total) by mouth 2 (two) times a day, Disp: 60 tablet, Rfl: 3  •  aspirin (ECOTRIN LOW STRENGTH) 81 mg EC tablet, Take by mouth, Disp: , Rfl:   •  B Complex-Folic Acid (B COMPLEX PLUS) TABS, Take by mouth, Disp: , Rfl:   •  Cholecalciferol (VITAMIN D3) 1000 units CAPS, Take by mouth, Disp: , Rfl:   •  Chondroitin Sulfate-Vit C-Mn 400-60-2 5 MG CAPS, Take by mouth, Disp: , Rfl:   •  Coenzyme Q10 (COQ-10) 100 MG CAPS, Take by mouth, Disp: , Rfl:   •  fenofibrate (TRICOR) 54 MG tablet, TAKE 1 TABLET BY MOUTH  DAILY, Disp: 90 tablet, Rfl: 3  •  meclizine (ANTIVERT) 25 mg tablet, TAKE 1 TABLET(25 MG) BY MOUTH THREE TIMES DAILY AS NEEDED FOR DIZZINESS, Disp: 60 tablet, Rfl: 3  •  metoprolol succinate (TOPROL-XL) 50 mg 24 hr tablet, TAKE 1 TABLET BY MOUTH  DAILY, Disp: 90 tablet, Rfl: 3  •  Multiple Vitamin (MULTI-VITAMIN DAILY PO), Take by mouth, Disp: , Rfl:   •  omeprazole (PriLOSEC) 40 MG capsule, TAKE 1 CAPSULE BY MOUTH  DAILY, Disp: 90 capsule, Rfl: 3  •  rosuvastatin (CRESTOR) 5 mg tablet, TAKE 1 TABLET BY MOUTH  DAILY, Disp: 90 tablet, Rfl: 3  •  solifenacin (VESICARE) 5 mg tablet, TAKE 1 TABLET BY MOUTH  DAILY, Disp: 90 tablet, Rfl: 3    ACTIVE PROBLEM LIST:  Patient Active Problem List   Diagnosis   • KADEN on CPAP   • Anxiety   • Basal cell carcinoma of cheek   • Basal cell carcinoma of skin   • Benign hypertension   • Deviated nasal septum   • GERD (gastroesophageal reflux disease)   • Hyperlipidemia   • Morbid obesity (HCC)   • OAB (overactive bladder)   • Impaired fasting glucose   • Bright red blood per rectum   • Diverticulosis   • Rib fracture   • Abnormal CT of the abdomen   • Fall   • Sepsis [facility])       PAST MEDICAL HISTORY:  Past Medical History:   Diagnosis Date   • [facility]     basal call carcinoma   • Colon polyps     DR Yuridia Urena   • Diverticulosis    • Hyperlipidemia    • Hypertension    • Obesity    • KADEN (obstructive sleep apnea)     cpap   • Pneumonia    • Small bowel obstruction, partial (HCC)    • SOB (shortness of breath)        PAST SURGICAL HISTORY:  Past Surgical History:   Procedure Laterality Date   • SKIN BIOPSY     • TONSILLECTOMY AND ADENOIDECTOMY         FAMILY HISTORY:  Family History   Problem Relation Age of Onset   • Heart disease Mother    • Hypertension Mother    • Breast cancer Sister        SOCIAL HISTORY:  Social History     Socioeconomic History   • Marital status: Significant Other     Spouse name: Not on file   • Number of children: 0   • Years of education: Not on file   • Highest education level: Not on file   Occupational History   • Occupation: SALES     Comment: FULL TIME    Tobacco Use   • Smoking status: Never   • Smokeless tobacco: Never   Vaping Use   • Vaping Use: Never used   Substance and Sexual Activity   • Alcohol use: Not Currently     Comment: occassional   • Drug use: No   • Sexual activity: Yes     Partners: Female     Comment: DENIED: HISTORY OF HIGH RISK SEXUAL BEHAVIOR    Other Topics Concern   • Not on file   Social History Narrative     as per all scripts      Social Determinants of Health     Financial Resource Strain: Not on file   Food Insecurity: Not on file   Transportation Needs: Not on file   Physical Activity: Not on file   Stress: Not on file   Social Connections: Not on file   Intimate Partner Violence: Not on file   Housing Stability: Not on file       Review of Systems   Respiratory: Negative for shortness of breath  Cardiovascular: Negative for chest pain  Gastrointestinal: Negative for abdominal pain  Objective:  Vitals:    12/15/22 0909   BP: 114/68   BP Location: Left arm   Patient Position: Sitting   Cuff Size: Adult   Pulse: 58   Resp: 16   SpO2: 95%   Weight: 102 kg (224 lb 6 4 oz)   Height: 5' 4" (1 626 m)     Body mass index is 38 52 kg/m²  Physical Exam  Vitals and nursing note reviewed  Constitutional:       Appearance: He is well-developed  Cardiovascular:      Rate and Rhythm: Normal rate and regular rhythm  Heart sounds: Normal heart sounds  Pulmonary:      Effort: Pulmonary effort is normal       Breath sounds: Normal breath sounds  Abdominal:      Palpations: Abdomen is soft  Tenderness: There is no abdominal tenderness  Neurological:      Mental Status: He is alert and oriented to person, place, and time     Psychiatric:         Mood and Affect: Mood normal          Behavior: Behavior normal            RESULTS:    Recent Results (from the past 1008 hour(s))   HEMOGLOBIN A1C W/ EAG ESTIMATION    Collection Time: 12/08/22 12:14 PM   Result Value Ref Range    Hemoglobin A1C 7 2 (H) Normal 3 8-5 6%; PreDiabetic 5 7-6 4%;  Diabetic >=6 5%; Glycemic control for adults with diabetes <7 0% %     mg/dl   Lipid panel    Collection Time: 12/08/22 12:14 PM   Result Value Ref Range    Cholesterol 129 See Comment mg/dL    Triglycerides 166 (H) See Comment mg/dL    HDL, Direct 33 (L) >=40 mg/dL    LDL Calculated 63 0 - 100 mg/dL    Non-HDL-Chol (CHOL-HDL) 96 mg/dl   Comprehensive metabolic panel    Collection Time: 12/08/22 12:14 PM   Result Value Ref Range    Sodium 141 135 - 147 mmol/L    Potassium 4 0 3 5 - 5 3 mmol/L    Chloride 109 (H) 96 - 108 mmol/L    CO2 28 21 - 32 mmol/L    ANION GAP 4 4 - 13 mmol/L    BUN 14 5 - 25 mg/dL    Creatinine 1 10 0 60 - 1 30 mg/dL    Glucose, Fasting 157 (H) 65 - 99 mg/dL    Calcium 9 1 8 3 - 10 1 mg/dL    AST 28 5 - 45 U/L    ALT 41 12 - 78 U/L    Alkaline Phosphatase 50 46 - 116 U/L    Total Protein 7 1 6 4 - 8 4 g/dL    Albumin 3 7 3 5 - 5 0 g/dL    Total Bilirubin 0 69 0 20 - 1 00 mg/dL    eGFR 65 ml/min/1 73sq m   CBC and differential    Collection Time: 12/08/22 12:14 PM   Result Value Ref Range    WBC 6 33 4 31 - 10 16 Thousand/uL    RBC 5 21 3 88 - 5 62 Million/uL    Hemoglobin 16 1 12 0 - 17 0 g/dL    Hematocrit 47 4 36 5 - 49 3 %    MCV 91 82 - 98 fL    MCH 30 9 26 8 - 34 3 pg    MCHC 34 0 31 4 - 37 4 g/dL    RDW 13 8 11 6 - 15 1 %    MPV 10 0 8 9 - 12 7 fL    Platelets 930 258 - 468 Thousands/uL    nRBC 0 /100 WBCs    Neutrophils Relative 50 43 - 75 %    Immat GRANS % 0 0 - 2 %    Lymphocytes Relative 38 14 - 44 %    Monocytes Relative 9 4 - 12 %    Eosinophils Relative 2 0 - 6 %    Basophils Relative 1 0 - 1 %    Neutrophils Absolute 3 12 1 85 - 7 62 Thousands/µL    Immature Grans Absolute 0 02 0 00 - 0 20 Thousand/uL    Lymphocytes Absolute 2 43 0 60 - 4 47 Thousands/µL    Monocytes Absolute 0 57 0 17 - 1 22 Thousand/µL    Eosinophils Absolute 0 13 0 00 - 0 61 Thousand/µL    Basophils Absolute 0 06 0 00 - 0 10 Thousands/µL       This note was created with voice recognition software  Phonic, grammatical and spelling errors may be present within the note as a result

## 2022-12-17 ENCOUNTER — HOSPITAL ENCOUNTER (EMERGENCY)
Facility: HOSPITAL | Age: 77
Discharge: HOME | End: 2022-12-17
Attending: EMERGENCY MEDICINE

## 2022-12-17 VITALS
OXYGEN SATURATION: 96 % | HEART RATE: 67 BPM | RESPIRATION RATE: 18 BRPM | BODY MASS INDEX: 38.41 KG/M2 | HEIGHT: 64 IN | SYSTOLIC BLOOD PRESSURE: 147 MMHG | DIASTOLIC BLOOD PRESSURE: 73 MMHG | TEMPERATURE: 98 F | WEIGHT: 225 LBS

## 2022-12-17 DIAGNOSIS — S49.90XA ARM INJURY: Primary | ICD-10-CM

## 2022-12-17 RX ORDER — ACETAMINOPHEN 325 MG/1
975 TABLET ORAL ONCE
Status: COMPLETED | OUTPATIENT
Start: 2022-12-17 | End: 2022-12-17

## 2022-12-17 RX ADMIN — ACETAMINOPHEN 975 MG: 325 TABLET, FILM COATED ORAL at 19:08

## 2022-12-17 NOTE — ED PROVIDER NOTES
History  Chief Complaint   Patient presents with   • Arm Injury     Pt presents to ER with c/o's LUE pain, pt states he lifted a box at work on Wednesday & felt a pain then & has since gotten worse   + radial pulse with brisk cap refill  Healthy appearing adult presents in no distress  Patient is a 59-year-old male past medical history of hypertension, hyperlipidemia, GERD presenting with arm injury  Patient states 3 days ago he was lifting an 80 pound box when he began having pain in the left upper extremity overlying the bicep and elbow  States it is aching pain, constant and worsening since this morning and not aided by lidocaine patches  States that it radiates up his arm from his elbow  States he took Aleve this morning which did not help either  Denies any numbness or tingling  Prior to Admission Medications   Prescriptions Last Dose Informant Patient Reported? Taking?    ALPRAZolam (XANAX) 0 5 mg tablet   No No   Sig: Take 1 tablet (0 5 mg total) by mouth 2 (two) times a day   B Complex-Folic Acid (B COMPLEX PLUS) TABS   Yes No   Sig: Take by mouth   Cholecalciferol (VITAMIN D3) 1000 units CAPS   Yes No   Sig: Take by mouth   Chondroitin Sulfate-Vit C-Mn 400-60-2 5 MG CAPS   Yes No   Sig: Take by mouth   Coenzyme Q10 (COQ-10) 100 MG CAPS   Yes No   Sig: Take by mouth   Multiple Vitamin (MULTI-VITAMIN DAILY PO)   Yes No   Sig: Take by mouth   aspirin (ECOTRIN LOW STRENGTH) 81 mg EC tablet   Yes No   Sig: Take by mouth   fenofibrate (TRICOR) 54 MG tablet   No No   Sig: TAKE 1 TABLET BY MOUTH  DAILY   meclizine (ANTIVERT) 25 mg tablet   No No   Sig: TAKE 1 TABLET(25 MG) BY MOUTH THREE TIMES DAILY AS NEEDED FOR DIZZINESS   metoprolol succinate (TOPROL-XL) 50 mg 24 hr tablet   No No   Sig: TAKE 1 TABLET BY MOUTH  DAILY   omeprazole (PriLOSEC) 40 MG capsule   No No   Sig: TAKE 1 CAPSULE BY MOUTH  DAILY   rosuvastatin (CRESTOR) 5 mg tablet   No No   Sig: TAKE 1 TABLET BY MOUTH  DAILY   solifenacin (VESICARE) 5 mg tablet   No No   Sig: TAKE 1 TABLET BY MOUTH  DAILY      Facility-Administered Medications: None       Past Medical History:   Diagnosis Date   • Cancer ([facility] )     basal call carcinoma   • Colon polyps     DR Ezio Stone   • Diverticulosis    • Hyperlipidemia    • Hypertension    • Obesity    • KADEN (obstructive sleep apnea)     cpap   • Pneumonia    • Small bowel obstruction, partial (HCC)    • SOB (shortness of breath)        Past Surgical History:   Procedure Laterality Date   • SKIN BIOPSY     • TONSILLECTOMY AND ADENOIDECTOMY         Family History   Problem Relation Age of Onset   • Heart disease Mother    • Hypertension Mother    • Breast cancer Sister      I have reviewed and agree with the history as documented  E-Cigarette/Vaping   • E-Cigarette Use Never User      E-Cigarette/Vaping Substances   • Nicotine No    • THC No    • CBD No    • Flavoring No    • Other No    • Unknown No      Social History     Tobacco Use   • Smoking status: Never   • Smokeless tobacco: Never   Vaping Use   • Vaping Use: Never used   Substance Use Topics   • Alcohol use: Not Currently     Comment: occassional   • Drug use: No       Review of Systems   All other systems reviewed and are negative  Physical Exam  Physical Exam  Vitals reviewed  Constitutional:       General: He is not in acute distress  Appearance: Normal appearance  He is not ill-appearing  HENT:      Mouth/Throat:      Mouth: Mucous membranes are moist    Eyes:      Conjunctiva/sclera: Conjunctivae normal    Cardiovascular:      Rate and Rhythm: Normal rate  Pulses: Normal pulses  Pulmonary:      Effort: Pulmonary effort is normal    Musculoskeletal:         General: Tenderness present  No swelling  Normal range of motion  Cervical back: Neck supple        Comments: Mild tenderness to the anterior portion of the elbow without erythema, edema, increased warmth, full and intact range of motion no pain   Skin:     General: Skin is warm and dry  Neurological:      General: No focal deficit present  Mental Status: He is alert  Sensory: No sensory deficit  Motor: No weakness  Psychiatric:         Mood and Affect: Mood normal          Vital Signs  ED Triage Vitals [12/17/22 1726]   Temperature Pulse Respirations Blood Pressure SpO2   98 °F (36 7 °C) 67 18 147/73 96 %      Temp Source Heart Rate Source Patient Position - Orthostatic VS BP Location FiO2 (%)   Temporal Monitor Sitting Left arm --      Pain Score       --           Vitals:    12/17/22 1726   BP: 147/73   Pulse: 67   Patient Position - Orthostatic VS: Sitting         Visual Acuity      ED Medications  Medications   acetaminophen (TYLENOL) tablet 975 mg (975 mg Oral Given 12/17/22 1908)       Diagnostic Studies  Results Reviewed     None                 No orders to display              Procedures  Procedures         ED Course                                             MDM  Number of Diagnoses or Management Options  Arm injury  Diagnosis management comments: Patient is a 79-year-old male past medical history of hypertension, hyperlipidemia, GERD presenting with arm injury  Patient is well-appearing at bedside with stable vitals and in no acute distress  Suspect biceps tendon strain versus partial tear, will place in sling with outpatient orthopedic follow-up and have discussed return precautions which patient states he understands  Disposition  Final diagnoses:   Arm injury     Time reflects when diagnosis was documented in both MDM as applicable and the Disposition within this note     Time User Action Codes Description Comment    12/17/2022  6:41 PM Sahra Ashley Add [[id number]] Arm injury       ED Disposition     ED Disposition   Discharge    Condition   Stable    Date/Time   Sat Dec 17, 2022  6:41 PM    Comment   Og Mediate discharge to home/self care                 Follow-up Information     Follow up With Specialties Details Why Contact Info Additional [address] [facility] Orthopedic Surgery Schedule an appointment as soon as possible for a visit   [address] [facility]  Carmine Johnson [address] [phone]  [address] [facility], [id number] [place] [address], [address] [facility]          Discharge Medication List as of 12/17/2022  6:42 PM      START taking these medications    Details   Diclofenac Sodium (VOLTAREN) 1 % Apply 2 g topically 4 (four) times a day, Starting Sat 12/17/2022, Normal         CONTINUE these medications which have NOT CHANGED    Details   ALPRAZolam (XANAX) 0 5 mg tablet Take 1 tablet (0 5 mg total) by mouth 2 (two) times a day, Starting Tue 7/26/2022, Normal      aspirin (ECOTRIN LOW STRENGTH) 81 mg EC tablet Take by mouth, Historical Med      B Complex-Folic Acid (B COMPLEX PLUS) TABS Take by mouth, Historical Med      Cholecalciferol (VITAMIN D3) 1000 units CAPS Take by mouth, Historical Med      Chondroitin Sulfate-Vit C-Mn 400-60-2 5 MG CAPS Take by mouth, Historical Med      Coenzyme Q10 (COQ-10) 100 MG CAPS Take by mouth, Historical Med      fenofibrate (TRICOR) 54 MG tablet TAKE 1 TABLET BY MOUTH  DAILY, Normal      meclizine (ANTIVERT) 25 mg tablet TAKE 1 TABLET(25 MG) BY MOUTH THREE TIMES DAILY AS NEEDED FOR DIZZINESS, Normal      metoprolol succinate (TOPROL-XL) 50 mg 24 hr tablet TAKE 1 TABLET BY MOUTH  DAILY, Normal      Multiple Vitamin (MULTI-VITAMIN DAILY PO) Take by mouth, Historical Med      omeprazole (PriLOSEC) 40 MG capsule TAKE 1 CAPSULE BY MOUTH  DAILY, Normal      rosuvastatin (CRESTOR) 5 mg tablet TAKE 1 TABLET BY MOUTH  DAILY, Normal      solifenacin (VESICARE) 5 mg tablet TAKE 1 TABLET BY MOUTH  DAILY, Normal             No discharge procedures on file      PDMP Review       Value Time User    PDMP Reviewed  Yes 7/26/2022 12:13 PM Genet Valencia MD          ED Provider  Electronically Signed by           Sivakumar Castle, DO  12/18/22 0547

## 2022-12-24 DIAGNOSIS — R42 VERTIGO: ICD-10-CM

## 2022-12-27 RX ORDER — MECLIZINE HYDROCHLORIDE 25 MG/1
TABLET ORAL
Qty: 60 TABLET | Refills: 3 | Status: SHIPPED | OUTPATIENT
Start: 2022-12-27

## 2022-12-30 PROBLEM — A41.9 SEPSIS (HCC): Status: RESOLVED | Noted: 2022-10-30 | Resolved: 2022-12-30

## 2023-01-06 ENCOUNTER — OFFICE VISIT (OUTPATIENT)
Dept: OBGYN CLINIC | Facility: CLINIC | Age: 78
End: 2023-01-06

## 2023-01-06 VITALS
SYSTOLIC BLOOD PRESSURE: 118 MMHG | HEIGHT: 64 IN | HEART RATE: 56 BPM | DIASTOLIC BLOOD PRESSURE: 74 MMHG | WEIGHT: 228.6 LBS | BODY MASS INDEX: 39.03 KG/M2

## 2023-01-06 DIAGNOSIS — S66.912A STRAIN OF LEFT WRIST, INITIAL ENCOUNTER: Primary | ICD-10-CM

## 2023-01-06 DIAGNOSIS — S56.912A FOREARM STRAIN, LEFT, INITIAL ENCOUNTER: ICD-10-CM

## 2023-01-06 RX ORDER — NAPROXEN 500 MG/1
500 TABLET ORAL 2 TIMES DAILY WITH MEALS
Qty: 30 TABLET | Refills: 0 | Status: SHIPPED | OUTPATIENT
Start: 2023-01-06

## 2023-01-06 NOTE — PROGRESS NOTES
Assessment/Plan:  Assessment/Plan   Diagnoses and all orders for this visit:    Strain of left wrist, initial encounter  -     Cock Up Wrist Splint  -     Ambulatory Referral to Occupational Therapy; Future  -     naproxen (Naprosyn) 500 mg tablet; Take 1 tablet (500 mg total) by mouth 2 (two) times a day with meals    Forearm strain, left, initial encounter  -     Ambulatory Referral to Occupational Therapy; Future      29-year-old right-hand-dominant male with left forearm and wrist pain from lifting injury while at work on 12/14/2022  Discussed with patient physical exam, impression, and plan  Physical exam left upper extremity noted for tenderness of the common extensor and along the wrist extensors of the middle and distal forearm  Left wrist noted for mild tenderness at the 3rd and 4th dorsal compartments  He has intact range of motion with flexion and extension at the elbow  He has weakness and pain with resisted  supination of the forearm with elbow flexed  He has normal strength with resisted wrist extension however pain with resisted wrist extension  He has normal sensation, biceps reflex, radial pulse both upper extremities  Clinical impression is that he is symptomatic from strain of the forearm and wrist   I discussed treatment regimen of bracing, anti-inflammatory, and formal therapy  Invasive management is not warranted  I provided cock-up wrist splint to limit repetitive wrist extension which he may wear during physical activity  He is to take naproxen 500 mg twice daily with food for 2 weeks  He is to take tumeric at least 1000 mg daily and tart cherry at least 1000 mg daily  He is to attend formal therapy and do home exercises as directed  He will follow up in 5 weeks at which point he will be re-evaluated  Subjective:   Patient ID: Shahnaz Moser is a 78 y o  male    Chief Complaint   Patient presents with   • Left Arm - Pain       29-year-old right-hand-dominant male presents for evaluation left forearm wrist pain from lifting injury while at work on 12/14/2022  He was lifting and 80 lb box when he felt pain at the left upper extremity  He had pain described as sudden in onset, localized to the left proximal forearm at the radial aspect, radiating distally to the wrist, aching and throbbing, worse with movement of the wrist and rotating the forearm, associated with limited range of motion, and improved with resting  He started self treating with resting and taking Aleve  And also applying lidocaine patch  He presented to the emergency room and there was concern for soft tissue injury  He was provided arm sling, prescribed topical diclofenac, and advised to follow up with orthopedic care  He reports intense pain has subsided and range of motion in the arm has also improved, however he still has pain that is bothersome  Arm Pain  This is a new problem  The current episode started 1 to 4 weeks ago  The problem occurs daily  The problem has been rapidly improving  Associated symptoms include arthralgias  Pertinent negatives include no abdominal pain, chest pain, chills, fever, joint swelling, numbness, rash, sore throat or weakness  The symptoms are aggravated by twisting (  Lifting)  He has tried rest and NSAIDs for the symptoms  The treatment provided mild relief  The following portions of the patient's history were reviewed and updated as appropriate: He  has a past medical history of Cancer [facility]), Colon polyps, Diverticulosis, Hyperlipidemia, Hypertension, Obesity, KADEN (obstructive sleep apnea), Pneumonia, Small bowel obstruction, partial ([address] ), and SOB (shortness of breath)  He  has a past surgical history that includes Skin biopsy and Tonsillectomy and adenoidectomy  His family history includes Breast cancer in his sister; Heart disease in his mother; Hypertension in his mother  He  reports that he has never smoked   He has never used smokeless tobacco  He reports that he does not currently use alcohol  He reports that he does not use drugs  He has No Known Allergies       Review of Systems   Constitutional: Negative for chills and fever  HENT: Negative for sore throat  Eyes: Negative for visual disturbance  Respiratory: Negative for shortness of breath  Cardiovascular: Negative for chest pain  Gastrointestinal: Negative for abdominal pain  Genitourinary: Negative for flank pain  Musculoskeletal: Positive for arthralgias  Negative for joint swelling  Skin: Negative for rash and wound  Neurological: Negative for weakness and numbness  Hematological: Does not bruise/bleed easily  Psychiatric/Behavioral: Negative for self-injury  Objective:  Vitals:    01/06/23 0903   BP: 118/74   Pulse: 56   Weight: 104 kg (228 lb 9 6 oz)   Height: 5' 4" (1 626 m)     Left Hand Exam     Muscle Strength   The patient has normal left wrist strength  Tests   Tinel's sign (median nerve): negative    Other   Sensation: normal  Pulse: present      Left Elbow Exam     Tenderness   Left elbow tenderness location:  common extensor, middle and distal forearm  Range of Motion   Extension: normal   Flexion: normal   Pronation: normal   Supination: 120     Muscle Strength   Pronation:  5/5   Supination:  4/5     Tests   Tinel's sign (cubital tunnel): negative    Other   Sensation: normal          Tenderness     Left Wrist/Hand   Tenderness in the third dorsal compartment, fourth dorsal compartment and common extensor tendon  No tenderness in the first dorsal compartment, second dorsal compartment, distal biceps tendon, distal triceps tendon, lateral epicondyle, medial epicondyle, triangular fibrocartilage complex , scaphoid and lunate  Strength/Myotome Testing     Left Wrist/Hand   Normal wrist strength    Tests     Left Wrist/Hand   Negative Tinel's sign (medial nerve) and Tinel's sign (radial tunnel)  Physical Exam  Vitals and nursing note reviewed  Constitutional:       General: He is not in acute distress  Appearance: He is well-developed  He is not ill-appearing or diaphoretic  HENT:      Head: Normocephalic and atraumatic  Right Ear: External ear normal       Left Ear: External ear normal    Eyes:      Conjunctiva/sclera: Conjunctivae normal    Neck:      Trachea: No tracheal deviation  Cardiovascular:      Rate and Rhythm: Normal rate  Pulmonary:      Effort: Pulmonary effort is normal  No respiratory distress  Abdominal:      General: There is no distension  Musculoskeletal:         General: Tenderness present  No swelling, deformity or signs of injury  Left elbow: No medial epicondyle or lateral epicondyle tenderness  Skin:     General: Skin is warm and dry  Coloration: Skin is not jaundiced or pale  Neurological:      Mental Status: He is alert and oriented to person, place, and time  Psychiatric:         Mood and Affect: Mood normal          Behavior: Behavior normal          Thought Content:  Thought content normal          Judgment: Judgment normal

## 2023-01-06 NOTE — LETTER
January 6, 2023     Patient: Dean Lamas  YOB: 1945  Date of Visit: 1/6/2023      To Whom it May Concern:    Dean Lamas is under my professional care  Gloriagilmer Dona was seen in my office on 1/6/2023  Stevo Carcamo  May work with restrictions:  -Wear wrist brace while at work   -No lifting, pushing, pulling, more than 40 lb    He will be re-evaluated in 5 weeks    If you have any questions or concerns, please don't hesitate to call           Sincerely,          Andrew Xiao DO        CC: No Recipients

## 2023-01-19 ENCOUNTER — EVALUATION (OUTPATIENT)
Dept: OCCUPATIONAL THERAPY | Facility: CLINIC | Age: 78
End: 2023-01-19

## 2023-01-19 DIAGNOSIS — S56.912A FOREARM STRAIN, LEFT, INITIAL ENCOUNTER: ICD-10-CM

## 2023-01-19 DIAGNOSIS — S66.912A STRAIN OF LEFT WRIST, INITIAL ENCOUNTER: ICD-10-CM

## 2023-01-19 NOTE — PROGRESS NOTES
OT Evaluation     Today's date: 2023  Patient name: Lauren Carranza  : 1945  MRN: [id number]  Referring provider: Boubacar Downs  Dx:   Encounter Diagnosis     ICD-10-CM    1  Strain of left wrist, initial encounter  [id number] Ambulatory Referral to Occupational Therapy      2  Forearm strain, left, initial encounter  [id number] Ambulatory Referral to Occupational Therapy                     Assessment  Assessment details: Monica Santana is a 79 y/o RHD male who injured his left wrist and FA at work while lifting a heavy box  In Mid December, he was lifting a box weighing about 75 lbs and his left hand slipped, causing forceful pronation of the left  He has severe pain which did not resolve so the next day he went to the ED  (-) Fx  Referred to an orthopedic  In early , he was prescribed anti inflammatory medications and an OTC wrist splint  He reports steady reduction of pain and wears the splint most of the day but not at night  Examination reveals functional motion of the forearm and wrist   Pain at rest is minimal and pain is localized to the dorsal wrist and forearm, especially with twisting the forearm or lifting   strength is functional but less than normal   No sensory complaints  No edema or sympathetic changes  Evaluation is of low complexity, due to minimal comorbidities and stable clinical presentation  Pt demonstrates good tolerance of therapy today and was provided with a written HEP focusing on heat, massage, and stretching  He was instructed to perform exercises daily  The patient demonstrates HEP instructions appropriately, verbalizes understanding, and is in agreement with the written HEP  Pt will benefit from skilled OT intervention to progress function, resolve pain, wean from splinting, and allow return to PLOF        Impairments: abnormal or restricted ROM, activity intolerance, impaired physical strength, lacks appropriate home exercise program and pain with function    Symptom irritability: lowUnderstanding of Dx/Px/POC: excellent  Goals  STG - 2 weeks  Reduce resting pain to less than 5/10  HEP compliance of stretches and glides  HEP compliance in wearing brace/strap as instructed  LTG  Achieve premorbid AROM of Fa/wrist/hand  Pain at rest less than 2/10   strength to at least 50% of uninvolved  Resolve sensory complaints  Return to PLOF with symptom control  Plan  Patient would benefit from: OT eval and skilled occupational therapy  Planned modality interventions: thermotherapy: hydrocollator packs  Planned therapy interventions: joint mobilization, manual therapy, massage, patient education, strengthening, stretching, therapeutic activities, therapeutic exercise, home exercise program, graded activity, functional ROM exercises and activity modification  Frequency: 1x week  Duration in weeks: 4  Plan of Care beginning date: 1/19/2023  Plan of Care expiration date: 2/16/2023  Treatment plan discussed with: patient        Subjective Evaluation    History of Present Illness  Mechanism of injury: trauma  Quality of life: excellent    Pain  Quality: dull ache and discomfort  Relieving factors: rest and support  Aggravating factors: lifting  Progression: improved    Social Support    Employment status: working  Hand dominance: right    Treatments  Previous treatment: immobilization and medication  Current treatment: occupational therapy  Patient Goals  Patient goals for therapy: decreased pain, increased motion and increased strength  Patient goal: resolve pain; normal use        Objective     Observations     Left Wrist/Hand   Negative for edema  Palpation   Left   Tenderness of the extensor carpi radialis brevis and extensor carpi radialis longus  Tenderness     Left Wrist/Hand   Tenderness in the third dorsal compartment, fourth dorsal compartment and TFCC       Neurological Testing     Sensation     Wrist/Hand   Left   Intact: light touch    Active Range of Motion     Left Elbow   Normal active range of motion    Left Wrist   Normal active range of motion    Strength/Myotome Testing     Left Wrist/Hand   Wrist extension: 4+  Wrist flexion: 5    Additional Strength Details  Matthieu #2  L  57 lbs,(tight)    R  89 lbs                Precautions:  Universal      Date 1/19            Visit 1            Manuals                                                                 Neuro Re-Ed                                                                                                        Ther Ex 15'            HEP PROM wrist, FA, splint wear  MHP  Mass                                                                                                       Ther Activity                                       Gait Training                                       Modalities             MHP Pre tx

## 2023-01-27 ENCOUNTER — OFFICE VISIT (OUTPATIENT)
Dept: OCCUPATIONAL THERAPY | Facility: CLINIC | Age: 78
End: 2023-01-27

## 2023-01-27 DIAGNOSIS — M25.532 LEFT WRIST PAIN: Primary | ICD-10-CM

## 2023-01-27 DIAGNOSIS — M79.632 PAIN IN LEFT FOREARM: ICD-10-CM

## 2023-01-27 NOTE — PROGRESS NOTES
Daily Note     Today's date: 2023  Patient name: Feliz Matias  : 1945  MRN: [id number]  Referring provider: Randy Loredo  Dx:   Encounter Diagnosis     ICD-10-CM    1  Left wrist pain  M25 532       2  Pain in left forearm  M79 632                      Subjective:  "About the same "  Weaned splint and medications  Pain not increased  Objective: See treatment diary below      Assessment: Tolerated treatment well  Patient would benefit from continued OT;  Pain greatest with elbow in extension, forearm overpronated, wrist in flexion and fisting  Plan: Progress treatment as tolerated  Continue care once weekly  Splint to be worn for heavier tasks        Precautions:  Universal      Date            Visit 1 2           Manuals  15'           IASTM  FA wrist, V/D  8'           Cupping  3' RN           STM  4' Dorsal FA                        Neuro Re-Ed                                                                                                        Ther Ex 15'   23'           HEP PROM wrist, FA, splint wear  MHP  Mass Splint wear for heavy tasks, add stretches           PROM 1:1  Elbow, FA, wrist  8'           RNGE  10x10           AAROM FA,   #1  S/P           PRE wrist  Wrist E  Elbow flex  1x10   Extend  1x10                                                  Ther Activity                                       Gait Training                                       Modalities             MHP Pre tx Pre tx

## 2023-02-03 ENCOUNTER — OFFICE VISIT (OUTPATIENT)
Dept: OCCUPATIONAL THERAPY | Facility: CLINIC | Age: 78
End: 2023-02-03

## 2023-02-03 DIAGNOSIS — M79.632 PAIN IN LEFT FOREARM: ICD-10-CM

## 2023-02-03 DIAGNOSIS — M25.532 LEFT WRIST PAIN: Primary | ICD-10-CM

## 2023-02-03 NOTE — PROGRESS NOTES
Daily Note     Today's date: 2/3/2023  Patient name: Og Schofield  : 1945  MRN: [id number]  Referring provider: Trixie Christie  Dx:   Encounter Diagnosis     ICD-10-CM    1  Left wrist pain  M25 532       2  Pain in left forearm  M79 632                      Subjective:  "About the same "  Splint wear not full time  Pain with lifting, resisted supination continues  Objective: See treatment diary below;  Pain near DRUJ, extensor retinaculum  Assessment: Tolerated treatment well  Patient would benefit from continued OT;  Pain greatest with elbow in extension, forearm overpronated, wrist in flexion and fisting  Plan: Progress treatment as tolerated  Continue care once weekly  Splint to be worn , removed while resting/seated  AROM exercises painfree only        Precautions:  Universal      Date  23          Visit 1 2 3          Manuals  15' 15'          IASTM  FA wrist, V/D  8' FA, wrist   V/D  8'          Cupping  3' RN 3'  RN          STM  4' Dorsal FA Dorsal wrist FA                       Neuro Re-Ed                                                                                                        Ther Ex 15'   23'   10'          HEP PROM wrist, FA, splint wear  MHP  Mass Splint wear for heavy tasks, add stretches Splint  , off for seated tasks only          PROM 1:1  Elbow, FA, wrist  8' FA, wrist 5'          RNGE  10x10 10x10          AAROM FA,   #1  S/P           PRE wrist  Wrist E  Elbow flex  1x10   Extend  1x10                                                  Ther Activity                                       Gait Training                                       Modalities             MHP Pre tx Pre tx Pre tx

## 2023-02-10 ENCOUNTER — OFFICE VISIT (OUTPATIENT)
Dept: OCCUPATIONAL THERAPY | Facility: CLINIC | Age: 78
End: 2023-02-10

## 2023-02-10 DIAGNOSIS — M25.532 LEFT WRIST PAIN: Primary | ICD-10-CM

## 2023-02-10 DIAGNOSIS — M79.632 PAIN IN LEFT FOREARM: ICD-10-CM

## 2023-02-10 NOTE — PROGRESS NOTES
Daily Note     Today's date: 2/10/2023  Patient name: Hetty Cabot  : 1945  MRN: [id number]  Referring provider: Pa Allan  Dx:   Encounter Diagnosis     ICD-10-CM    1  Left wrist pain  M25 532       2  Pain in left forearm  M79 632                      Subjective:  "About the same "  "Nothing really helps"      Objective: See treatment diary below for clinic program      Assessment: Tolerated treatment well  Patient would benefit from continued OT;  Pain greatest with elbow in extension, forearm overpronated, wrist in flexion and fisting  Pain greatest in mid dorsal FA  Therapy ineffective in reducing pain  Plan:   Hold therapy due to continued pain without reduction  Patient has appointment with physician next week  Splint to be worn , removed while resting/seated  AROM exercises painfree only        Precautions:  Universal      Date 1/19 1/27 2/3 2/10         Visit 1 2 3 4         Manuals  15' 15' 8'         IASTM  FA wrist, V/D  8' FA, wrist   V/D  8' FA, wrist V/D  8'         Cupping  3' RN 3'  RN          STM  4' Dorsal FA Dorsal wrist FA                       Neuro Re-Ed                                                                                                        Ther Ex 15'   23'   10'   10'         HEP PROM wrist, FA, splint wear  MHP  Mass Splint wear for heavy tasks, add stretches Splint  , off for seated tasks only Review of ROM HEP, splint wear         PROM 1:1  Elbow, FA, wrist  8' FA, wrist 5' FA, wrist  5'         RNGE  10x10 10x10 10x10         AAROM FA,   #1  S/P           PRE wrist  Wrist E  Elbow flex  1x10   Extend  1x10                                                  Ther Activity                                       Gait Training                                       Modalities             MHP Pre tx Pre tx Pre tx Pre tx         TENS    10'

## 2023-02-17 ENCOUNTER — APPOINTMENT (OUTPATIENT)
Dept: RADIOLOGY | Facility: CLINIC | Age: 78
End: 2023-02-17

## 2023-02-17 ENCOUNTER — OFFICE VISIT (OUTPATIENT)
Dept: OBGYN CLINIC | Facility: CLINIC | Age: 78
End: 2023-02-17

## 2023-02-17 VITALS
HEIGHT: 64 IN | HEART RATE: 56 BPM | DIASTOLIC BLOOD PRESSURE: 85 MMHG | SYSTOLIC BLOOD PRESSURE: 144 MMHG | BODY MASS INDEX: 38.93 KG/M2 | WEIGHT: 228 LBS

## 2023-02-17 DIAGNOSIS — S59.912D FOREARM INJURY, LEFT, SUBSEQUENT ENCOUNTER: Primary | ICD-10-CM

## 2023-02-17 DIAGNOSIS — S59.912D FOREARM INJURY, LEFT, SUBSEQUENT ENCOUNTER: ICD-10-CM

## 2023-02-17 RX ORDER — DICLOFENAC SODIUM 75 MG/1
75 TABLET, DELAYED RELEASE ORAL 2 TIMES DAILY
Qty: 60 TABLET | Refills: 1 | Status: SHIPPED | OUTPATIENT
Start: 2023-02-17

## 2023-02-17 NOTE — LETTER
February 17, 2023     Patient: Rowdy Gray  YOB: 1945  Date of Visit: 2/17/2023      To Whom it May Concern:    Rowdy Gray is under my professional care  Shari Castle was seen in my office on 2/17/2023  May work with restrictions:  -Wear wrist brace while at work   -No lifting, pushing, pulling, more than 40 lb    We will be re-evaluated after getting MRI done    If you have any questions or concerns, please don't hesitate to call           Sincerely,          Andrew Xiao DO        CC: No Recipients

## 2023-02-17 NOTE — PROGRESS NOTES
Assessment/Plan:  Assessment/Plan   Diagnoses and all orders for this visit:    Forearm injury, left, subsequent encounter  -     MRI forearm left wo contrast; Future  -     diclofenac (VOLTAREN) 75 mg EC tablet; Take 1 tablet (75 mg total) by mouth 2 (two) times a day  -     XR forearm 2 vw left; Future      19-year-old right-hand-dominant male with left forearm pain following injury on 12/14/2022  Discussed with patient physical exam, radiographs, impression, and plan  X-rays noted for cortical irregularity proximal radius  Physical exam left upper extremity noted for tenderness at the junction of the distal and middle thirds of the radius  He has intact flexion and extension of the elbow  He has supination limited to 100 degrees as well as weakness with supination  He had normal strength in the wrist and digits of the hand  He is intact neurovascularly  Clinical impression is that he is somatic from injury to the arm  Symptoms persist despite naproxen 500 mg twice daily since 1/6/2023, wearing wrist brace since 1/6/2023, and formal therapy and home exercises since 1/19/2023  At this time I will refer him for MRI of the left forearm to evaluate for occult fracture and muscle tear as invasive management may be warranted  In the interim he may discontinue naproxen and take diclofenac 75 mg twice daily and not to take any other NSAIDs, but may take Tylenol  He will follow-up after getting MRI done  Subjective:   Patient ID: Cynthia Gonzales is a 78 y o  male  Chief Complaint   Patient presents with   • Left Forearm - Follow-up, Pain       19-year-old right-hand-dominant male following up for left forearm pain following injury at work on 12/14/2022  He was last seen by me weeks ago at which point he was provided wrist brace and referred to formal therapy  He was prescribed naproxen 500 mg twice daily  He had been attending formal therapy and doing home exercises since 1/19/2023    He denies any improvement in symptoms since his last visit  He has pain described as localized to the middle and proximal forearm, sharp, worse with supination of the forearm, associated with limited range of motion, and improved with resting  Arm Pain  This is a new problem  The current episode started more than 1 month ago  The problem occurs daily  The problem has been unchanged  Associated symptoms include weakness  Pertinent negatives include no arthralgias, joint swelling or numbness  Exacerbated by: Arm use  He has tried rest, NSAIDs, immobilization and position changes (Formal therapy, home exercise) for the symptoms  The treatment provided mild relief  Review of Systems   Musculoskeletal: Negative for arthralgias and joint swelling  Neurological: Positive for weakness  Negative for numbness  Objective:  Vitals:    02/17/23 0842   BP: 144/85   Pulse: 56   Weight: 103 kg (228 lb)   Height: 5' 4" (1 626 m)     Left Hand Exam     Muscle Strength   Wrist extension: 5/5   Wrist flexion: 5/5     Other   Sensation: normal  Pulse: present      Left Elbow Exam     Range of Motion   Extension: normal   Flexion: normal   Pronation: normal   Supination: 100     Muscle Strength   Pronation:  5/5   Supination:  4/5     Tests   Varus: negative  Valgus: negative          Strength/Myotome Testing     Left Wrist/Hand   Wrist extension: 5  Wrist flexion: 5     (2nd hand position)     Trial 1: 5      Physical Exam  Vitals and nursing note reviewed  Constitutional:       General: He is not in acute distress  Appearance: He is well-developed  He is not ill-appearing or diaphoretic  HENT:      Head: Normocephalic and atraumatic  Right Ear: External ear normal       Left Ear: External ear normal    Eyes:      Conjunctiva/sclera: Conjunctivae normal    Neck:      Trachea: No tracheal deviation  Cardiovascular:      Rate and Rhythm: Normal rate     Pulmonary:      Effort: Pulmonary effort is normal  No respiratory distress  Abdominal:      General: There is no distension  Musculoskeletal:         General: Tenderness (Junction of middle and distal thirds of left radius) present  No swelling, deformity or signs of injury  Skin:     General: Skin is warm and dry  Coloration: Skin is not jaundiced or pale  Neurological:      Mental Status: He is alert and oriented to person, place, and time  Psychiatric:         Mood and Affect: Mood normal          Behavior: Behavior normal          Thought Content: Thought content normal          Judgment: Judgment normal          I have personally reviewed pertinent films in PACS and my interpretation is X-rays noted for cortical irregularity proximal radius  Mayra Carrier

## 2023-03-31 ENCOUNTER — RA CDI HCC (OUTPATIENT)
Dept: OTHER | Facility: HOSPITAL | Age: 78
End: 2023-03-31

## 2023-03-31 ENCOUNTER — APPOINTMENT (OUTPATIENT)
Dept: LAB | Facility: HOSPITAL | Age: 78
End: 2023-03-31

## 2023-03-31 ENCOUNTER — OFFICE VISIT (OUTPATIENT)
Dept: LAB | Facility: HOSPITAL | Age: 78
End: 2023-03-31

## 2023-03-31 DIAGNOSIS — I10 BENIGN HYPERTENSION: ICD-10-CM

## 2023-03-31 DIAGNOSIS — R73.01 IMPAIRED FASTING GLUCOSE: ICD-10-CM

## 2023-03-31 DIAGNOSIS — Z01.818 PRE-OP TESTING: ICD-10-CM

## 2023-03-31 DIAGNOSIS — C44.519 BASAL CELL CARCINOMA (BCC) OF SKIN OF OTHER PART OF TORSO: ICD-10-CM

## 2023-03-31 DIAGNOSIS — E78.2 MIXED HYPERLIPIDEMIA: ICD-10-CM

## 2023-03-31 LAB
ALBUMIN SERPL BCP-MCNC: 4.3 G/DL (ref 3.5–5)
ALP SERPL-CCNC: 45 U/L (ref 34–104)
ALT SERPL W P-5'-P-CCNC: 43 U/L (ref 7–52)
ANION GAP SERPL CALCULATED.3IONS-SCNC: 6 MMOL/L (ref 4–13)
AST SERPL W P-5'-P-CCNC: 30 U/L (ref 13–39)
ATRIAL RATE: 60 BPM
BASOPHILS # BLD AUTO: 0.07 THOUSANDS/ÂΜL (ref 0–0.1)
BASOPHILS NFR BLD AUTO: 1 % (ref 0–1)
BILIRUB SERPL-MCNC: 0.56 MG/DL (ref 0.2–1)
BUN SERPL-MCNC: 14 MG/DL (ref 5–25)
CALCIUM SERPL-MCNC: 8.6 MG/DL (ref 8.4–10.2)
CHLORIDE SERPL-SCNC: 105 MMOL/L (ref 96–108)
CHOLEST SERPL-MCNC: 140 MG/DL
CO2 SERPL-SCNC: 29 MMOL/L (ref 21–32)
CREAT SERPL-MCNC: 1 MG/DL (ref 0.6–1.3)
EOSINOPHIL # BLD AUTO: 0.16 THOUSAND/ÂΜL (ref 0–0.61)
EOSINOPHIL NFR BLD AUTO: 2 % (ref 0–6)
ERYTHROCYTE [DISTWIDTH] IN BLOOD BY AUTOMATED COUNT: 13.7 % (ref 11.6–15.1)
GFR SERPL CREATININE-BSD FRML MDRD: 73 ML/MIN/1.73SQ M
GLUCOSE P FAST SERPL-MCNC: 165 MG/DL (ref 65–99)
HCT VFR BLD AUTO: 47.3 % (ref 36.5–49.3)
HDLC SERPL-MCNC: 34 MG/DL
HGB BLD-MCNC: 16.2 G/DL (ref 12–17)
IMM GRANULOCYTES # BLD AUTO: 0.01 THOUSAND/UL (ref 0–0.2)
IMM GRANULOCYTES NFR BLD AUTO: 0 % (ref 0–2)
LDLC SERPL CALC-MCNC: 56 MG/DL (ref 0–100)
LYMPHOCYTES # BLD AUTO: 2.57 THOUSANDS/ÂΜL (ref 0.6–4.47)
LYMPHOCYTES NFR BLD AUTO: 34 % (ref 14–44)
MCH RBC QN AUTO: 31 PG (ref 26.8–34.3)
MCHC RBC AUTO-ENTMCNC: 34.2 G/DL (ref 31.4–37.4)
MCV RBC AUTO: 90 FL (ref 82–98)
MONOCYTES # BLD AUTO: 0.69 THOUSAND/ÂΜL (ref 0.17–1.22)
MONOCYTES NFR BLD AUTO: 9 % (ref 4–12)
NEUTROPHILS # BLD AUTO: 4.14 THOUSANDS/ÂΜL (ref 1.85–7.62)
NEUTS SEG NFR BLD AUTO: 54 % (ref 43–75)
NONHDLC SERPL-MCNC: 106 MG/DL
NRBC BLD AUTO-RTO: 0 /100 WBCS
P AXIS: 46 DEGREES
PLATELET # BLD AUTO: 232 THOUSANDS/UL (ref 149–390)
PMV BLD AUTO: 10.1 FL (ref 8.9–12.7)
POTASSIUM SERPL-SCNC: 3.8 MMOL/L (ref 3.5–5.3)
PR INTERVAL: 206 MS
PROT SERPL-MCNC: 7 G/DL (ref 6.4–8.4)
QRS AXIS: 20 DEGREES
QRSD INTERVAL: 94 MS
QT INTERVAL: 428 MS
QTC INTERVAL: 428 MS
RBC # BLD AUTO: 5.23 MILLION/UL (ref 3.88–5.62)
SODIUM SERPL-SCNC: 140 MMOL/L (ref 135–147)
T WAVE AXIS: 21 DEGREES
TRIGL SERPL-MCNC: 249 MG/DL
VENTRICULAR RATE: 60 BPM
WBC # BLD AUTO: 7.64 THOUSAND/UL (ref 4.31–10.16)

## 2023-03-31 NOTE — PROGRESS NOTES
[facility]  coding opportunities          Chart Reviewed number of suggestions sent to Provider: 1     Patients Insurance     Medicare Insurance: Medicare        [id number]

## 2023-04-02 LAB
EST. AVERAGE GLUCOSE BLD GHB EST-MCNC: 171 MG/DL
HBA1C MFR BLD: 7.6 %

## 2023-04-07 ENCOUNTER — OFFICE VISIT (OUTPATIENT)
Dept: INTERNAL MEDICINE CLINIC | Facility: CLINIC | Age: 78
End: 2023-04-07

## 2023-04-07 VITALS
WEIGHT: 229 LBS | DIASTOLIC BLOOD PRESSURE: 80 MMHG | OXYGEN SATURATION: 94 % | RESPIRATION RATE: 14 BRPM | HEART RATE: 60 BPM | SYSTOLIC BLOOD PRESSURE: 132 MMHG | HEIGHT: 64 IN | BODY MASS INDEX: 39.09 KG/M2

## 2023-04-07 DIAGNOSIS — R53.83 FATIGUE, UNSPECIFIED TYPE: ICD-10-CM

## 2023-04-07 DIAGNOSIS — I10 BENIGN HYPERTENSION: Primary | ICD-10-CM

## 2023-04-07 DIAGNOSIS — E78.2 MIXED HYPERLIPIDEMIA: ICD-10-CM

## 2023-04-07 DIAGNOSIS — E11.65 TYPE 2 DIABETES MELLITUS WITH HYPERGLYCEMIA, WITHOUT LONG-TERM CURRENT USE OF INSULIN (HCC): ICD-10-CM

## 2023-04-07 DIAGNOSIS — C44.91 BASAL CELL CARCINOMA (BCC), UNSPECIFIED SITE: ICD-10-CM

## 2023-04-07 NOTE — PROGRESS NOTES
Assessment/Plan:     Reinforced diet again because of the sugars  Started to discuss medicine with him  We will have him stop his cholesterol medicine 1 week after the surgery because of his aches and pains to see if that is the problem  Bring him back in 1 month and then most likely switch the metoprolol to see if that is contributing to his fatigue  Continue other medications  Quality Measures:       Return in about 4 weeks (around 5/5/2023) for Regular visit and Medicare Wellness  No problem-specific Assessment & Plan notes found for this encounter  Diagnoses and all orders for this visit:    Benign hypertension    Mixed hyperlipidemia    Type 2 diabetes mellitus with hyperglycemia, without long-term current use of insulin (HCC)    Fatigue, unspecified type    Basal cell carcinoma (BCC), unspecified site        Subjective:      Patient ID: Teodoro Jack is a 78 y o  male  Patient comes in today for follow-up  His blood pressure is controlled  Still having trouble with fatigue  He reports that he will be having basal cell skin cancer surgery next week  Cholesterol is controlled but he also complains of aches and pains  We have already switched him off of 1 statin  Sugars are also up again  He does not have sweets but admits his carbs are probably the problem and with his joint pains activity is limited        ALLERGIES:  No Known Allergies    CURRENT MEDICATIONS:    Current Outpatient Medications:   •  ALPRAZolam (XANAX) 0 5 mg tablet, TAKE 1 TABLET(0 5 MG) BY MOUTH TWICE DAILY, Disp: 60 tablet, Rfl: 3  •  B Complex-Folic Acid (B COMPLEX PLUS) TABS, Take by mouth, Disp: , Rfl:   •  Cholecalciferol (VITAMIN D3) 1000 units CAPS, Take by mouth, Disp: , Rfl:   •  Chondroitin Sulfate-Vit C-Mn 400-60-2 5 MG CAPS, Take by mouth, Disp: , Rfl:   •  Coenzyme Q10 (COQ-10) 100 MG CAPS, Take by mouth, Disp: , Rfl:   •  fenofibrate (TRICOR) 54 MG tablet, TAKE 1 TABLET BY MOUTH  DAILY, Disp: 90 tablet, Rfl: 3  •  meclizine (ANTIVERT) 25 mg tablet, TAKE 1 TABLET(25 MG) BY MOUTH THREE TIMES DAILY AS NEEDED FOR DIZZINESS, Disp: 60 tablet, Rfl: 3  •  metoprolol succinate (TOPROL-XL) 50 mg 24 hr tablet, TAKE 1 TABLET BY MOUTH  DAILY, Disp: 90 tablet, Rfl: 3  •  Multiple Vitamin (MULTI-VITAMIN DAILY PO), Take by mouth, Disp: , Rfl:   •  omeprazole (PriLOSEC) 40 MG capsule, TAKE 1 CAPSULE BY MOUTH  DAILY, Disp: 90 capsule, Rfl: 3  •  rosuvastatin (CRESTOR) 5 mg tablet, TAKE 1 TABLET BY MOUTH  DAILY, Disp: 90 tablet, Rfl: 3  •  solifenacin (VESICARE) 5 mg tablet, TAKE 1 TABLET BY MOUTH  DAILY, Disp: 90 tablet, Rfl: 3  •  aspirin (ECOTRIN LOW STRENGTH) 81 mg EC tablet, Take by mouth (Patient not taking: Reported on 4/7/2023), Disp: , Rfl:   •  diclofenac (VOLTAREN) 75 mg EC tablet, Take 1 tablet (75 mg total) by mouth 2 (two) times a day (Patient not taking: Reported on 4/7/2023), Disp: 60 tablet, Rfl: 1  •  Diclofenac Sodium (VOLTAREN) 1 %, Apply 2 g topically 4 (four) times a day (Patient not taking: Reported on 4/7/2023), Disp: 50 g, Rfl: 0    ACTIVE PROBLEM LIST:  Patient Active Problem List   Diagnosis   • KADEN on CPAP   • Anxiety   • Basal cell carcinoma of cheek   • Basal cell carcinoma of skin   • Benign hypertension   • Deviated nasal septum   • GERD (gastroesophageal reflux disease)   • Hyperlipidemia   • Morbid obesity (HCC)   • OAB (overactive bladder)   • Impaired fasting glucose   • Bright red blood per rectum   • Diverticulosis   • Rib fracture   • Abnormal CT of the abdomen   • Fall       PAST MEDICAL HISTORY:  Past Medical History:   Diagnosis Date   • Cancer ([facility] )     basal call carcinoma   • Colon polyps     DR Deidra Scherer   • Diverticulosis    • Hyperlipidemia    • Hypertension    • Obesity    • KADEN (obstructive sleep apnea)     cpap   • Pneumonia    • Small bowel obstruction, partial (HCC)    • SOB (shortness of breath)        PAST SURGICAL HISTORY:  Past Surgical History:   Procedure Laterality Date   • "SKIN BIOPSY     • TONSILLECTOMY AND ADENOIDECTOMY         FAMILY HISTORY:  Family History   Problem Relation Age of Onset   • Heart disease Mother    • Hypertension Mother    • Breast cancer Sister        SOCIAL HISTORY:  Social History     Socioeconomic History   • Marital status: Significant Other     Spouse name: Not on file   • Number of children: 0   • Years of education: Not on file   • Highest education level: Not on file   Occupational History   • Occupation: SALES     Comment: FULL TIME    Tobacco Use   • Smoking status: Never   • Smokeless tobacco: Never   Vaping Use   • Vaping Use: Never used   Substance and Sexual Activity   • Alcohol use: Not Currently     Comment: occassional   • Drug use: No   • Sexual activity: Yes     Partners: Female     Comment: DENIED: HISTORY OF HIGH RISK SEXUAL BEHAVIOR    Other Topics Concern   • Not on file   Social History Narrative     as per all scripts      Social Determinants of Health     Financial Resource Strain: Not on file   Food Insecurity: Not on file   Transportation Needs: Not on file   Physical Activity: Not on file   Stress: Not on file   Social Connections: Not on file   Intimate Partner Violence: Not on file   Housing Stability: Not on file       Review of Systems   Respiratory: Negative for shortness of breath  Cardiovascular: Negative for chest pain  Gastrointestinal: Negative for abdominal pain  Objective:  Vitals:    04/07/23 0759   BP: 132/80   BP Location: Left arm   Patient Position: Sitting   Pulse: 60   Resp: 14   SpO2: 94%   Weight: 104 kg (229 lb)   Height: 5' 4\" (1 626 m)     Body mass index is 39 31 kg/m²  Physical Exam  Vitals and nursing note reviewed  Constitutional:       Appearance: He is well-developed  Cardiovascular:      Rate and Rhythm: Normal rate and regular rhythm  Heart sounds: Normal heart sounds  Pulmonary:      Effort: Pulmonary effort is normal       Breath sounds: Normal breath sounds   " Abdominal:      Palpations: Abdomen is soft  Tenderness: There is no abdominal tenderness  Neurological:      Mental Status: He is alert and oriented to person, place, and time  RESULTS:    In chart    This note was created with voice recognition software  Phonic, grammatical and spelling errors may be present within the note as a result

## 2023-04-25 ENCOUNTER — TELEPHONE (OUTPATIENT)
Dept: OBGYN CLINIC | Facility: HOSPITAL | Age: 78
End: 2023-04-25

## 2023-04-25 NOTE — TELEPHONE ENCOUNTER
Caller: Patient    Doctor: Razia Callejas    Reason for call: Patient is having stitches removed 4/27 about 1/2 hour prior to MRI. Will he still be able to get the MRI done?     Call back#: [phone]

## 2023-04-27 ENCOUNTER — HOSPITAL ENCOUNTER (OUTPATIENT)
Dept: MRI IMAGING | Facility: HOSPITAL | Age: 78
Discharge: HOME | End: 2023-04-27
Attending: FAMILY MEDICINE

## 2023-04-27 DIAGNOSIS — S59.912D FOREARM INJURY, LEFT, SUBSEQUENT ENCOUNTER: ICD-10-CM

## 2023-05-04 ENCOUNTER — TELEPHONE (OUTPATIENT)
Dept: OBGYN CLINIC | Facility: CLINIC | Age: 78
End: 2023-05-04

## 2023-05-11 ENCOUNTER — TELEPHONE (OUTPATIENT)
Dept: GASTROENTEROLOGY | Facility: CLINIC | Age: 78
End: 2023-05-11

## 2023-05-11 ENCOUNTER — OFFICE VISIT (OUTPATIENT)
Dept: INTERNAL MEDICINE CLINIC | Facility: CLINIC | Age: 78
End: 2023-05-11

## 2023-05-11 VITALS
RESPIRATION RATE: 14 BRPM | WEIGHT: 226 LBS | DIASTOLIC BLOOD PRESSURE: 82 MMHG | OXYGEN SATURATION: 93 % | HEART RATE: 74 BPM | HEIGHT: 64 IN | BODY MASS INDEX: 38.58 KG/M2 | SYSTOLIC BLOOD PRESSURE: 130 MMHG

## 2023-05-11 DIAGNOSIS — I10 BENIGN HYPERTENSION: ICD-10-CM

## 2023-05-11 DIAGNOSIS — R53.83 FATIGUE, UNSPECIFIED TYPE: ICD-10-CM

## 2023-05-11 DIAGNOSIS — Z00.00 MEDICARE ANNUAL WELLNESS VISIT, SUBSEQUENT: Primary | ICD-10-CM

## 2023-05-11 RX ORDER — LOSARTAN POTASSIUM 25 MG/1
25 TABLET ORAL DAILY
Qty: 90 TABLET | Refills: 3 | Status: SHIPPED | OUTPATIENT
Start: 2023-05-11

## 2023-05-11 RX ORDER — METOPROLOL SUCCINATE 50 MG/1
25 TABLET, EXTENDED RELEASE ORAL DAILY
Qty: 90 TABLET | Refills: 3
Start: 2023-05-11

## 2023-05-11 NOTE — TELEPHONE ENCOUNTER
05/11/23  Screened by: Bairon Lombardo    Referring Provider Alfred Stacy    Pre- Screening: There is no height or weight on file to calculate BMI  Has patient been referred for a routine screening Colonoscopy? yes  Is the patient between 39-70 years old? yes      Previous Colonoscopy yes   If yes:    Date: 2019    Facility:     Reason:       SCHEDULING STAFF: If the patient is between 39yrs-47yrs, please advise patient to confirm benefits/coverage with their insurance company for a routine screening colonoscopy, some insurance carriers will only cover at [address] or older  If the patient is over 66years old, please schedule an office visit  Does the patient want to see a Gastroenterologist prior to their procedure OR are they having any GI symptoms? no    Has the patient been hospitalized or had abdominal surgery in the past 6 months? no    Does the patient use supplemental oxygen? no    Does the patient take Coumadin, Lovenox, Plavix, Elliquis, Xarelto, or other blood thinning medication? no    Has the patient had a stroke, cardiac event, or stent placed in the past year? no     PT PASSED OA    SCHEDULING STAFF: If patient answers NO to above questions, then schedule procedure  If patient answers YES to above questions, then schedule office appointment  If patient is between 45yrs - 49yrs, please advise patient that we will have to confirm benefits & coverage with their insurance company for a routine screening colonoscopy

## 2023-05-11 NOTE — TELEPHONE ENCOUNTER
Pt is due and passed oa screening  Pt looking to schedule with Dr Yg Means  Please reach out to pt to schedule as I am unable to schedule anything past 8/1/23

## 2023-05-12 ENCOUNTER — OFFICE VISIT (OUTPATIENT)
Dept: OBGYN CLINIC | Facility: CLINIC | Age: 78
End: 2023-05-12

## 2023-05-12 VITALS
BODY MASS INDEX: 38.41 KG/M2 | HEART RATE: 86 BPM | HEIGHT: 64 IN | WEIGHT: 225 LBS | DIASTOLIC BLOOD PRESSURE: 85 MMHG | SYSTOLIC BLOOD PRESSURE: 131 MMHG

## 2023-05-12 DIAGNOSIS — S46.219A BICEPS TENDON TEAR: Primary | ICD-10-CM

## 2023-05-12 NOTE — PROGRESS NOTES
Assessment/Plan:  Assessment/Plan   Diagnoses and all orders for this visit:    Biceps tendon tear  -     Ambulatory Referral to Orthopedic Surgery; Future        79-year-old right-hand-dominant male with left forearm pain following injury on 12/14/2022  Discussed with patient MRI results, impression, and plan  MRI left forearm noted for near complete tear at the insertion of the distal biceps tendon without retraction  On physical exam he has intact strength with flexion and extension of the elbow however weakness with resisted supination  I advised patient that pain may have improved due to passing of the inflammatory phase from his injury  I advised him that there is risk of injury progressing to complete tendon rupture  Recommend he continue to limit amount of lifting any left upper extremity  I will refer him to orthopedic surgeon for further evaluation and recommendation of treatment  He will follow-up with me as needed  Subjective:   Patient ID: Cristhian Fraser is a 78 y o  male  Chief Complaint   Patient presents with   • Left Forearm - Follow-up       79-year-old right-hand-dominant male following up for left forearm pain from injury on 12/14/2022  He was last seen by me more than 2 before months ago at which point he was referred for MRI of the left forearm and prescribed diclofenac 75 mg twice daily  He states that since last visit pain has improved, as he has been doing most work with the right upper extremity and getting more help at work from customers and coworkers  Reports experiencing pain described as localized to the proximal forearm, achy and sore, none radiating, worse with strenuous activity, and improved with resting  Elbow Pain  This is a new problem  The current episode started more than 1 month ago  The problem occurs daily  The problem has been gradually improving  Associated symptoms include arthralgias and weakness   Pertinent negatives include no joint swelling or "numbness  Exacerbated by: Lifting  He has tried rest and NSAIDs (Physical therapy, home exercise) for the symptoms  The treatment provided moderate relief  Review of Systems   Musculoskeletal: Positive for arthralgias  Negative for joint swelling  Neurological: Positive for weakness  Negative for numbness  Objective:  Vitals:    05/12/23 0831   BP: 131/85   Pulse: 86   Weight: 102 kg (225 lb)   Height: 5' 4\" (1 626 m)     Left Elbow Exam     Muscle Strength   Left elbow normal strength: 4+/5 supination  Pronation:  5/5             Physical Exam  Vitals and nursing note reviewed  Constitutional:       General: He is not in acute distress  Appearance: He is well-developed  He is not ill-appearing or diaphoretic  HENT:      Head: Normocephalic and atraumatic  Right Ear: External ear normal       Left Ear: External ear normal    Eyes:      Conjunctiva/sclera: Conjunctivae normal    Neck:      Trachea: No tracheal deviation  Cardiovascular:      Rate and Rhythm: Normal rate  Pulmonary:      Effort: Pulmonary effort is normal  No respiratory distress  Abdominal:      General: There is no distension  Skin:     General: Skin is warm and dry  Coloration: Skin is not jaundiced or pale  Neurological:      Mental Status: He is alert and oriented to person, place, and time  Psychiatric:         Mood and Affect: Mood normal          Behavior: Behavior normal          Thought Content: Thought content normal          Judgment: Judgment normal          I have personally reviewed pertinent films in PACS and my interpretation is Biceps tendon tear without retraction          "

## 2023-05-12 NOTE — LETTER
May 12, 2023     Patient: Chris Yanez  YOB: 1945  Date of Visit: 5/12/2023      To Whom it May Concern:    Chris Yanez is under my professional care  Cori Vernon was seen in my office on 5/12/2023  Cori Vernon  may work with restrictions:  -Wear wrist brace while at work   -No lifting, pushing, pulling, more than 40 lb    He is referred to orthopedic surgeon for further treatment  If you have any questions or concerns, please don't hesitate to call           Sincerely,          Andrew Xiao DO        CC: No Recipients

## 2023-05-23 ENCOUNTER — TELEPHONE (OUTPATIENT)
Dept: OBGYN CLINIC | Facility: CLINIC | Age: 78
End: 2023-05-23

## 2023-05-23 ENCOUNTER — TELEPHONE (OUTPATIENT)
Dept: OBGYN CLINIC | Facility: HOSPITAL | Age: 78
End: 2023-05-23

## 2023-05-23 NOTE — TELEPHONE ENCOUNTER
Hello,  Please advise if the following patient can be forced onto the schedule:    Patient: Mingo Singh      : 49    MRN: [id number]    Call back #: [phone]    Insurance: Workers Comp    Reason for appointment: Referred by Dr Emily Diez for near complete tear at the insertion of the distal biceps brachii tendon, without retraction     Requested doctor/location: [facility]/[place]      Thank you    Email sent

## 2023-05-23 NOTE — TELEPHONE ENCOUNTER
Called and left message, we schedule your appointment with Dr Brii Dean at 2pm on 5/30/2023 at the [facility] office  Please call with any questions at [phone]

## 2023-06-02 ENCOUNTER — PREP FOR PROCEDURE (OUTPATIENT)
Dept: GASTROENTEROLOGY | Facility: CLINIC | Age: 78
End: 2023-06-02

## 2023-06-02 DIAGNOSIS — Z86.010 HISTORY OF COLON POLYPS: Primary | ICD-10-CM

## 2023-06-02 DIAGNOSIS — Z12.11 SCREENING FOR COLON CANCER: ICD-10-CM

## 2023-06-08 ENCOUNTER — CONSULT (OUTPATIENT)
Dept: OBGYN CLINIC | Facility: CLINIC | Age: 78
End: 2023-06-08
Payer: OTHER MISCELLANEOUS

## 2023-06-08 ENCOUNTER — TELEPHONE (OUTPATIENT)
Dept: OBGYN CLINIC | Facility: CLINIC | Age: 78
End: 2023-06-08

## 2023-06-08 VITALS
WEIGHT: 225 LBS | SYSTOLIC BLOOD PRESSURE: 139 MMHG | DIASTOLIC BLOOD PRESSURE: 77 MMHG | BODY MASS INDEX: 38.41 KG/M2 | HEIGHT: 64 IN | HEART RATE: 84 BPM

## 2023-06-08 DIAGNOSIS — S46.219A BICEPS TENDON TEAR: ICD-10-CM

## 2023-06-08 DIAGNOSIS — S59.912D FOREARM INJURY, LEFT, SUBSEQUENT ENCOUNTER: Primary | ICD-10-CM

## 2023-06-08 DIAGNOSIS — S66.912A STRAIN OF LEFT WRIST, INITIAL ENCOUNTER: ICD-10-CM

## 2023-06-08 PROCEDURE — 99214 OFFICE O/P EST MOD 30 MIN: CPT | Performed by: ORTHOPAEDIC SURGERY

## 2023-06-08 NOTE — TELEPHONE ENCOUNTER
Called patient's WC carrier to verify that the claim is still active and open  Spoke with Georges Culver who said that the claim is still open

## 2023-06-08 NOTE — PROGRESS NOTES
Patient Name:  Leah Fuentes  MRN:  [id number]    Assessment & Plan     1  Forearm injury, left, subsequent encounter  -     Cock Up Wrist Splint    2  Biceps tendon tear    3  Strain of left wrist, initial encounter        Left partial thickness distal biceps tendon tear with main complaints of distal forearm and wrist pain  · Forearm X-rays and MRI reviewed in office with patient  Possibility of chronic partial tearing of the distal biceps tendon from cystic changes in radial tuberosity  · In depth conversation had with patient regarding his current etiology of pain, distal bicep tendon tears and nonoperative vs surgical management  · At this time, patient's pain is more wrist and distal forearm etiology vs distal biceps tendon  Advised patient it would be unlikely surgical intervention, by means of distal biceps tendon repair, would alleviate his current symptoms  · Educated patient about the anatomy of the biceps tendon and function including supination strength  · Provided patient with new cock up wrist brace to use with increased function and throughout the day  · Placed new OT script to work on wrist, elbow strengthening, eccentric exercises, and manual therapy as tolerated  · If patient's wrist pain persists at follow up appointment, can consider getting new images of the Left wrist   · Follow up in office in 3 months for reevaluation     Chief Complaint     Left elbow pain    History of the Present Illness     Leah Fuentes is a 78 y o  male with Left forearm pain ongoing since December after a work injury  Patient was carrying a bed/matress and it slipped causing some supination of his wrist and forearm  Patient had moderate pain after this incident and came to the ED 3 days after the work injury  Patient did follow up with Dr Angel Luis Fierro for evaluation and MRI was ordered of Left forearm  He was also provided a wrist brace at that time which moderately helped with his pain   Patient had performed OT without "much pain relief, but the brace has consistently been providing pain relief  He locates his pain to the distal wrist and dorsal forearm  He denies pain in the antecubital fossa at this time  He works in sales, 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  He denies previous IVDA, prolonged steroid use, history of smoking  Review of Systems     Review of Systems   Constitutional: Negative for chills and fever  HENT: Negative for ear pain and sore throat  Eyes: Negative for pain and visual disturbance  Respiratory: Negative for cough and shortness of breath  Cardiovascular: Negative for chest pain and palpitations  Gastrointestinal: Negative for abdominal pain and vomiting  Genitourinary: Negative for dysuria and hematuria  Musculoskeletal: Negative for arthralgias and back pain  Skin: Negative for color change and rash  Neurological: Negative for seizures and syncope  All other systems reviewed and are negative  Physical Exam     /77   Pulse 84   Ht 5' 4\" (1 626 m)   Wt 102 kg (225 lb)   BMI 38 62 kg/m²     Left Elbow    Tenderness to palpation over 1st dorsal compartment, dorsal forearm musculature; no appreciated tenderness over antecubital fossa, distal biceps tendon, TFCC  Valgus and varus stress testing without pain or laxity  Active range of motion  0 to 140 degrees without pain  4/5 strength with resisted supination compared to contralateral side, without pain  4+/5 resisted elbow flexion without pain   Full composite fist  Hook test negative  Neurovascularly intact distally          Eyes:  Anicteric sclerae  Neck:  Supple  Lungs:  Normal respiratory effort  Cardiovascular:  Capillary refill is less than 2 seconds  Skin:  Intact without erythema  Neurologic:  Sensation grossly intact to light touch  Psychiatric:  Mood and affect are appropriate      Data Review     I have personally reviewed pertinent films in PACS, and my interpretation follows:    MRI performed " of Left forearm demonstrates high grade partial distal biceps tendon tear, without retraction noted  Erosive changes noted at radial tuberosity       Past Medical History:   Diagnosis Date   • Cancer ([facility] [address] )     basal call carcinoma   • Colon polyps     DR Phi Lenz   • Diverticulosis    • Hyperlipidemia    • Hypertension    • Obesity    • KADEN (obstructive sleep apnea)     cpap   • Pneumonia    • Small bowel obstruction, partial (HCC)    • SOB (shortness of breath)        Past Surgical History:   Procedure Laterality Date   • SKIN BIOPSY     • TONSILLECTOMY AND ADENOIDECTOMY         Allergies   Allergen Reactions   • Fluorouracil Rash       Current Outpatient Medications on File Prior to Visit   Medication Sig Dispense Refill   • ALPRAZolam (XANAX) 0 5 mg tablet TAKE 1 TABLET(0 5 MG) BY MOUTH TWICE DAILY 60 tablet 2   • aspirin (ECOTRIN LOW STRENGTH) 81 mg EC tablet Take by mouth     • B Complex-Folic Acid (B COMPLEX PLUS) TABS Take by mouth     • Cholecalciferol (VITAMIN D3) 1000 units CAPS Take by mouth     • Coenzyme Q10 (COQ-10) 100 MG CAPS Take by mouth     • fenofibrate (TRICOR) 54 MG tablet TAKE 1 TABLET BY MOUTH  DAILY 90 tablet 3   • losartan (COZAAR) 25 mg tablet Take 1 tablet (25 mg total) by mouth daily 90 tablet 3   • meclizine (ANTIVERT) 25 mg tablet TAKE 1 TABLET(25 MG) BY MOUTH THREE TIMES DAILY AS NEEDED FOR DIZZINESS 60 tablet 3   • metoprolol succinate (TOPROL-XL) 50 mg 24 hr tablet Take 0 5 tablets (25 mg total) by mouth daily 90 tablet 3   • Multiple Vitamin (MULTI-VITAMIN DAILY PO) Take by mouth     • omeprazole (PriLOSEC) 40 MG capsule TAKE 1 CAPSULE BY MOUTH  DAILY 90 capsule 3   • solifenacin (VESICARE) 5 mg tablet TAKE 1 TABLET BY MOUTH  DAILY 90 tablet 3   • Chondroitin Sulfate-Vit C-Mn 400-60-2 5 MG CAPS Take by mouth (Patient not taking: Reported on 5/11/2023)     • diclofenac (VOLTAREN) 75 mg EC tablet Take 1 tablet (75 mg total) by mouth 2 (two) times a day 60 tablet 1   • Diclofenac Sodium (VOLTAREN) 1 % Apply 2 g topically 4 (four) times a day (Patient not taking: Reported on 4/7/2023) 50 g 0   • rosuvastatin (CRESTOR) 5 mg tablet TAKE 1 TABLET BY MOUTH  DAILY (Patient not taking: Reported on 5/12/2023) 90 tablet 3     No current facility-administered medications on file prior to visit         Social History     Tobacco Use   • Smoking status: Never   • Smokeless tobacco: Never   Vaping Use   • Vaping Use: Never used   Substance Use Topics   • Alcohol use: Not Currently     Comment: occassional   • Drug use: No       Family History   Problem Relation Age of Onset   • Heart disease Mother    • Hypertension Mother    • Breast cancer Sister              Procedures Performed     Procedures  None       Zheng Portillo DO

## 2023-06-09 ENCOUNTER — RA CDI HCC (OUTPATIENT)
Dept: OTHER | Facility: HOSPITAL | Age: 78
End: 2023-06-09

## 2023-06-16 ENCOUNTER — OFFICE VISIT (OUTPATIENT)
Dept: INTERNAL MEDICINE CLINIC | Facility: CLINIC | Age: 78
End: 2023-06-16
Payer: MEDICARE

## 2023-06-16 VITALS — HEIGHT: 64 IN | BODY MASS INDEX: 38.24 KG/M2 | WEIGHT: 224 LBS

## 2023-06-16 DIAGNOSIS — E11.65 TYPE 2 DIABETES MELLITUS WITH HYPERGLYCEMIA, WITHOUT LONG-TERM CURRENT USE OF INSULIN (HCC): ICD-10-CM

## 2023-06-16 DIAGNOSIS — I10 BENIGN HYPERTENSION: Primary | ICD-10-CM

## 2023-06-16 DIAGNOSIS — R53.83 FATIGUE, UNSPECIFIED TYPE: ICD-10-CM

## 2023-06-16 PROCEDURE — 99214 OFFICE O/P EST MOD 30 MIN: CPT | Performed by: INTERNAL MEDICINE

## 2023-06-16 RX ORDER — METOPROLOL SUCCINATE 25 MG/1
12.5 TABLET, EXTENDED RELEASE ORAL DAILY
Qty: 90 TABLET | Refills: 1 | Status: SHIPPED | OUTPATIENT
Start: 2023-06-16

## 2023-06-16 RX ORDER — LOSARTAN POTASSIUM 50 MG/1
50 TABLET ORAL DAILY
Qty: 90 TABLET | Refills: 1 | Status: SHIPPED | OUTPATIENT
Start: 2023-06-16

## 2023-06-16 NOTE — PROGRESS NOTES
Assessment/Plan:     Doing much better  Will try and decrease the metoprolol further to see if he gets even better improvement in the fatigue  Resume regular follow-up  Ordered labs for next visit  Quality Measures:       Return in about 3 months (around 9/16/2023) for Recheck  No problem-specific Assessment & Plan notes found for this encounter  Diagnoses and all orders for this visit:    Benign hypertension  -     losartan (COZAAR) 50 mg tablet; Take 1 tablet (50 mg total) by mouth daily  -     metoprolol succinate (TOPROL-XL) 25 mg 24 hr tablet; Take 0 5 tablets (12 5 mg total) by mouth daily    Fatigue, unspecified type    Type 2 diabetes mellitus with hyperglycemia, without long-term current use of insulin ([facility])  -     Comprehensive metabolic panel; Future  -     CBC and differential; Future  -     Hemoglobin A1C; Future  -     Lipid panel; Future          Subjective:      Patient ID: Sergio Terrell is a 78 y o  male  Patient comes in today for follow-up  He states he feels much better  The fatigue is markedly improved with the adjustment in the blood pressure medicine  Blood pressure is controlled  His aches and pains went away as well, off of the statin  This would be the second statin giving him a problem        ALLERGIES:  Allergies   Allergen Reactions   • Fluorouracil Rash       CURRENT MEDICATIONS:    Current Outpatient Medications:   •  ALPRAZolam (XANAX) 0 5 mg tablet, TAKE 1 TABLET(0 5 MG) BY MOUTH TWICE DAILY, Disp: 60 tablet, Rfl: 2  •  aspirin (ECOTRIN LOW STRENGTH) 81 mg EC tablet, Take by mouth, Disp: , Rfl:   •  B Complex-Folic Acid (B COMPLEX PLUS) TABS, Take by mouth, Disp: , Rfl:   •  Cholecalciferol (VITAMIN D3) 1000 units CAPS, Take by mouth, Disp: , Rfl:   •  Chondroitin Sulfate-Vit C-Mn 400-60-2 5 MG CAPS, Take by mouth, Disp: , Rfl:   •  Coenzyme Q10 (COQ-10) 100 MG CAPS, Take by mouth, Disp: , Rfl:   •  fenofibrate (TRICOR) 54 MG tablet, TAKE 1 TABLET BY MOUTH DAILY, Disp: 90 tablet, Rfl: 3  •  losartan (COZAAR) 50 mg tablet, Take 1 tablet (50 mg total) by mouth daily, Disp: 90 tablet, Rfl: 1  •  meclizine (ANTIVERT) 25 mg tablet, TAKE 1 TABLET(25 MG) BY MOUTH THREE TIMES DAILY AS NEEDED FOR DIZZINESS, Disp: 60 tablet, Rfl: 3  •  metoprolol succinate (TOPROL-XL) 25 mg 24 hr tablet, Take 0 5 tablets (12 5 mg total) by mouth daily, Disp: 90 tablet, Rfl: 1  •  Multiple Vitamin (MULTI-VITAMIN DAILY PO), Take by mouth, Disp: , Rfl:   •  omeprazole (PriLOSEC) 40 MG capsule, TAKE 1 CAPSULE BY MOUTH  DAILY, Disp: 90 capsule, Rfl: 3  •  solifenacin (VESICARE) 5 mg tablet, TAKE 1 TABLET BY MOUTH  DAILY, Disp: 90 tablet, Rfl: 3    ACTIVE PROBLEM LIST:  Patient Active Problem List   Diagnosis   • KADEN on CPAP   • Anxiety   • Basal cell carcinoma of cheek   • Basal cell carcinoma of skin   • Benign hypertension   • Deviated nasal septum   • GERD (gastroesophageal reflux disease)   • Hyperlipidemia   • Morbid obesity (HCC)   • OAB (overactive bladder)   • Impaired fasting glucose   • Bright red blood per rectum   • Diverticulosis   • Rib fracture   • Abnormal CT of the abdomen   • Fall   • Forearm injury, left, subsequent encounter       PAST MEDICAL HISTORY:  Past Medical History:   Diagnosis Date   • Cancer ([facility] [address] )     basal call carcinoma   • Colon polyps     DR Zavala Me   • Diverticulosis    • Hyperlipidemia    • Hypertension    • Obesity    • KADEN (obstructive sleep apnea)     cpap   • Pneumonia    • Small bowel obstruction, partial (HCC)    • SOB (shortness of breath)        PAST SURGICAL HISTORY:  Past Surgical History:   Procedure Laterality Date   • SKIN BIOPSY     • TONSILLECTOMY AND ADENOIDECTOMY         FAMILY HISTORY:  Family History   Problem Relation Age of Onset   • Heart disease Mother    • Hypertension Mother    • Breast cancer Sister        SOCIAL HISTORY:  Social History     Socioeconomic History   • Marital status: Significant Other     Spouse name: Not on file   • "Number of children: 0   • Years of education: Not on file   • Highest education level: Not on file   Occupational History   • Occupation: SALES     Comment: FULL TIME    Tobacco Use   • Smoking status: Never   • Smokeless tobacco: Never   Vaping Use   • Vaping Use: Never used   Substance and Sexual Activity   • Alcohol use: Not Currently     Comment: occassional   • Drug use: No   • Sexual activity: Yes     Partners: Female     Comment: DENIED: HISTORY OF HIGH RISK SEXUAL BEHAVIOR    Other Topics Concern   • Not on file   Social History Narrative     as per all scripts      Social Determinants of Health     Financial Resource Strain: Low Risk  (5/11/2023)    Overall Financial Resource Strain (CARDIA)    • Difficulty of Paying Living Expenses: Not hard at all   Food Insecurity: Not on file   Transportation Needs: No Transportation Needs (5/11/2023)    PRAPARE - Transportation    • Lack of Transportation (Medical): No    • Lack of Transportation (Non-Medical): No   Physical Activity: Not on file   Stress: Not on file   Social Connections: Not on file   Intimate Partner Violence: Not on file   Housing Stability: Not on file       Review of Systems   Respiratory: Negative for shortness of breath  Cardiovascular: Negative for chest pain  Gastrointestinal: Negative for abdominal pain  Objective:  Vitals:    06/16/23 1010   Weight: 102 kg (224 lb)   Height: 5' 4\" (1 626 m)     Body mass index is 38 45 kg/m²  Physical Exam  Vitals and nursing note reviewed  Constitutional:       Appearance: He is well-developed  Cardiovascular:      Rate and Rhythm: Normal rate and regular rhythm  Heart sounds: Normal heart sounds  Pulmonary:      Effort: Pulmonary effort is normal       Breath sounds: Normal breath sounds  Abdominal:      Palpations: Abdomen is soft  Tenderness: There is no abdominal tenderness     Neurological:      Mental Status: He is alert and oriented to person, place, and " time            RESULTS:    In chart    This note was created with voice recognition software  Phonic, grammatical and spelling errors may be present within the note as a result

## 2023-06-23 ENCOUNTER — HOSPITAL ENCOUNTER (EMERGENCY)
Facility: HOSPITAL | Age: 78
Discharge: HOME | End: 2023-06-23
Attending: EMERGENCY MEDICINE
Payer: MEDICARE

## 2023-06-23 ENCOUNTER — APPOINTMENT (EMERGENCY)
Dept: RADIOLOGY | Facility: HOSPITAL | Age: 78
End: 2023-06-23
Payer: MEDICARE

## 2023-06-23 ENCOUNTER — EVALUATION (OUTPATIENT)
Dept: OCCUPATIONAL THERAPY | Facility: CLINIC | Age: 78
End: 2023-06-23
Payer: OTHER MISCELLANEOUS

## 2023-06-23 VITALS
BODY MASS INDEX: 39.27 KG/M2 | RESPIRATION RATE: 20 BRPM | TEMPERATURE: 98 F | DIASTOLIC BLOOD PRESSURE: 82 MMHG | HEART RATE: 83 BPM | OXYGEN SATURATION: 98 % | SYSTOLIC BLOOD PRESSURE: 159 MMHG | HEIGHT: 64 IN | WEIGHT: 230 LBS

## 2023-06-23 DIAGNOSIS — S82.891A CLOSED FRACTURE OF RIGHT ANKLE, INITIAL ENCOUNTER: Primary | ICD-10-CM

## 2023-06-23 DIAGNOSIS — M25.522 LEFT ELBOW PAIN: Primary | ICD-10-CM

## 2023-06-23 DIAGNOSIS — M79.632 LEFT FOREARM PAIN: ICD-10-CM

## 2023-06-23 PROCEDURE — 97110 THERAPEUTIC EXERCISES: CPT | Performed by: OCCUPATIONAL THERAPIST

## 2023-06-23 PROCEDURE — 73610 X-RAY EXAM OF ANKLE: CPT

## 2023-06-23 PROCEDURE — 97165 OT EVAL LOW COMPLEX 30 MIN: CPT | Performed by: OCCUPATIONAL THERAPIST

## 2023-06-23 PROCEDURE — 99283 EMERGENCY DEPT VISIT LOW MDM: CPT

## 2023-06-23 RX ORDER — NAPROXEN 500 MG/1
500 TABLET ORAL 2 TIMES DAILY WITH MEALS
Qty: 20 TABLET | Refills: 0 | Status: SHIPPED | OUTPATIENT
Start: 2023-06-23

## 2023-06-23 RX ORDER — OXYCODONE HYDROCHLORIDE 5 MG/1
5 TABLET ORAL EVERY 6 HOURS PRN
Qty: 10 TABLET | Refills: 0 | Status: SHIPPED | OUTPATIENT
Start: 2023-06-23 | End: 2023-06-28

## 2023-06-23 RX ORDER — NAPROXEN 250 MG/1
500 TABLET ORAL ONCE
Status: COMPLETED | OUTPATIENT
Start: 2023-06-23 | End: 2023-06-23

## 2023-06-23 RX ADMIN — NAPROXEN 500 MG: 250 TABLET ORAL at 17:37

## 2023-06-23 NOTE — ED PROVIDER NOTES
History  Chief Complaint   Patient presents with   • Ankle Pain     C/o right ankle pain since Tuesday, denies injuries  78 y o  M presents w 2 days of R ankle pain  States started after picking up something heavy  Now w minimal swelling, no discoloration, no deformity  Took ibuprofen at home w some relief  Prior to Admission Medications   Prescriptions Last Dose Informant Patient Reported? Taking?    ALPRAZolam (XANAX) 0 5 mg tablet  Self No No   Sig: TAKE 1 TABLET(0 5 MG) BY MOUTH TWICE DAILY   B Complex-Folic Acid (B COMPLEX PLUS) TABS  Self Yes No   Sig: Take by mouth   Cholecalciferol (VITAMIN D3) 1000 units CAPS  Self Yes No   Sig: Take by mouth   Chondroitin Sulfate-Vit C-Mn 400-60-2 5 MG CAPS  Self Yes No   Sig: Take by mouth   Coenzyme Q10 (COQ-10) 100 MG CAPS  Self Yes No   Sig: Take by mouth   Multiple Vitamin (MULTI-VITAMIN DAILY PO)  Self Yes No   Sig: Take by mouth   aspirin (ECOTRIN LOW STRENGTH) 81 mg EC tablet  Self Yes No   Sig: Take by mouth   fenofibrate (TRICOR) 54 MG tablet  Self No No   Sig: TAKE 1 TABLET BY MOUTH  DAILY   losartan (COZAAR) 50 mg tablet   No No   Sig: Take 1 tablet (50 mg total) by mouth daily   meclizine (ANTIVERT) 25 mg tablet  Self No No   Sig: TAKE 1 TABLET(25 MG) BY MOUTH THREE TIMES DAILY AS NEEDED FOR DIZZINESS   metoprolol succinate (TOPROL-XL) 25 mg 24 hr tablet   No No   Sig: Take 0 5 tablets (12 5 mg total) by mouth daily   omeprazole (PriLOSEC) 40 MG capsule  Self No No   Sig: TAKE 1 CAPSULE BY MOUTH  DAILY   solifenacin (VESICARE) 5 mg tablet  Self No No   Sig: TAKE 1 TABLET BY MOUTH  DAILY      Facility-Administered Medications: None       Past Medical History:   Diagnosis Date   • Cancer ([facility] [address] )     basal call carcinoma   • Colon polyps     DR Tan Alert   • Diverticulosis    • Hyperlipidemia    • Hypertension    • Obesity    • KADEN (obstructive sleep apnea)     cpap   • Pneumonia    • Small bowel obstruction, partial (HCC)    • SOB (shortness of breath)        Past Surgical History:   Procedure Laterality Date   • SKIN BIOPSY     • TONSILLECTOMY AND ADENOIDECTOMY         Family History   Problem Relation Age of Onset   • Heart disease Mother    • Hypertension Mother    • Breast cancer Sister      I have reviewed and agree with the history as documented  E-Cigarette/Vaping   • E-Cigarette Use Never User      E-Cigarette/Vaping Substances   • Nicotine No    • THC No    • CBD No    • Flavoring No    • Other No    • Unknown No      Social History     Tobacco Use   • Smoking status: Never   • Smokeless tobacco: Never   Vaping Use   • Vaping Use: Never used   Substance Use Topics   • Alcohol use: Not Currently     Comment: occassional   • Drug use: No       Review of Systems    Physical Exam  Physical Exam  Vitals reviewed  Constitutional:       General: He is not in acute distress  Appearance: He is well-developed  He is not diaphoretic  HENT:      Head: Normocephalic and atraumatic  Eyes:      Conjunctiva/sclera: Conjunctivae normal    Pulmonary:      Effort: Pulmonary effort is normal  No respiratory distress  Breath sounds: Normal breath sounds  Musculoskeletal:         General: Swelling and tenderness (R ankle, lateral, proximal to lateral mal) present  No deformity  Normal range of motion  Cervical back: Normal range of motion and neck supple  Skin:     General: Skin is warm and dry  Coloration: Skin is not pale  Findings: No bruising or erythema  Neurological:      Mental Status: He is alert and oriented to person, place, and time  Cranial Nerves: No cranial nerve deficit     Psychiatric:         Behavior: Behavior normal          Vital Signs  ED Triage Vitals   Temperature Pulse Respirations Blood Pressure SpO2   06/23/23 1536 06/23/23 1536 06/23/23 1536 06/23/23 1536 06/23/23 1536   98 °F (36 7 °C) 74 18 (!) 176/78 94 %      Temp src Heart Rate Source Patient Position - Orthostatic VS BP Location FiO2 (%)   -- 06/23/23 1536 06/23/23 1536 06/23/23 1536 --    Monitor Sitting Left arm       Pain Score       06/23/23 1737       5           Vitals:    06/23/23 1536   BP: (!) 176/78   Pulse: 74   Patient Position - Orthostatic VS: Sitting         Visual Acuity      ED Medications  Medications   naproxen (NAPROSYN) tablet 500 mg (500 mg Oral Given 6/23/23 1737)       Diagnostic Studies  Results Reviewed     None                 XR ankle 3+ views RIGHT    (Results Pending)              Procedures  Orthopedic injury treatment    Date/Time: 6/23/2023 5:42 PM    Performed by: Adelina Landin DO  Authorized by: Adelina Landin DO    Patient Location:  ED  [place] Protocol:  Consent: Verbal consent obtained  Risks and benefits: risks, benefits and alternatives were discussed  Consent given by: patient  Patient understanding: patient states understanding of the procedure being performed  Relevant documents: relevant documents present and verified  Radiology Images displayed and confirmed  If images not available, report reviewed: imaging studies available  Patient identity confirmed: verbally with patient      Neurovascular status: Neurovascularly intact    Distal perfusion: normal    Neurological function: normal    Range of motion: reduced    Immobilization:  Splint  Supplies used:  Cotton padding and Ortho-Glass  Neurovascular status: Neurovascularly intact    Distal perfusion: normal    Neurological function: normal    Range of motion: normal    Range of motion comment:  Splinted  Patient tolerance:  Patient tolerated the procedure well with no immediate complications   Post w stirrups  Crutches  Right ankle             ED Course                               SBIRT 22yo+    Flowsheet Row Most Recent Value   Initial Alcohol Screen: US AUDIT-C     1  How often do you have a drink containing alcohol? 0 Filed at: 06/23/2023 1539   2   How many drinks containing alcohol do you have on a typical day you are drinking?  0 Filed at: 06/23/2023 1539   3a  Male UNDER 65: How often do you have five or more drinks on one occasion? 0 Filed at: 06/23/2023 1539   3b  FEMALE Any Age, or MALE 65+: How often do you have 4 or more drinks on one occassion? 0 Filed at: 06/23/2023 1539   Audit-C Score 0 Filed at: 06/23/2023 1539   STEPHANIE: How many times in the past year have you    Used an illegal drug or used a prescription medication for non-medical reasons? Never Filed at: 06/23/2023 1539                    Medical Decision Making  Right ankle pain after lifting something heavy  Xray concerning for stress fx, right ankle  Splint, ortho f/u, pain control  Amount and/or Complexity of Data Reviewed  Radiology: ordered  Risk  Prescription drug management  Disposition  Final diagnoses:   Closed fracture of right ankle, initial encounter     Time reflects when diagnosis was documented in both MDM as applicable and the Disposition within this note     Time User Action Codes Description Comment    6/23/2023  5:14 PM Jose Gillis Add [K07 925K] Closed fracture of right ankle, initial encounter       ED Disposition     ED Disposition   Discharge    Condition   Stable    Date/Time   Fri Jun 23, 2023  5:37 PM    Comment   Debra Rivera discharge to home/self care                 Follow-up Information     Follow up With Specialties Details Why Contact Info Additional Information    Luisana Gordon,  Orthopedic Surgery Schedule an appointment as soon as possible for a visit  For follow up to ensure improvement, and for further testing and treatment as needed [facility] Emergency Department Emergency Medicine  If symptoms worsen [address] Emergency Department, [address] [place] [address], [facility]          Patient's Medications   Discharge Prescriptions    NAPROXEN (NAPROSYN) 500 MG TABLET    Take 1 tablet (500 mg total) by mouth 2 (two) times a day with meals Mild pain control       Start Date: 6/23/2023 End Date: --       Order Dose: 500 mg       Quantity: 20 tablet    Refills: 0    OXYCODONE (ROXICODONE) 5 IMMEDIATE RELEASE TABLET    Take 1 tablet (5 mg total) by mouth every 6 (six) hours as needed for severe pain for up to 5 days Max Daily Amount: 20 mg       Start Date: 6/23/2023 End Date: 6/28/2023       Order Dose: 5 mg       Quantity: 10 tablet    Refills: 0       No discharge procedures on file      PDMP Review       Value Time User    PDMP Reviewed  Yes 4/28/2023  2:10 PM Chon Ling MD          ED Provider  Electronically Signed by           Nelida Fall DO  06/23/23 5742

## 2023-06-23 NOTE — PROGRESS NOTES
OT Evaluation     Today's date: 2023  Patient name: Duane Human  : 1945  MRN: [id number]  Referring provider: Aurelia Monreal  Dx:   Encounter Diagnosis     ICD-10-CM    1  Left elbow pain  M25 522       2  Left forearm pain  M79 632                      Assessment  Assessment details: Josselyn Estrada is a 77 y/o RHD male  who was originally injured in [facility] 2022 at work  He was lifting a box weighing about 75 lbs and his left hand slipped, causing forceful pronation of the left  He has severe pain which did not resolve so the next day he went to the ED  (-) Fx  Referred to an orthopedic  He was referred by his orthopedic for therapy  He attended therapy in 2023 for pain of his left wrist and FA  At that time he was prescribed anti inflammatory medications and an OTC wrist splint  He returned to his orthopedic in the splint and was referred for an MRI in April which diagnosed a partial distal bicep tendon tear  He returns today for continuation of therapy as surgery is not recommended at this time  He reports steady reduction of pain and wears the splint most of the day but not at night  He returns today for continuation of care due to pain of the mid forearm and volar elbow  Pain of the wrist has resolved  He does continue to wear a wrist splint which controls pain with lifting  He continues to work at [employer] and must lift heavy occasionally  Examination reveals functional motion of the elbow, forearm, and wrist   Pain at rest is minimal and pain is localized to the forearm, especially with supination or lifting  Left  strength is functional and greater than 50% of the right  No sensory complaints  No edema or sympathetic changes  Evaluation is of low complexity, due to minimal comorbidities and stable clinical presentation       Pt demonstrates good tolerance of therapy today and was provided with a written HEP focusing on eccentric strengthening of the elbow, FA supination, and wrist flexion  He was instructed to perform exercises daily  The patient demonstrates HEP instructions appropriately, verbalizes understanding, and is in agreement with the written HEP  Pt will benefit from skilled OT intervention to progress function, resolve pain, wean from splinting, and allow return to PLOF  Impairments: abnormal or restricted ROM, activity intolerance, impaired physical strength, lacks appropriate home exercise program and pain with function    Symptom irritability: lowUnderstanding of Dx/Px/POC: excellent  Goals  STG - 2 weeks  Reduce resting pain to less than 2/10  HEP compliance of stretches and glides  HEP compliance in wearing brace/strap as instructed  LTG  Achieve premorbid AROM of Fa/wrist/hand  Pain with function less than 2/10   strength to at least 75% of uninvolved  Resolve sensory complaints  Return to PLOF with symptom control, splinting prn for lifting  Plan  Patient would benefit from: OT eval and skilled occupational therapy  Planned modality interventions: thermotherapy: hydrocollator packs  Planned therapy interventions: joint mobilization, manual therapy, massage, patient education, strengthening, stretching, therapeutic activities, therapeutic exercise, home exercise program, graded activity, functional ROM exercises and activity modification  Other planned therapy interventions: eccentric strengthening     Duration in weeks: 4  Plan of Care beginning date: 2023  Plan of Care expiration date: 2023  Treatment plan discussed with: patient        Subjective Evaluation    History of Present Illness  Mechanism of injury: trauma  Quality of life: excellent    Pain  Current pain ratin  At worst pain ratin  Quality: dull ache, discomfort, sharp and pulling  Relieving factors: rest and support  Aggravating factors: lifting  Progression: improved    Social Support    Employment status: working ("[employer])  Hand dominance: right    Treatments  Previous treatment: immobilization and medication  Current treatment: occupational therapy  Patient Goals  Patient goals for therapy: decreased pain, increased motion and increased strength  Patient goal: resolve pain; normal use        Objective     Observations     Left Elbow   Negative for deformity  Left Wrist/Hand   Negative for edema  Palpation   Left   No palpable tenderness to the biceps  Tenderness of the biceps, extensor carpi radialis brevis, extensor carpi radialis longus and supinator  Tenderness     Left Elbow   Tenderness in the distal biceps tendon  Left Wrist/Hand   Tenderness in the distal biceps tendon  No tenderness in the third dorsal compartment, fourth dorsal compartment and triangular fibrocartilage complex   Neurological Testing     Sensation     Elbow   Left Elbow   Intact: light touch    Wrist/Hand   Left   Intact: light touch    Active Range of Motion     Left Elbow   Normal active range of motion    Left Wrist   Normal active range of motion    Strength/Myotome Testing     Left Elbow   Flexion: 4+  Extension: 5  Forearm supination: 4+  Forearm pronation: 5    Left Wrist/Hand   Wrist extension: 4+  Wrist flexion: 5  Radial deviation: 5  Ulnar deviation: 5    Additional Strength Details  Limited by pain   Matthieu #2  L  73 8 bs    R  95 lbs  Tests     Left Elbow   Negative Cozen's, Mill's, pronator compression and radial tunnel  Precautions:  Universal  Access Code: [email or web address]  URL: [email or web address]  Date: 06/23/2023  Prepared by: Isauro Silva    Exercises  - Standing Single Arm Eccentric Bicep Curl Pronated then Supinated  - 1 x daily - 7 x weekly - 3 sets - 10 reps  - Forearm Pronation and Supination with Hammer  - 1 x daily - 7 x weekly - 3 sets - 10 reps  - Seated Eccentric Wrist Flexion with Dumbbell  - 1 x daily - 7 x weekly - 3 sets - 10 reps    POC expires Auth Status Unit limit Start date  Expiration date PT/OT + Visit Limit?   4 weeks  7/21/23 Yes, req  4 6/23/23 12/31/23 12 then Emma Lombardo       Dx Left FA, elbow pain       FOTO @ IE               Date 6/23            Visit 1            Manuals                                                                 Neuro Re-Ed                                                                                                        Ther Ex 15'            HEP Ecc PRE  Elbow E, FA, wrist F                                                                                                       Ther Activity                                       Gait Training                                       Modalities             MHP Pre tx

## 2023-06-24 ENCOUNTER — NURSE TRIAGE (OUTPATIENT)
Dept: OTHER | Facility: OTHER | Age: 78
End: 2023-06-24

## 2023-06-24 NOTE — TELEPHONE ENCOUNTER
Patient calling in to schedule ER follow up visit with Dr Wing BEY left  Please call him during normal business hours  Thank you

## 2023-06-24 NOTE — TELEPHONE ENCOUNTER
"Regarding: fractured ankle / needs follow up appt   ----- Message from Irina Rooney sent at 6/24/2023  7:29 AM EDT -----  \"I was in the ER yesterday and I have  a fractured ankle  They put a temporary splint on it and I am to follow up with Dr Schrader and I'd like to schedule an appt  \"    "

## 2023-06-26 ENCOUNTER — TELEPHONE (OUTPATIENT)
Dept: OBGYN CLINIC | Facility: HOSPITAL | Age: 78
End: 2023-06-26

## 2023-06-26 NOTE — TELEPHONE ENCOUNTER
Force on request sent to [facility],  Please advise if the following patient can be forced onto the schedule:    Patient: Giorgi Jack    : 1945    MRN: [id number]    Call back #:     Insurance: [address] [facility]     Reason for appointment: Patient seen in ER on 23 for possible close fracture of right ankle  Placed splint with cloth material and it wore through  Patient wanting to get boot  Patient asking to be seen in [facility]  Requested doctor/location: Dameon/Ashu//Kannan  Thank you

## 2023-06-27 ENCOUNTER — OFFICE VISIT (OUTPATIENT)
Dept: OBGYN CLINIC | Facility: CLINIC | Age: 78
End: 2023-06-27
Payer: MEDICARE

## 2023-06-27 VITALS
HEART RATE: 64 BPM | DIASTOLIC BLOOD PRESSURE: 79 MMHG | WEIGHT: 230 LBS | HEIGHT: 64 IN | SYSTOLIC BLOOD PRESSURE: 146 MMHG | BODY MASS INDEX: 39.27 KG/M2

## 2023-06-27 DIAGNOSIS — M54.31 SCIATICA OF RIGHT SIDE: ICD-10-CM

## 2023-06-27 DIAGNOSIS — S93.401A SPRAIN OF UNSPECIFIED LIGAMENT OF RIGHT ANKLE, INITIAL ENCOUNTER: Primary | ICD-10-CM

## 2023-06-27 PROCEDURE — 99214 OFFICE O/P EST MOD 30 MIN: CPT | Performed by: ORTHOPAEDIC SURGERY

## 2023-06-27 NOTE — PROGRESS NOTES
Orthopaedics Office Visit - New Patient Visit    ASSESSMENT/PLAN:    Assessment:   Right ankle sprain  Sciatica, right   DOI 6/22/23     Plan:   · X-rays were reviewed, no fracture or dislocation   · He was fit with a cam walker boot   · He was advised he cannot drive in the boot  · If he is unable to work in the cam walker boot he may continue the use of the air cast   · He will start PT for ankle sprain exercises as well as for sciatica   · Continue Naproxen for pain control   · Follow up in the office as needed if symptoms worsen or fail to improve     To Do Next Visit:  PRN    _____________________________________________________  CHIEF COMPLAINT:  Chief Complaint   Patient presents with   • Right Ankle - Pain         SUBJECTIVE:  Basia Dover is a 78 y o  male who presents to the office for right ankle pain  He states on Thursday 6/22/23 he was lifting something heavy and began to have lateral ankle pain  He presented to the ED the following day, at which time x-rays were performed and he was placed into a splint  He presented back to the ED yesterday as the splint was falling apart  He was provided with a air cast at that time  He notes the air cast has been beneficial for hm  He notes pain to the lateral aspect of the ankle  He is ambulating with a cane since injury  He notes right sided sciatica  He is taking Naproxen for pain control       He works at [employer] in sales      PAST MEDICAL HISTORY:  Past Medical History:   Diagnosis Date   • [facility] )     basal call carcinoma   • Colon polyps     DR Hamida Gunter   • Diverticulosis    • Hyperlipidemia    • Hypertension    • Obesity    • KADEN (obstructive sleep apnea)     cpap   • Pneumonia    • Small bowel obstruction, partial (HCC)    • SOB (shortness of breath)        PAST SURGICAL HISTORY:  Past Surgical History:   Procedure Laterality Date   • SKIN BIOPSY     • TONSILLECTOMY AND ADENOIDECTOMY         FAMILY HISTORY:  Family History   Problem Relation Age of Onset   • Heart disease Mother    • Hypertension Mother    • Breast cancer Sister        SOCIAL HISTORY:  Social History     Tobacco Use   • Smoking status: Never   • Smokeless tobacco: Never   Vaping Use   • Vaping Use: Never used   Substance Use Topics   • Alcohol use: Not Currently     Comment: occassional   • Drug use: No       MEDICATIONS:    Current Outpatient Medications:   •  ALPRAZolam (XANAX) 0 5 mg tablet, TAKE 1 TABLET(0 5 MG) BY MOUTH TWICE DAILY, Disp: 60 tablet, Rfl: 2  •  aspirin (ECOTRIN LOW STRENGTH) 81 mg EC tablet, Take by mouth, Disp: , Rfl:   •  B Complex-Folic Acid (B COMPLEX PLUS) TABS, Take by mouth, Disp: , Rfl:   •  Cholecalciferol (VITAMIN D3) 1000 units CAPS, Take by mouth, Disp: , Rfl:   •  Chondroitin Sulfate-Vit C-Mn 400-60-2 5 MG CAPS, Take by mouth, Disp: , Rfl:   •  Coenzyme Q10 (COQ-10) 100 MG CAPS, Take by mouth, Disp: , Rfl:   •  fenofibrate (TRICOR) 54 MG tablet, TAKE 1 TABLET BY MOUTH  DAILY, Disp: 90 tablet, Rfl: 3  •  losartan (COZAAR) 50 mg tablet, Take 1 tablet (50 mg total) by mouth daily, Disp: 90 tablet, Rfl: 1  •  meclizine (ANTIVERT) 25 mg tablet, TAKE 1 TABLET(25 MG) BY MOUTH THREE TIMES DAILY AS NEEDED FOR DIZZINESS, Disp: 60 tablet, Rfl: 3  •  metoprolol succinate (TOPROL-XL) 25 mg 24 hr tablet, Take 0 5 tablets (12 5 mg total) by mouth daily, Disp: 90 tablet, Rfl: 1  •  Multiple Vitamin (MULTI-VITAMIN DAILY PO), Take by mouth, Disp: , Rfl:   •  naproxen (Naprosyn) 500 mg tablet, Take 1 tablet (500 mg total) by mouth 2 (two) times a day with meals Mild pain control, Disp: 20 tablet, Rfl: 0  •  omeprazole (PriLOSEC) 40 MG capsule, TAKE 1 CAPSULE BY MOUTH  DAILY, Disp: 90 capsule, Rfl: 3  •  oxyCODONE (Roxicodone) 5 immediate release tablet, Take 1 tablet (5 mg total) by mouth every 6 (six) hours as needed for severe pain for up to 5 days Max Daily Amount: 20 mg, Disp: 10 tablet, Rfl: 0  •  solifenacin (VESICARE) 5 mg tablet, TAKE 1 TABLET BY MOUTH  DAILY, "Disp: 90 tablet, Rfl: 3    ALLERGIES:  Allergies   Allergen Reactions   • Fluorouracil Rash       REVIEW OF SYSTEMS:  MSK: as noted in HPI  Neuro: WNL  Pertinent items are otherwise noted in HPI  A comprehensive review of systems was otherwise negative  LABS:  HgA1c:   Lab Results   Component Value Date    HGBA1C 7 6 (H) 03/31/2023     BMP:   Lab Results   Component Value Date    CALCIUM 8 6 03/31/2023    K 3 8 03/31/2023    CO2 29 03/31/2023     03/31/2023    BUN 14 03/31/2023    CREATININE 1 00 03/31/2023     CBC: No components found for: \"CBC\"    _____________________________________________________  PHYSICAL EXAMINATION:  Vital signs: /79   Pulse 64   Ht 5' 4\" (1 626 m)   Wt 104 kg (230 lb)   BMI 39 48 kg/m²   General: No acute distress, awake and alert  Psychiatric: Mood and affect appear appropriate  HEENT: Trachea Midline, No torticollis, no apparent facial trauma  Cardiovascular: No audible murmurs; Extremities appear perfused  Pulmonary: No audible wheezing or stridor  Skin: No open lesions; see further details (if any) below    MUSCULOSKELETAL EXAMINATION:    Extremities:  Right ankle     No erythema, ecchymosis or significant edema  ATFL tenderness   No medial tenderness   Normal dorsi flexion and plantar flexion   Able to wiggle toes   Ambulates with a cane   Extremity appears warm and well perfused     _____________________________________________________  STUDIES REVIEWED:  I personally reviewed the images and interpretation is as follows:  X-rays of the right ankle demonstrate no acute fracture or dislocation  No significant degenerative changes noted  PROCEDURES PERFORMED:  Procedures    Scribe Attestation    I,:  Myriam Gutierrez am acting as a scribe while in the presence of the attending physician :       I,:  Bandar Rojas MD personally performed the services described in this documentation    as scribed in my presence  :           "

## 2023-06-29 ENCOUNTER — OFFICE VISIT (OUTPATIENT)
Dept: OCCUPATIONAL THERAPY | Facility: CLINIC | Age: 78
End: 2023-06-29
Payer: OTHER MISCELLANEOUS

## 2023-06-29 DIAGNOSIS — M25.522 LEFT ELBOW PAIN: Primary | ICD-10-CM

## 2023-06-29 DIAGNOSIS — M79.632 LEFT FOREARM PAIN: ICD-10-CM

## 2023-06-29 PROCEDURE — 97110 THERAPEUTIC EXERCISES: CPT | Performed by: OCCUPATIONAL THERAPIST

## 2023-06-29 PROCEDURE — 97140 MANUAL THERAPY 1/> REGIONS: CPT | Performed by: OCCUPATIONAL THERAPIST

## 2023-06-29 NOTE — PROGRESS NOTES
"Daily Note     Today's date: 2023  Patient name: Jaz De Leon  : 1945  MRN: [id number]  Referring provider: Froilan Garcia  Dx:   Encounter Diagnosis     ICD-10-CM    1  Left elbow pain  M25 522       2  Left forearm pain  M79 632                      Subjective: \"I am feeling better with the exercises\"  \"I sprained my ankle\"        Objective: See treatment diary below  Patient ambulating with cane due to recent ankle sprain  Assessment: Tolerated treatment well  Patient would benefit from continued OT ;  Pain of bicep and forearm reducing  Plan: Progress treatment as tolerated  NV, progress to active PRE's     Precautions:  Universal  Access Code: [email or web address]  URL: [email or web address]  Date: 2023  Prepared by: Heather Do    Exercises  - Standing Single Arm Eccentric Bicep Curl Pronated then Supinated  - 1 x daily - 7 x weekly - 3 sets - 10 reps  - Forearm Pronation and Supination with Hammer  - 1 x daily - 7 x weekly - 3 sets - 10 reps  - Seated Eccentric Wrist Flexion with Dumbbell  - 1 x daily - 7 x weekly - 3 sets - 10 reps    POC expires Auth Status Unit limit Start date  Expiration date PT/OT + Visit Limit?   4 weeks  23 Yes, req  4 23 12 then Ronen Barker       Dx Left FA, elbow pain       FOTO @ IE               Date            Visit 1 2           Manuals  8'                        IASTM  Elbow FA           STM  Distal bicep                        Neuro Re-Ed                                                                                                        Ther Ex 15'   23'           HEP Ecc PRE  Elbow E, FA, wrist F Increase # in HEP, add isometric           PROM 1:1  Eblow E wrist E FA sup/pro           Elbow PRE   Isometric  2x10x3\"  Ecc #2  2x10           FA  PRE  #2  2x10           wrist PRE  #2  2x10                                                  Ther Activity                                     " Gait Training                                       Modalities             MHP Pre tx

## 2023-07-06 ENCOUNTER — OFFICE VISIT (OUTPATIENT)
Dept: OCCUPATIONAL THERAPY | Facility: CLINIC | Age: 78
End: 2023-07-06
Payer: OTHER MISCELLANEOUS

## 2023-07-06 DIAGNOSIS — M79.632 LEFT FOREARM PAIN: ICD-10-CM

## 2023-07-06 DIAGNOSIS — M25.522 LEFT ELBOW PAIN: Primary | ICD-10-CM

## 2023-07-06 PROCEDURE — 97110 THERAPEUTIC EXERCISES: CPT | Performed by: OCCUPATIONAL THERAPIST

## 2023-07-06 PROCEDURE — 97140 MANUAL THERAPY 1/> REGIONS: CPT | Performed by: OCCUPATIONAL THERAPIST

## 2023-07-06 NOTE — PROGRESS NOTES
Daily Note     Today's date: 2023  Patient name: Carlo Whyte  : 1945  MRN: [id number]  Referring provider: Joanie Babin  Dx:   Encounter Diagnosis     ICD-10-CM    1. Left elbow pain  M25.522       2. Left forearm pain  M79.632                      Subjective: "I am feeling better with the exercises"  "I sprained my ankle"      Pain today 1/10      Objective: See treatment diary below. Patient ambulating with cane due to recent ankle sprain. Assessment: Tolerated treatment well. Patient would benefit from continued OT ;  Pain of bicep and forearm resolving. Plan: Progress treatment as tolerated. Added  active PRE's to HEP with weaning of eccentric PRE's. Return in 2 weeks and discharge if goals are met. Precautions:  Universal  Access Code: [email or web address]  URL: [email or web address]  Date: 2023  Prepared by: Deshaun Esparza    Exercises  - Standing Single Arm Eccentric Bicep Curl Pronated then Supinated  - 1 x daily - 7 x weekly - 3 sets - 10 reps  - Forearm Pronation and Supination with Hammer  - 1 x daily - 7 x weekly - 3 sets - 10 reps  - Seated Eccentric Wrist Flexion with Dumbbell  - 1 x daily - 7 x weekly - 3 sets - 10 reps    POC expires Auth Status Unit limit Start date  Expiration date PT/OT + Visit Limit?   4 weeks  23 Yes, req. 4 23 12 then Dannielle Rodarte       Dx Left FA, elbow pain       FOTO @ IE               Date           Visit 1 2 3          Manuals  8' 8'                       IASTM  Elbow FA Elbow Volar FA          STM  Distal bicep Distal bicep                       Neuro Re-Ed                                                                                                        Ther Ex 15'      23'          HEP Ecc PRE  Elbow E, FA, wrist F Increase # in HEP, add isometric Wean Ecc, progress to concentric          PROM 1:1  Eblow E wrist E FA sup/pro PROM elbow E pronate          Elbow PRE Isometric  2x10x3"  Ecc #2  2x10 Concentric Bicep  #2  2x10          FA  PRE  #2  2x10 #2  2x10          wrist PRE  #2  2x10 #2 2x10                                                 Ther Activity                                       Gait Training                                       Modalities             MHP Pre tx

## 2023-07-20 ENCOUNTER — OFFICE VISIT (OUTPATIENT)
Dept: OCCUPATIONAL THERAPY | Facility: CLINIC | Age: 78
End: 2023-07-20
Payer: OTHER MISCELLANEOUS

## 2023-07-20 DIAGNOSIS — M79.632 LEFT FOREARM PAIN: ICD-10-CM

## 2023-07-20 DIAGNOSIS — M25.522 LEFT ELBOW PAIN: Primary | ICD-10-CM

## 2023-07-20 PROCEDURE — 97168 OT RE-EVAL EST PLAN CARE: CPT | Performed by: OCCUPATIONAL THERAPIST

## 2023-07-20 NOTE — PROGRESS NOTES
OT Re-Evaluation  and OT Discharge     Today's date: 2023  Patient name: Kurt Nance  : 1945  MRN: [id number]  Referring provider: Maday Rob  Dx:   Encounter Diagnosis     ICD-10-CM    1. Left elbow pain  M25.522       2. Left forearm pain  M79.632                      Assessment  Assessment details:   LOLA Fierro is a 79 y/o RHD male  who was originally injured in [facility] 2022 at work. He was lifting a box weighing about 75 lbs and his left hand slipped, causing forceful pronation of the left. He has severe pain which did not resolve so the next day he went to the ED. (-) Fx. Referred to an orthopedic. He was referred by his orthopedic for therapy. He attended therapy in 2023 for pain of his left wrist and FA. At that time he was prescribed anti inflammatory medications and an OTC wrist splint. He returned to his orthopedic in the splint and was referred for an MRI in April which diagnosed a partial distal bicep tendon tear. He returns today for continuation of therapy as surgery is not recommended at this time. He reports steady reduction of pain and wears the splint most of the day but not at night. He returns today for continuation of care due to pain of the mid forearm and volar elbow. Pain of the wrist has resolved. He does continue to wear a wrist splint which controls pain with lifting. He continues to work at [employer] and must lift heavy occasionally. Examination reveals functional motion of the elbow, forearm, and wrist.  Pain at rest is minimal and pain is localized to the forearm, especially with supination or lifting. Left  strength is functional and greater than 50% of the right. No sensory complaints. No edema or sympathetic changes. Evaluation is of low complexity, due to minimal comorbidities and stable clinical presentation.      Pt demonstrates good tolerance of therapy today and was provided with a written HEP focusing on eccentric strengthening of the elbow, FA supination, and wrist flexion. He was instructed to perform exercises daily. The patient demonstrates HEP instructions appropriately, verbalizes understanding, and is in agreement with the written HEP. Pt will benefit from skilled OT intervention to progress function, resolve pain, wean from splinting, and allow return to PLOF. Discharge Summary/RE  7/20/2023  Wen Caro has been seen for 4 OT visits and has made excellent progress. The patient presents with decreased pain and improved functional use of the left elbow and FA. Pain with resisted supination greatly decrease with lifting lighter weights. The patient  is independent with the upgraded HEP. The patient  has achieved maximum benefit from OT and continued skilled OT is not indicated at this time. Recommend discharge from OT. The patient is in agreement with discharge plan    Impairments: abnormal or restricted ROM, activity intolerance, impaired physical strength, lacks appropriate home exercise program and pain with function    Symptom irritability: lowUnderstanding of Dx/Px/POC: excellent  Goals  STG - 2 weeks  Reduce resting pain to less than 2/10 MET  HEP compliance of stretches and glidesMET  HEP compliance in wearing brace/strap as instructed. MET    LTG  Achieve premorbid AROM of Fa/wrist/handMET  Pain with function less than 2/10MET   strength to at least 75% of uninvolvedMET  Resolve sensory complaintsMET  Return to PLOF with symptom control, splinting prn for lifting.  MET      Plan  Patient would benefit from: discharge to HEP  Planned modality interventions: thermotherapy: hydrocollator packs  Planned therapy interventions: joint mobilization, manual therapy, massage, patient education, strengthening, stretching, therapeutic activities, therapeutic exercise, home exercise program, graded activity, functional ROM exercises and activity modification  Other planned therapy interventions: eccentric strengthening. Duration in weeks: 4  Plan of Care beginning date: 2023  Plan of Care expiration date: 2023  Treatment plan discussed with: patient        Subjective Evaluation    History of Present Illness  Mechanism of injury: trauma  Quality of life: excellent    Pain  Current pain ratin  At worst pain ratin  Quality: discomfort  Relieving factors: rest and support  Aggravating factors: lifting  Progression: improved    Social Support    Employment status: working ([address] [place] [address])  Hand dominance: right    Treatments  Previous treatment: immobilization and medication  Current treatment: occupational therapy  Patient Goals  Patient goals for therapy: decreased pain, increased motion and increased strength  Patient goal: resolve pain; normal use        Objective     Observations     Left Elbow   Negative for deformity. Left Wrist/Hand   Negative for edema. Palpation   Left   No palpable tenderness to the biceps. Tenderness of the biceps, extensor carpi radialis brevis, extensor carpi radialis longus and supinator. Tenderness     Left Elbow   Tenderness in the distal biceps tendon. Left Wrist/Hand   Tenderness in the distal biceps tendon. No tenderness in the third dorsal compartment, fourth dorsal compartment and triangular fibrocartilage complex . Neurological Testing     Sensation     Elbow   Left Elbow   Intact: light touch    Wrist/Hand   Left   Intact: light touch    Active Range of Motion     Left Elbow   Normal active range of motion    Left Wrist   Normal active range of motion    Strength/Myotome Testing     Left Elbow   Flexion: 5  Extension: 5  Forearm supination: 5  Forearm pronation: 5    Left Wrist/Hand   Wrist extension: 5  Wrist flexion: 5  Radial deviation: 5  Ulnar deviation: 5    Additional Strength Details  Limited by pain   Matthieu #2  L  91.2 lbs. R  95 lbs.      Tests     Left Elbow   Negative Cozen's, Mill's, pronator compression and radial tunnel. Daily Note     Today's date: 2023  Patient name: Veronica Ott  : 1945  MRN: [id number]  Referring provider: Anibal Molina  Dx:   Encounter Diagnosis     ICD-10-CM    1. Left elbow pain  M25.522       2. Left forearm pain  M79.632                      Subjective: "I am feeling better with the exercises"  "The pain is better, I am just careful with lifting"      Objective: See treatment diary below. Assessment: Tolerated treatment well. Pain of bicep and forearm resolving. May discharge, therapy goals and FOTO met. Plan:   Discharge to HEP     Precautions:  Universal  Access Code: [email or web address]  URL: [email or web address]  Date: 2023  Prepared by: Roxy Burgos    Exercises  - Standing Single Arm Eccentric Bicep Curl Pronated then Supinated  - 1 x daily - 7 x weekly - 3 sets - 10 reps  - Forearm Pronation and Supination with Hammer  - 1 x daily - 7 x weekly - 3 sets - 10 reps  - Seated Eccentric Wrist Flexion with Dumbbell  - 1 x daily - 7 x weekly - 3 sets - 10 reps    POC expires Auth Status Unit limit Start date  Expiration date PT/OT + Visit Limit?   4 weeks  23 Yes, req. 4 23 12 then Nisha Leal       Dx Left FA, elbow pain       FOTO met               Date   RE/F         Visit 1 2 3 4         Manuals  8' 8'                       IASTM  Elbow FA Elbow Volar FA          STM  Distal bicep Distal bicep                       Neuro Re-Ed                                                                                                        Ther Ex 15'   23'   23'  15'         HEP Ecc PRE  Elbow E, FA, wrist F Increase # in HEP, add isometric Wean Ecc, progress to concentric HEP review for d/c    RE         PROM 1:1  Eblow E wrist E FA sup/pro PROM elbow E pronate PROM elbow, FA         Elbow PRE   Isometric  2x10x3"  Ecc #2  2x10 Concentric Bicep  #2  2x10 1:1   Resisted S/P  Resisted Elbow F FA  PRE  #2  2x10 #2  2x10          wrist PRE  #2  2x10 #2 2x10                                                 Ther Activity                                       Gait Training                                       Modalities             MHP Pre tx

## 2023-07-31 DIAGNOSIS — F41.9 ANXIETY: ICD-10-CM

## 2023-07-31 RX ORDER — ALPRAZOLAM 0.5 MG/1
TABLET ORAL
Qty: 60 TABLET | Refills: 3 | Status: SHIPPED | OUTPATIENT
Start: 2023-07-31

## 2023-07-31 NOTE — TELEPHONE ENCOUNTER
Controlled Substance Review    [place] PDMP or [place]  reviewed: No red flags were identified; safe to proceed with prescription. Keon Baker

## 2023-08-16 ENCOUNTER — TELEPHONE (OUTPATIENT)
Dept: GASTROENTEROLOGY | Facility: CLINIC | Age: 78
End: 2023-08-16

## 2023-08-17 ENCOUNTER — APPOINTMENT (OUTPATIENT)
Dept: LAB | Facility: HOSPITAL | Age: 78
End: 2023-08-17
Payer: MEDICARE

## 2023-08-17 DIAGNOSIS — E11.65 TYPE 2 DIABETES MELLITUS WITH HYPERGLYCEMIA, WITHOUT LONG-TERM CURRENT USE OF INSULIN (HCC): ICD-10-CM

## 2023-08-17 LAB
ALBUMIN SERPL BCP-MCNC: 4.3 G/DL (ref 3.5–5)
ALP SERPL-CCNC: 57 U/L (ref 34–104)
ALT SERPL W P-5'-P-CCNC: 31 U/L (ref 7–52)
ANION GAP SERPL CALCULATED.3IONS-SCNC: 5 MMOL/L
AST SERPL W P-5'-P-CCNC: 27 U/L (ref 13–39)
BASOPHILS # BLD AUTO: 0.08 THOUSANDS/ÂΜL (ref 0–0.1)
BASOPHILS NFR BLD AUTO: 1 % (ref 0–1)
BILIRUB SERPL-MCNC: 0.68 MG/DL (ref 0.2–1)
BUN SERPL-MCNC: 18 MG/DL (ref 5–25)
CALCIUM SERPL-MCNC: 9.2 MG/DL (ref 8.4–10.2)
CHLORIDE SERPL-SCNC: 104 MMOL/L (ref 96–108)
CHOLEST SERPL-MCNC: 198 MG/DL
CO2 SERPL-SCNC: 29 MMOL/L (ref 21–32)
CREAT SERPL-MCNC: 1.12 MG/DL (ref 0.6–1.3)
EOSINOPHIL # BLD AUTO: 0.15 THOUSAND/ÂΜL (ref 0–0.61)
EOSINOPHIL NFR BLD AUTO: 2 % (ref 0–6)
ERYTHROCYTE [DISTWIDTH] IN BLOOD BY AUTOMATED COUNT: 13.8 % (ref 11.6–15.1)
EST. AVERAGE GLUCOSE BLD GHB EST-MCNC: 177 MG/DL
GFR SERPL CREATININE-BSD FRML MDRD: 63 ML/MIN/1.73SQ M
GLUCOSE P FAST SERPL-MCNC: 198 MG/DL (ref 65–99)
HBA1C MFR BLD: 7.8 %
HCT VFR BLD AUTO: 46.9 % (ref 36.5–49.3)
HDLC SERPL-MCNC: 34 MG/DL
HGB BLD-MCNC: 16.4 G/DL (ref 12–17)
IMM GRANULOCYTES # BLD AUTO: 0.03 THOUSAND/UL (ref 0–0.2)
IMM GRANULOCYTES NFR BLD AUTO: 0 % (ref 0–2)
LYMPHOCYTES # BLD AUTO: 2.17 THOUSANDS/ÂΜL (ref 0.6–4.47)
LYMPHOCYTES NFR BLD AUTO: 28 % (ref 14–44)
MCH RBC QN AUTO: 31.4 PG (ref 26.8–34.3)
MCHC RBC AUTO-ENTMCNC: 35 G/DL (ref 31.4–37.4)
MCV RBC AUTO: 90 FL (ref 82–98)
MONOCYTES # BLD AUTO: 0.75 THOUSAND/ÂΜL (ref 0.17–1.22)
MONOCYTES NFR BLD AUTO: 10 % (ref 4–12)
NEUTROPHILS # BLD AUTO: 4.63 THOUSANDS/ÂΜL (ref 1.85–7.62)
NEUTS SEG NFR BLD AUTO: 59 % (ref 43–75)
NONHDLC SERPL-MCNC: 164 MG/DL
NRBC BLD AUTO-RTO: 0 /100 WBCS
PLATELET # BLD AUTO: 219 THOUSANDS/UL (ref 149–390)
PMV BLD AUTO: 10 FL (ref 8.9–12.7)
POTASSIUM SERPL-SCNC: 4.1 MMOL/L (ref 3.5–5.3)
PROT SERPL-MCNC: 7.5 G/DL (ref 6.4–8.4)
RBC # BLD AUTO: 5.23 MILLION/UL (ref 3.88–5.62)
SODIUM SERPL-SCNC: 138 MMOL/L (ref 135–147)
TRIGL SERPL-MCNC: 411 MG/DL
WBC # BLD AUTO: 7.81 THOUSAND/UL (ref 4.31–10.16)

## 2023-08-17 PROCEDURE — 85025 COMPLETE CBC W/AUTO DIFF WBC: CPT

## 2023-08-17 PROCEDURE — 36415 COLL VENOUS BLD VENIPUNCTURE: CPT

## 2023-08-17 PROCEDURE — 80053 COMPREHEN METABOLIC PANEL: CPT

## 2023-08-17 PROCEDURE — 83036 HEMOGLOBIN GLYCOSYLATED A1C: CPT

## 2023-08-17 PROCEDURE — 80061 LIPID PANEL: CPT

## 2023-08-18 ENCOUNTER — TELEPHONE (OUTPATIENT)
Dept: GASTROENTEROLOGY | Facility: CLINIC | Age: 78
End: 2023-08-18

## 2023-08-24 ENCOUNTER — ANESTHESIA (OUTPATIENT)
Dept: GASTROENTEROLOGY | Facility: HOSPITAL | Age: 78
End: 2023-08-24

## 2023-08-24 ENCOUNTER — HOSPITAL ENCOUNTER (OUTPATIENT)
Dept: GASTROENTEROLOGY | Facility: HOSPITAL | Age: 78
Setting detail: OUTPATIENT SURGERY
End: 2023-08-24
Attending: INTERNAL MEDICINE
Payer: MEDICARE

## 2023-08-24 ENCOUNTER — ANESTHESIA EVENT (OUTPATIENT)
Dept: GASTROENTEROLOGY | Facility: HOSPITAL | Age: 78
End: 2023-08-24

## 2023-08-24 VITALS
BODY MASS INDEX: 37.9 KG/M2 | WEIGHT: 222 LBS | SYSTOLIC BLOOD PRESSURE: 154 MMHG | TEMPERATURE: 97.2 F | HEIGHT: 64 IN | OXYGEN SATURATION: 93 % | DIASTOLIC BLOOD PRESSURE: 80 MMHG | HEART RATE: 73 BPM | RESPIRATION RATE: 16 BRPM

## 2023-08-24 DIAGNOSIS — Z12.11 SCREENING FOR COLON CANCER: ICD-10-CM

## 2023-08-24 DIAGNOSIS — Z86.010 HISTORY OF COLON POLYPS: ICD-10-CM

## 2023-08-24 PROCEDURE — 45385 COLONOSCOPY W/LESION REMOVAL: CPT | Performed by: INTERNAL MEDICINE

## 2023-08-24 PROCEDURE — 88305 TISSUE EXAM BY PATHOLOGIST: CPT | Performed by: PATHOLOGY

## 2023-08-24 RX ORDER — LIDOCAINE HYDROCHLORIDE 20 MG/ML
INJECTION, SOLUTION EPIDURAL; INFILTRATION; INTRACAUDAL; PERINEURAL AS NEEDED
Status: DISCONTINUED | OUTPATIENT
Start: 2023-08-24 | End: 2023-08-24

## 2023-08-24 RX ORDER — PROPOFOL 10 MG/ML
INJECTION, EMULSION INTRAVENOUS AS NEEDED
Status: DISCONTINUED | OUTPATIENT
Start: 2023-08-24 | End: 2023-08-24

## 2023-08-24 RX ORDER — SODIUM CHLORIDE, SODIUM LACTATE, POTASSIUM CHLORIDE, CALCIUM CHLORIDE 600; 310; 30; 20 MG/100ML; MG/100ML; MG/100ML; MG/100ML
INJECTION, SOLUTION INTRAVENOUS CONTINUOUS PRN
Status: DISCONTINUED | OUTPATIENT
Start: 2023-08-24 | End: 2023-08-24

## 2023-08-24 RX ADMIN — PROPOFOL 65 MG: 10 INJECTION, EMULSION INTRAVENOUS at 09:31

## 2023-08-24 RX ADMIN — LIDOCAINE HYDROCHLORIDE 100 MG: 20 INJECTION, SOLUTION EPIDURAL; INFILTRATION; INTRACAUDAL at 09:30

## 2023-08-24 RX ADMIN — PROPOFOL 35 MG: 10 INJECTION, EMULSION INTRAVENOUS at 09:37

## 2023-08-24 RX ADMIN — PROPOFOL 25 MG: 10 INJECTION, EMULSION INTRAVENOUS at 09:40

## 2023-08-24 RX ADMIN — PROPOFOL 65 MG: 10 INJECTION, EMULSION INTRAVENOUS at 09:32

## 2023-08-24 RX ADMIN — PROPOFOL 35 MG: 10 INJECTION, EMULSION INTRAVENOUS at 09:34

## 2023-08-24 RX ADMIN — PROPOFOL 25 MG: 10 INJECTION, EMULSION INTRAVENOUS at 09:43

## 2023-08-24 RX ADMIN — SODIUM CHLORIDE, SODIUM LACTATE, POTASSIUM CHLORIDE, AND CALCIUM CHLORIDE: .6; .31; .03; .02 INJECTION, SOLUTION INTRAVENOUS at 08:12

## 2023-08-24 NOTE — ANESTHESIA PREPROCEDURE EVALUATION
Procedure:  COLONOSCOPY    Relevant Problems   CARDIO   (+) Benign hypertension   (+) Hyperlipidemia      GI/HEPATIC   (+) Bright red blood per rectum   (+) GERD (gastroesophageal reflux disease)      MUSCULOSKELETAL   (+) Sciatica of right side      NEURO/PSYCH   (+) Anxiety      PULMONARY   (+) KADEN on CPAP      Other   (+) Morbid obesity (HCC)        Physical Exam    Airway    Mallampati score: I  TM Distance: >3 FB  Neck ROM: full     Dental       Cardiovascular  Cardiovascular exam normal    Pulmonary  Pulmonary exam normal     Other Findings        Anesthesia Plan  ASA Score- 3     Anesthesia Type- IV sedation with anesthesia with ASA Monitors. Additional Monitors:   Airway Plan:           Plan Factors-Exercise tolerance (METS): >4 METS. Chart reviewed. EKG reviewed. Imaging results reviewed. Existing labs reviewed. Patient summary reviewed. Induction- intravenous. Postoperative Plan- Plan for postoperative opioid use. Planned trial extubation    Informed Consent- Anesthetic plan and risks discussed with patient. I personally reviewed this patient with the CRNA. Discussed and agreed on the Anesthesia Plan with the CRNA. Derik Speaker

## 2023-08-24 NOTE — ANESTHESIA POSTPROCEDURE EVALUATION
Post-Op Assessment Note    CV Status:  Stable  Pain Score: 0    Pain management: adequate     Mental Status:  Alert and awake   Hydration Status:  Euvolemic   PONV Controlled:  Controlled   Airway Patency:  Patent      Post Op Vitals Reviewed: Yes      Staff: CRNA         No notable events documented.     /86 (08/24/23 0950)    Temp (!) 97.4 °F (36.3 °C) (08/24/23 0950)    Pulse 79 (08/24/23 0950)   Resp 16 (08/24/23 0950)    SpO2 91 % (08/24/23 0950)

## 2023-08-24 NOTE — H&P
History and Physical - [facility]  Pedro Mackenzie 70 y.o. male MRN: [id number]                  HPI: Pedro Mackenzie is a 70y.o. year old male who presents for colonoscopy for history of colon polyps      REVIEW OF SYSTEMS: Per the HPI, and otherwise unremarkable. Historical Information   Past Medical History:   Diagnosis Date   • Cancer ([address])     basal call carcinoma   • Colon polyps     DR. Chary Callejas   • CPAP (continuous positive airway pressure) dependence    • Diverticulosis    • Hyperlipidemia    • Hypertension    • Obesity    • KADEN (obstructive sleep apnea)     cpap   • Pneumonia    • Small bowel obstruction, partial (HCC)    • SOB (shortness of breath)      Past Surgical History:   Procedure Laterality Date   • COLONOSCOPY     • SKIN BIOPSY     • TONSILLECTOMY AND ADENOIDECTOMY       Social History   Social History     Substance and Sexual Activity   Alcohol Use Not Currently    Comment: occassional     Social History     Substance and Sexual Activity   Drug Use No     Social History     Tobacco Use   Smoking Status Never   Smokeless Tobacco Never     Family History   Problem Relation Age of Onset   • Heart disease Mother    • Hypertension Mother    • Breast cancer Sister        Meds/Allergies     (Not in a hospital admission)      Allergies   Allergen Reactions   • Fluorouracil Rash       Objective     Blood pressure 151/83, pulse 80, temperature 97.5 °F (36.4 °C), temperature source Temporal, resp. rate 17, height 5' 4" (1.626 m), weight 101 kg (222 lb 0.1 oz), SpO2 94 %. PHYSICAL EXAM    /83   Pulse 80   Temp 97.5 °F (36.4 °C) (Temporal)   Resp 17   Ht 5' 4" (1.626 m)   Wt 101 kg (222 lb 0.1 oz)   SpO2 94%   BMI 38.11 kg/m²       Gen: NAD  CV: RRR  CHEST: Clear  ABD: soft, NT/ND  EXT: no edema      ASSESSMENT/PLAN:  This is a 70y.o. year old male here for colonoscopy, and he is stable and optimized for his procedure.

## 2023-08-29 PROCEDURE — 88305 TISSUE EXAM BY PATHOLOGIST: CPT | Performed by: PATHOLOGY

## 2023-09-13 ENCOUNTER — TELEPHONE (OUTPATIENT)
Dept: PAIN MEDICINE | Facility: CLINIC | Age: 78
End: 2023-09-13

## 2023-09-13 NOTE — TELEPHONE ENCOUNTER
A (Lead Pcng Cpsr Sn 52cm Atr Rt Ventricle Trnvns 4) lead was advanced under fluoroscopic guidance to the right ventricle and secured with sutures after confirming adequate pacing parameters via the PSA.    Called pt FARHAD letting him know his appt was moved from 9/14 to 9/28

## 2023-09-21 ENCOUNTER — OFFICE VISIT (OUTPATIENT)
Dept: INTERNAL MEDICINE CLINIC | Facility: CLINIC | Age: 78
End: 2023-09-21
Payer: MEDICARE

## 2023-09-21 VITALS
HEIGHT: 64 IN | BODY MASS INDEX: 37.15 KG/M2 | DIASTOLIC BLOOD PRESSURE: 68 MMHG | RESPIRATION RATE: 16 BRPM | WEIGHT: 217.6 LBS | OXYGEN SATURATION: 92 % | SYSTOLIC BLOOD PRESSURE: 114 MMHG | HEART RATE: 62 BPM

## 2023-09-21 DIAGNOSIS — I10 BENIGN HYPERTENSION: ICD-10-CM

## 2023-09-21 DIAGNOSIS — E11.65 TYPE 2 DIABETES MELLITUS WITH HYPERGLYCEMIA, WITHOUT LONG-TERM CURRENT USE OF INSULIN (HCC): Primary | ICD-10-CM

## 2023-09-21 DIAGNOSIS — E78.2 MIXED HYPERLIPIDEMIA: ICD-10-CM

## 2023-09-21 PROBLEM — R73.01 IMPAIRED FASTING GLUCOSE: Status: RESOLVED | Noted: 2018-04-27 | Resolved: 2023-09-21

## 2023-09-21 PROCEDURE — 99214 OFFICE O/P EST MOD 30 MIN: CPT | Performed by: INTERNAL MEDICINE

## 2023-09-21 RX ORDER — METFORMIN HYDROCHLORIDE 500 MG/1
500 TABLET, EXTENDED RELEASE ORAL
Qty: 90 TABLET | Refills: 1 | Status: SHIPPED | OUTPATIENT
Start: 2023-09-21 | End: 2024-03-19

## 2023-09-21 NOTE — PROGRESS NOTES
Assessment/Plan:     Since his sugars continue to go up, although he is working on the diet and losing weight, I recommended starting metformin 500 mg with dinner. Recommended diabetic education with nutrition counseling but he declined because of his work schedule. His chronic hypertension remains well controlled with his losartan and metoprolol at present dosing. For his chronic hyperlipidemia, triglycerides were high. But he is so much better off of the statins. Hopefully weight loss will fix this. Quality Measures:       Return in about 4 weeks (around 10/19/2023) for Recheck. No problem-specific Assessment & Plan notes found for this encounter. Diagnoses and all orders for this visit:    Type 2 diabetes mellitus with hyperglycemia, without long-term current use of insulin (HCC)  -     metFORMIN (GLUCOPHAGE-XR) 500 mg 24 hr tablet; Take 1 tablet (500 mg total) by mouth daily with dinner  -     Hemoglobin A1C; Future  -     Comprehensive metabolic panel; Future    Mixed hyperlipidemia    Benign hypertension          Subjective:      Patient ID: Carl Arango is a 70 y.o. male. Patient comes in today for routine follow-up. He states he feels much better. His blood pressure remains controlled. His cholesterol is up off of the statin but he feels so much better. His joint pains and fatigue have resolved. He is tolerating the lower dose of metoprolol. States he has been working on his diet and even harder since his colonoscopy. However, his A1c is again even higher.       ALLERGIES:  Allergies   Allergen Reactions   • Fluorouracil Rash       CURRENT MEDICATIONS:    Current Outpatient Medications:   •  ALPRAZolam (XANAX) 0.5 mg tablet, TAKE 1 TABLET(0.5 MG) BY MOUTH TWICE DAILY, Disp: 60 tablet, Rfl: 3  •  aspirin (ECOTRIN LOW STRENGTH) 81 mg EC tablet, Take by mouth, Disp: , Rfl:   •  B Complex-Folic Acid (B COMPLEX PLUS) TABS, Take by mouth, Disp: , Rfl:   •  Cholecalciferol (VITAMIN D3) 1000 units CAPS, Take by mouth, Disp: , Rfl:   •  Chondroitin Sulfate-Vit C-Mn 400-60-2.5 MG CAPS, Take by mouth, Disp: , Rfl:   •  fenofibrate (TRICOR) 54 MG tablet, TAKE 1 TABLET BY MOUTH  DAILY, Disp: 90 tablet, Rfl: 3  •  losartan (COZAAR) 50 mg tablet, Take 1 tablet (50 mg total) by mouth daily, Disp: 90 tablet, Rfl: 1  •  meclizine (ANTIVERT) 25 mg tablet, TAKE 1 TABLET(25 MG) BY MOUTH THREE TIMES DAILY AS NEEDED FOR DIZZINESS, Disp: 60 tablet, Rfl: 3  •  metFORMIN (GLUCOPHAGE-XR) 500 mg 24 hr tablet, Take 1 tablet (500 mg total) by mouth daily with dinner, Disp: 90 tablet, Rfl: 1  •  metoprolol succinate (TOPROL-XL) 25 mg 24 hr tablet, Take 0.5 tablets (12.5 mg total) by mouth daily, Disp: 90 tablet, Rfl: 1  •  Multiple Vitamin (MULTI-VITAMIN DAILY PO), Take by mouth, Disp: , Rfl:   •  omeprazole (PriLOSEC) 40 MG capsule, TAKE 1 CAPSULE BY MOUTH  DAILY, Disp: 90 capsule, Rfl: 3  •  solifenacin (VESICARE) 5 mg tablet, TAKE 1 TABLET BY MOUTH  DAILY, Disp: 90 tablet, Rfl: 3    ACTIVE PROBLEM LIST:  Patient Active Problem List   Diagnosis   • KADEN on CPAP   • Anxiety   • Basal cell carcinoma of cheek   • Basal cell carcinoma of skin   • Benign hypertension   • Deviated nasal septum   • GERD (gastroesophageal reflux disease)   • Hyperlipidemia   • Morbid obesity (HCC)   • OAB (overactive bladder)   • Bright red blood per rectum   • Diverticulosis   • Rib fracture   • Abnormal CT of the abdomen   • Fall   • Forearm injury, left, subsequent encounter   • Sprain of unspecified ligament of right ankle, initial encounter   • Sciatica of right side   • Type 2 diabetes mellitus with hyperglycemia, without long-term current use of insulin (HCC)       PAST MEDICAL HISTORY:  Past Medical History:   Diagnosis Date   • Cancer ([address])     basal call carcinoma   • Colon polyps     DR. Taty Bliss   • CPAP (continuous positive airway pressure) dependence    • Diverticulosis    • Hyperlipidemia    • Hypertension    • Obesity    • KADEN (obstructive sleep apnea)     cpap   • Pneumonia    • Small bowel obstruction, partial (HCC)    • SOB (shortness of breath)        PAST SURGICAL HISTORY:  Past Surgical History:   Procedure Laterality Date   • COLONOSCOPY     • SKIN BIOPSY     • TONSILLECTOMY AND ADENOIDECTOMY         FAMILY HISTORY:  Family History   Problem Relation Age of Onset   • Heart disease Mother    • Hypertension Mother    • Breast cancer Sister        SOCIAL HISTORY:  Social History     Socioeconomic History   • Marital status: Significant Other     Spouse name: Not on file   • Number of children: 0   • Years of education: Not on file   • Highest education level: Not on file   Occupational History   • Occupation: SALES     Comment: FULL TIME    Tobacco Use   • Smoking status: Never   • Smokeless tobacco: Never   Vaping Use   • Vaping Use: Never used   Substance and Sexual Activity   • Alcohol use: Not Currently     Comment: occassional   • Drug use: No   • Sexual activity: Yes     Partners: Female     Comment: DENIED: HISTORY OF HIGH RISK SEXUAL BEHAVIOR    Other Topics Concern   • Not on file   Social History Narrative     as per all scripts      Social Determinants of Health     Financial Resource Strain: Low Risk  (5/11/2023)    Overall Financial Resource Strain (CARDIA)    • Difficulty of Paying Living Expenses: Not hard at all   Food Insecurity: Not on file   Transportation Needs: No Transportation Needs (5/11/2023)    PRAPARE - Transportation    • Lack of Transportation (Medical): No    • Lack of Transportation (Non-Medical): No   Physical Activity: Not on file   Stress: Not on file   Social Connections: Not on file   Intimate Partner Violence: Not on file   Housing Stability: Not on file       Review of Systems   Respiratory: Negative for shortness of breath. Cardiovascular: Negative for chest pain. Gastrointestinal: Negative for abdominal pain.          Objective:  Vitals:    09/21/23 0949   BP: 114/68   BP Location: Left arm   Patient Position: Sitting   Cuff Size: Adult   Pulse: 62   Resp: 16   SpO2: 92%   Weight: 98.7 kg (217 lb 9.6 oz)   Height: 5' 4" (1.626 m)     Body mass index is 37.35 kg/m². Physical Exam  Vitals and nursing note reviewed. Constitutional:       Appearance: He is well-developed. Cardiovascular:      Rate and Rhythm: Normal rate and regular rhythm. Heart sounds: Normal heart sounds. Pulmonary:      Effort: Pulmonary effort is normal.      Breath sounds: Normal breath sounds. Neurological:      Mental Status: He is alert. RESULTS:  Hemoglobin A1C   Date/Time Value Ref Range Status   08/17/2023 09:49 AM 7.8 (H) Normal 4.0-5.6%; PreDiabetic 5.7-6.4%; Diabetic >=6.5%; Glycemic control for adults with diabetes <7.0% % Final     Cholesterol   Date/Time Value Ref Range Status   08/17/2023 09:49  See Comment mg/dL Final     Comment:     Cholesterol:         Pediatric <18 Years        Desirable          <170 mg/dL      Borderline High    170-199 mg/dL      High               >=200 mg/dL        Adult >=18 Years            Desirable        <200 mg/dL      Borderline High  200-239 mg/dL      High             >239 mg/dL       Triglycerides   Date/Time Value Ref Range Status   08/17/2023 09:49  (H) See Comment mg/dL Final     Comment:     Triglyceride:     0-9Y            <75mg/dL     10Y-17Y         <90 mg/dL       >=18Y     Normal          <150 mg/dL     Borderline High 150-199 mg/dL     High            200-499 mg/dL        Very High       >499 mg/dL    Specimen collection should occur prior to Metamizole administration due to the potential for falsely depressed results. HDL, Direct   Date/Time Value Ref Range Status   08/17/2023 09:49 AM 34 (L) >=40 mg/dL Final     LDL Calculated   Date/Time Value Ref Range Status   08/17/2023 09:49 AM   Final     Comment:     Calculated LDL invalid, triglycerides >400 mg/dl  This screening LDL is a calculated result.    It does not have the accuracy of the Direct Measured LDL in the monitoring of patients with hyperlipidemia and/or statin therapy. Direct Measure LDL (HGT917) must be ordered separately in these patients. Hemoglobin   Date/Time Value Ref Range Status   08/17/2023 09:49 AM 16.4 12.0 - 17.0 g/dL Final     Hematocrit   Date/Time Value Ref Range Status   08/17/2023 09:49 AM 46.9 36.5 - 49.3 % Final     Platelets   Date/Time Value Ref Range Status   08/17/2023 09:49  149 - 390 Thousands/uL Final     PSA   Date/Time Value Ref Range Status   03/11/2021 09:58 AM 1.0 0.0 - 4.0 ng/mL Final     Comment:     American Urological Association Guidelines define biochemical recurrence of prostate cancer as a detectable or rising PSA value post-radical prostatectomy that is greater than or equal to 0.2 ng/mL with a second confirmatory level of greater than or equal to 0.2 ng/mL. TSH 3RD GENERATON   Date/Time Value Ref Range Status   08/11/2022 10:01 AM 1.126 0.450 - 4.500 uIU/mL Final     Comment:     Adult TSH (3rd generation) reference range follows the recommended guidelines of the American Thyroid Association, January, 2020. Sodium   Date/Time Value Ref Range Status   08/17/2023 09:49  135 - 147 mmol/L Final     BUN   Date/Time Value Ref Range Status   08/17/2023 09:49 AM 18 5 - 25 mg/dL Final     Creatinine   Date/Time Value Ref Range Status   08/17/2023 09:49 AM 1.12 0.60 - 1.30 mg/dL Final     Comment:     Standardized to IDMS reference method      In chart    This note was created with voice recognition software. Phonic, grammatical and spelling errors may be present within the note as a result.

## 2023-10-12 ENCOUNTER — OFFICE VISIT (OUTPATIENT)
Dept: OBGYN CLINIC | Facility: CLINIC | Age: 78
End: 2023-10-12
Payer: MEDICARE

## 2023-10-12 ENCOUNTER — APPOINTMENT (OUTPATIENT)
Dept: RADIOLOGY | Facility: CLINIC | Age: 78
End: 2023-10-12
Payer: MEDICARE

## 2023-10-12 VITALS
HEIGHT: 64 IN | HEART RATE: 60 BPM | SYSTOLIC BLOOD PRESSURE: 120 MMHG | BODY MASS INDEX: 37.05 KG/M2 | DIASTOLIC BLOOD PRESSURE: 70 MMHG | WEIGHT: 217 LBS

## 2023-10-12 DIAGNOSIS — S66.912A STRAIN OF LEFT WRIST, INITIAL ENCOUNTER: Primary | ICD-10-CM

## 2023-10-12 DIAGNOSIS — S66.912A STRAIN OF LEFT WRIST, INITIAL ENCOUNTER: ICD-10-CM

## 2023-10-12 PROCEDURE — 99213 OFFICE O/P EST LOW 20 MIN: CPT | Performed by: ORTHOPAEDIC SURGERY

## 2023-10-12 PROCEDURE — 73110 X-RAY EXAM OF WRIST: CPT

## 2023-10-12 NOTE — PROGRESS NOTES
Assessment  1. Strain of left wrist, initial encounter        Plan  ***    {Oral Swab Statement:72112}    {Opioid Statement:17229}    {UDS Statement:95110}    {PDMP Statement:63091}    {Pain Management Procedure Statement:45419}    My impressions and treatment recommendations were discussed in detail with the patient who verbalized understanding and had no further questions. Discharge instructions were provided. I personally saw and examined the patient and I agree with the above discussed plan of care. Orders Placed This Encounter   Procedures    XR wrist 3+ vw left     Standing Status:   Future     Number of Occurrences:   1     Standing Expiration Date:   10/12/2027     Scheduling Instructions:      Bring along any outside films relating to this procedure. No orders of the defined types were placed in this encounter. History of Present Illness    Pamela Ybarra is a 70 y.o. male ***    I have personally reviewed and/or updated the patient's past medical history, past surgical history, family history, social history, current medications, allergies, and vital signs today.      Review of Systems    Patient Active Problem List   Diagnosis    KADEN on CPAP    Anxiety    Basal cell carcinoma of cheek    Basal cell carcinoma of skin    Benign hypertension    Deviated nasal septum    GERD (gastroesophageal reflux disease)    Hyperlipidemia    Morbid obesity (HCC)    OAB (overactive bladder)    Bright red blood per rectum    Diverticulosis    Rib fracture    Abnormal CT of the abdomen    Fall    Forearm injury, left, subsequent encounter    Sprain of unspecified ligament of right ankle, initial encounter    Sciatica of right side    Type 2 diabetes mellitus with hyperglycemia, without long-term current use of insulin ([address])       Past Medical History:   Diagnosis Date    Cancer ([address])     basal call carcinoma    Colon polyps     DR. Kathia Gunderson    CPAP (continuous positive airway pressure) dependence Diverticulosis     Hyperlipidemia     Hypertension     Obesity     KADEN (obstructive sleep apnea)     cpap    Pneumonia     Small bowel obstruction, partial (HCC)     SOB (shortness of breath)        Past Surgical History:   Procedure Laterality Date    COLONOSCOPY      SKIN BIOPSY      TONSILLECTOMY AND ADENOIDECTOMY         Family History   Problem Relation Age of Onset    Heart disease Mother     Hypertension Mother     Breast cancer Sister        Social History     Occupational History    Occupation: SALES     Comment: FULL TIME    Tobacco Use    Smoking status: Never    Smokeless tobacco: Never   Vaping Use    Vaping Use: Never used   Substance and Sexual Activity    Alcohol use: Not Currently     Comment: occassional    Drug use: No    Sexual activity: Yes     Partners: Female     Comment: DENIED: HISTORY OF HIGH RISK SEXUAL BEHAVIOR        Current Outpatient Medications on File Prior to Visit   Medication Sig    ALPRAZolam (XANAX) 0.5 mg tablet TAKE 1 TABLET(0.5 MG) BY MOUTH TWICE DAILY    aspirin (ECOTRIN LOW STRENGTH) 81 mg EC tablet Take by mouth    B Complex-Folic Acid (B COMPLEX PLUS) TABS Take by mouth    Cholecalciferol (VITAMIN D3) 1000 units CAPS Take by mouth    Chondroitin Sulfate-Vit C-Mn 400-60-2.5 MG CAPS Take by mouth    fenofibrate (TRICOR) 54 MG tablet TAKE 1 TABLET BY MOUTH  DAILY    losartan (COZAAR) 50 mg tablet Take 1 tablet (50 mg total) by mouth daily    meclizine (ANTIVERT) 25 mg tablet TAKE 1 TABLET(25 MG) BY MOUTH THREE TIMES DAILY AS NEEDED FOR DIZZINESS    metFORMIN (GLUCOPHAGE-XR) 500 mg 24 hr tablet Take 1 tablet (500 mg total) by mouth daily with dinner    metoprolol succinate (TOPROL-XL) 25 mg 24 hr tablet Take 0.5 tablets (12.5 mg total) by mouth daily    Multiple Vitamin (MULTI-VITAMIN DAILY PO) Take by mouth    omeprazole (PriLOSEC) 40 MG capsule TAKE 1 CAPSULE BY MOUTH  DAILY    solifenacin (VESICARE) 5 mg tablet TAKE 1 TABLET BY MOUTH  DAILY     No current facility-administered medications on file prior to visit. No Known Allergies    Physical Exam    Ht 5' 4" (1.626 m)   BMI 37.35 kg/m²     Constitutional: {General Appearance:15371::"normal, well developed, well nourished, alert, in no distress and non-toxic and no overt pain behavior. "}  Eyes: {Sclera:17573::"anicteric"}  HEENT: {Hearin::"grossly intact"}  Neck: {Neck:21225::"supple, symmetric, trachea midline and no masses "}  Pulmonary:{Respiratory effort:70620::"even and unlabored"}  Cardiovascular:{Examination of Extremities:06292::"No edema or pitting edema present"}  Skin:{Skin and Subcutaneous tissues:06920::"Normal without rashes or lesions and well hydrated"}  Psychiatric:{Mood and Affect:92523::"Mood and affect appropriate"}  Neurologic:{Cranial Nerves:95836::"Cranial Nerves II-XII grossly intact"}  Musculoskeletal:{Gait and Station:09326::"normal"}    Imaging

## 2023-10-12 NOTE — PROGRESS NOTES
Patient Name:  Uli Ignacio  MRN:  [id number]    Assessment & Plan     1. Strain of left wrist, initial encounter  -     XR wrist 3+ vw left; Future; Expected date: 10/12/2023        Left wrist/forearm strain. The patient's x-rays were reviewed today. The patient is encouraged to continue use of cockup wrist splint. Continue use of over the counter analgesics and topical creams as needed for symptom management. Continue activities of daily living as tolerated. Follow-up as needed if symptoms worsen or fail to improve. History of the Present Illness   Uli Ignacio is a 70 y.o. male with Left partial thickness distal biceps tendon tear with main complaints of distal forearm and wrist pain at his last visit on 6/8/23. The patient was provided a cock-up wrist splint, referred to occupational therapy. 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 He did attend occupational therapy without relief in symptoms. He takes 81 mg of aspirin daily. He has pain with lifting objects. He is roughly 20% improved in his symptoms. He was at 60% improved however his symptoms are worsening. Pain is located on the volar aspect of the distal radius and 1st dorsal compartment. He notes pain with wrist extension. He uses goat udder cream for symptom management. He denies any numbness or tingling. He is able to perform all of his activities of daily life. Review of Systems     Review of Systems   Constitutional:  Negative for appetite change and unexpected weight change. HENT:  Negative for congestion and trouble swallowing. Eyes:  Negative for visual disturbance. Respiratory:  Negative for cough and shortness of breath. Cardiovascular:  Negative for chest pain and palpitations. Gastrointestinal:  Negative for nausea and vomiting. Endocrine: Negative for cold intolerance and heat intolerance. Musculoskeletal:  Positive for arthralgias.  Negative for joint swelling and myalgias. Skin:  Negative for rash. Neurological:  Negative for numbness. Physical Exam     /70   Pulse 60   Ht 5' 4" (1.626 m)   Wt 98.4 kg (217 lb)   BMI 37.25 kg/m²     Left forearm/wrist:  Previous basal cell carcinoma surgical site well healed near midshaft of radius  Tenderness to palpation over surgical scar, abductor pollicis longus  Active range of motion decreased flexion and extension  Pain with resisted supination, flexion, abduction of thumb  Negative Finkelstein's testing  Negative Hook test  Normal motion of fingers  No swelling noted  No ecchymosis   Full composite fist  Neurovascularly intact distally  Sensation intact median, radial, ulnar nerve distributions       Data Review     I have personally reviewed pertinent films in PACS, and my interpretation follows. X-rays taken 10/12/23 of Left wrist demonstrate well maintained joint spaces. Early degenerative changes at [facility] and of carpals. No acute fracture. Social History     Tobacco Use   • Smoking status: Never   • Smokeless tobacco: Never   Vaping Use   • Vaping Use: Never used   Substance Use Topics   • Alcohol use: Not Currently     Comment: occassional   • Drug use: No           Procedures  None performed.      Sharda Aldana   Scribe Attestation    I,:  Sharda Aldana am acting as a scribe while in the presence of the attending physician.:       I,:  Chad Turner DO personally performed the services described in this documentation    as scribed in my presence.:

## 2023-10-26 ENCOUNTER — APPOINTMENT (OUTPATIENT)
Dept: LAB | Facility: HOSPITAL | Age: 78
End: 2023-10-26
Payer: MEDICARE

## 2023-10-26 DIAGNOSIS — E11.65 TYPE 2 DIABETES MELLITUS WITH HYPERGLYCEMIA, WITHOUT LONG-TERM CURRENT USE OF INSULIN (HCC): ICD-10-CM

## 2023-10-26 LAB
ALBUMIN SERPL BCP-MCNC: 4.3 G/DL (ref 3.5–5)
ALP SERPL-CCNC: 58 U/L (ref 34–104)
ALT SERPL W P-5'-P-CCNC: 19 U/L (ref 7–52)
ANION GAP SERPL CALCULATED.3IONS-SCNC: 6 MMOL/L
AST SERPL W P-5'-P-CCNC: 17 U/L (ref 13–39)
BILIRUB SERPL-MCNC: 0.58 MG/DL (ref 0.2–1)
BUN SERPL-MCNC: 20 MG/DL (ref 5–25)
CALCIUM SERPL-MCNC: 9.4 MG/DL (ref 8.4–10.2)
CHLORIDE SERPL-SCNC: 102 MMOL/L (ref 96–108)
CO2 SERPL-SCNC: 29 MMOL/L (ref 21–32)
CREAT SERPL-MCNC: 1.15 MG/DL (ref 0.6–1.3)
EST. AVERAGE GLUCOSE BLD GHB EST-MCNC: 146 MG/DL
GFR SERPL CREATININE-BSD FRML MDRD: 61 ML/MIN/1.73SQ M
GLUCOSE P FAST SERPL-MCNC: 117 MG/DL (ref 65–99)
HBA1C MFR BLD: 6.7 %
POTASSIUM SERPL-SCNC: 4.2 MMOL/L (ref 3.5–5.3)
PROT SERPL-MCNC: 7.6 G/DL (ref 6.4–8.4)
SODIUM SERPL-SCNC: 137 MMOL/L (ref 135–147)

## 2023-10-26 PROCEDURE — 80053 COMPREHEN METABOLIC PANEL: CPT

## 2023-10-26 PROCEDURE — 36415 COLL VENOUS BLD VENIPUNCTURE: CPT

## 2023-10-26 PROCEDURE — 83036 HEMOGLOBIN GLYCOSYLATED A1C: CPT

## 2023-11-02 ENCOUNTER — OFFICE VISIT (OUTPATIENT)
Dept: INTERNAL MEDICINE CLINIC | Facility: CLINIC | Age: 78
End: 2023-11-02
Payer: MEDICARE

## 2023-11-02 VITALS
OXYGEN SATURATION: 93 % | HEIGHT: 64 IN | SYSTOLIC BLOOD PRESSURE: 118 MMHG | BODY MASS INDEX: 36.02 KG/M2 | DIASTOLIC BLOOD PRESSURE: 70 MMHG | WEIGHT: 211 LBS | HEART RATE: 63 BPM

## 2023-11-02 DIAGNOSIS — E11.65 TYPE 2 DIABETES MELLITUS WITH HYPERGLYCEMIA, WITHOUT LONG-TERM CURRENT USE OF INSULIN (HCC): Primary | ICD-10-CM

## 2023-11-02 PROCEDURE — 99213 OFFICE O/P EST LOW 20 MIN: CPT | Performed by: INTERNAL MEDICINE

## 2023-11-02 NOTE — PROGRESS NOTES
Assessment/Plan:     Doing much better. We will continue the metformin at 500 mg daily with dinner. Continue all other medicines. Ordered labs for next visit. Quality Measures:       Return in 3 months (on 2/2/2024) for Diabetes Follow-up, Recheck. No problem-specific Assessment & Plan notes found for this encounter. Diagnoses and all orders for this visit:    Type 2 diabetes mellitus with hyperglycemia, without long-term current use of insulin (HCC)  -     Comprehensive metabolic panel; Future  -     CBC and differential; Future  -     Lipid Panel with Direct LDL reflex; Future  -     Hemoglobin A1C; Future  -     Albumin / creatinine urine ratio; Future          Subjective:      Patient ID: Regan Cardoza is a 70 y.o. male. Patient comes in today for follow-up on the metformin. He reports no side effects on the medicine. He has been watching his diet better. He has lost weight. His sugars are way down. Overall, a very good response. No new complaints today. No further additions to his history.         ALLERGIES:  No Known Allergies    CURRENT MEDICATIONS:    Current Outpatient Medications:   •  ALPRAZolam (XANAX) 0.5 mg tablet, TAKE 1 TABLET(0.5 MG) BY MOUTH TWICE DAILY, Disp: 60 tablet, Rfl: 3  •  aspirin (ECOTRIN LOW STRENGTH) 81 mg EC tablet, Take by mouth, Disp: , Rfl:   •  B Complex-Folic Acid (B COMPLEX PLUS) TABS, Take by mouth, Disp: , Rfl:   •  Cholecalciferol (VITAMIN D3) 1000 units CAPS, Take by mouth, Disp: , Rfl:   •  fenofibrate (TRICOR) 54 MG tablet, TAKE 1 TABLET BY MOUTH  DAILY, Disp: 90 tablet, Rfl: 3  •  losartan (COZAAR) 50 mg tablet, Take 1 tablet (50 mg total) by mouth daily, Disp: 90 tablet, Rfl: 1  •  meclizine (ANTIVERT) 25 mg tablet, TAKE 1 TABLET(25 MG) BY MOUTH THREE TIMES DAILY AS NEEDED FOR DIZZINESS, Disp: 60 tablet, Rfl: 3  •  metFORMIN (GLUCOPHAGE-XR) 500 mg 24 hr tablet, Take 1 tablet (500 mg total) by mouth daily with dinner, Disp: 90 tablet, Rfl: 1  • metoprolol succinate (TOPROL-XL) 25 mg 24 hr tablet, Take 0.5 tablets (12.5 mg total) by mouth daily, Disp: 90 tablet, Rfl: 1  •  Multiple Vitamin (MULTI-VITAMIN DAILY PO), Take by mouth, Disp: , Rfl:   •  omeprazole (PriLOSEC) 40 MG capsule, TAKE 1 CAPSULE BY MOUTH  DAILY, Disp: 90 capsule, Rfl: 3  •  solifenacin (VESICARE) 5 mg tablet, TAKE 1 TABLET BY MOUTH  DAILY, Disp: 90 tablet, Rfl: 3  •  Chondroitin Sulfate-Vit C-Mn 400-60-2.5 MG CAPS, Take by mouth (Patient not taking: Reported on 11/2/2023), Disp: , Rfl:     ACTIVE PROBLEM LIST:  Patient Active Problem List   Diagnosis   • KADEN on CPAP   • Anxiety   • Basal cell carcinoma of cheek   • Basal cell carcinoma of skin   • Benign hypertension   • Deviated nasal septum   • GERD (gastroesophageal reflux disease)   • Hyperlipidemia   • Morbid obesity (HCC)   • OAB (overactive bladder)   • Bright red blood per rectum   • Diverticulosis   • Rib fracture   • Abnormal CT of the abdomen   • Fall   • Forearm injury, left, subsequent encounter   • Sprain of unspecified ligament of right ankle, initial encounter   • Sciatica of right side   • Type 2 diabetes mellitus with hyperglycemia, without long-term current use of insulin ([address])       PAST MEDICAL HISTORY:  Past Medical History:   Diagnosis Date   • Cancer ([address])     basal call carcinoma   • Colon polyps     DR. Jasper Meneses   • CPAP (continuous positive airway pressure) dependence    • Diverticulosis    • Hyperlipidemia    • Hypertension    • Obesity    • KADEN (obstructive sleep apnea)     cpap   • Pneumonia    • Small bowel obstruction, partial (HCC)    • SOB (shortness of breath)        PAST SURGICAL HISTORY:  Past Surgical History:   Procedure Laterality Date   • COLONOSCOPY     • SKIN BIOPSY     • TONSILLECTOMY AND ADENOIDECTOMY         FAMILY HISTORY:  Family History   Problem Relation Age of Onset   • Heart disease Mother    • Hypertension Mother    • Breast cancer Sister        SOCIAL HISTORY:  Social History Socioeconomic History   • Marital status: Significant Other     Spouse name: Not on file   • Number of children: 0   • Years of education: Not on file   • Highest education level: Not on file   Occupational History   • Occupation: SALES     Comment: FULL TIME    Tobacco Use   • Smoking status: Never   • Smokeless tobacco: Never   Vaping Use   • Vaping Use: Never used   Substance and Sexual Activity   • Alcohol use: Not Currently     Comment: occassional   • Drug use: No   • Sexual activity: Yes     Partners: Female     Comment: DENIED: HISTORY OF HIGH RISK SEXUAL BEHAVIOR    Other Topics Concern   • Not on file   Social History Narrative     as per all scripts      Social Determinants of Health     Financial Resource Strain: Low Risk  (5/11/2023)    Overall Financial Resource Strain (CARDIA)    • Difficulty of Paying Living Expenses: Not hard at all   Food Insecurity: Not on file   Transportation Needs: No Transportation Needs (5/11/2023)    PRAPARE - Transportation    • Lack of Transportation (Medical): No    • Lack of Transportation (Non-Medical): No   Physical Activity: Not on file   Stress: Not on file   Social Connections: Not on file   Intimate Partner Violence: Not on file   Housing Stability: Not on file       Review of Systems   Respiratory:  Negative for shortness of breath. Cardiovascular:  Negative for chest pain. Gastrointestinal:  Negative for abdominal pain. Objective:  Vitals:    11/02/23 0821   BP: 118/70   BP Location: Left arm   Patient Position: Sitting   Cuff Size: Large   Pulse: 63   SpO2: 93%   Weight: 95.7 kg (211 lb)   Height: 5' 4" (1.626 m)     Body mass index is 36.22 kg/m². Physical Exam  Vitals and nursing note reviewed. Constitutional:       Appearance: Normal appearance. Neurological:      Mental Status: He is alert.    Psychiatric:         Mood and Affect: Mood normal.         Behavior: Behavior normal.           RESULTS:  Hemoglobin A1C   Date/Time Value Ref Range Status   10/26/2023 09:04 AM 6.7 (H) Normal 4.0-5.6%; PreDiabetic 5.7-6.4%; Diabetic >=6.5%; Glycemic control for adults with diabetes <7.0% % Final     Cholesterol   Date/Time Value Ref Range Status   08/17/2023 09:49  See Comment mg/dL Final     Comment:     Cholesterol:         Pediatric <18 Years        Desirable          <170 mg/dL      Borderline High    170-199 mg/dL      High               >=200 mg/dL        Adult >=18 Years            Desirable        <200 mg/dL      Borderline High  200-239 mg/dL      High             >239 mg/dL       Triglycerides   Date/Time Value Ref Range Status   08/17/2023 09:49  (H) See Comment mg/dL Final     Comment:     Triglyceride:     0-9Y            <75mg/dL     10Y-17Y         <90 mg/dL       >=18Y     Normal          <150 mg/dL     Borderline High 150-199 mg/dL     High            200-499 mg/dL        Very High       >499 mg/dL    Specimen collection should occur prior to Metamizole administration due to the potential for falsely depressed results. HDL, Direct   Date/Time Value Ref Range Status   08/17/2023 09:49 AM 34 (L) >=40 mg/dL Final     LDL Calculated   Date/Time Value Ref Range Status   08/17/2023 09:49 AM   Final     Comment:     Calculated LDL invalid, triglycerides >400 mg/dl  This screening LDL is a calculated result. It does not have the accuracy of the Direct Measured LDL in the monitoring of patients with hyperlipidemia and/or statin therapy. Direct Measure LDL (YSX365) must be ordered separately in these patients.      Hemoglobin   Date/Time Value Ref Range Status   08/17/2023 09:49 AM 16.4 12.0 - 17.0 g/dL Final     Hematocrit   Date/Time Value Ref Range Status   08/17/2023 09:49 AM 46.9 36.5 - 49.3 % Final     Platelets   Date/Time Value Ref Range Status   08/17/2023 09:49  149 - 390 Thousands/uL Final     PSA   Date/Time Value Ref Range Status   03/11/2021 09:58 AM 1.0 0.0 - 4.0 ng/mL Final     Comment: American Urological Association Guidelines define biochemical recurrence of prostate cancer as a detectable or rising PSA value post-radical prostatectomy that is greater than or equal to 0.2 ng/mL with a second confirmatory level of greater than or equal to 0.2 ng/mL. TSH 3RD GENERATON   Date/Time Value Ref Range Status   08/11/2022 10:01 AM 1.126 0.450 - 4.500 uIU/mL Final     Comment:     Adult TSH (3rd generation) reference range follows the recommended guidelines of the American Thyroid Association, January, 2020. Sodium   Date/Time Value Ref Range Status   10/26/2023 09:04  135 - 147 mmol/L Final     BUN   Date/Time Value Ref Range Status   10/26/2023 09:04 AM 20 5 - 25 mg/dL Final     Creatinine   Date/Time Value Ref Range Status   10/26/2023 09:04 AM 1.15 0.60 - 1.30 mg/dL Final     Comment:     Standardized to IDMS reference method      In chart    This note was created with voice recognition software. Phonic, grammatical and spelling errors may be present within the note as a result.

## 2023-11-13 DIAGNOSIS — R42 VERTIGO: ICD-10-CM

## 2023-11-13 RX ORDER — MECLIZINE HYDROCHLORIDE 25 MG/1
TABLET ORAL
Qty: 90 TABLET | Refills: 2 | Status: SHIPPED | OUTPATIENT
Start: 2023-11-13

## 2023-12-01 DIAGNOSIS — F41.9 ANXIETY: ICD-10-CM

## 2023-12-01 RX ORDER — ALPRAZOLAM 0.5 MG/1
TABLET ORAL
Qty: 60 TABLET | Refills: 5 | Status: SHIPPED | OUTPATIENT
Start: 2023-12-01

## 2023-12-01 NOTE — TELEPHONE ENCOUNTER
Controlled Substance Review    [place] or [place]  reviewed: No red flags were identified; safe to proceed with prescription. Ana Laura Silver

## 2023-12-09 DIAGNOSIS — I10 BENIGN HYPERTENSION: ICD-10-CM

## 2023-12-11 RX ORDER — LOSARTAN POTASSIUM 50 MG/1
50 TABLET ORAL DAILY
Qty: 90 TABLET | Refills: 3 | Status: SHIPPED | OUTPATIENT
Start: 2023-12-11

## 2023-12-28 DIAGNOSIS — R42 VERTIGO: ICD-10-CM

## 2023-12-28 DIAGNOSIS — F41.9 ANXIETY: ICD-10-CM

## 2023-12-29 RX ORDER — ALPRAZOLAM 0.5 MG/1
TABLET ORAL
Qty: 180 TABLET | Refills: 1 | Status: SHIPPED | OUTPATIENT
Start: 2023-12-29

## 2023-12-29 RX ORDER — MECLIZINE HYDROCHLORIDE 25 MG/1
TABLET ORAL
Qty: 270 TABLET | Refills: 1 | Status: SHIPPED | OUTPATIENT
Start: 2023-12-29

## 2023-12-29 NOTE — TELEPHONE ENCOUNTER
Controlled Substance Review    [place] or [place]  reviewed: No red flags were identified; safe to proceed with prescription..

## 2024-01-26 LAB
LEFT EYE DIABETIC RETINOPATHY: NORMAL
RIGHT EYE DIABETIC RETINOPATHY: NORMAL

## 2024-02-01 ENCOUNTER — APPOINTMENT (OUTPATIENT)
Dept: LAB | Facility: HOSPITAL | Age: 79
End: 2024-02-01
Attending: INTERNAL MEDICINE
Payer: MEDICARE

## 2024-02-01 DIAGNOSIS — E11.65 TYPE 2 DIABETES MELLITUS WITH HYPERGLYCEMIA, WITHOUT LONG-TERM CURRENT USE OF INSULIN (HCC): ICD-10-CM

## 2024-02-01 LAB
ALBUMIN SERPL BCP-MCNC: 4.6 G/DL (ref 3.5–5)
ALP SERPL-CCNC: 41 U/L (ref 34–104)
ALT SERPL W P-5'-P-CCNC: 17 U/L (ref 7–52)
ANION GAP SERPL CALCULATED.3IONS-SCNC: 6 MMOL/L
AST SERPL W P-5'-P-CCNC: 17 U/L (ref 13–39)
BASOPHILS # BLD AUTO: 0.07 THOUSANDS/ÂΜL (ref 0–0.1)
BASOPHILS NFR BLD AUTO: 1 % (ref 0–1)
BILIRUB SERPL-MCNC: 0.59 MG/DL (ref 0.2–1)
BUN SERPL-MCNC: 20 MG/DL (ref 5–25)
CALCIUM SERPL-MCNC: 10 MG/DL (ref 8.4–10.2)
CHLORIDE SERPL-SCNC: 105 MMOL/L (ref 96–108)
CHOLEST SERPL-MCNC: 187 MG/DL
CO2 SERPL-SCNC: 26 MMOL/L (ref 21–32)
CREAT SERPL-MCNC: 1.19 MG/DL (ref 0.6–1.3)
CREAT UR-MCNC: 102.5 MG/DL
EOSINOPHIL # BLD AUTO: 0.19 THOUSAND/ÂΜL (ref 0–0.61)
EOSINOPHIL NFR BLD AUTO: 3 % (ref 0–6)
ERYTHROCYTE [DISTWIDTH] IN BLOOD BY AUTOMATED COUNT: 14 % (ref 11.6–15.1)
EST. AVERAGE GLUCOSE BLD GHB EST-MCNC: 148 MG/DL
GFR SERPL CREATININE-BSD FRML MDRD: 58 ML/MIN/1.73SQ M
GLUCOSE P FAST SERPL-MCNC: 133 MG/DL (ref 65–99)
HBA1C MFR BLD: 6.8 %
HCT VFR BLD AUTO: 49 % (ref 36.5–49.3)
HDLC SERPL-MCNC: 40 MG/DL
HGB BLD-MCNC: 16.9 G/DL (ref 12–17)
IMM GRANULOCYTES # BLD AUTO: 0.01 THOUSAND/UL (ref 0–0.2)
IMM GRANULOCYTES NFR BLD AUTO: 0 % (ref 0–2)
LDLC SERPL CALC-MCNC: 113 MG/DL (ref 0–100)
LYMPHOCYTES # BLD AUTO: 2.15 THOUSANDS/ÂΜL (ref 0.6–4.47)
LYMPHOCYTES NFR BLD AUTO: 32 % (ref 14–44)
MCH RBC QN AUTO: 30.7 PG (ref 26.8–34.3)
MCHC RBC AUTO-ENTMCNC: 34.5 G/DL (ref 31.4–37.4)
MCV RBC AUTO: 89 FL (ref 82–98)
MICROALBUMIN UR-MCNC: 7.2 MG/L
MICROALBUMIN/CREAT 24H UR: 7 MG/G CREATININE (ref 0–30)
MONOCYTES # BLD AUTO: 0.59 THOUSAND/ÂΜL (ref 0.17–1.22)
MONOCYTES NFR BLD AUTO: 9 % (ref 4–12)
NEUTROPHILS # BLD AUTO: 3.82 THOUSANDS/ÂΜL (ref 1.85–7.62)
NEUTS SEG NFR BLD AUTO: 55 % (ref 43–75)
NRBC BLD AUTO-RTO: 0 /100 WBCS
PLATELET # BLD AUTO: 263 THOUSANDS/UL (ref 149–390)
PMV BLD AUTO: 10.2 FL (ref 8.9–12.7)
POTASSIUM SERPL-SCNC: 3.8 MMOL/L (ref 3.5–5.3)
PROT SERPL-MCNC: 7.4 G/DL (ref 6.4–8.4)
RBC # BLD AUTO: 5.51 MILLION/UL (ref 3.88–5.62)
SODIUM SERPL-SCNC: 137 MMOL/L (ref 135–147)
TRIGL SERPL-MCNC: 170 MG/DL
WBC # BLD AUTO: 6.83 THOUSAND/UL (ref 4.31–10.16)

## 2024-02-01 PROCEDURE — 85025 COMPLETE CBC W/AUTO DIFF WBC: CPT

## 2024-02-01 PROCEDURE — 36415 COLL VENOUS BLD VENIPUNCTURE: CPT

## 2024-02-01 PROCEDURE — 80053 COMPREHEN METABOLIC PANEL: CPT

## 2024-02-01 PROCEDURE — 80061 LIPID PANEL: CPT

## 2024-02-01 PROCEDURE — 82043 UR ALBUMIN QUANTITATIVE: CPT

## 2024-02-01 PROCEDURE — 82570 ASSAY OF URINE CREATININE: CPT

## 2024-02-01 PROCEDURE — 83036 HEMOGLOBIN GLYCOSYLATED A1C: CPT

## 2024-02-02 ENCOUNTER — RA CDI HCC (OUTPATIENT)
Dept: OTHER | Facility: HOSPITAL | Age: 79
End: 2024-02-02

## 2024-02-07 DIAGNOSIS — R42 VERTIGO: ICD-10-CM

## 2024-02-07 RX ORDER — MECLIZINE HYDROCHLORIDE 25 MG/1
TABLET ORAL
Qty: 270 TABLET | Refills: 1 | Status: SHIPPED | OUTPATIENT
Start: 2024-02-07

## 2024-02-09 ENCOUNTER — TELEPHONE (OUTPATIENT)
Dept: ADMINISTRATIVE | Facility: OTHER | Age: 79
End: 2024-02-09

## 2024-02-09 ENCOUNTER — OFFICE VISIT (OUTPATIENT)
Dept: INTERNAL MEDICINE CLINIC | Facility: CLINIC | Age: 79
End: 2024-02-09
Payer: MEDICARE

## 2024-02-09 VITALS
SYSTOLIC BLOOD PRESSURE: 132 MMHG | HEART RATE: 75 BPM | WEIGHT: 214 LBS | HEIGHT: 64 IN | DIASTOLIC BLOOD PRESSURE: 78 MMHG | OXYGEN SATURATION: 94 % | BODY MASS INDEX: 36.54 KG/M2

## 2024-02-09 DIAGNOSIS — I10 BENIGN HYPERTENSION: ICD-10-CM

## 2024-02-09 DIAGNOSIS — E11.65 TYPE 2 DIABETES MELLITUS WITH HYPERGLYCEMIA, WITHOUT LONG-TERM CURRENT USE OF INSULIN (HCC): ICD-10-CM

## 2024-02-09 DIAGNOSIS — E11.65 TYPE 2 DIABETES MELLITUS WITH HYPERGLYCEMIA, WITHOUT LONG-TERM CURRENT USE OF INSULIN (HCC): Primary | ICD-10-CM

## 2024-02-09 DIAGNOSIS — E78.2 MIXED HYPERLIPIDEMIA: ICD-10-CM

## 2024-02-09 DIAGNOSIS — F41.9 ANXIETY: ICD-10-CM

## 2024-02-09 PROCEDURE — 99214 OFFICE O/P EST MOD 30 MIN: CPT | Performed by: INTERNAL MEDICINE

## 2024-02-09 RX ORDER — METFORMIN HYDROCHLORIDE 500 MG/1
500 TABLET, EXTENDED RELEASE ORAL
Qty: 90 TABLET | Refills: 3 | Status: SHIPPED | OUTPATIENT
Start: 2024-02-09 | End: 2024-08-07

## 2024-02-09 RX ORDER — BUSPIRONE HYDROCHLORIDE 5 MG/1
5 TABLET ORAL 2 TIMES DAILY
Qty: 60 TABLET | Refills: 5 | Status: SHIPPED | OUTPATIENT
Start: 2024-02-09

## 2024-02-09 NOTE — LETTER
Diabetic Eye Exam Form    Date Requested: 24  Patient: Xander Bojorquez  Patient : 1945   Referring Provider: Magnus Casiano MD      DIABETIC Eye Exam Date _______________________________      Type of Exam MUST be documented for Diabetic Eye Exams. Please CHECK ONE.     Retinal Exam       Dilated Retinal Exam       OCT       Optomap-Iris Exam      Fundus Photography       Left Eye - Please check Retinopathy or No Retinopathy        Exam did show retinopathy    Exam did not show retinopathy       Right Eye - Please check Retinopathy or No Retinopathy       Exam did show retinopathy    Exam did not show retinopathy       Comments __________________________________________________________    Practice Providing Exam ______________________________________________    Exam Performed By (print name) _______________________________________      Provider Signature ___________________________________________________      These reports are needed for  compliance.  Please fax this completed form and a copy of the Diabetic Eye Exam report to our office located at [address] [place] [address] as soon as possible via Fax [phone] attention Good: Phone [phone]  We thank you for your assistance in treating our mutual patient.

## 2024-02-09 NOTE — TELEPHONE ENCOUNTER
----- Message from Sari Arias sent at 2/9/2024  9:10 AM EST -----  Regarding: Care Gap Request - Diabeteic Foot Exam     02/09/24 9:10 AM    Hello, our patient above has had Diabetic Foot Exam completed/performed. Please assist in updating the patient chart by making an External outreach to [facility] facility located in [place], [place]. The date of service is sometime within the last month.    Thank you,  Sari Arias [facility]     Sari LEAL [facility]     02/09/24 9:12 AM    Hello, our patient above has had Diabetic Eye Exam completed/performed. Please assist in updating the patient chart by making an External outreach to [facility] located in [place], [place]. The date of service is sometime within the last month.    Thank you,  Sari Arias [facility]

## 2024-02-09 NOTE — LETTER
Diabetic Foot Exam Form    Date Requested: 24  Patient: Xander Bojorquez  Patient : 1945   Referring Provider: Magnus Casiano MD    Diabetic Foot Exam Performed with shoes and socks removed        Yes         No     Date of Diabetic Foot Exam ______________________________  Risk Score ____________________________________________    Left Foot       Visual Inspection         Monofilament Testing Sensory Exam        Pedal Pulses         Additional Comments         Right Foot      Visual Inspection         Monofilament Testing Sensory Exam       Pedal Pulses         Additional Comments         Comments __________________________________________________________    Practice Providing Exam ______________________________________________    Exam Performed By (print name) _______________________________________      Provider Signature ___________________________________________________      These reports are needed for  compliance.    Please fax this completed form and a copy of the Diabetic Foot Exam report to our office located at [address] [place] [address] as soon as possible via Fax [phone] attention Good: Phone [phone]    We thank you for your assistance in treating our mutual patient.

## 2024-02-09 NOTE — TELEPHONE ENCOUNTER
Upon review of the In Basket request and the patient's chart, initial outreach has been made via fax to facility. Please see Contacts section for details.     Thank you  Good Lloyd MA

## 2024-02-09 NOTE — LETTER
Diabetic Eye Exam Form    Date Requested: 24  Patient: Xander Bojorquez  Patient : 1945   Referring Provider: Magnus Casiano MD     2nd request      DIABETIC Eye Exam Date _______________________________      Type of Exam MUST be documented for Diabetic Eye Exams. Please CHECK ONE.     Retinal Exam       Dilated Retinal Exam       OCT       Optomap-Iris Exam      Fundus Photography       Left Eye - Please check Retinopathy or No Retinopathy        Exam did show retinopathy    Exam did not show retinopathy       Right Eye - Please check Retinopathy or No Retinopathy       Exam did show retinopathy    Exam did not show retinopathy       Comments __________________________________________________________    Practice Providing Exam ______________________________________________    Exam Performed By (print name) _______________________________________      Provider Signature ___________________________________________________      These reports are needed for  compliance.  Please fax this completed form and a copy of the Diabetic Eye Exam report to our office located at [address] [place] [address] as soon as possible via Fax [phone] attention Good: Phone [phone]  We thank you for your assistance in treating our mutual patient.

## 2024-02-09 NOTE — LETTER
Diabetic Foot Exam Form    Date Requested: 24  Patient: Xander Bojorquez  Patient : 1945   Referring Provider: Magnus Casiano MD     2nd request    Diabetic Foot Exam Performed with shoes and socks removed        Yes         No     Date of Diabetic Foot Exam ______________________________  Risk Score ____________________________________________    Left Foot       Visual Inspection         Monofilament Testing Sensory Exam        Pedal Pulses         Additional Comments         Right Foot      Visual Inspection         Monofilament Testing Sensory Exam       Pedal Pulses         Additional Comments         Comments __________________________________________________________    Practice Providing Exam ______________________________________________    Exam Performed By (print name) _______________________________________      Provider Signature ___________________________________________________      These reports are needed for  compliance.    Please fax this completed form and a copy of the Diabetic Foot Exam report to our office located at [address] [place] [address] as soon as possible via Fax [phone] attention Good: Phone [phone]    We thank you for your assistance in treating our mutual patient.

## 2024-02-09 NOTE — PROGRESS NOTES
Assessment/Plan:     His diabetes remains well-controlled.  Continue the metformin 500 mg daily.  Keep working on diet.  His hypertension remains controlled with losartan 50 mg daily and metoprolol 12.5 mg daily.  For his anxiety, worsening somewhat.  Will add BuSpar 5 mg twice a day.  After he has been on that for several weeks, suggested starting to cut back on the Xanax.  For his mixed hyperlipidemia, continue his Tricor 54 mg daily.  For his hands, agree it is probably from neck positioning as he sleeps.  He will correct that and call us if still having problems.  Continue all other medications.    Continue followup with all specialists.   Continue diet and exercise.    Ordered labs for next visit.     Quality Measures:       Return in about 4 months (around 6/9/2024) for Regular visit and Medicare Wellness.    No problem-specific Assessment & Plan notes found for this encounter.       Diagnoses and all orders for this visit:    Type 2 diabetes mellitus with hyperglycemia, without long-term current use of insulin (HCC)  -     Comprehensive metabolic panel; Future  -     CBC and differential; Future  -     Hemoglobin A1C; Future  -     Lipid panel; Future    Benign hypertension    Mixed hyperlipidemia    Anxiety  -     busPIRone (BUSPAR) 5 mg tablet; Take 1 tablet (5 mg total) by mouth 2 (two) times a day          Subjective:      Patient ID: Xander Bojorquez is a 78 y.o. male.    Patient comes in today for routine follow-up.  His blood work looked okay.  His sugar remains controlled.  Up slightly but that did include the holidays.  He had 1 episode of low sugar at work.  Luckily, he called his significant other and she told him to take some sugar.  Rapidly cleared.  Now he keeps sugar at the office.  Has had no further episodes.  His blood pressure is controlled.  Cholesterol is controlled.  He also has been waking up at night with his hands tingling and he has to shake them awake.  Goes away quickly.  Again, his  significant other pointed out to him that he is really tilting his head forward when he is sleeping with an extra pillow.  This sensation only does happen at night.  He has no trouble during the day at work.  Uses his hands and is on the computer a lot.        ALLERGIES:  No Known Allergies    CURRENT MEDICATIONS:    Current Outpatient Medications:   •  ALPRAZolam (XANAX) 0.5 mg tablet, TAKE 1 TABLET(0.5 MG) BY MOUTH TWICE DAILY, Disp: 180 tablet, Rfl: 1  •  aspirin (ECOTRIN LOW STRENGTH) 81 mg EC tablet, Take by mouth, Disp: , Rfl:   •  B Complex-Folic Acid (B COMPLEX PLUS) TABS, Take by mouth, Disp: , Rfl:   •  busPIRone (BUSPAR) 5 mg tablet, Take 1 tablet (5 mg total) by mouth 2 (two) times a day, Disp: 60 tablet, Rfl: 5  •  Cholecalciferol (VITAMIN D3) 1000 units CAPS, Take by mouth, Disp: , Rfl:   •  fenofibrate (TRICOR) 54 MG tablet, TAKE 1 TABLET BY MOUTH  DAILY, Disp: 90 tablet, Rfl: 3  •  losartan (COZAAR) 50 mg tablet, TAKE 1 TABLET(50 MG) BY MOUTH DAILY, Disp: 90 tablet, Rfl: 3  •  meclizine (ANTIVERT) 25 mg tablet, TAKE 1 TABLET(25 MG) BY MOUTH THREE TIMES DAILY AS NEEDED FOR DIZZINESS, Disp: 270 tablet, Rfl: 1  •  metFORMIN (GLUCOPHAGE-XR) 500 mg 24 hr tablet, Take 1 tablet (500 mg total) by mouth daily with dinner, Disp: 90 tablet, Rfl: 1  •  metoprolol succinate (TOPROL-XL) 25 mg 24 hr tablet, Take 0.5 tablets (12.5 mg total) by mouth daily, Disp: 90 tablet, Rfl: 1  •  Multiple Vitamin (MULTI-VITAMIN DAILY PO), Take by mouth, Disp: , Rfl:   •  omeprazole (PriLOSEC) 40 MG capsule, TAKE 1 CAPSULE BY MOUTH  DAILY, Disp: 90 capsule, Rfl: 3  •  solifenacin (VESICARE) 5 mg tablet, TAKE 1 TABLET BY MOUTH  DAILY, Disp: 90 tablet, Rfl: 3    ACTIVE PROBLEM LIST:  Patient Active Problem List   Diagnosis   • KADEN on CPAP   • Anxiety   • Basal cell carcinoma of cheek   • Basal cell carcinoma of skin   • Benign hypertension   • Deviated nasal septum   • GERD (gastroesophageal reflux disease)   • Hyperlipidemia   •  Morbid obesity (HCC)   • OAB (overactive bladder)   • Bright red blood per rectum   • Diverticulosis   • Rib fracture   • Abnormal CT of the abdomen   • Fall   • Forearm injury, left, subsequent encounter   • Sprain of unspecified ligament of right ankle, initial encounter   • Sciatica of right side   • Type 2 diabetes mellitus with hyperglycemia, without long-term current use of insulin (HCC)       PAST MEDICAL HISTORY:  Past Medical History:   Diagnosis Date   • Cancer (HCC)     basal call carcinoma   • Colon polyps     DR. ALVES   • CPAP (continuous positive airway pressure) dependence    • Diverticulosis    • Hyperlipidemia    • Hypertension    • Obesity    • KADEN (obstructive sleep apnea)     cpap   • Pneumonia    • Small bowel obstruction, partial (HCC)    • SOB (shortness of breath)        PAST SURGICAL HISTORY:  Past Surgical History:   Procedure Laterality Date   • COLONOSCOPY     • SKIN BIOPSY     • TONSILLECTOMY AND ADENOIDECTOMY         FAMILY HISTORY:  Family History   Problem Relation Age of Onset   • Heart disease Mother    • Hypertension Mother    • Breast cancer Sister        SOCIAL HISTORY:  Social History     Socioeconomic History   • Marital status: Significant Other     Spouse name: Not on file   • Number of children: 0   • Years of education: Not on file   • Highest education level: Not on file   Occupational History   • Occupation: SALES     Comment: FULL TIME    Tobacco Use   • Smoking status: Never   • Smokeless tobacco: Never   Vaping Use   • Vaping status: Never Used   Substance and Sexual Activity   • Alcohol use: Not Currently     Comment: occassional   • Drug use: No   • Sexual activity: Yes     Partners: Female     Comment: DENIED: HISTORY OF HIGH RISK SEXUAL BEHAVIOR    Other Topics Concern   • Not on file   Social History Narrative     as per all scripts      Social Determinants of Health     Financial Resource Strain: Low Risk  (5/11/2023)    Overall Financial Resource Strain  "(CARDIA)    • Difficulty of Paying Living Expenses: Not hard at all   Food Insecurity: Not on file   Transportation Needs: No Transportation Needs (5/11/2023)    PRAPARE - Transportation    • Lack of Transportation (Medical): No    • Lack of Transportation (Non-Medical): No   Physical Activity: Not on file   Stress: Not on file   Social Connections: Not on file   Intimate Partner Violence: Not on file   Housing Stability: Not on file       Review of Systems   Respiratory:  Negative for shortness of breath.    Cardiovascular:  Negative for chest pain.   Gastrointestinal:  Negative for abdominal pain.         Objective:  Vitals:    02/09/24 0852   BP: 132/78   BP Location: Left arm   Patient Position: Sitting   Cuff Size: Standard   Pulse: 75   SpO2: 94%   Weight: 97.1 kg (214 lb)   Height: 5' 4\" (1.626 m)     Body mass index is 36.73 kg/m².     Physical Exam  Vitals and nursing note reviewed.   Constitutional:       Appearance: Normal appearance. He is well-developed.   Cardiovascular:      Rate and Rhythm: Normal rate and regular rhythm.      Heart sounds: Normal heart sounds.   Pulmonary:      Effort: Pulmonary effort is normal.      Breath sounds: Normal breath sounds.   Abdominal:      Palpations: Abdomen is soft.      Tenderness: There is no abdominal tenderness.   Neurological:      Mental Status: He is alert and oriented to person, place, and time.           RESULTS:  Hemoglobin A1C   Date/Time Value Ref Range Status   02/01/2024 10:13 AM 6.8 (H) Normal 4.0-5.6%; PreDiabetic 5.7-6.4%; Diabetic >=6.5%; Glycemic control for adults with diabetes <7.0% % Final     Cholesterol   Date/Time Value Ref Range Status   02/01/2024 10:13  See Comment mg/dL Final     Comment:     Cholesterol:         Pediatric <18 Years        Desirable          <170 mg/dL      Borderline High    170-199 mg/dL      High               >=200 mg/dL        Adult >=18 Years            Desirable        <200 mg/dL      Borderline High  " 200-239 mg/dL      High             >239 mg/dL       Triglycerides   Date/Time Value Ref Range Status   02/01/2024 10:13  (H) See Comment mg/dL Final     Comment:     Triglyceride:     0-9Y            <75mg/dL     10Y-17Y         <90 mg/dL       >=18Y     Normal          <150 mg/dL     Borderline High 150-199 mg/dL     High            200-499 mg/dL        Very High       >499 mg/dL    Specimen collection should occur prior to Metamizole administration due to the potential for falsely depressed results.     HDL, Direct   Date/Time Value Ref Range Status   02/01/2024 10:13 AM 40 >=40 mg/dL Final     LDL Calculated   Date/Time Value Ref Range Status   02/01/2024 10:13  (H) 0 - 100 mg/dL Final     Comment:     LDL Cholesterol:     Optimal           <100 mg/dl     Near Optimal      100-129 mg/dl     Above Optimal       Borderline High 130-159 mg/dl       High            160-189 mg/dl       Very High       >189 mg/dl         This screening LDL is a calculated result.   It does not have the accuracy of the Direct Measured LDL in the monitoring of patients with hyperlipidemia and/or statin therapy.   Direct Measure LDL (MQG246) must be ordered separately in these patients.     Hemoglobin   Date/Time Value Ref Range Status   02/01/2024 10:13 AM 16.9 12.0 - 17.0 g/dL Final     Hematocrit   Date/Time Value Ref Range Status   02/01/2024 10:13 AM 49.0 36.5 - 49.3 % Final     Platelets   Date/Time Value Ref Range Status   02/01/2024 10:13  149 - 390 Thousands/uL Final     PSA   Date/Time Value Ref Range Status   03/11/2021 09:58 AM 1.0 0.0 - 4.0 ng/mL Final     Comment:     American Urological Association Guidelines define biochemical recurrence of prostate cancer as a detectable or rising PSA value post-radical prostatectomy that is greater than or equal to 0.2 ng/mL with a second confirmatory level of greater than or equal to 0.2 ng/mL.     TSH 3RD GENERATON   Date/Time Value Ref Range Status   08/11/2022  10:01 AM 1.126 0.450 - 4.500 uIU/mL Final     Comment:     Adult TSH (3rd generation) reference range follows the recommended guidelines of the American Thyroid Association, January, 2020.     Sodium   Date/Time Value Ref Range Status   02/01/2024 10:13  135 - 147 mmol/L Final     BUN   Date/Time Value Ref Range Status   02/01/2024 10:13 AM 20 5 - 25 mg/dL Final     Creatinine   Date/Time Value Ref Range Status   02/01/2024 10:13 AM 1.19 0.60 - 1.30 mg/dL Final     Comment:     Standardized to IDMS reference method      In chart    This note was created with voice recognition software.  Phonic, grammatical and spelling errors may be present within the note as a result.

## 2024-02-12 ENCOUNTER — TELEPHONE (OUTPATIENT)
Dept: INTERNAL MEDICINE CLINIC | Facility: CLINIC | Age: 79
End: 2024-02-12

## 2024-02-12 DIAGNOSIS — I10 BENIGN HYPERTENSION: ICD-10-CM

## 2024-02-12 RX ORDER — METOPROLOL SUCCINATE 25 MG/1
25 TABLET, EXTENDED RELEASE ORAL DAILY
Qty: 90 TABLET | Refills: 3 | Status: SHIPPED | OUTPATIENT
Start: 2024-02-12

## 2024-02-12 NOTE — TELEPHONE ENCOUNTER
As a follow-up, a second attempt has been made for outreach via fax to facility. Please see Contacts section for details.    Thank you  Good Lloyd MA

## 2024-02-12 NOTE — TELEPHONE ENCOUNTER
Patient called in regards to his Metoprolol. Patient said that you had changed it from 50 mg to 25 mg taking 1 tablet per day. But when he got the medication it tells him to cut it in half.    Please clarify and notify patient on the correct dosage and how many times a day.

## 2024-02-12 NOTE — TELEPHONE ENCOUNTER
"As per Dr. Casiano, \"  Please clarify with him 1 more time.  The chart is listing what we sent, the half tablet of 25 mg, and it has mentioned that multiple times.  But if he has been taking 25 mg daily and his numbers are good, we can send that prescription in, 25 mg daily.  Just want to make sure we are on the same page.     Spoke with the patient and he informed me that he does take 1 full tablet of 25MG daily. He said his readings are good on this dose. Updated rx sent in, and updated in his chart as well.  "

## 2024-02-13 NOTE — TELEPHONE ENCOUNTER
Upon review of the In Basket request we were able to locate, review, and update the patient chart as requested for Diabetic Eye Exam.    Any additional questions or concerns should be emailed to the Practice Liaisons via the appropriate education email address, please do not reply via In Basket.    Thank you  Good Lloyd MA

## 2024-02-16 NOTE — TELEPHONE ENCOUNTER
Upon review of the In Basket request we were able to locate, review, and update the patient chart as requested for Diabetic Foot Exam.    Any additional questions or concerns should be emailed to the Practice Liaisons via the appropriate education email address, please do not reply via In Basket.    Thank you  Good Lloyd MA

## 2024-04-11 DIAGNOSIS — N32.81 OAB (OVERACTIVE BLADDER): ICD-10-CM

## 2024-04-11 RX ORDER — SOLIFENACIN SUCCINATE 5 MG/1
5 TABLET, FILM COATED ORAL DAILY
Qty: 90 TABLET | Refills: 3 | Status: SHIPPED | OUTPATIENT
Start: 2024-04-11

## 2024-05-16 DIAGNOSIS — I10 BENIGN HYPERTENSION: ICD-10-CM

## 2024-05-16 DIAGNOSIS — E78.2 MIXED HYPERLIPIDEMIA: ICD-10-CM

## 2024-05-17 RX ORDER — METOPROLOL SUCCINATE 25 MG/1
25 TABLET, EXTENDED RELEASE ORAL DAILY
Qty: 90 TABLET | Refills: 1 | Status: SHIPPED | OUTPATIENT
Start: 2024-05-17

## 2024-05-17 RX ORDER — FENOFIBRATE 54 MG/1
54 TABLET ORAL DAILY
Qty: 90 TABLET | Refills: 1 | Status: SHIPPED | OUTPATIENT
Start: 2024-05-17

## 2024-05-28 DIAGNOSIS — E78.2 MIXED HYPERLIPIDEMIA: ICD-10-CM

## 2024-05-28 RX ORDER — FENOFIBRATE 54 MG/1
54 TABLET ORAL DAILY
Qty: 90 TABLET | Refills: 1 | OUTPATIENT
Start: 2024-05-28

## 2024-05-28 NOTE — TELEPHONE ENCOUNTER
Patient is going in for Basel Cell removal on the face 6/4 and is wondering other than his aspirin(he did start holding it), what other medication should be held prior to the surgery?      Patient is also wondering, will the EKG  and blood work that was done through [facility] be available to view in our records?    Patients final question is regarding his anesthesia. Patient does not want the same mixture that he had when he had his colonoscopy. He had severe headaches from it. What can you do different to prevent them from giving him the same mixture?  Is there something you can put into his chart that [facility] will get?    Please give patient a call when you have some answers for him.  Thank you.

## 2024-05-28 NOTE — TELEPHONE ENCOUNTER
Patient needs a refill of the following medication:     Fenofibrate (Tricor) 54 MG tablet  Take 1 tablet by mouth daily    Patient stated he is not sure if he is still on the 54 mg. He said the dosage may be lower.  He has been off the medication for a month now due to not having it filled.   The medication can be called into [facility] #[id number].  Thank you.

## 2024-05-29 ENCOUNTER — TELEPHONE (OUTPATIENT)
Age: 79
End: 2024-05-29

## 2024-05-29 NOTE — TELEPHONE ENCOUNTER
Patient called in stating he is having trouble filling fenofibrate (TRICOR) 54 MG tablet. Last date script was refilled in his chart states 5/17 but was sent to [facility]. Patient does not use that pharmacy.       Patient also needs clarification on what dose he should be taking regarding this medication. Patient was given two different instruction from the doctor.     Please advise and call patient.

## 2024-05-30 RX ORDER — FENOFIBRATE 54 MG/1
54 TABLET ORAL DAILY
Qty: 90 TABLET | Refills: 1 | Status: SHIPPED | OUTPATIENT
Start: 2024-05-30

## 2024-05-30 NOTE — TELEPHONE ENCOUNTER
Pt called back about the tricor fenofibrate med.  I relayed the nurses message and he said he doesn't use [facility],  just the [facility] in [place].     Can we resend this fill to [facility] for him please?

## 2024-06-03 ENCOUNTER — TELEPHONE (OUTPATIENT)
Age: 79
End: 2024-06-03

## 2024-06-03 DIAGNOSIS — K21.9 GASTROESOPHAGEAL REFLUX DISEASE WITHOUT ESOPHAGITIS: ICD-10-CM

## 2024-06-03 RX ORDER — OMEPRAZOLE 40 MG/1
40 CAPSULE, DELAYED RELEASE ORAL DAILY
Qty: 90 CAPSULE | Refills: 1 | Status: SHIPPED | OUTPATIENT
Start: 2024-06-03

## 2024-06-03 NOTE — TELEPHONE ENCOUNTER
Pt had labs drawn through [facility] for upcoming surgery (CBC, CMP)-pt wanted you to be aware that they are in care everywhere. Advised pt that you had also ordered a Lipid and A1c which is will still complete.

## 2024-06-06 ENCOUNTER — RA CDI HCC (OUTPATIENT)
Dept: OTHER | Facility: HOSPITAL | Age: 79
End: 2024-06-06

## 2024-06-13 ENCOUNTER — OFFICE VISIT (OUTPATIENT)
Dept: INTERNAL MEDICINE CLINIC | Facility: CLINIC | Age: 79
End: 2024-06-13
Payer: MEDICARE

## 2024-06-13 ENCOUNTER — APPOINTMENT (OUTPATIENT)
Dept: LAB | Facility: HOSPITAL | Age: 79
End: 2024-06-13
Payer: MEDICARE

## 2024-06-13 VITALS
RESPIRATION RATE: 17 BRPM | WEIGHT: 217 LBS | TEMPERATURE: 97.3 F | DIASTOLIC BLOOD PRESSURE: 76 MMHG | OXYGEN SATURATION: 97 % | HEART RATE: 64 BPM | HEIGHT: 64 IN | SYSTOLIC BLOOD PRESSURE: 124 MMHG | BODY MASS INDEX: 37.05 KG/M2

## 2024-06-13 DIAGNOSIS — C44.319 BASAL CELL CARCINOMA OF CHEEK: ICD-10-CM

## 2024-06-13 DIAGNOSIS — I10 BENIGN HYPERTENSION: ICD-10-CM

## 2024-06-13 DIAGNOSIS — E11.65 TYPE 2 DIABETES MELLITUS WITH HYPERGLYCEMIA, WITHOUT LONG-TERM CURRENT USE OF INSULIN (HCC): ICD-10-CM

## 2024-06-13 DIAGNOSIS — F41.9 ANXIETY: ICD-10-CM

## 2024-06-13 DIAGNOSIS — Z00.00 MEDICARE ANNUAL WELLNESS VISIT, SUBSEQUENT: Primary | ICD-10-CM

## 2024-06-13 DIAGNOSIS — E78.2 MIXED HYPERLIPIDEMIA: ICD-10-CM

## 2024-06-13 LAB
ALBUMIN SERPL BCP-MCNC: 4.2 G/DL (ref 3.5–5)
ALP SERPL-CCNC: 39 U/L (ref 34–104)
ALT SERPL W P-5'-P-CCNC: 19 U/L (ref 7–52)
ANION GAP SERPL CALCULATED.3IONS-SCNC: 8 MMOL/L (ref 4–13)
AST SERPL W P-5'-P-CCNC: 18 U/L (ref 13–39)
BASOPHILS # BLD AUTO: 0.08 THOUSANDS/ÂΜL (ref 0–0.1)
BASOPHILS NFR BLD AUTO: 1 % (ref 0–1)
BILIRUB SERPL-MCNC: 0.61 MG/DL (ref 0.2–1)
BUN SERPL-MCNC: 14 MG/DL (ref 5–25)
CALCIUM SERPL-MCNC: 9.3 MG/DL (ref 8.4–10.2)
CHLORIDE SERPL-SCNC: 105 MMOL/L (ref 96–108)
CHOLEST SERPL-MCNC: 168 MG/DL
CO2 SERPL-SCNC: 25 MMOL/L (ref 21–32)
CREAT SERPL-MCNC: 0.95 MG/DL (ref 0.6–1.3)
EOSINOPHIL # BLD AUTO: 0.13 THOUSAND/ÂΜL (ref 0–0.61)
EOSINOPHIL NFR BLD AUTO: 2 % (ref 0–6)
ERYTHROCYTE [DISTWIDTH] IN BLOOD BY AUTOMATED COUNT: 13.5 % (ref 11.6–15.1)
EST. AVERAGE GLUCOSE BLD GHB EST-MCNC: 143 MG/DL
GFR SERPL CREATININE-BSD FRML MDRD: 77 ML/MIN/1.73SQ M
GLUCOSE P FAST SERPL-MCNC: 138 MG/DL (ref 65–99)
HBA1C MFR BLD: 6.6 %
HCT VFR BLD AUTO: 49.1 % (ref 36.5–49.3)
HDLC SERPL-MCNC: 32 MG/DL
HGB BLD-MCNC: 16.6 G/DL (ref 12–17)
IMM GRANULOCYTES # BLD AUTO: 0.02 THOUSAND/UL (ref 0–0.2)
IMM GRANULOCYTES NFR BLD AUTO: 0 % (ref 0–2)
LDLC SERPL CALC-MCNC: 87 MG/DL (ref 0–100)
LYMPHOCYTES # BLD AUTO: 2.17 THOUSANDS/ÂΜL (ref 0.6–4.47)
LYMPHOCYTES NFR BLD AUTO: 31 % (ref 14–44)
MCH RBC QN AUTO: 30.8 PG (ref 26.8–34.3)
MCHC RBC AUTO-ENTMCNC: 33.8 G/DL (ref 31.4–37.4)
MCV RBC AUTO: 91 FL (ref 82–98)
MONOCYTES # BLD AUTO: 0.59 THOUSAND/ÂΜL (ref 0.17–1.22)
MONOCYTES NFR BLD AUTO: 8 % (ref 4–12)
NEUTROPHILS # BLD AUTO: 4.05 THOUSANDS/ÂΜL (ref 1.85–7.62)
NEUTS SEG NFR BLD AUTO: 58 % (ref 43–75)
NONHDLC SERPL-MCNC: 136 MG/DL
NRBC BLD AUTO-RTO: 0 /100 WBCS
PLATELET # BLD AUTO: 279 THOUSANDS/UL (ref 149–390)
PMV BLD AUTO: 9.9 FL (ref 8.9–12.7)
POTASSIUM SERPL-SCNC: 3.9 MMOL/L (ref 3.5–5.3)
PROT SERPL-MCNC: 7.2 G/DL (ref 6.4–8.4)
RBC # BLD AUTO: 5.39 MILLION/UL (ref 3.88–5.62)
SODIUM SERPL-SCNC: 138 MMOL/L (ref 135–147)
TRIGL SERPL-MCNC: 246 MG/DL
WBC # BLD AUTO: 7.04 THOUSAND/UL (ref 4.31–10.16)

## 2024-06-13 PROCEDURE — 36415 COLL VENOUS BLD VENIPUNCTURE: CPT

## 2024-06-13 PROCEDURE — 83036 HEMOGLOBIN GLYCOSYLATED A1C: CPT

## 2024-06-13 PROCEDURE — G0439 PPPS, SUBSEQ VISIT: HCPCS | Performed by: INTERNAL MEDICINE

## 2024-06-13 PROCEDURE — 80053 COMPREHEN METABOLIC PANEL: CPT

## 2024-06-13 PROCEDURE — 85025 COMPLETE CBC W/AUTO DIFF WBC: CPT

## 2024-06-13 PROCEDURE — 80061 LIPID PANEL: CPT

## 2024-06-13 PROCEDURE — 99214 OFFICE O/P EST MOD 30 MIN: CPT | Performed by: INTERNAL MEDICINE

## 2024-06-13 NOTE — PROGRESS NOTES
Ambulatory Visit  Name: Xander Bojorquez      : 1945      MRN: [id number]  Encounter Provider: Magnus Casiano MD  Encounter Date: 2024   Encounter department: [facility] INTERNAL [facility]IC[facility] [place]    Assessment & Plan   1. Medicare annual wellness visit, subsequent  2. Type 2 diabetes mellitus with hyperglycemia, without long-term current use of insulin (HCC)  Assessment & Plan:  Has been doing well.  Needs more recent A1c.  Lab Results   Component Value Date    HGBA1C 6.8 (H) 2024     3. Mixed hyperlipidemia  Assessment & Plan:  Has been controlled with his Tricor but he needs to get the blood work done.  4. Benign hypertension  Assessment & Plan:  Controlled with losartan 50 mg daily and his metoprolol  5. Anxiety  Assessment & Plan:  Controlled with current meds, BuSpar and alprazolam  6. Basal cell carcinoma of cheek  Assessment & Plan:  Status post surgery.  Went well.       Preventive health issues were discussed with patient, and age appropriate screening tests were ordered as noted in patient's After Visit Summary. Personalized health advice and appropriate referrals for health education or preventive services given if needed, as noted in patient's After Visit Summary.    History of Present Illness     Patient comes in today for routine follow-up and Medicare wellness.  He states his skin surgery went well for the basal cell.  No complications.  He thought the blood work he did preop for that would cover everything but his lipids and A1c were not checked.  He feels that sugars have been doing okay.  His cholesterol had been doing okay.  Blood pressure is controlled.  Anxiety is under control.  Trying to keep busy.  Denies any new complaints today.  No further additions to his history.       Patient Care Team:  Magnus Casiano MD as PCP - General  CHERISE Vanessa MD    Review of Systems   Respiratory:  Negative for shortness of breath.    Cardiovascular:  Negative  for chest pain.   Gastrointestinal:  Negative for abdominal pain.     Medical History Reviewed by provider this encounter:       Annual Wellness Visit Questionnaire   Xander is here for his Subsequent Wellness visit.     Health Risk Assessment:   Patient rates overall health as fair. Patient feels that their physical health rating is same. Patient is satisfied with their life. Eyesight was rated as same. Hearing was rated as slightly worse. Patient feels that their emotional and mental health rating is same. Patients states they are never, rarely angry. Patient states they are sometimes unusually tired/fatigued. Pain experienced in the last 7 days has been none. Patient states that he has experienced no weight loss or gain in last 6 months.     Depression Screening:   PHQ-2 Score: 0      Fall Risk Screening:   In the past year, patient has experienced: no history of falling in past year      Home Safety:  Patient does not have trouble with stairs inside or outside of their home. Patient has working smoke alarms and has working carbon monoxide detector. Home safety hazards include: none.     Nutrition:   Current diet is No Added Salt and Low Carb.     Medications:   Patient is currently taking over-the-counter supplements. OTC medications include: see medication list. Patient is able to manage medications.     Activities of Daily Living (ADLs)/Instrumental Activities of Daily Living (IADLs):   Walk and transfer into and out of bed and chair?: Yes  Dress and groom yourself?: Yes    Bathe or shower yourself?: Yes    Feed yourself? Yes  Do your laundry/housekeeping?: Yes  Manage your money, pay your bills and track your expenses?: Yes  Make your own meals?: Yes    Do your own shopping?: Yes    Previous Hospitalizations:   Any hospitalizations or ED visits within the last 12 months?: Yes    How many hospitalizations have you had in the last year?: 3-4    Advance Care Planning:   Living will: No    Advanced directive: Yes     Advanced directive counseling given: Yes      Cognitive Screening:   Provider or family/friend/caregiver concerned regarding cognition?: No    PREVENTIVE SCREENINGS      Cardiovascular Screening:    General: Screening Not Indicated and History Lipid Disorder      Diabetes Screening:     General: Screening Not Indicated and History Diabetes      Colorectal Cancer Screening:     General: Screening Current      Prostate Cancer Screening:    General: Screening Not Indicated      Osteoporosis Screening:    General: Screening Not Indicated      Abdominal Aortic Aneurysm (AAA) Screening:        General: Screening Not Indicated      Lung Cancer Screening:     General: Screening Not Indicated      Hepatitis C Screening:    General: Screening Not Indicated    Screening, Brief Intervention, and Referral to Treatment (SBIRT)    Screening  Typical number of drinks in a day: 0  Typical number of drinks in a week: 0  Interpretation: Low risk drinking behavior.    Single Item Drug Screening:  How often have you used an illegal drug (including marijuana) or a prescription medication for non-medical reasons in the past year? never    Single Item Drug Screen Score: 0  Interpretation: Negative screen for possible drug use disorder    Brief Intervention  Alcohol & drug use screenings were reviewed. No concerns regarding substance use disorder identified.     Social Determinants of Health     Financial Resource Strain: Low Risk  (5/11/2023)    Overall Financial Resource Strain (CARDIA)    • Difficulty of Paying Living Expenses: Not hard at all   Food Insecurity: No Food Insecurity (6/13/2024)    Hunger Vital Sign    • Worried About Running Out of Food in the Last Year: Never true    • Ran Out of Food in the Last Year: Never true   Transportation Needs: No Transportation Needs (6/13/2024)    PRAPARE - Transportation    • Lack of Transportation (Medical): No    • Lack of Transportation (Non-Medical): No   Housing Stability: Low Risk   "(6/13/2024)    Housing Stability Vital Sign    • Unable to Pay for Housing in the Last Year: No    • Number of Times Moved in the Last Year: 1    • Homeless in the Last Year: No   Utilities: Not At Risk (6/13/2024)    [facility] Utilities    • Threatened with loss of utilities: No     No results found.    Objective     /76 (BP Location: Left arm, Patient Position: Sitting, Cuff Size: Adult)   Pulse 64   Temp (!) 97.3 °F (36.3 °C) (Tympanic)   Resp 17   Ht 5' 4\" (1.626 m)   Wt 98.4 kg (217 lb)   SpO2 97%   BMI 37.25 kg/m²     Physical Exam  Vitals and nursing note reviewed.   Cardiovascular:      Rate and Rhythm: Normal rate and regular rhythm.   Pulmonary:      Effort: Pulmonary effort is normal.      Breath sounds: Normal breath sounds.   Abdominal:      General: Abdomen is flat.      Tenderness: There is no abdominal tenderness.   Musculoskeletal:      Right lower leg: No edema.      Left lower leg: No edema.   Neurological:      Mental Status: He is alert and oriented to person, place, and time.   Psychiatric:         Behavior: Behavior normal.         Thought Content: Thought content normal.         Judgment: Judgment normal.       Administrative Statements           "

## 2024-06-13 NOTE — ASSESSMENT & PLAN NOTE
Has been doing well.  Needs more recent A1c.  Lab Results   Component Value Date    HGBA1C 6.8 (H) 02/01/2024

## 2024-06-13 NOTE — PATIENT INSTRUCTIONS
Medicare Preventive Visit Patient Instructions  Thank you for completing your Welcome to Medicare Visit or Medicare Annual Wellness Visit today. Your next wellness visit will be due in one year (6/14/2025).  The screening/preventive services that you may require over the next 5-10 years are detailed below. Some tests may not apply to you based off risk factors and/or age. Screening tests ordered at today's visit but not completed yet may show as past due. Also, please note that scanned in results may not display below.  Preventive Screenings:  Service Recommendations Previous Testing/Comments   Colorectal Cancer Screening  Colonoscopy    Fecal Occult Blood Test (FOBT)/Fecal Immunochemical Test (FIT)  Fecal DNA/Cologuard Test  Flexible Sigmoidoscopy Age: 45-75 years old   Colonoscopy: every 10 years (May be performed more frequently if at higher risk)  OR  FOBT/FIT: every 1 year  OR  Cologuard: every 3 years  OR  Sigmoidoscopy: every 5 years  Screening may be recommended earlier than age 45 if at higher risk for colorectal cancer. Also, an individualized decision between you and your healthcare provider will decide whether screening between the ages of 76-85 would be appropriate. Colonoscopy: 08/24/2023  FOBT/FIT: Not on file  Cologuard: Not on file  Sigmoidoscopy: Not on file    Screening Current     Prostate Cancer Screening Individualized decision between patient and health care provider in men between ages of 55-69   Medicare will cover every 12 months beginning on the day after your 50th birthday PSA: 1.0 ng/mL     Screening Not Indicated     Hepatitis C Screening Once for adults born between 1945 and 1965  More frequently in patients at high risk for Hepatitis C Hep C Antibody: Not on file        Diabetes Screening 1-2 times per year if you're at risk for diabetes or have pre-diabetes Fasting glucose: 133 mg/dL (2/1/2024)  A1C: 6.8 % (2/1/2024)  Screening Not Indicated  History Diabetes   Cholesterol Screening  Once every 5 years if you don't have a lipid disorder. May order more often based on risk factors. Lipid panel: 02/01/2024  Screening Not Indicated  History Lipid Disorder      Other Preventive Screenings Covered by Medicare:  Abdominal Aortic Aneurysm (AAA) Screening: covered once if your at risk. You're considered to be at risk if you have a family history of AAA or a male between the age of 65-75 who smoking at least 100 cigarettes in your lifetime.  Lung Cancer Screening: covers low dose CT scan once per year if you meet all of the following conditions: (1) Age 55-77; (2) No signs or symptoms of lung cancer; (3) Current smoker or have quit smoking within the last 15 years; (4) You have a tobacco smoking history of at least 20 pack years (packs per day x number of years you smoked); (5) You get a written order from a healthcare provider.  Glaucoma Screening: covered annually if you're considered high risk: (1) You have diabetes OR (2) Family history of glaucoma OR (3)  aged 50 and older OR (4)  American aged 65 and older  Osteoporosis Screening: covered every 2 years if you meet one of the following conditions: (1) Have a vertebral abnormality; (2) On glucocorticoid therapy for more than 3 months; (3) Have primary hyperparathyroidism; (4) On osteoporosis medications and need to assess response to drug therapy.  HIV Screening: covered annually if you're between the age of 15-65. Also covered annually if you are younger than 15 and older than 65 with risk factors for HIV infection. For pregnant patients, it is covered up to 3 times per pregnancy.    Immunizations:  Immunization Recommendations   Influenza Vaccine Annual influenza vaccination during flu season is recommended for all persons aged >= 6 months who do not have contraindications   Pneumococcal Vaccine   * Pneumococcal conjugate vaccine = PCV13 (Prevnar 13), PCV15 (Vaxneuvance), PCV20 (Prevnar 20)  * Pneumococcal polysaccharide  vaccine = PPSV23 (Pneumovax) Adults 19-65 yo with certain risk factors or if 65+ yo  If never received any pneumonia vaccine: recommend Prevnar 20 (PCV20)  Give PCV20 if previously received 1 dose of PCV13 or PPSV23   Hepatitis B Vaccine 3 dose series if at intermediate or high risk (ex: diabetes, end stage renal disease, liver disease)   Respiratory syncytial virus (RSV) Vaccine - COVERED BY MEDICARE PART D  * RSVPreF3 (Arexvy) CDC recommends that adults 60 years of age and older may receive a single dose of RSV vaccine using shared clinical decision-making (SCDM)   Tetanus (Td) Vaccine - COST NOT COVERED BY MEDICARE PART B Following completion of primary series, a booster dose should be given every 10 years to maintain immunity against tetanus. Td may also be given as tetanus wound prophylaxis.   Tdap Vaccine - COST NOT COVERED BY MEDICARE PART B Recommended at least once for all adults. For pregnant patients, recommended with each pregnancy.   Shingles Vaccine (Shingrix) - COST NOT COVERED BY MEDICARE PART B  2 shot series recommended in those 19 years and older who have or will have weakened immune systems or those 50 years and older     Health Maintenance Due:      Topic Date Due   • Hepatitis C Screening  Never done   • Colorectal Cancer Screening  08/23/2026     Immunizations Due:      Topic Date Due   • Pneumococcal Vaccine: 65+ Years (1 of 2 - PCV) Never done   • COVID-19 Vaccine (7 - 2023-24 season) 09/01/2023   • Influenza Vaccine (Season Ended) 09/01/2024     Advance Directives   What are advance directives?  Advance directives are legal documents that state your wishes and plans for medical care. These plans are made ahead of time in case you lose your ability to make decisions for yourself. Advance directives can apply to any medical decision, such as the treatments you want, and if you want to donate organs.   What are the types of advance directives?  There are many types of advance directives, and  each state has rules about how to use them. You may choose a combination of any of the following:  Living will:  This is a written record of the treatment you want. You can also choose which treatments you do not want, which to limit, and which to stop at a certain time. This includes surgery, medicine, IV fluid, and tube feedings.   Durable power of  for healthcare (DPAHC):  This is a written record that states who you want to make healthcare choices for you when you are unable to make them for yourself. This person, called a proxy, is usually a family member or a friend. You may choose more than 1 proxy.  Do not resuscitate (DNR) order:  A DNR order is used in case your heart stops beating or you stop breathing. It is a request not to have certain forms of treatment, such as CPR. A DNR order may be included in other types of advance directives.  Medical directive:  This covers the care that you want if you are in a coma, near death, or unable to make decisions for yourself. You can list the treatments you want for each condition. Treatment may include pain medicine, surgery, blood transfusions, dialysis, IV or tube feedings, and a ventilator (breathing machine).  Values history:  This document has questions about your views, beliefs, and how you feel and think about life. This information can help others choose the care that you would choose.  Why are advance directives important?  An advance directive helps you control your care. Although spoken wishes may be used, it is better to have your wishes written down. Spoken wishes can be misunderstood, or not followed. Treatments may be given even if you do not want them. An advance directive may make it easier for your family to make difficult choices about your care.   Weight Management   Why it is important to manage your weight:  Being overweight increases your risk of health conditions such as heart disease, high blood pressure, type 2 diabetes, and  certain types of cancer. It can also increase your risk for osteoarthritis, sleep apnea, and other respiratory problems. Aim for a slow, steady weight loss. Even a small amount of weight loss can lower your risk of health problems.  How to lose weight safely:  A safe and healthy way to lose weight is to eat fewer calories and get regular exercise. You can lose up about 1 pound a week by decreasing the number of calories you eat by 500 calories each day.   Healthy meal plan for weight management:  A healthy meal plan includes a variety of foods, contains fewer calories, and helps you stay healthy. A healthy meal plan includes the following:  Eat whole-grain foods more often.  A healthy meal plan should contain fiber. Fiber is the part of grains, fruits, and vegetables that is not broken down by your body. Whole-grain foods are healthy and provide extra fiber in your diet. Some examples of whole-grain foods are whole-wheat breads and pastas, oatmeal, brown rice, and bulgur.  Eat a variety of vegetables every day.  Include dark, leafy greens such as spinach, kale, terence greens, and mustard greens. Eat yellow and orange vegetables such as carrots, sweet potatoes, and winter squash.   Eat a variety of fruits every day.  Choose fresh or canned fruit (canned in its own juice or light syrup) instead of juice. Fruit juice has very little or no fiber.  Eat low-fat dairy foods.  Drink fat-free (skim) milk or 1% milk. Eat fat-free yogurt and low-fat cottage cheese. Try low-fat cheeses such as mozzarella and other reduced-fat cheeses.  Choose meat and other protein foods that are low in fat.  Choose beans or other legumes such as split peas or lentils. Choose fish, skinless poultry (chicken or turkey), or lean cuts of red meat (beef or pork). Before you cook meat or poultry, cut off any visible fat.   Use less fat and oil.  Try baking foods instead of frying them. Add less fat, such as margarine, sour cream, regular salad  dressing and mayonnaise to foods. Eat fewer high-fat foods. Some examples of high-fat foods include french fries, doughnuts, ice cream, and cakes.  Eat fewer sweets.  Limit foods and drinks that are high in sugar. This includes candy, cookies, regular soda, and sweetened drinks.  Exercise:  Exercise at least 30 minutes per day on most days of the week. Some examples of exercise include walking, biking, dancing, and swimming. You can also fit in more physical activity by taking the stairs instead of the elevator or parking farther away from stores. Ask your healthcare provider about the best exercise plan for you.      © Copyright [employer] 2018 Information is for End User's use only and may not be sold, redistributed or otherwise used for commercial purposes. All illustrations and images included in CareNotes® are the copyrighted property of A.D.A.M., Inc. or [employer]

## 2024-06-14 DIAGNOSIS — E11.65 TYPE 2 DIABETES MELLITUS WITH HYPERGLYCEMIA, WITHOUT LONG-TERM CURRENT USE OF INSULIN (HCC): Primary | ICD-10-CM

## 2024-06-14 DIAGNOSIS — F41.9 ANXIETY: ICD-10-CM

## 2024-06-14 RX ORDER — ALPRAZOLAM 0.5 MG/1
TABLET ORAL
Qty: 60 TABLET | Refills: 3 | Status: SHIPPED | OUTPATIENT
Start: 2024-06-14

## 2024-08-26 DIAGNOSIS — E78.2 MIXED HYPERLIPIDEMIA: ICD-10-CM

## 2024-08-26 RX ORDER — FENOFIBRATE 54 MG/1
54 TABLET ORAL DAILY
Qty: 90 TABLET | Refills: 1 | Status: SHIPPED | OUTPATIENT
Start: 2024-08-26

## 2024-08-26 NOTE — TELEPHONE ENCOUNTER
Patient called to request a refill for their fenofibrate (TRICOR) 54 MG tablet. Advised a refill was requested on 08/26/2024 and is pending approval. Patient verbalized understanding and is in agreement.

## 2024-09-06 ENCOUNTER — TELEPHONE (OUTPATIENT)
Age: 79
End: 2024-09-06

## 2024-09-06 DIAGNOSIS — F41.9 ANXIETY: ICD-10-CM

## 2024-09-06 NOTE — TELEPHONE ENCOUNTER
Patient wants future refills with [facility].Says they should be faxing information over soon     [facility]: [phone]

## 2024-09-09 DIAGNOSIS — F41.9 ANXIETY: ICD-10-CM

## 2024-09-09 DIAGNOSIS — E11.65 TYPE 2 DIABETES MELLITUS WITH HYPERGLYCEMIA, WITHOUT LONG-TERM CURRENT USE OF INSULIN (HCC): ICD-10-CM

## 2024-09-09 RX ORDER — BUSPIRONE HYDROCHLORIDE 5 MG/1
5 TABLET ORAL 2 TIMES DAILY
Qty: 60 TABLET | Refills: 5 | Status: SHIPPED | OUTPATIENT
Start: 2024-09-09

## 2024-09-09 RX ORDER — ALPRAZOLAM 0.5 MG
0.5 TABLET ORAL 2 TIMES DAILY
Qty: 60 TABLET | Refills: 3 | Status: SHIPPED | OUTPATIENT
Start: 2024-09-09

## 2024-09-09 RX ORDER — METFORMIN HCL 500 MG
500 TABLET, EXTENDED RELEASE 24 HR ORAL
Qty: 90 TABLET | Refills: 1 | Status: SHIPPED | OUTPATIENT
Start: 2024-09-09 | End: 2025-03-08

## 2024-09-25 DIAGNOSIS — I10 BENIGN HYPERTENSION: ICD-10-CM

## 2024-09-25 RX ORDER — LOSARTAN POTASSIUM 50 MG/1
50 TABLET ORAL DAILY
Qty: 90 TABLET | Refills: 1 | Status: SHIPPED | OUTPATIENT
Start: 2024-09-25

## 2024-10-17 ENCOUNTER — APPOINTMENT (OUTPATIENT)
Dept: LAB | Facility: HOSPITAL | Age: 79
End: 2024-10-17
Payer: MEDICARE

## 2024-10-17 ENCOUNTER — RA CDI HCC (OUTPATIENT)
Dept: OTHER | Facility: HOSPITAL | Age: 79
End: 2024-10-17

## 2024-10-17 DIAGNOSIS — E11.65 TYPE 2 DIABETES MELLITUS WITH HYPERGLYCEMIA, WITHOUT LONG-TERM CURRENT USE OF INSULIN (HCC): ICD-10-CM

## 2024-10-17 LAB
ALBUMIN SERPL BCG-MCNC: 4.3 G/DL (ref 3.5–5)
ALP SERPL-CCNC: 39 U/L (ref 34–104)
ALT SERPL W P-5'-P-CCNC: 24 U/L (ref 7–52)
ANION GAP SERPL CALCULATED.3IONS-SCNC: 6 MMOL/L (ref 4–13)
AST SERPL W P-5'-P-CCNC: 23 U/L (ref 13–39)
BASOPHILS # BLD AUTO: 0.08 THOUSANDS/ΜL (ref 0–0.1)
BASOPHILS NFR BLD AUTO: 1 % (ref 0–1)
BILIRUB SERPL-MCNC: 0.45 MG/DL (ref 0.2–1)
BUN SERPL-MCNC: 16 MG/DL (ref 5–25)
CALCIUM SERPL-MCNC: 9 MG/DL (ref 8.4–10.2)
CHLORIDE SERPL-SCNC: 106 MMOL/L (ref 96–108)
CHOLEST SERPL-MCNC: 186 MG/DL
CO2 SERPL-SCNC: 27 MMOL/L (ref 21–32)
CREAT SERPL-MCNC: 1.11 MG/DL (ref 0.6–1.3)
EOSINOPHIL # BLD AUTO: 0.25 THOUSAND/ΜL (ref 0–0.61)
EOSINOPHIL NFR BLD AUTO: 3 % (ref 0–6)
ERYTHROCYTE [DISTWIDTH] IN BLOOD BY AUTOMATED COUNT: 13.7 % (ref 11.6–15.1)
EST. AVERAGE GLUCOSE BLD GHB EST-MCNC: 148 MG/DL
GFR SERPL CREATININE-BSD FRML MDRD: 63 ML/MIN/1.73SQ M
GLUCOSE P FAST SERPL-MCNC: 128 MG/DL (ref 65–99)
HBA1C MFR BLD: 6.8 %
HCT VFR BLD AUTO: 47.5 % (ref 36.5–49.3)
HDLC SERPL-MCNC: 36 MG/DL
HGB BLD-MCNC: 15.7 G/DL (ref 12–17)
IMM GRANULOCYTES # BLD AUTO: 0.03 THOUSAND/UL (ref 0–0.2)
IMM GRANULOCYTES NFR BLD AUTO: 0 % (ref 0–2)
LDLC SERPL CALC-MCNC: 90 MG/DL (ref 0–100)
LYMPHOCYTES # BLD AUTO: 2.41 THOUSANDS/ΜL (ref 0.6–4.47)
LYMPHOCYTES NFR BLD AUTO: 26 % (ref 14–44)
MCH RBC QN AUTO: 30.5 PG (ref 26.8–34.3)
MCHC RBC AUTO-ENTMCNC: 33.1 G/DL (ref 31.4–37.4)
MCV RBC AUTO: 92 FL (ref 82–98)
MONOCYTES # BLD AUTO: 0.71 THOUSAND/ΜL (ref 0.17–1.22)
MONOCYTES NFR BLD AUTO: 8 % (ref 4–12)
NEUTROPHILS # BLD AUTO: 5.82 THOUSANDS/ΜL (ref 1.85–7.62)
NEUTS SEG NFR BLD AUTO: 62 % (ref 43–75)
NONHDLC SERPL-MCNC: 150 MG/DL
NRBC BLD AUTO-RTO: 0 /100 WBCS
PLATELET # BLD AUTO: 273 THOUSANDS/UL (ref 149–390)
PMV BLD AUTO: 9.8 FL (ref 8.9–12.7)
POTASSIUM SERPL-SCNC: 4.4 MMOL/L (ref 3.5–5.3)
PROT SERPL-MCNC: 7.2 G/DL (ref 6.4–8.4)
RBC # BLD AUTO: 5.14 MILLION/UL (ref 3.88–5.62)
SODIUM SERPL-SCNC: 139 MMOL/L (ref 135–147)
TRIGL SERPL-MCNC: 301 MG/DL
WBC # BLD AUTO: 9.3 THOUSAND/UL (ref 4.31–10.16)

## 2024-10-17 PROCEDURE — 85025 COMPLETE CBC W/AUTO DIFF WBC: CPT

## 2024-10-17 PROCEDURE — 80061 LIPID PANEL: CPT

## 2024-10-17 PROCEDURE — 83036 HEMOGLOBIN GLYCOSYLATED A1C: CPT

## 2024-10-17 PROCEDURE — 80053 COMPREHEN METABOLIC PANEL: CPT

## 2024-10-17 PROCEDURE — 36415 COLL VENOUS BLD VENIPUNCTURE: CPT

## 2024-10-24 ENCOUNTER — OFFICE VISIT (OUTPATIENT)
Dept: INTERNAL MEDICINE CLINIC | Facility: CLINIC | Age: 79
End: 2024-10-24
Payer: MEDICARE

## 2024-10-24 VITALS
SYSTOLIC BLOOD PRESSURE: 120 MMHG | DIASTOLIC BLOOD PRESSURE: 78 MMHG | HEART RATE: 58 BPM | HEIGHT: 64 IN | OXYGEN SATURATION: 91 % | WEIGHT: 221 LBS | BODY MASS INDEX: 37.73 KG/M2

## 2024-10-24 DIAGNOSIS — I10 BENIGN HYPERTENSION: Primary | ICD-10-CM

## 2024-10-24 DIAGNOSIS — E11.65 TYPE 2 DIABETES MELLITUS WITH HYPERGLYCEMIA, WITHOUT LONG-TERM CURRENT USE OF INSULIN (HCC): ICD-10-CM

## 2024-10-24 DIAGNOSIS — E78.2 MIXED HYPERLIPIDEMIA: ICD-10-CM

## 2024-10-24 DIAGNOSIS — E66.01 MORBID OBESITY (HCC): ICD-10-CM

## 2024-10-24 PROCEDURE — G2211 COMPLEX E/M VISIT ADD ON: HCPCS | Performed by: INTERNAL MEDICINE

## 2024-10-24 PROCEDURE — 99214 OFFICE O/P EST MOD 30 MIN: CPT | Performed by: INTERNAL MEDICINE

## 2024-10-24 RX ORDER — AMMONIUM LACTATE 12 G/100G
LOTION TOPICAL
COMMUNITY
Start: 2024-08-05

## 2024-10-24 NOTE — ASSESSMENT & PLAN NOTE
Controlled.  Keep working on diet.  Continue current medications.  Lab Results   Component Value Date    HGBA1C 6.8 (H) 10/17/2024       Orders:    Albumin / creatinine urine ratio; Future    Comprehensive metabolic panel; Future    Hemoglobin A1C; Future    CBC and differential; Future

## 2024-10-24 NOTE — ASSESSMENT & PLAN NOTE
Controlled except for the triglycerides.  Keep working on diet.  He has had too many side effects from the statin so this is what he can tolerate at this point.  Orders:    Lipid Panel with Direct LDL reflex; Future

## 2024-10-24 NOTE — PROGRESS NOTES
"Ambulatory Visit  Name: Xander Bojorquez      : 1945      MRN: [id number]  Encounter Provider: Magnus Casiano MD  Encounter Date: 10/24/2024   Encounter department: [facility] INTER[facility] MEDICINE [place]    Assessment & Plan  Benign hypertension  Well-controlled.  Continue current medication.       Type 2 diabetes mellitus with hyperglycemia, without long-term current use of insulin (HCC)  Controlled.  Keep working on diet.  Continue current medications.  Lab Results   Component Value Date    HGBA1C 6.8 (H) 10/17/2024       Orders:    Albumin / creatinine urine ratio; Future    Comprehensive metabolic panel; Future    Hemoglobin A1C; Future    CBC and differential; Future    Mixed hyperlipidemia  Controlled except for the triglycerides.  Keep working on diet.  He has had too many side effects from the statin so this is what he can tolerate at this point.  Orders:    Lipid Panel with Direct LDL reflex; Future    Morbid obesity (HCC)  Keep working on diet.          History of Present Illness     Patient comes in today for routine follow-up.  He states he is doing okay.  Taking his medicines as directed.  His blood pressure is controlled.  Cholesterol is controlled except for the triglycerides.  He is taking the Tricor.  That is all he has been able to tolerate in terms of side effects.  His sugars remain controlled.  A1c was up slightly but still controlled.  He is taking the metformin as directed.  Trying to watch his diet.  Trying to lose weight.  Again, no new complaints today.  No further additions to his history.          Review of Systems   Respiratory:  Negative for shortness of breath.    Cardiovascular:  Negative for chest pain.   Gastrointestinal:  Negative for abdominal pain.           Objective     /78 (BP Location: Left arm, Patient Position: Sitting, Cuff Size: Large)   Pulse 58   Ht 5' 4\" (1.626 m)   Wt 100 kg (221 lb)   SpO2 91%   BMI 37.93 kg/m²     Physical Exam  Vitals and nursing " note reviewed.   Constitutional:       Appearance: Normal appearance. He is well-developed.   Cardiovascular:      Rate and Rhythm: Normal rate and regular rhythm.      Heart sounds: Normal heart sounds.   Pulmonary:      Effort: Pulmonary effort is normal.      Breath sounds: Normal breath sounds.   Abdominal:      Palpations: Abdomen is soft.      Tenderness: There is no abdominal tenderness.   Neurological:      Mental Status: He is alert and oriented to person, place, and time.   Psychiatric:         Mood and Affect: Mood normal.         Behavior: Behavior normal.

## 2024-11-18 DIAGNOSIS — E78.2 MIXED HYPERLIPIDEMIA: ICD-10-CM

## 2024-11-18 DIAGNOSIS — R42 VERTIGO: ICD-10-CM

## 2024-11-19 ENCOUNTER — TELEPHONE (OUTPATIENT)
Age: 79
End: 2024-11-19

## 2024-11-19 DIAGNOSIS — E11.65 TYPE 2 DIABETES MELLITUS WITH HYPERGLYCEMIA, WITHOUT LONG-TERM CURRENT USE OF INSULIN (HCC): ICD-10-CM

## 2024-11-19 RX ORDER — FENOFIBRATE 54 MG/1
54 TABLET ORAL DAILY
Qty: 90 TABLET | Refills: 1 | Status: SHIPPED | OUTPATIENT
Start: 2024-11-19

## 2024-11-19 RX ORDER — METFORMIN HYDROCHLORIDE 500 MG/1
500 TABLET, EXTENDED RELEASE ORAL
Qty: 90 TABLET | Refills: 1 | Status: CANCELLED | OUTPATIENT
Start: 2024-11-19 | End: 2025-05-18

## 2024-11-19 RX ORDER — MECLIZINE HYDROCHLORIDE 25 MG/1
25 TABLET ORAL EVERY 8 HOURS SCHEDULED
Qty: 270 TABLET | Refills: 1 | Status: SHIPPED | OUTPATIENT
Start: 2024-11-19

## 2024-11-19 NOTE — TELEPHONE ENCOUNTER
Patient called, states he just became diabetic 4 months ago.  He noticed on his left foot, middle toe has a purple line going through it.  He didn't stub it, it doesn't hurt or is he experiencing numbness.  He said this happened once before to him but it went away.  He does have concern, he did say that he would take a photo and send picture through the [facility] for Dr. Casiano to review it.     Offered an appt, and offered to speak to nurse, decline to both.      Please call patient to advise.    Please Advise jayson Barr

## 2024-11-19 NOTE — TELEPHONE ENCOUNTER
Sent #[email or web address] message to pt informing him that he has a refill remaining on Metformin prescription and to contact [facility].

## 2024-11-30 DIAGNOSIS — I10 BENIGN HYPERTENSION: ICD-10-CM

## 2024-12-02 RX ORDER — METOPROLOL SUCCINATE 25 MG/1
25 TABLET, EXTENDED RELEASE ORAL DAILY
Qty: 90 TABLET | Refills: 1 | Status: SHIPPED | OUTPATIENT
Start: 2024-12-02 | End: 2024-12-09 | Stop reason: SDUPTHER

## 2024-12-09 DIAGNOSIS — I10 BENIGN HYPERTENSION: ICD-10-CM

## 2024-12-10 RX ORDER — METOPROLOL SUCCINATE 25 MG/1
25 TABLET, EXTENDED RELEASE ORAL DAILY
Qty: 90 TABLET | Refills: 1 | Status: SHIPPED | OUTPATIENT
Start: 2024-12-10

## 2024-12-15 DIAGNOSIS — E11.65 TYPE 2 DIABETES MELLITUS WITH HYPERGLYCEMIA, WITHOUT LONG-TERM CURRENT USE OF INSULIN (HCC): ICD-10-CM

## 2024-12-15 DIAGNOSIS — F41.9 ANXIETY: ICD-10-CM

## 2024-12-17 RX ORDER — BUSPIRONE HYDROCHLORIDE 5 MG/1
5 TABLET ORAL 2 TIMES DAILY
Qty: 180 TABLET | Refills: 1 | Status: SHIPPED | OUTPATIENT
Start: 2024-12-17

## 2024-12-17 RX ORDER — METFORMIN HYDROCHLORIDE 500 MG/1
500 TABLET, EXTENDED RELEASE ORAL
Qty: 90 TABLET | Refills: 1 | Status: SHIPPED | OUTPATIENT
Start: 2024-12-17

## 2024-12-17 RX ORDER — ALPRAZOLAM 0.5 MG
0.5 TABLET ORAL 2 TIMES DAILY
Qty: 60 TABLET | Refills: 5 | Status: SHIPPED | OUTPATIENT
Start: 2024-12-17 | End: 2024-12-19 | Stop reason: SDUPTHER

## 2024-12-18 DIAGNOSIS — I10 BENIGN HYPERTENSION: ICD-10-CM

## 2024-12-19 ENCOUNTER — TELEPHONE (OUTPATIENT)
Age: 79
End: 2024-12-19

## 2024-12-19 DIAGNOSIS — F41.9 ANXIETY: ICD-10-CM

## 2024-12-19 RX ORDER — ALPRAZOLAM 0.5 MG
0.5 TABLET ORAL 2 TIMES DAILY
Qty: 20 TABLET | Refills: 0 | Status: SHIPPED | OUTPATIENT
Start: 2024-12-19 | End: 2024-12-29

## 2024-12-19 RX ORDER — LOSARTAN POTASSIUM 50 MG/1
50 TABLET ORAL DAILY
Qty: 90 TABLET | Refills: 1 | Status: SHIPPED | OUTPATIENT
Start: 2024-12-19

## 2024-12-19 NOTE — TELEPHONE ENCOUNTER
Patient calling again to see if the doctor has sent in the prescription to [facility].  He is on his way out and wanted to stop by the pharmacy.  He has been without this medication now for 10 days.     Prednisone Counseling:  I discussed with the patient the risks of prolonged use of prednisone including but not limited to weight gain, insomnia, osteoporosis, mood changes, diabetes, susceptibility to infection, glaucoma and high blood pressure.  In cases where prednisone use is prolonged, patients should be monitored with blood pressure checks, serum glucose levels and an eye exam.  Additionally, the patient may need to be placed on GI prophylaxis, PCP prophylaxis, and calcium and vitamin D supplementation and/or a bisphosphonate.  The patient verbalized understanding of the proper use and the possible adverse effects of prednisone.  All of the patient's questions and concerns were addressed.

## 2024-12-19 NOTE — TELEPHONE ENCOUNTER
Patients RX for ALPRAZolam (XANAX) 0.5 mg was sent to [facility] mail order. unfortunately somehow it will not be delivered until Dec 26,2024.    Requesting couple of pills ,weeks supply to hold him over.   please sent to [facility]

## 2025-01-21 DIAGNOSIS — F41.9 ANXIETY: ICD-10-CM

## 2025-01-21 RX ORDER — ALPRAZOLAM 0.5 MG
0.5 TABLET ORAL 2 TIMES DAILY
Qty: 60 TABLET | Refills: 3 | Status: SHIPPED | OUTPATIENT
Start: 2025-01-21 | End: 2025-01-31

## 2025-01-21 NOTE — TELEPHONE ENCOUNTER
Pt called, requested medication refill for :    ALPRAZolam (XANAX) 0.5 mg tablet     Pharmacy confirmed with pt:    [id number] - [place] - [address]   [address], [place] [place] [id number]     Please advise, thank you.

## 2025-02-13 DIAGNOSIS — I10 BENIGN HYPERTENSION: ICD-10-CM

## 2025-02-13 DIAGNOSIS — E78.2 MIXED HYPERLIPIDEMIA: ICD-10-CM

## 2025-02-13 DIAGNOSIS — F41.9 ANXIETY: ICD-10-CM

## 2025-02-13 RX ORDER — METOPROLOL SUCCINATE 25 MG/1
25 TABLET, EXTENDED RELEASE ORAL DAILY
Qty: 90 TABLET | Refills: 1 | Status: SHIPPED | OUTPATIENT
Start: 2025-02-13

## 2025-02-13 RX ORDER — FENOFIBRATE 54 MG/1
54 TABLET ORAL DAILY
Qty: 90 TABLET | Refills: 1 | Status: SHIPPED | OUTPATIENT
Start: 2025-02-13

## 2025-02-13 RX ORDER — BUSPIRONE HYDROCHLORIDE 5 MG/1
5 TABLET ORAL 2 TIMES DAILY
Qty: 180 TABLET | Refills: 1 | Status: SHIPPED | OUTPATIENT
Start: 2025-02-13

## 2025-02-13 NOTE — TELEPHONE ENCOUNTER
Reason for call:   [x] Refill   [] Prior Auth  [] Other:     Office:   [x] PCP/Provider - [facility]  [] Specialty/Provider -     Medication:   Fenofibrate 54 mg, 1 qd 90  Metoprolol 25 mg, 1 qd, 90  Buspirone 5 mg, 1 bid, 180    Pharmacy:   [facility]    Does the patient have enough for 3 days?   [x] Yes   [] No - Send as HP to POD

## 2025-02-20 ENCOUNTER — RA CDI HCC (OUTPATIENT)
Dept: OTHER | Facility: HOSPITAL | Age: 80
End: 2025-02-20

## 2025-02-21 ENCOUNTER — APPOINTMENT (OUTPATIENT)
Dept: LAB | Facility: HOSPITAL | Age: 80
End: 2025-02-21
Payer: MEDICARE

## 2025-02-21 DIAGNOSIS — E11.65 TYPE 2 DIABETES MELLITUS WITH HYPERGLYCEMIA, WITHOUT LONG-TERM CURRENT USE OF INSULIN (HCC): ICD-10-CM

## 2025-02-21 DIAGNOSIS — E78.2 MIXED HYPERLIPIDEMIA: ICD-10-CM

## 2025-02-21 LAB
ALBUMIN SERPL BCG-MCNC: 4.3 G/DL (ref 3.5–5)
ALP SERPL-CCNC: 45 U/L (ref 34–104)
ALT SERPL W P-5'-P-CCNC: 31 U/L (ref 7–52)
ANION GAP SERPL CALCULATED.3IONS-SCNC: 6 MMOL/L (ref 4–13)
AST SERPL W P-5'-P-CCNC: 26 U/L (ref 13–39)
BASOPHILS # BLD AUTO: 0.06 THOUSANDS/ΜL (ref 0–0.1)
BASOPHILS NFR BLD AUTO: 1 % (ref 0–1)
BILIRUB SERPL-MCNC: 0.67 MG/DL (ref 0.2–1)
BUN SERPL-MCNC: 14 MG/DL (ref 5–25)
CALCIUM SERPL-MCNC: 9.6 MG/DL (ref 8.4–10.2)
CHLORIDE SERPL-SCNC: 104 MMOL/L (ref 96–108)
CHOLEST SERPL-MCNC: 187 MG/DL (ref ?–200)
CO2 SERPL-SCNC: 27 MMOL/L (ref 21–32)
CREAT SERPL-MCNC: 1.04 MG/DL (ref 0.6–1.3)
CREAT UR-MCNC: 77.1 MG/DL
EOSINOPHIL # BLD AUTO: 0.15 THOUSAND/ΜL (ref 0–0.61)
EOSINOPHIL NFR BLD AUTO: 2 % (ref 0–6)
ERYTHROCYTE [DISTWIDTH] IN BLOOD BY AUTOMATED COUNT: 13.4 % (ref 11.6–15.1)
EST. AVERAGE GLUCOSE BLD GHB EST-MCNC: 171 MG/DL
GFR SERPL CREATININE-BSD FRML MDRD: 68 ML/MIN/1.73SQ M
GLUCOSE P FAST SERPL-MCNC: 179 MG/DL (ref 65–99)
HBA1C MFR BLD: 7.6 %
HCT VFR BLD AUTO: 48.7 % (ref 36.5–49.3)
HDLC SERPL-MCNC: 35 MG/DL
HGB BLD-MCNC: 16.2 G/DL (ref 12–17)
IMM GRANULOCYTES # BLD AUTO: 0.01 THOUSAND/UL (ref 0–0.2)
IMM GRANULOCYTES NFR BLD AUTO: 0 % (ref 0–2)
LDLC SERPL CALC-MCNC: 93 MG/DL (ref 0–100)
LYMPHOCYTES # BLD AUTO: 2.23 THOUSANDS/ΜL (ref 0.6–4.47)
LYMPHOCYTES NFR BLD AUTO: 29 % (ref 14–44)
MCH RBC QN AUTO: 30.7 PG (ref 26.8–34.3)
MCHC RBC AUTO-ENTMCNC: 33.3 G/DL (ref 31.4–37.4)
MCV RBC AUTO: 92 FL (ref 82–98)
MICROALBUMIN UR-MCNC: <7 MG/L
MONOCYTES # BLD AUTO: 0.61 THOUSAND/ΜL (ref 0.17–1.22)
MONOCYTES NFR BLD AUTO: 8 % (ref 4–12)
NEUTROPHILS # BLD AUTO: 4.63 THOUSANDS/ΜL (ref 1.85–7.62)
NEUTS SEG NFR BLD AUTO: 60 % (ref 43–75)
NRBC BLD AUTO-RTO: 0 /100 WBCS
PLATELET # BLD AUTO: 251 THOUSANDS/UL (ref 149–390)
PMV BLD AUTO: 9.7 FL (ref 8.9–12.7)
POTASSIUM SERPL-SCNC: 4.5 MMOL/L (ref 3.5–5.3)
PROT SERPL-MCNC: 7.2 G/DL (ref 6.4–8.4)
RBC # BLD AUTO: 5.27 MILLION/UL (ref 3.88–5.62)
SODIUM SERPL-SCNC: 137 MMOL/L (ref 135–147)
TRIGL SERPL-MCNC: 296 MG/DL (ref ?–150)
WBC # BLD AUTO: 7.69 THOUSAND/UL (ref 4.31–10.16)

## 2025-02-21 PROCEDURE — 85025 COMPLETE CBC W/AUTO DIFF WBC: CPT

## 2025-02-21 PROCEDURE — 36415 COLL VENOUS BLD VENIPUNCTURE: CPT

## 2025-02-21 PROCEDURE — 82043 UR ALBUMIN QUANTITATIVE: CPT

## 2025-02-21 PROCEDURE — 80053 COMPREHEN METABOLIC PANEL: CPT

## 2025-02-21 PROCEDURE — 80061 LIPID PANEL: CPT

## 2025-02-21 PROCEDURE — 82570 ASSAY OF URINE CREATININE: CPT

## 2025-02-21 PROCEDURE — 83036 HEMOGLOBIN GLYCOSYLATED A1C: CPT

## 2025-02-27 ENCOUNTER — OFFICE VISIT (OUTPATIENT)
Dept: INTERNAL MEDICINE CLINIC | Facility: CLINIC | Age: 80
End: 2025-02-27
Payer: MEDICARE

## 2025-02-27 VITALS
WEIGHT: 216.8 LBS | HEART RATE: 73 BPM | RESPIRATION RATE: 16 BRPM | DIASTOLIC BLOOD PRESSURE: 72 MMHG | HEIGHT: 64 IN | BODY MASS INDEX: 37.01 KG/M2 | OXYGEN SATURATION: 93 % | SYSTOLIC BLOOD PRESSURE: 112 MMHG

## 2025-02-27 DIAGNOSIS — E11.65 TYPE 2 DIABETES MELLITUS WITH HYPERGLYCEMIA, WITHOUT LONG-TERM CURRENT USE OF INSULIN (HCC): Primary | ICD-10-CM

## 2025-02-27 DIAGNOSIS — E66.01 MORBID OBESITY (HCC): ICD-10-CM

## 2025-02-27 DIAGNOSIS — I10 BENIGN HYPERTENSION: ICD-10-CM

## 2025-02-27 DIAGNOSIS — E78.2 MIXED HYPERLIPIDEMIA: ICD-10-CM

## 2025-02-27 PROCEDURE — G2211 COMPLEX E/M VISIT ADD ON: HCPCS | Performed by: INTERNAL MEDICINE

## 2025-02-27 PROCEDURE — 99214 OFFICE O/P EST MOD 30 MIN: CPT | Performed by: INTERNAL MEDICINE

## 2025-02-27 NOTE — PROGRESS NOTES
"Name: Xander Bojorquez      : 1945      MRN: [id number]  Encounter Provider: Magnus Casiano MD  Encounter Date: 2025   Encounter department: [facility] INTERNAL MEDICINE [place]  :  Assessment & Plan  Type 2 diabetes mellitus with hyperglycemia, without long-term current use of insulin (HCC)  Not controlled not sure why.  Possibly just remainder from the holidays.  Will recheck in 1 month and then adjust from there.  Lab Results   Component Value Date    HGBA1C 7.6 (H) 2025     Orders:  •  Ambulatory referral to Podiatry; Future  •  Hemoglobin A1C; Future  •  Comprehensive metabolic panel; Future    Benign hypertension  Controlled.  No change in medication.       Mixed hyperlipidemia  Controlled.  No change in medication.       Morbid obesity (HCC)  Keep working on diet.              History of Present Illness   Patient comes in today for routine follow-up.  He states he is doing okay.  His sugars were unexpectedly higher than his usual.  Possibly from the holidays.  He has not changed his diet.  States he is not missing his medicine.  Blood pressure is controlled.  Cholesterol is controlled.  No other complaints today.  No further additions to his history.      Review of Systems   Respiratory:  Negative for shortness of breath.    Cardiovascular:  Negative for chest pain.   Gastrointestinal:  Negative for abdominal pain.       Objective   /72 (BP Location: Left arm, Patient Position: Sitting, Cuff Size: Adult)   Pulse 73   Resp 16   Ht 5' 4\" (1.626 m)   Wt 98.3 kg (216 lb 12.8 oz)   SpO2 93%   BMI 37.21 kg/m²      Physical Exam  Vitals and nursing note reviewed.   Constitutional:       Appearance: Normal appearance. He is well-developed.   Cardiovascular:      Rate and Rhythm: Normal rate and regular rhythm.      Heart sounds: Normal heart sounds.   Pulmonary:      Effort: Pulmonary effort is normal.      Breath sounds: Normal breath sounds.   Abdominal:      Palpations: Abdomen is " soft.      Tenderness: There is no abdominal tenderness.   Neurological:      Mental Status: He is alert and oriented to person, place, and time.   Psychiatric:         Mood and Affect: Mood normal.         Behavior: Behavior normal.

## 2025-02-27 NOTE — ASSESSMENT & PLAN NOTE
Not controlled not sure why.  Possibly just remainder from the holidays.  Will recheck in 1 month and then adjust from there.  Lab Results   Component Value Date    HGBA1C 7.6 (H) 02/21/2025     Orders:  •  Ambulatory referral to Podiatry; Future  •  Hemoglobin A1C; Future  •  Comprehensive metabolic panel; Future

## 2025-03-21 DIAGNOSIS — N32.81 OAB (OVERACTIVE BLADDER): ICD-10-CM

## 2025-03-21 RX ORDER — SOLIFENACIN SUCCINATE 5 MG/1
5 TABLET, FILM COATED ORAL DAILY
Qty: 90 TABLET | Refills: 1 | Status: SHIPPED | OUTPATIENT
Start: 2025-03-21

## 2025-03-26 ENCOUNTER — APPOINTMENT (OUTPATIENT)
Dept: LAB | Facility: CLINIC | Age: 80
End: 2025-03-26
Payer: MEDICARE

## 2025-03-26 ENCOUNTER — TELEPHONE (OUTPATIENT)
Age: 80
End: 2025-03-26

## 2025-03-26 DIAGNOSIS — E11.65 TYPE 2 DIABETES MELLITUS WITH HYPERGLYCEMIA, WITHOUT LONG-TERM CURRENT USE OF INSULIN (HCC): ICD-10-CM

## 2025-03-26 LAB
ALBUMIN SERPL BCG-MCNC: 4.5 G/DL (ref 3.5–5)
ALP SERPL-CCNC: 49 U/L (ref 34–104)
ALT SERPL W P-5'-P-CCNC: 25 U/L (ref 7–52)
ANION GAP SERPL CALCULATED.3IONS-SCNC: 8 MMOL/L (ref 4–13)
AST SERPL W P-5'-P-CCNC: 20 U/L (ref 13–39)
BILIRUB SERPL-MCNC: 0.42 MG/DL (ref 0.2–1)
BUN SERPL-MCNC: 17 MG/DL (ref 5–25)
CALCIUM SERPL-MCNC: 9.7 MG/DL (ref 8.4–10.2)
CHLORIDE SERPL-SCNC: 103 MMOL/L (ref 96–108)
CO2 SERPL-SCNC: 27 MMOL/L (ref 21–32)
CREAT SERPL-MCNC: 1.07 MG/DL (ref 0.6–1.3)
EST. AVERAGE GLUCOSE BLD GHB EST-MCNC: 163 MG/DL
GFR SERPL CREATININE-BSD FRML MDRD: 66 ML/MIN/1.73SQ M
GLUCOSE P FAST SERPL-MCNC: 162 MG/DL (ref 65–99)
HBA1C MFR BLD: 7.3 %
POTASSIUM SERPL-SCNC: 4.1 MMOL/L (ref 3.5–5.3)
PROT SERPL-MCNC: 7.6 G/DL (ref 6.4–8.4)
SODIUM SERPL-SCNC: 138 MMOL/L (ref 135–147)

## 2025-03-26 PROCEDURE — 36415 COLL VENOUS BLD VENIPUNCTURE: CPT

## 2025-03-26 PROCEDURE — 83036 HEMOGLOBIN GLYCOSYLATED A1C: CPT

## 2025-03-26 PROCEDURE — 80053 COMPREHEN METABOLIC PANEL: CPT

## 2025-03-26 NOTE — TELEPHONE ENCOUNTER
Patient called to let provider know that he went for his labs today.  When the results come in let patient know the results.  Patient said if there is reason for concern or if provider needs to discuss the labs with him he will be more than happy to come in on a Thursday or Friday to discuss them.    Please advise, thank you.    Xander: [phone]

## 2025-03-27 ENCOUNTER — RESULTS FOLLOW-UP (OUTPATIENT)
Dept: INTERNAL MEDICINE CLINIC | Facility: CLINIC | Age: 80
End: 2025-03-27

## 2025-03-27 DIAGNOSIS — E11.65 TYPE 2 DIABETES MELLITUS WITH HYPERGLYCEMIA, WITHOUT LONG-TERM CURRENT USE OF INSULIN (HCC): Primary | ICD-10-CM

## 2025-04-04 ENCOUNTER — OFFICE VISIT (OUTPATIENT)
Dept: PODIATRY | Facility: CLINIC | Age: 80
End: 2025-04-04

## 2025-04-04 VITALS — WEIGHT: 216 LBS | BODY MASS INDEX: 36.88 KG/M2 | HEIGHT: 64 IN

## 2025-04-04 DIAGNOSIS — E11.65 TYPE 2 DIABETES MELLITUS WITH HYPERGLYCEMIA, WITHOUT LONG-TERM CURRENT USE OF INSULIN (HCC): ICD-10-CM

## 2025-04-04 NOTE — ASSESSMENT & PLAN NOTE
Diagnosis and options discussed with patient  Patient agreeable to the plan as stated below    -DM foot risk is low. Recommend annual DM foot exam  -Discussed DM risk to lower extremities, proper shoe gear, and daily monitoring of feet.   -Discussed weight loss and suitable exercise regiment.   -Reviewed most recent PCP visit on 2/27/2025  -Educated on A1C and the risks of poorly controlled Diabetes. Reviewed recent A1C:  Lab Results   Component Value Date    HGBA1C 7.3 (H) 03/26/2025   .     Lab Results   Component Value Date    HGBA1C 7.3 (H) 03/26/2025       Orders:  •  Ambulatory referral to Podiatry

## 2025-04-04 NOTE — PROGRESS NOTES
"Name: Xander Bojorquez      : 1945      MRN: [id number]  Encounter Provider: Efren Weaver DPM  Encounter Date: 2025   Encounter department: [facility] PODIATRY [facility]  :  Assessment & Plan  Type 2 diabetes mellitus with hyperglycemia, without long-term current use of insulin ([facility])  Diagnosis and options discussed with patient  Patient agreeable to the plan as stated below    -DM foot risk is low. Recommend annual DM foot exam  -Discussed DM risk to lower extremities, proper shoe gear, and daily monitoring of feet.   -Discussed weight loss and suitable exercise regiment.   -Reviewed most recent PCP visit on 2025  -Educated on A1C and the risks of poorly controlled Diabetes. Reviewed recent A1C:  Lab Results   Component Value Date    HGBA1C 7.3 (H) 2025   .     Lab Results   Component Value Date    HGBA1C 7.3 (H) 2025       Orders:  •  Ambulatory referral to Podiatry        History of Present Illness   HPI  Xander Bojorquez is a 79 y.o. male who presents for DM foot check. HE was diagnosed with DM 4-6 months ago. His nails grow very thick. HE had an ingrown toenail removed last fall. His PCP wanted him to get his feet checked with his new DM diagnosis.       Review of Systems  As stated in HPI, otherwise normal    Medical History Reviewed by provider this encounter:  Tobacco  Allergies  Meds  Problems  Med Hx  Surg Hx  Fam Hx           Objective   Ht 5' 4\" (1.626 m)   Wt 98 kg (216 lb)   BMI 37.08 kg/m²      Physical Exam  Vitals reviewed.   Constitutional:       General: He is not in acute distress.  Cardiovascular:      Rate and Rhythm: Normal rate.      Pulses: Normal pulses. no weak pulses.           Dorsalis pedis pulses are 2+ on the right side and 2+ on the left side.        Posterior tibial pulses are 2+ on the right side and 2+ on the left side.   Pulmonary:      Effort: Pulmonary effort is normal. No respiratory distress.   Musculoskeletal:         General: No " deformity.   Feet:      Right foot:      Skin integrity: No ulcer, skin breakdown, erythema, warmth, callus or dry skin.      Left foot:      Skin integrity: No ulcer, skin breakdown, erythema, warmth, callus or dry skin.   Skin:     Capillary Refill: Capillary refill takes less than 2 seconds.      Findings: No erythema.   Neurological:      Mental Status: He is alert and oriented to person, place, and time.      Sensory: No sensory deficit.      Gait: Gait normal.     Diabetic Foot Exam    Patient's shoes and socks removed.    Right Foot/Ankle   Right Foot Inspection  Skin Exam: skin normal and skin intact. No dry skin, no warmth, no callus, no erythema, no maceration, no abnormal color, no pre-ulcer, no ulcer and no callus.     Toe Exam:  no right toe deformity    Sensory   Vibration: intact  Monofilament testing: intact    Vascular  Capillary refills: < 3 seconds  The right DP pulse is 2+. The right PT pulse is 2+.     Left Foot/Ankle  Left Foot Inspection  Skin Exam: skin normal and skin intact. No dry skin, no warmth, no erythema, no maceration, normal color, no pre-ulcer, no ulcer and no callus.     Toe Exam: No left toe deformity.     Sensory   Vibration: intact  Monofilament testing: intact    Vascular  Capillary refills: < 3 seconds  The left DP pulse is 2+. The left PT pulse is 2+.     Assign Risk Category  No deformity present  No loss of protective sensation  No weak pulses  Risk: 0

## 2025-04-24 DIAGNOSIS — I10 BENIGN HYPERTENSION: ICD-10-CM

## 2025-04-24 RX ORDER — LOSARTAN POTASSIUM 50 MG/1
50 TABLET ORAL DAILY
Qty: 90 TABLET | Refills: 1 | Status: SHIPPED | OUTPATIENT
Start: 2025-04-24

## 2025-04-25 DIAGNOSIS — F41.9 ANXIETY: ICD-10-CM

## 2025-04-25 RX ORDER — ALPRAZOLAM 0.5 MG
0.5 TABLET ORAL 2 TIMES DAILY
Qty: 60 TABLET | Refills: 3 | Status: SHIPPED | OUTPATIENT
Start: 2025-04-25 | End: 2025-05-05

## 2025-05-16 ENCOUNTER — OFFICE VISIT (OUTPATIENT)
Dept: URGENT CARE | Facility: CLINIC | Age: 80
End: 2025-05-16
Payer: MEDICARE

## 2025-05-16 VITALS
TEMPERATURE: 97 F | OXYGEN SATURATION: 94 % | RESPIRATION RATE: 18 BRPM | HEART RATE: 60 BPM | DIASTOLIC BLOOD PRESSURE: 63 MMHG | SYSTOLIC BLOOD PRESSURE: 140 MMHG

## 2025-05-16 DIAGNOSIS — R22.0 FACIAL SWELLING: Primary | ICD-10-CM

## 2025-05-16 PROCEDURE — G0463 HOSPITAL OUTPT CLINIC VISIT: HCPCS | Performed by: PHYSICAL MEDICINE & REHABILITATION

## 2025-05-16 PROCEDURE — 99213 OFFICE O/P EST LOW 20 MIN: CPT | Performed by: PHYSICAL MEDICINE & REHABILITATION

## 2025-05-16 RX ORDER — DOXYCYCLINE HYCLATE 100 MG
TABLET ORAL
COMMUNITY
Start: 2025-02-07

## 2025-05-16 RX ORDER — METHYLPREDNISOLONE 4 MG/1
TABLET ORAL
Qty: 1 EACH | Refills: 0 | Status: SHIPPED | OUTPATIENT
Start: 2025-05-16

## 2025-05-16 RX ORDER — ALBUTEROL SULFATE 90 UG/1
2 INHALANT RESPIRATORY (INHALATION) EVERY 6 HOURS PRN
COMMUNITY
Start: 2025-02-07

## 2025-05-16 NOTE — PROGRESS NOTES
[facility]        NAME: Xander Bojorquez is a 79 y.o. male  : 1945    MRN: [id number]  DATE: May 16, 2025  TIME: 2:12 PM    Assessment and Plan   Facial swelling [R22.0]  1. Facial swelling  methylPREDNISolone 4 MG tablet therapy pack    amoxicillin-clavulanate (AUGMENTIN) 875-125 mg per tablet        Swelling of face is mild however cannot exclude underlying etiology being dental vs tmj  Etiology appears more so dental related however again cannot exclude in this setting  Will start antibiotics and steroid as needed    Patient Instructions       Follow up with PCP in 3-5 days.  Proceed to  ER if symptoms worsen.    If tests are performed, our office will contact you with results only if changes need to made to the care plan discussed with you at the visit. [facility]    Chief Complaint     Chief Complaint   Patient presents with    Facial Swelling     Patient has right cheek facial pain and swelling for about 3 days. Unsure if got bit by insect.          History of Present Illness       Patient is a 79 year old male presenting with right cheek pain and swelling. Started on 25.      Review of Systems   Review of Systems   Constitutional: Negative.    HENT:  Positive for facial swelling.    Respiratory: Negative.     Cardiovascular: Negative.          Current Medications     Current Medications[1]    Current Allergies     Allergies as of 2025    (No Known Allergies)            The following portions of the patient's history were reviewed and updated as appropriate: allergies, current medications, past family history, past medical history, past social history, past surgical history and problem list.     Past Medical History:   Diagnosis Date    Cancer (HCC)     basal call carcinoma    Colon polyps     DR. ALVES    CPAP (continuous positive airway pressure) dependence     Diverticulosis     Hyperlipidemia     Hypertension     Obesity     KADEN  (obstructive sleep apnea)     cpap    Pneumonia     Small bowel obstruction, partial (HCC)     SOB (shortness of breath)        Past Surgical History:   Procedure Laterality Date    COLONOSCOPY      SKIN BIOPSY      TONSILLECTOMY AND ADENOIDECTOMY         Family History   Problem Relation Age of Onset    Heart disease Mother     Hypertension Mother     Breast cancer Sister          Medications have been verified.        Objective   /63   Pulse 60   Temp (!) 97 °F (36.1 °C)   Resp 18   SpO2 94%        Physical Exam     Physical Exam  Constitutional:       General: He is not in acute distress.     Appearance: Normal appearance. He is not ill-appearing.   HENT:      Right Ear: Tympanic membrane normal.      Left Ear: Tympanic membrane normal.      Nose: Nose normal.      Mouth/Throat:      Mouth: Mucous membranes are moist.      Dentition: Abnormal dentition. Dental tenderness present.      Pharynx: Oropharynx is clear.     Cardiovascular:      Rate and Rhythm: Normal rate and regular rhythm.      Pulses: Normal pulses.      Heart sounds: Normal heart sounds.   Pulmonary:      Effort: Pulmonary effort is normal.      Breath sounds: Normal breath sounds.     Neurological:      Mental Status: He is alert.                        [1]   Current Outpatient Medications:     ALPRAZolam (XANAX) 0.5 mg tablet, Take 1 tablet (0.5 mg total) by mouth 2 (two) times a day for 10 days, Disp: 60 tablet, Rfl: 3    amoxicillin-clavulanate (AUGMENTIN) 875-125 mg per tablet, Take 1 tablet by mouth every 12 (twelve) hours for 7 days, Disp: 14 tablet, Rfl: 0    aspirin (ECOTRIN LOW STRENGTH) 81 mg EC tablet, Take by mouth, Disp: , Rfl:     B Complex-Folic Acid (B COMPLEX PLUS) TABS, Take by mouth, Disp: , Rfl:     busPIRone (BUSPAR) 5 mg tablet, Take 1 tablet (5 mg total) by mouth 2 (two) times a day, Disp: 180 tablet, Rfl: 1    Cholecalciferol (VITAMIN D3) 1000 units CAPS, Take by mouth, Disp: , Rfl:     fenofibrate (TRICOR) 54 MG  tablet, Take 1 tablet (54 mg total) by mouth daily, Disp: 90 tablet, Rfl: 1    losartan (COZAAR) 50 mg tablet, Take 1 tablet (50 mg total) by mouth daily, Disp: 90 tablet, Rfl: 1    meclizine (ANTIVERT) 25 mg tablet, Take 1 tablet (25 mg total) by mouth every 8 (eight) hours, Disp: 270 tablet, Rfl: 1    metFORMIN (GLUCOPHAGE-XR) 500 mg 24 hr tablet, TAKE 1 TABLET BY MOUTH DAILY  WITH DINNER, Disp: 90 tablet, Rfl: 1    methylPREDNISolone 4 MG tablet therapy pack, Use as directed on package, Disp: 1 each, Rfl: 0    metoprolol succinate (TOPROL-XL) 25 mg 24 hr tablet, Take 1 tablet (25 mg total) by mouth daily, Disp: 90 tablet, Rfl: 1    Multiple Vitamin (MULTI-VITAMIN DAILY PO), Take by mouth, Disp: , Rfl:     omeprazole (PriLOSEC) 40 MG capsule, Take 1 capsule (40 mg total) by mouth daily, Disp: 90 capsule, Rfl: 1    solifenacin (VESICARE) 5 mg tablet, Take 1 tablet (5 mg total) by mouth daily, Disp: 90 tablet, Rfl: 1    albuterol (PROVENTIL HFA,VENTOLIN HFA) 90 mcg/act inhaler, Inhale 2 puffs every 6 (six) hours as needed (Patient not taking: Reported on 5/16/2025), Disp: , Rfl:     ammonium lactate (LAC-HYDRIN) 12 % lotion, APPLY TOPICALLY EVERY DAY (Patient not taking: Reported on 5/16/2025), Disp: , Rfl:     doxycycline hyclate (VIBRA-TABS) 100 mg tablet, , Disp: , Rfl:

## 2025-05-19 ENCOUNTER — DOCUMENTATION (OUTPATIENT)
Dept: ADMINISTRATIVE | Facility: OTHER | Age: 80
End: 2025-05-19

## 2025-05-19 VITALS — DIASTOLIC BLOOD PRESSURE: 63 MMHG | SYSTOLIC BLOOD PRESSURE: 140 MMHG

## 2025-05-19 NOTE — PROGRESS NOTES
05/19/25 9:16 AM    Patient was called after the Urgent Care visit Patient declined to schedule appointment.    Thank you.  KRISTIN WRIGHT MA  [facility]      Blood pressure elevated  Appointment department: [facility]  Appointment provider: * No providers found *  Blood pressure   05/16/25 1402 140/63   05/16/25 1358 148/65

## 2025-06-05 DIAGNOSIS — E11.65 TYPE 2 DIABETES MELLITUS WITH HYPERGLYCEMIA, WITHOUT LONG-TERM CURRENT USE OF INSULIN (HCC): ICD-10-CM

## 2025-06-06 RX ORDER — METFORMIN HYDROCHLORIDE 500 MG/1
500 TABLET, EXTENDED RELEASE ORAL
Qty: 90 TABLET | Refills: 1 | Status: SHIPPED | OUTPATIENT
Start: 2025-06-06

## 2025-07-02 ENCOUNTER — APPOINTMENT (OUTPATIENT)
Dept: LAB | Facility: CLINIC | Age: 80
End: 2025-07-02
Payer: MEDICARE

## 2025-07-02 DIAGNOSIS — I10 BENIGN HYPERTENSION: ICD-10-CM

## 2025-07-02 DIAGNOSIS — E11.65 TYPE 2 DIABETES MELLITUS WITH HYPERGLYCEMIA, WITHOUT LONG-TERM CURRENT USE OF INSULIN (HCC): ICD-10-CM

## 2025-07-02 DIAGNOSIS — E78.2 MIXED HYPERLIPIDEMIA: ICD-10-CM

## 2025-07-02 LAB
ALBUMIN SERPL BCG-MCNC: 4.6 G/DL (ref 3.5–5)
ALP SERPL-CCNC: 43 U/L (ref 34–104)
ALT SERPL W P-5'-P-CCNC: 32 U/L (ref 7–52)
ANION GAP SERPL CALCULATED.3IONS-SCNC: 10 MMOL/L (ref 4–13)
AST SERPL W P-5'-P-CCNC: 27 U/L (ref 13–39)
BASOPHILS # BLD AUTO: 0.07 THOUSANDS/ÂΜL (ref 0–0.1)
BASOPHILS NFR BLD AUTO: 1 % (ref 0–1)
BILIRUB SERPL-MCNC: 0.76 MG/DL (ref 0.2–1)
BUN SERPL-MCNC: 19 MG/DL (ref 5–25)
CALCIUM SERPL-MCNC: 9.2 MG/DL (ref 8.4–10.2)
CHLORIDE SERPL-SCNC: 101 MMOL/L (ref 96–108)
CHOLEST SERPL-MCNC: 192 MG/DL (ref ?–200)
CO2 SERPL-SCNC: 26 MMOL/L (ref 21–32)
CREAT SERPL-MCNC: 1.11 MG/DL (ref 0.6–1.3)
EOSINOPHIL # BLD AUTO: 0.12 THOUSAND/ÂΜL (ref 0–0.61)
EOSINOPHIL NFR BLD AUTO: 2 % (ref 0–6)
ERYTHROCYTE [DISTWIDTH] IN BLOOD BY AUTOMATED COUNT: 13.9 % (ref 11.6–15.1)
EST. AVERAGE GLUCOSE BLD GHB EST-MCNC: 163 MG/DL
GFR SERPL CREATININE-BSD FRML MDRD: 63 ML/MIN/1.73SQ M
GLUCOSE P FAST SERPL-MCNC: 152 MG/DL (ref 65–99)
HBA1C MFR BLD: 7.3 %
HCT VFR BLD AUTO: 48.8 % (ref 36.5–49.3)
HDLC SERPL-MCNC: 38 MG/DL
HGB BLD-MCNC: 16.7 G/DL (ref 12–17)
IMM GRANULOCYTES # BLD AUTO: 0.02 THOUSAND/UL (ref 0–0.2)
IMM GRANULOCYTES NFR BLD AUTO: 0 % (ref 0–2)
LDLC SERPL CALC-MCNC: 116 MG/DL (ref 0–100)
LYMPHOCYTES # BLD AUTO: 1.88 THOUSANDS/ÂΜL (ref 0.6–4.47)
LYMPHOCYTES NFR BLD AUTO: 28 % (ref 14–44)
MCH RBC QN AUTO: 31.3 PG (ref 26.8–34.3)
MCHC RBC AUTO-ENTMCNC: 34.2 G/DL (ref 31.4–37.4)
MCV RBC AUTO: 92 FL (ref 82–98)
MONOCYTES # BLD AUTO: 0.51 THOUSAND/ÂΜL (ref 0.17–1.22)
MONOCYTES NFR BLD AUTO: 8 % (ref 4–12)
NEUTROPHILS # BLD AUTO: 4.09 THOUSANDS/ÂΜL (ref 1.85–7.62)
NEUTS SEG NFR BLD AUTO: 61 % (ref 43–75)
NONHDLC SERPL-MCNC: 154 MG/DL
NRBC BLD AUTO-RTO: 0 /100 WBCS
PLATELET # BLD AUTO: 240 THOUSANDS/UL (ref 149–390)
PMV BLD AUTO: 10 FL (ref 8.9–12.7)
POTASSIUM SERPL-SCNC: 4 MMOL/L (ref 3.5–5.3)
PROT SERPL-MCNC: 7.4 G/DL (ref 6.4–8.4)
RBC # BLD AUTO: 5.33 MILLION/UL (ref 3.88–5.62)
SODIUM SERPL-SCNC: 137 MMOL/L (ref 135–147)
TRIGL SERPL-MCNC: 191 MG/DL (ref ?–150)
WBC # BLD AUTO: 6.69 THOUSAND/UL (ref 4.31–10.16)

## 2025-07-02 PROCEDURE — 83036 HEMOGLOBIN GLYCOSYLATED A1C: CPT

## 2025-07-02 PROCEDURE — 85025 COMPLETE CBC W/AUTO DIFF WBC: CPT

## 2025-07-02 PROCEDURE — 80061 LIPID PANEL: CPT

## 2025-07-02 PROCEDURE — 36415 COLL VENOUS BLD VENIPUNCTURE: CPT

## 2025-07-02 PROCEDURE — 80053 COMPREHEN METABOLIC PANEL: CPT

## 2025-07-02 RX ORDER — FENOFIBRATE 54 MG/1
54 TABLET ORAL DAILY
Qty: 90 TABLET | Refills: 1 | Status: SHIPPED | OUTPATIENT
Start: 2025-07-02

## 2025-07-02 RX ORDER — METOPROLOL SUCCINATE 25 MG/1
25 TABLET, EXTENDED RELEASE ORAL DAILY
Qty: 90 TABLET | Refills: 1 | Status: SHIPPED | OUTPATIENT
Start: 2025-07-02

## 2025-07-10 ENCOUNTER — OFFICE VISIT (OUTPATIENT)
Dept: INTERNAL MEDICINE CLINIC | Facility: CLINIC | Age: 80
End: 2025-07-10
Payer: MEDICARE

## 2025-07-10 VITALS
SYSTOLIC BLOOD PRESSURE: 108 MMHG | HEART RATE: 60 BPM | BODY MASS INDEX: 37.69 KG/M2 | WEIGHT: 220.8 LBS | HEIGHT: 64 IN | DIASTOLIC BLOOD PRESSURE: 68 MMHG | RESPIRATION RATE: 18 BRPM | OXYGEN SATURATION: 92 %

## 2025-07-10 DIAGNOSIS — N32.81 OAB (OVERACTIVE BLADDER): ICD-10-CM

## 2025-07-10 DIAGNOSIS — Z00.00 MEDICARE ANNUAL WELLNESS VISIT, SUBSEQUENT: Primary | ICD-10-CM

## 2025-07-10 DIAGNOSIS — I10 BENIGN HYPERTENSION: ICD-10-CM

## 2025-07-10 DIAGNOSIS — E78.2 MIXED HYPERLIPIDEMIA: ICD-10-CM

## 2025-07-10 DIAGNOSIS — K21.9 GASTROESOPHAGEAL REFLUX DISEASE WITHOUT ESOPHAGITIS: ICD-10-CM

## 2025-07-10 DIAGNOSIS — E11.65 TYPE 2 DIABETES MELLITUS WITH HYPERGLYCEMIA, WITHOUT LONG-TERM CURRENT USE OF INSULIN (HCC): ICD-10-CM

## 2025-07-10 PROCEDURE — G2211 COMPLEX E/M VISIT ADD ON: HCPCS | Performed by: INTERNAL MEDICINE

## 2025-07-10 PROCEDURE — 99214 OFFICE O/P EST MOD 30 MIN: CPT | Performed by: INTERNAL MEDICINE

## 2025-07-10 PROCEDURE — G0439 PPPS, SUBSEQ VISIT: HCPCS | Performed by: INTERNAL MEDICINE

## 2025-07-10 RX ORDER — SOLIFENACIN SUCCINATE 5 MG/1
5 TABLET, FILM COATED ORAL DAILY
Qty: 90 TABLET | Refills: 3 | Status: SHIPPED | OUTPATIENT
Start: 2025-07-10

## 2025-07-10 RX ORDER — OMEPRAZOLE 40 MG/1
40 CAPSULE, DELAYED RELEASE ORAL DAILY
Qty: 90 CAPSULE | Refills: 3 | Status: SHIPPED | OUTPATIENT
Start: 2025-07-10

## 2025-07-10 NOTE — PROGRESS NOTES
Name: Xander Bojorquez      : 1945      MRN: [id number]  Encounter Provider: Magnus Casiano MD  Encounter Date: 7/10/2025   Encounter department: [facility] INTERNAL MEDICINE [place]  :  Assessment & Plan  Medicare annual wellness visit, subsequent  No new issues.       Gastroesophageal reflux disease without esophagitis  Controlled.  Orders:  •  omeprazole (PriLOSEC) 40 MG capsule; Take 1 capsule (40 mg total) by mouth daily    OAB (overactive bladder)  Needed a refill.  But working well.  Orders:  •  solifenacin (VESICARE) 5 mg tablet; Take 1 tablet (5 mg total) by mouth daily    Benign hypertension  Controlled.  No change in medication.  Orders:  •  Comprehensive metabolic panel; Future  •  CBC and differential; Future    Type 2 diabetes mellitus with hyperglycemia, without long-term current use of insulin (HCC)  Still just a little high.  He will keep working on the diet.  Next time, consider increase in the metformin.  Lab Results   Component Value Date    HGBA1C 7.3 (H) 2025       Orders:  •  Hemoglobin A1C; Future    Mixed hyperlipidemia  LDL just slightly above goal again.  He cannot tolerate statins.  Continue Tricor.  Orders:  •  Lipid panel; Future       Preventive health issues were discussed with patient, and age appropriate screening tests were ordered as noted in patient's After Visit Summary. Personalized health advice and appropriate referrals for health education or preventive services given if needed, as noted in patient's After Visit Summary.    History of Present Illness     Patient comes in today for routine follow-up and Medicare wellness.  He states he is doing okay.  His labs are stable.  A1c still just slightly high at 7.3.  He has been working on the diet.  Still taking the metformin and tolerating it.  Blood pressure is controlled.  Other chronic problems are stable.  Denies any new complaints today.  No further additions to his history.       Patient Care Team:  Magnus  MD Oh as PCP - General  CHERISE Vanessa MD    Review of Systems   Respiratory:  Negative for shortness of breath.    Cardiovascular:  Negative for chest pain.   Gastrointestinal:  Negative for abdominal pain.     Medical History Reviewed by provider this encounter:  Tobacco  Allergies  Meds  Problems  Med Hx  Surg Hx  Fam Hx       Annual Wellness Visit Questionnaire   Xander is here for his Subsequent Wellness visit.     Health Risk Assessment:   Patient rates overall health as good. Patient feels that their physical health rating is same. Patient is satisfied with their life. Eyesight was rated as same. Hearing was rated as same. Patient feels that their emotional and mental health rating is same. Patients states they are never, rarely angry. Patient states they are always unusually tired/fatigued. Pain experienced in the last 7 days has been some. Patient's pain rating has been 5/10. Patient states that he has experienced no weight loss or gain in last 6 months.     Depression Screening:   PHQ-2 Score: 0      Fall Risk Screening:   In the past year, patient has experienced: no history of falling in past year      Home Safety:  Patient has trouble with stairs inside or outside of their home. Patient has working smoke alarms and has working carbon monoxide detector. Home safety hazards include: none.     Nutrition:   Current diet is Diabetic.     Medications:   Patient is currently taking over-the-counter supplements. OTC medications include: see medication list. Patient is able to manage medications.     Activities of Daily Living (ADLs)/Instrumental Activities of Daily Living (IADLs):   Walk and transfer into and out of bed and chair?: Yes  Dress and groom yourself?: Yes    Bathe or shower yourself?: Yes    Feed yourself? Yes  Do your laundry/housekeeping?: Yes  Manage your money, pay your bills and track your expenses?: Yes  Make your own meals?: Yes    Do your own shopping?:  Yes    Previous Hospitalizations:   Any hospitalizations or ED visits within the last 12 months?: No      Advance Care Planning:   Living will: Yes    Durable POA for healthcare: Yes    Advanced directive: Yes      Cognitive Screening:   Provider or family/friend/caregiver concerned regarding cognition?: No    Preventive Screenings      Cardiovascular Screening:    General: Screening Not Indicated and History Lipid Disorder      Diabetes Screening:     General: Screening Not Indicated and History Diabetes      Colorectal Cancer Screening:     General: Screening Current      Prostate Cancer Screening:    General: Screening Not Indicated      Osteoporosis Screening:    General: Screening Not Indicated      Abdominal Aortic Aneurysm (AAA) Screening:        General: Screening Not Indicated      Lung Cancer Screening:     General: Screening Not Indicated      Hepatitis C Screening:    General: Screening Not Indicated    Immunizations:  - Immunizations due: Prevnar 20 and Zoster (Shingrix)  - Risks/benefits immunizations discussed      Screening, Brief Intervention, and Referral to Treatment (SBIRT)     Screening  Typical number of drinks in a day: 0  Typical number of drinks in a week: 0  Interpretation: Low risk drinking behavior.    AUDIT-C Screenin) How often did you have a drink containing alcohol in the past year? never  2) How many drinks did you have on a typical day when you were drinking in the past year? 0  3) How often did you have 6 or more drinks on one occasion in the past year? never    AUDIT-C Score: 0  Interpretation: Score 0-3 (male): Negative screen for alcohol misuse    Single Item Drug Screening:  How often have you used an illegal drug (including marijuana) or a prescription medication for non-medical reasons in the past year? never    Single Item Drug Screen Score: 0  Interpretation: Negative screen for possible drug use disorder    Brief Intervention  Alcohol & drug use screenings were  "reviewed. No concerns regarding substance use disorder identified.     Social Drivers of Health     Financial Resource Strain: Low Risk  (5/11/2023)    Overall Financial Resource Strain (CARDIA)    • Difficulty of Paying Living Expenses: Not hard at all   Food Insecurity: No Food Insecurity (7/10/2025)    Hunger Vital Sign    • Worried About Running Out of Food in the Last Year: Never true    • Ran Out of Food in the Last Year: Never true   Transportation Needs: No Transportation Needs (7/10/2025)    PRAPARE - Transportation    • Lack of Transportation (Medical): No    • Lack of Transportation (Non-Medical): No   Housing Stability: Low Risk  (7/10/2025)    Housing Stability Vital Sign    • Unable to Pay for Housing in the Last Year: No    • Number of Times Moved in the Last Year: 1    • Homeless in the Last Year: No   Utilities: Not At Risk (7/10/2025)    [facility] Utilities    • Threatened with loss of utilities: No     No results found.    Objective   /68 (BP Location: Left arm, Patient Position: Sitting, Cuff Size: Adult)   Pulse 60   Resp 18   Ht 5' 4\" (1.626 m)   Wt 100 kg (220 lb 12.8 oz)   SpO2 92%   BMI 37.90 kg/m²     Physical Exam  Vitals and nursing note reviewed.     Cardiovascular:      Rate and Rhythm: Normal rate and regular rhythm.   Pulmonary:      Effort: Pulmonary effort is normal.      Breath sounds: Normal breath sounds.   Abdominal:      General: Abdomen is flat.      Tenderness: There is no abdominal tenderness.     Musculoskeletal:      Right lower leg: No edema.      Left lower leg: No edema.     Neurological:      Mental Status: He is alert and oriented to person, place, and time.     Psychiatric:         Behavior: Behavior normal.         Thought Content: Thought content normal.         Judgment: Judgment normal.         "

## 2025-07-10 NOTE — ASSESSMENT & PLAN NOTE
LDL just slightly above goal again.  He cannot tolerate statins.  Continue Tricor.  Orders:  •  Lipid panel; Future

## 2025-07-10 NOTE — PATIENT INSTRUCTIONS
Medicare Preventive Visit Patient Instructions  Thank you for completing your Welcome to Medicare Visit or Medicare Annual Wellness Visit today. Your next wellness visit will be due in one year (7/11/2026).  The screening/preventive services that you may require over the next 5-10 years are detailed below. Some tests may not apply to you based off risk factors and/or age. Screening tests ordered at today's visit but not completed yet may show as past due. Also, please note that scanned in results may not display below.  Preventive Screenings:  Service Recommendations Previous Testing/Comments   Colorectal Cancer Screening  Colonoscopy    Fecal Occult Blood Test (FOBT)/Fecal Immunochemical Test (FIT)  Fecal DNA/Cologuard Test  Flexible Sigmoidoscopy Age: 45-75 years old   Colonoscopy: every 10 years (May be performed more frequently if at higher risk)  OR  FOBT/FIT: every 1 year  OR  Cologuard: every 3 years  OR  Sigmoidoscopy: every 5 years  Screening may be recommended earlier than age 45 if at higher risk for colorectal cancer. Also, an individualized decision between you and your healthcare provider will decide whether screening between the ages of 76-85 would be appropriate. Colonoscopy: 08/24/2023  FOBT/FIT: Not on file  Cologuard: Not on file  Sigmoidoscopy: Not on file    Screening Current     Prostate Cancer Screening Individualized decision between patient and health care provider in men between ages of 55-69   Medicare will cover every 12 months beginning on the day after your 50th birthday PSA: 1.0 ng/mL     Screening Not Indicated     Hepatitis C Screening Once for adults born between 1945 and 1965  More frequently in patients at high risk for Hepatitis C Hep C Antibody: Not on file        Diabetes Screening 1-2 times per year if you're at risk for diabetes or have pre-diabetes Fasting glucose: 152 mg/dL (7/2/2025)  A1C: 7.3 % (7/2/2025)  Screening Not Indicated  History Diabetes   Cholesterol Screening  Once every 5 years if you don't have a lipid disorder. May order more often based on risk factors. Lipid panel: 07/02/2025  Screening Not Indicated  History Lipid Disorder      Other Preventive Screenings Covered by Medicare:  Abdominal Aortic Aneurysm (AAA) Screening: covered once if your at risk. You're considered to be at risk if you have a family history of AAA or a male between the age of 65-75 who smoking at least 100 cigarettes in your lifetime.  Lung Cancer Screening: covers low dose CT scan once per year if you meet all of the following conditions: (1) Age 55-77; (2) No signs or symptoms of lung cancer; (3) Current smoker or have quit smoking within the last 15 years; (4) You have a tobacco smoking history of at least 20 pack years (packs per day x number of years you smoked); (5) You get a written order from a healthcare provider.  Glaucoma Screening: covered annually if you're considered high risk: (1) You have diabetes OR (2) Family history of glaucoma OR (3)  aged 50 and older OR (4)  American aged 65 and older  Osteoporosis Screening: covered every 2 years if you meet one of the following conditions: (1) Have a vertebral abnormality; (2) On glucocorticoid therapy for more than 3 months; (3) Have primary hyperparathyroidism; (4) On osteoporosis medications and need to assess response to drug therapy.  HIV Screening: covered annually if you're between the age of 15-65. Also covered annually if you are younger than 15 and older than 65 with risk factors for HIV infection. For pregnant patients, it is covered up to 3 times per pregnancy.    Immunizations:  Immunization Recommendations   Influenza Vaccine Annual influenza vaccination during flu season is recommended for all persons aged >= 6 months who do not have contraindications   Pneumococcal Vaccine   * Pneumococcal conjugate vaccine = PCV13 (Prevnar 13), PCV15 (Vaxneuvance), PCV20 (Prevnar 20)  * Pneumococcal polysaccharide  vaccine = PPSV23 (Pneumovax) Adults 19-65 yo with certain risk factors or if 65+ yo  If never received any pneumonia vaccine: recommend Prevnar 20 (PCV20)  Give PCV20 if previously received 1 dose of PCV13 or PPSV23   Hepatitis B Vaccine 3 dose series if at intermediate or high risk (ex: diabetes, end stage renal disease, liver disease)   Respiratory syncytial virus (RSV) Vaccine - COVERED BY MEDICARE PART D  * RSVPreF3 (Arexvy) CDC recommends that adults 60 years of age and older may receive a single dose of RSV vaccine using shared clinical decision-making (SCDM)   Tetanus (Td) Vaccine - COST NOT COVERED BY MEDICARE PART B Following completion of primary series, a booster dose should be given every 10 years to maintain immunity against tetanus. Td may also be given as tetanus wound prophylaxis.   Tdap Vaccine - COST NOT COVERED BY MEDICARE PART B Recommended at least once for all adults. For pregnant patients, recommended with each pregnancy.   Shingles Vaccine (Shingrix) - COST NOT COVERED BY MEDICARE PART B  2 shot series recommended in those 19 years and older who have or will have weakened immune systems or those 50 years and older     Health Maintenance Due:      Topic Date Due   • Hepatitis C Screening  Never done   • Colorectal Cancer Screening  08/23/2026     Immunizations Due:      Topic Date Due   • Pneumococcal Vaccine: 50+ Years (1 of 2 - PCV) Never done   • COVID-19 Vaccine (7 - 2024-25 season) 09/01/2024   • Influenza Vaccine (1) 09/01/2025     Advance Directives   What are advance directives?  Advance directives are legal documents that state your wishes and plans for medical care. These plans are made ahead of time in case you lose your ability to make decisions for yourself. Advance directives can apply to any medical decision, such as the treatments you want, and if you want to donate organs.   What are the types of advance directives?  There are many types of advance directives, and each state  has rules about how to use them. You may choose a combination of any of the following:  Living will:  This is a written record of the treatment you want. You can also choose which treatments you do not want, which to limit, and which to stop at a certain time. This includes surgery, medicine, IV fluid, and tube feedings.   Durable power of  for healthcare (DPAHC):  This is a written record that states who you want to make healthcare choices for you when you are unable to make them for yourself. This person, called a proxy, is usually a family member or a friend. You may choose more than 1 proxy.  Do not resuscitate (DNR) order:  A DNR order is used in case your heart stops beating or you stop breathing. It is a request not to have certain forms of treatment, such as CPR. A DNR order may be included in other types of advance directives.  Medical directive:  This covers the care that you want if you are in a coma, near death, or unable to make decisions for yourself. You can list the treatments you want for each condition. Treatment may include pain medicine, surgery, blood transfusions, dialysis, IV or tube feedings, and a ventilator (breathing machine).  Values history:  This document has questions about your views, beliefs, and how you feel and think about life. This information can help others choose the care that you would choose.  Why are advance directives important?  An advance directive helps you control your care. Although spoken wishes may be used, it is better to have your wishes written down. Spoken wishes can be misunderstood, or not followed. Treatments may be given even if you do not want them. An advance directive may make it easier for your family to make difficult choices about your care.   Weight Management   Why it is important to manage your weight:  Being overweight increases your risk of health conditions such as heart disease, high blood pressure, type 2 diabetes, and certain types of  cancer. It can also increase your risk for osteoarthritis, sleep apnea, and other respiratory problems. Aim for a slow, steady weight loss. Even a small amount of weight loss can lower your risk of health problems.  How to lose weight safely:  A safe and healthy way to lose weight is to eat fewer calories and get regular exercise. You can lose up about 1 pound a week by decreasing the number of calories you eat by 500 calories each day.   Healthy meal plan for weight management:  A healthy meal plan includes a variety of foods, contains fewer calories, and helps you stay healthy. A healthy meal plan includes the following:  Eat whole-grain foods more often.  A healthy meal plan should contain fiber. Fiber is the part of grains, fruits, and vegetables that is not broken down by your body. Whole-grain foods are healthy and provide extra fiber in your diet. Some examples of whole-grain foods are whole-wheat breads and pastas, oatmeal, brown rice, and bulgur.  Eat a variety of vegetables every day.  Include dark, leafy greens such as spinach, kale, terence greens, and mustard greens. Eat yellow and orange vegetables such as carrots, sweet potatoes, and winter squash.   Eat a variety of fruits every day.  Choose fresh or canned fruit (canned in its own juice or light syrup) instead of juice. Fruit juice has very little or no fiber.  Eat low-fat dairy foods.  Drink fat-free (skim) milk or 1% milk. Eat fat-free yogurt and low-fat cottage cheese. Try low-fat cheeses such as mozzarella and other reduced-fat cheeses.  Choose meat and other protein foods that are low in fat.  Choose beans or other legumes such as split peas or lentils. Choose fish, skinless poultry (chicken or turkey), or lean cuts of red meat (beef or pork). Before you cook meat or poultry, cut off any visible fat.   Use less fat and oil.  Try baking foods instead of frying them. Add less fat, such as margarine, sour cream, regular salad dressing and  mayonnaise to foods. Eat fewer high-fat foods. Some examples of high-fat foods include french fries, doughnuts, ice cream, and cakes.  Eat fewer sweets.  Limit foods and drinks that are high in sugar. This includes candy, cookies, regular soda, and sweetened drinks.  Exercise:  Exercise at least 30 minutes per day on most days of the week. Some examples of exercise include walking, biking, dancing, and swimming. You can also fit in more physical activity by taking the stairs instead of the elevator or parking farther away from stores. Ask your healthcare provider about the best exercise plan for you.    © Copyright [employer] 2018 Information is for End User's use only and may not be sold, redistributed or otherwise used for commercial purposes. All illustrations and images included in CareNotes® are the copyrighted property of [employer] or [employer]

## 2025-07-10 NOTE — ASSESSMENT & PLAN NOTE
Controlled.  Orders:  •  omeprazole (PriLOSEC) 40 MG capsule; Take 1 capsule (40 mg total) by mouth daily

## 2025-07-10 NOTE — ASSESSMENT & PLAN NOTE
Still just a little high.  He will keep working on the diet.  Next time, consider increase in the metformin.  Lab Results   Component Value Date    HGBA1C 7.3 (H) 07/02/2025       Orders:  •  Hemoglobin A1C; Future

## 2025-07-10 NOTE — ASSESSMENT & PLAN NOTE
Needed a refill.  But working well.  Orders:  •  solifenacin (VESICARE) 5 mg tablet; Take 1 tablet (5 mg total) by mouth daily

## 2025-07-22 DIAGNOSIS — I10 BENIGN HYPERTENSION: ICD-10-CM

## 2025-07-22 RX ORDER — LOSARTAN POTASSIUM 50 MG/1
50 TABLET ORAL DAILY
Qty: 90 TABLET | Refills: 1 | Status: SHIPPED | OUTPATIENT
Start: 2025-07-22

## 2025-07-22 RX ORDER — LOSARTAN POTASSIUM 50 MG/1
50 TABLET ORAL DAILY
Qty: 7 TABLET | Refills: 0 | Status: SHIPPED | OUTPATIENT
Start: 2025-07-22

## 2025-07-22 NOTE — TELEPHONE ENCOUNTER
Patient is requesting a 7 day supply to be sent to [id number] - [place] - [address]  [phone]   And a 90 sent to [facility] - [address] [phone]

## 2025-07-31 DIAGNOSIS — K21.9 GASTROESOPHAGEAL REFLUX DISEASE WITHOUT ESOPHAGITIS: ICD-10-CM

## 2025-08-01 RX ORDER — OMEPRAZOLE 40 MG/1
40 CAPSULE, DELAYED RELEASE ORAL DAILY
Qty: 90 CAPSULE | Refills: 1 | Status: SHIPPED | OUTPATIENT
Start: 2025-08-01

## 2025-08-12 ENCOUNTER — TELEPHONE (OUTPATIENT)
Age: 80
End: 2025-08-12

## 2025-08-21 ENCOUNTER — OFFICE VISIT (OUTPATIENT)
Dept: URGENT CARE | Facility: CLINIC | Age: 80
End: 2025-08-21
Payer: MEDICARE

## 2025-08-21 VITALS
RESPIRATION RATE: 18 BRPM | HEART RATE: 72 BPM | SYSTOLIC BLOOD PRESSURE: 114 MMHG | DIASTOLIC BLOOD PRESSURE: 62 MMHG | OXYGEN SATURATION: 95 % | TEMPERATURE: 98.6 F

## 2025-08-21 DIAGNOSIS — S29.001A INJURY OF MUSCLE OF CHEST WALL: Primary | ICD-10-CM

## 2025-08-21 PROCEDURE — 99213 OFFICE O/P EST LOW 20 MIN: CPT | Performed by: PHYSICIAN ASSISTANT

## 2025-08-21 PROCEDURE — G0463 HOSPITAL OUTPT CLINIC VISIT: HCPCS | Performed by: PHYSICIAN ASSISTANT
